# Patient Record
Sex: FEMALE | Race: WHITE | Employment: FULL TIME | URBAN - METROPOLITAN AREA
[De-identification: names, ages, dates, MRNs, and addresses within clinical notes are randomized per-mention and may not be internally consistent; named-entity substitution may affect disease eponyms.]

---

## 2017-01-12 ENCOUNTER — TELEPHONE (OUTPATIENT)
Dept: FAMILY MEDICINE CLINIC | Age: 47
End: 2017-01-12

## 2017-01-13 ENCOUNTER — TELEPHONE (OUTPATIENT)
Dept: FAMILY MEDICINE CLINIC | Age: 47
End: 2017-01-13

## 2017-01-13 DIAGNOSIS — G89.29 OTHER CHRONIC BACK PAIN: Primary | ICD-10-CM

## 2017-01-13 DIAGNOSIS — M54.9 OTHER CHRONIC BACK PAIN: Primary | ICD-10-CM

## 2017-01-19 PROBLEM — M48.061 SPINAL STENOSIS, LUMBAR REGION, WITHOUT NEUROGENIC CLAUDICATION: Chronic | Status: ACTIVE | Noted: 2017-01-19

## 2017-01-19 PROBLEM — M47.817 LUMBOSACRAL SPONDYLOSIS WITHOUT MYELOPATHY: Status: ACTIVE | Noted: 2017-01-19

## 2017-01-19 PROBLEM — M51.379 DEGENERATION OF LUMBAR OR LUMBOSACRAL INTERVERTEBRAL DISC: Chronic | Status: ACTIVE | Noted: 2017-01-19

## 2017-01-19 PROBLEM — M51.37 DEGENERATION OF LUMBAR OR LUMBOSACRAL INTERVERTEBRAL DISC: Chronic | Status: ACTIVE | Noted: 2017-01-19

## 2017-01-19 PROBLEM — M51.26 DISPLACEMENT OF LUMBAR INTERVERTEBRAL DISC WITHOUT MYELOPATHY: Chronic | Status: ACTIVE | Noted: 2017-01-19

## 2017-01-19 PROBLEM — M51.37 DEGENERATION OF LUMBAR OR LUMBOSACRAL INTERVERTEBRAL DISC: Status: ACTIVE | Noted: 2017-01-19

## 2017-01-19 PROBLEM — M51.379 DEGENERATION OF LUMBAR OR LUMBOSACRAL INTERVERTEBRAL DISC: Status: ACTIVE | Noted: 2017-01-19

## 2017-01-19 PROBLEM — M51.26 DISPLACEMENT OF LUMBAR INTERVERTEBRAL DISC WITHOUT MYELOPATHY: Status: ACTIVE | Noted: 2017-01-19

## 2017-01-19 PROBLEM — M47.817 LUMBOSACRAL SPONDYLOSIS WITHOUT MYELOPATHY: Chronic | Status: ACTIVE | Noted: 2017-01-19

## 2017-01-19 PROBLEM — M48.061 SPINAL STENOSIS, LUMBAR REGION, WITHOUT NEUROGENIC CLAUDICATION: Status: ACTIVE | Noted: 2017-01-19

## 2017-05-31 ENCOUNTER — OFFICE VISIT (OUTPATIENT)
Dept: FAMILY MEDICINE CLINIC | Age: 47
End: 2017-05-31

## 2017-05-31 VITALS
DIASTOLIC BLOOD PRESSURE: 62 MMHG | BODY MASS INDEX: 23.58 KG/M2 | WEIGHT: 137.4 LBS | TEMPERATURE: 99.1 F | SYSTOLIC BLOOD PRESSURE: 92 MMHG

## 2017-05-31 DIAGNOSIS — J01.01 ACUTE RECURRENT MAXILLARY SINUSITIS: Primary | ICD-10-CM

## 2017-05-31 PROCEDURE — 99213 OFFICE O/P EST LOW 20 MIN: CPT | Performed by: FAMILY MEDICINE

## 2017-05-31 RX ORDER — AMOXICILLIN AND CLAVULANATE POTASSIUM 875; 125 MG/1; MG/1
1 TABLET, FILM COATED ORAL EVERY 12 HOURS
Qty: 20 TABLET | Refills: 0 | Status: SHIPPED | OUTPATIENT
Start: 2017-05-31 | End: 2017-06-10

## 2017-05-31 ASSESSMENT — ENCOUNTER SYMPTOMS
FACIAL SWELLING: 1
SINUS PRESSURE: 1
SORE THROAT: 1
BACK PAIN: 1

## 2017-08-14 PROBLEM — M51.26 DISC DISPLACEMENT, LUMBAR: Status: ACTIVE | Noted: 2017-08-14

## 2017-08-14 PROBLEM — M48.061 LUMBAR STENOSIS: Status: ACTIVE | Noted: 2017-08-14

## 2017-10-16 ENCOUNTER — TELEPHONE (OUTPATIENT)
Dept: FAMILY MEDICINE CLINIC | Age: 47
End: 2017-10-16

## 2017-10-16 ENCOUNTER — OFFICE VISIT (OUTPATIENT)
Dept: FAMILY MEDICINE CLINIC | Age: 47
End: 2017-10-16

## 2017-10-16 VITALS
WEIGHT: 137 LBS | TEMPERATURE: 98.5 F | SYSTOLIC BLOOD PRESSURE: 108 MMHG | DIASTOLIC BLOOD PRESSURE: 68 MMHG | BODY MASS INDEX: 23.52 KG/M2

## 2017-10-16 DIAGNOSIS — J01.00 ACUTE MAXILLARY SINUSITIS, RECURRENCE NOT SPECIFIED: Primary | ICD-10-CM

## 2017-10-16 DIAGNOSIS — M51.26 DISC DISPLACEMENT, LUMBAR: ICD-10-CM

## 2017-10-16 PROCEDURE — 99213 OFFICE O/P EST LOW 20 MIN: CPT | Performed by: FAMILY MEDICINE

## 2017-10-16 RX ORDER — AMOXICILLIN AND CLAVULANATE POTASSIUM 875; 125 MG/1; MG/1
1 TABLET, FILM COATED ORAL EVERY 12 HOURS
Qty: 20 TABLET | Refills: 0 | Status: SHIPPED | OUTPATIENT
Start: 2017-10-16 | End: 2017-10-26

## 2017-10-16 RX ORDER — AMOXICILLIN AND CLAVULANATE POTASSIUM 875; 125 MG/1; MG/1
1 TABLET, FILM COATED ORAL EVERY 12 HOURS
Qty: 20 TABLET | Refills: 0 | Status: SHIPPED | OUTPATIENT
Start: 2017-10-16 | End: 2017-10-16 | Stop reason: SDUPTHER

## 2017-10-16 ASSESSMENT — ENCOUNTER SYMPTOMS
BACK PAIN: 1
DIARRHEA: 0
NAUSEA: 0
VOMITING: 0
SINUS PRESSURE: 1
COUGH: 1
SINUS PAIN: 1
RHINORRHEA: 1

## 2017-10-16 ASSESSMENT — PATIENT HEALTH QUESTIONNAIRE - PHQ9
1. LITTLE INTEREST OR PLEASURE IN DOING THINGS: 0
SUM OF ALL RESPONSES TO PHQ QUESTIONS 1-9: 0
2. FEELING DOWN, DEPRESSED OR HOPELESS: 0
SUM OF ALL RESPONSES TO PHQ9 QUESTIONS 1 & 2: 0

## 2017-10-16 NOTE — PROGRESS NOTES
Subjective:      Patient ID: Sandra Colin is a 40 y.o. female. HPI  Head ,chest ,stuffy ,runny nose  Mucous with cough + yellow  Fever last a week ago   OTC Tylenol sinus + Sudafed     Review of Systems   Constitutional:        Loss of appetite   HENT: Positive for congestion, postnasal drip, rhinorrhea, sinus pain and sinus pressure. Respiratory: Positive for cough. Gastrointestinal: Negative for diarrhea, nausea and vomiting. Genitourinary:        BCP continuous to skip cycle   Musculoskeletal: Positive for back pain. Back pain resolved   Chiropractor care   She didn't want Epidural    Psychiatric/Behavioral:        Sleep is poor due cough       Objective:   Physical Exam   Constitutional: She is oriented to person, place, and time. She appears well-developed and well-nourished. No distress. HENT:   Head: Normocephalic. Right Ear: External ear normal.   Left Ear: External ear normal.   Nose: Nose normal.   Mouth/Throat: Oropharynx is clear and moist. No oropharyngeal exudate. Eyes: Conjunctivae and EOM are normal. Pupils are equal, round, and reactive to light. Neck: Normal range of motion. Neck supple. Cardiovascular: Normal rate, regular rhythm and normal heart sounds. No murmur heard. Pulmonary/Chest: Effort normal and breath sounds normal. No respiratory distress. She has no wheezes. Musculoskeletal: Normal range of motion. She exhibits no edema. Neurological: She is alert and oriented to person, place, and time. Skin: Skin is warm. No rash noted. She is not diaphoretic. Psychiatric: Her behavior is normal. Judgment and thought content normal.   Vitals reviewed. /68   Temp 98.5 °F (36.9 °C) (Oral)   Wt 137 lb (62.1 kg)   BMI 23.52 kg/m²       Assessment:      1. Acute maxillary sinusitis, recurrence not specified  amoxicillin-clavulanate (AUGMENTIN) 875-125 MG per tablet   2.  Disc displacement, lumbar             Plan:      Back pain stable she couldn't continue with pain specialist due insurance issues & she declined Epidural  Rx

## 2017-10-16 NOTE — TELEPHONE ENCOUNTER
Pt states that Augmentin needs sent to [place]. It is cheaper for her if it is sent there.     Last office visit 10/16/17

## 2017-11-01 ENCOUNTER — TELEPHONE (OUTPATIENT)
Dept: FAMILY MEDICINE CLINIC | Age: 47
End: 2017-11-01

## 2017-11-01 DIAGNOSIS — M51.26 DISPLACEMENT OF LUMBAR INTERVERTEBRAL DISC WITHOUT MYELOPATHY: Chronic | ICD-10-CM

## 2017-11-01 DIAGNOSIS — M48.061 SPINAL STENOSIS, LUMBAR REGION, WITHOUT NEUROGENIC CLAUDICATION: Chronic | ICD-10-CM

## 2017-11-01 DIAGNOSIS — M51.37 DEGENERATION OF LUMBAR OR LUMBOSACRAL INTERVERTEBRAL DISC: Chronic | ICD-10-CM

## 2017-11-01 DIAGNOSIS — M47.817 LUMBOSACRAL SPONDYLOSIS WITHOUT MYELOPATHY: Chronic | ICD-10-CM

## 2017-11-07 ENCOUNTER — TELEPHONE (OUTPATIENT)
Dept: PAIN MANAGEMENT | Age: 47
End: 2017-11-07

## 2017-12-28 ENCOUNTER — OFFICE VISIT (OUTPATIENT)
Dept: PAIN MANAGEMENT | Age: 47
End: 2017-12-28

## 2017-12-28 VITALS
DIASTOLIC BLOOD PRESSURE: 69 MMHG | OXYGEN SATURATION: 99 % | SYSTOLIC BLOOD PRESSURE: 117 MMHG | HEIGHT: 64 IN | BODY MASS INDEX: 23.9 KG/M2 | WEIGHT: 140 LBS | HEART RATE: 71 BPM

## 2017-12-28 DIAGNOSIS — M51.37 DEGENERATION OF LUMBAR OR LUMBOSACRAL INTERVERTEBRAL DISC: Chronic | ICD-10-CM

## 2017-12-28 DIAGNOSIS — M47.817 LUMBOSACRAL SPONDYLOSIS WITHOUT MYELOPATHY: Chronic | ICD-10-CM

## 2017-12-28 DIAGNOSIS — F51.01 PRIMARY INSOMNIA: ICD-10-CM

## 2017-12-28 DIAGNOSIS — G89.29 CHRONIC BILATERAL LOW BACK PAIN WITH LEFT-SIDED SCIATICA: ICD-10-CM

## 2017-12-28 DIAGNOSIS — G89.4 CHRONIC PAIN SYNDROME: Primary | ICD-10-CM

## 2017-12-28 DIAGNOSIS — R53.83 FATIGUE, UNSPECIFIED TYPE: ICD-10-CM

## 2017-12-28 DIAGNOSIS — M54.42 CHRONIC BILATERAL LOW BACK PAIN WITH LEFT-SIDED SCIATICA: ICD-10-CM

## 2017-12-28 DIAGNOSIS — M54.16 LUMBAR NERVE ROOT IMPINGEMENT: ICD-10-CM

## 2017-12-28 DIAGNOSIS — F32.A DEPRESSION, UNSPECIFIED DEPRESSION TYPE: ICD-10-CM

## 2017-12-28 DIAGNOSIS — M51.26 DISC DISPLACEMENT, LUMBAR: ICD-10-CM

## 2017-12-28 PROCEDURE — 99244 OFF/OP CNSLTJ NEW/EST MOD 40: CPT | Performed by: NURSE PRACTITIONER

## 2017-12-28 RX ORDER — OXYCODONE HYDROCHLORIDE AND ACETAMINOPHEN 5; 325 MG/1; MG/1
1 TABLET ORAL 2 TIMES DAILY PRN
Qty: 60 TABLET | Refills: 0 | Status: SHIPPED | OUTPATIENT
Start: 2017-12-28 | End: 2018-01-25 | Stop reason: SDUPTHER

## 2017-12-28 RX ORDER — SPIRONOLACTONE 100 MG/1
100 TABLET, FILM COATED ORAL DAILY
COMMUNITY
End: 2020-07-01 | Stop reason: CLARIF

## 2017-12-28 RX ORDER — DULOXETIN HYDROCHLORIDE 30 MG/1
30 CAPSULE, DELAYED RELEASE ORAL DAILY
Qty: 30 CAPSULE | Refills: 0 | Status: SHIPPED | OUTPATIENT
Start: 2017-12-28 | End: 2018-01-25 | Stop reason: SDUPTHER

## 2017-12-28 RX ORDER — DROSPIRENONE AND ETHINYL ESTRADIOL 0.03MG-3MG
1 KIT ORAL DAILY
COMMUNITY
End: 2020-07-01 | Stop reason: CLARIF

## 2017-12-28 RX ORDER — AMITRIPTYLINE HYDROCHLORIDE 25 MG/1
25 TABLET, FILM COATED ORAL NIGHTLY
Qty: 28 TABLET | Refills: 0 | Status: SHIPPED | OUTPATIENT
Start: 2017-12-28 | End: 2018-01-25 | Stop reason: SDUPTHER

## 2017-12-29 NOTE — PROGRESS NOTES
HISTORY OF PRESENT ILLNESS:  Ms. Katie Ford is a 56 y.o. female presents for consultation at the kind request of Dr. Fritz Jolley her primary care physician for chronic pain management. Her presenting problems are pain in the lower back that radiated more to the left buttock and leg, than the right lower extremity. She has also been evaluated by OB/GYN. Onset of pain in the lower back began about a year ago while making her bed. She believes she may have bended wrongly causing a strain on her lower back. Reports having failed to work the next day due to pain, and was treated at [facility] later the same day with Morphine, and muscle relaxer's. She was under the care of pain management with Dr. Mirta Vasquez, who was already her 's pain provider for about 1 year. Insurance coverage changed due to her recent divorce, forcing her to find another pain provider. Under Dr. Delma Butcher care she was mainly treated with hydrocodone and oxycodone. Denies having had PRASHANTH's or surgeries to the lumbar spine, and is not interested in these procedures at this point. Denies having had physical therapy. She was last prescribed oxycodone 2 months ago, and was able to stretch it until today's visit. She reports taking it only as needed to avoid getting dependent on pain pills. She rates the pain in the lower back and legs at 3/10, and mainly takes pain medications to allow her to work. She describes it as aching, burning, numbness, tingling, pins and needles to the left leg. Pain is greater in her lower back than legs. Pain is made worse by: sitting, bending, vacuuming, straining, yardwork. Activities that have been limited by pain that she otherwise tolerated well are exercising. Alternative therapies she has previously attempted are pain medicine, manipulation by chiropractor, ice/heat packs. Current treatment regimen has helped relieve about 50% of the pain.  Relieving factors of pain include pain medicine, heat pad, Pulmonary/Chest: Effort normal and breath sounds normal. No respiratory distress. Abdominal: Soft. Bowel sounds are normal. She exhibits no distension and no mass. There is no tenderness. There is no guarding. Musculoskeletal: She exhibits tenderness. She exhibits no edema. Right shoulder: Normal.        Right hip: She exhibits decreased range of motion (Tenderness with 70° flexion to the lumbar region) and tenderness. Left hip: She exhibits tenderness (Guarded lumber pain). She exhibits normal range of motion. Lumbar back: She exhibits tenderness and bony tenderness (Tenderness noted mainly with 60° flexion, 5° extension). She exhibits normal range of motion and no edema. Left upper leg: She exhibits tenderness. Left lower leg: She exhibits tenderness. Left foot: There is tenderness. Lymphadenopathy:     She has no cervical adenopathy. She has no axillary adenopathy. Neurological: She is alert and oriented to person, place, and time. She has normal strength and normal reflexes. No cranial nerve deficit or sensory deficit. She displays a negative Romberg sign. Coordination and gait normal.   Reflex Scores:       Patellar reflexes are 2+ on the right side and 2+ on the left side. Achilles reflexes are 2+ on the right side and 2+ on the left side. Skin: Skin is warm, dry and intact. No rash noted. No cyanosis. Nails show no clubbing. Psychiatric: Her speech is normal and behavior is normal. Her affect is blunt. She exhibits a depressed mood. MRI of the Lumbar spine 1/13/17:  1.  Small right lateral disc protrusion at the L2-3 level, extending into the neural foramen, resulting in right neural foraminal narrowing, impinging upon the exiting right L2 nerve root. 2.  No significant canal stenosis throughout the lumbar spine. /69   Pulse 71   Ht 5' 4\" (1.626 m)   Wt 140 lb (63.5 kg)   SpO2 99%   Breastfeeding?  No   BMI 24.03 kg/m²

## 2018-01-08 ENCOUNTER — TELEPHONE (OUTPATIENT)
Dept: PAIN MANAGEMENT | Age: 48
End: 2018-01-08

## 2018-01-08 NOTE — TELEPHONE ENCOUNTER
Following up on overdue results for the CBC, CMP and TSH ordered during her last office visit. Alexus Newton will review the need for testing with the patient at her next monthly office visit.

## 2018-01-15 ENCOUNTER — HOSPITAL ENCOUNTER (OUTPATIENT)
Dept: MAMMOGRAPHY | Age: 48
Discharge: HOME | End: 2018-01-15
Attending: FAMILY MEDICINE | Admitting: FAMILY MEDICINE

## 2018-01-15 DIAGNOSIS — Z12.31 VISIT FOR SCREENING MAMMOGRAM: ICD-10-CM

## 2018-01-25 ENCOUNTER — OFFICE VISIT (OUTPATIENT)
Dept: PAIN MANAGEMENT | Age: 48
End: 2018-01-25

## 2018-01-25 VITALS
DIASTOLIC BLOOD PRESSURE: 73 MMHG | SYSTOLIC BLOOD PRESSURE: 112 MMHG | OXYGEN SATURATION: 99 % | HEART RATE: 78 BPM | WEIGHT: 135 LBS | BODY MASS INDEX: 23.17 KG/M2

## 2018-01-25 DIAGNOSIS — F51.01 PRIMARY INSOMNIA: ICD-10-CM

## 2018-01-25 DIAGNOSIS — M51.37 DEGENERATION OF LUMBAR OR LUMBOSACRAL INTERVERTEBRAL DISC: Chronic | ICD-10-CM

## 2018-01-25 DIAGNOSIS — G89.4 CHRONIC PAIN SYNDROME: Primary | ICD-10-CM

## 2018-01-25 DIAGNOSIS — G89.29 CHRONIC BILATERAL LOW BACK PAIN WITH LEFT-SIDED SCIATICA: ICD-10-CM

## 2018-01-25 DIAGNOSIS — M48.061 SPINAL STENOSIS, LUMBAR REGION, WITHOUT NEUROGENIC CLAUDICATION: Chronic | ICD-10-CM

## 2018-01-25 DIAGNOSIS — F32.A DEPRESSION, UNSPECIFIED DEPRESSION TYPE: ICD-10-CM

## 2018-01-25 DIAGNOSIS — M54.16 LUMBAR NERVE ROOT IMPINGEMENT: ICD-10-CM

## 2018-01-25 DIAGNOSIS — M54.42 CHRONIC BILATERAL LOW BACK PAIN WITH LEFT-SIDED SCIATICA: ICD-10-CM

## 2018-01-25 DIAGNOSIS — M51.26 DISPLACEMENT OF LUMBAR INTERVERTEBRAL DISC WITHOUT MYELOPATHY: Chronic | ICD-10-CM

## 2018-01-25 PROCEDURE — 99213 OFFICE O/P EST LOW 20 MIN: CPT | Performed by: NURSE PRACTITIONER

## 2018-01-25 RX ORDER — AMITRIPTYLINE HYDROCHLORIDE 25 MG/1
25 TABLET, FILM COATED ORAL NIGHTLY
Qty: 28 TABLET | Refills: 1 | Status: SHIPPED | OUTPATIENT
Start: 2018-01-25 | End: 2018-03-08 | Stop reason: SDUPTHER

## 2018-01-25 RX ORDER — OXYCODONE HYDROCHLORIDE AND ACETAMINOPHEN 5; 325 MG/1; MG/1
1 TABLET ORAL 2 TIMES DAILY PRN
Qty: 60 TABLET | Refills: 0 | Status: SHIPPED | OUTPATIENT
Start: 2018-01-25 | End: 2018-03-08 | Stop reason: SDUPTHER

## 2018-01-25 RX ORDER — DULOXETIN HYDROCHLORIDE 30 MG/1
30 CAPSULE, DELAYED RELEASE ORAL DAILY
Qty: 30 CAPSULE | Refills: 1 | Status: SHIPPED | OUTPATIENT
Start: 2018-01-25 | End: 2018-03-08

## 2018-01-25 NOTE — PROGRESS NOTES
Elavil  -Opted for home exercises as physical therapy would be expensive under her insurance  -CBT techniques- relaxation therapies such as biofeedback, mindfulness based stress reduction, imagery, cognitive restructuring, problem solving discussed with patient  -Last UDS Consistent  -Return in about 4 weeks (around 2/22/2018). Current Outpatient Prescriptions   Medication Sig Dispense Refill    oxyCODONE-acetaminophen (PERCOCET) 5-325 MG per tablet Take 1 tablet by mouth 2 times daily as needed for Pain for up to 28 days. 60 tablet 0    DULoxetine (CYMBALTA) 30 MG extended release capsule Take 1 capsule by mouth daily 30 capsule 1    amitriptyline (ELAVIL) 25 MG tablet Take 1 tablet by mouth nightly 28 tablet 1    drospirenone-ethinyl estradiol (OCELLA) 3-0.03 MG TABS Take 1 tablet by mouth daily      spironolactone (ALDACTONE) 100 MG tablet Take 100 mg by mouth daily       No current facility-administered medications for this visit. I will continue her current medication regimen  which is part of the above treatment schedule. It has been helping with Ms. Lara's chronic  medical problems which for this visit include: The primary encounter diagnosis was Chronic pain syndrome. Diagnoses of Degeneration of lumbar or lumbosacral intervertebral disc, Displacement of lumbar intervertebral disc without myelopathy, Lumbar nerve root impingement, L2, Spinal stenosis, lumbar region, without neurogenic claudication, Chronic bilateral low back pain with left-sided sciatica, Depression, unspecified depression type, minimal, and Primary insomnia were also pertinent to this visit. Risks and benefits of the medications and other alternative treatments  including no treatment were discussed with the patient. The common side effects of these medications were also explained to the patient. Informed verbal consent was obtained.    Goals of current treatment regimen include improvement in pain, restoration of

## 2018-03-08 ENCOUNTER — OFFICE VISIT (OUTPATIENT)
Dept: PAIN MANAGEMENT | Age: 48
End: 2018-03-08

## 2018-03-08 VITALS — DIASTOLIC BLOOD PRESSURE: 82 MMHG | OXYGEN SATURATION: 100 % | HEART RATE: 77 BPM | SYSTOLIC BLOOD PRESSURE: 123 MMHG

## 2018-03-08 DIAGNOSIS — M48.061 SPINAL STENOSIS, LUMBAR REGION, WITHOUT NEUROGENIC CLAUDICATION: Chronic | ICD-10-CM

## 2018-03-08 DIAGNOSIS — G89.4 CHRONIC PAIN SYNDROME: Primary | ICD-10-CM

## 2018-03-08 DIAGNOSIS — M51.26 DISPLACEMENT OF LUMBAR INTERVERTEBRAL DISC WITHOUT MYELOPATHY: Chronic | ICD-10-CM

## 2018-03-08 DIAGNOSIS — M47.817 LUMBOSACRAL SPONDYLOSIS WITHOUT MYELOPATHY: Chronic | ICD-10-CM

## 2018-03-08 DIAGNOSIS — F51.01 PRIMARY INSOMNIA: ICD-10-CM

## 2018-03-08 DIAGNOSIS — K59.03 DRUG-INDUCED CONSTIPATION: ICD-10-CM

## 2018-03-08 DIAGNOSIS — M51.37 DEGENERATION OF LUMBAR OR LUMBOSACRAL INTERVERTEBRAL DISC: Chronic | ICD-10-CM

## 2018-03-08 DIAGNOSIS — M54.16 LUMBAR NERVE ROOT IMPINGEMENT: ICD-10-CM

## 2018-03-08 DIAGNOSIS — F32.A DEPRESSION, UNSPECIFIED DEPRESSION TYPE: ICD-10-CM

## 2018-03-08 PROCEDURE — 99213 OFFICE O/P EST LOW 20 MIN: CPT | Performed by: NURSE PRACTITIONER

## 2018-03-08 RX ORDER — POLYETHYLENE GLYCOL 3350 17 G/17G
17 POWDER, FOR SOLUTION ORAL DAILY
Qty: 510 G | Refills: 0 | Status: SHIPPED | OUTPATIENT
Start: 2018-03-08 | End: 2018-04-07

## 2018-03-08 RX ORDER — AMITRIPTYLINE HYDROCHLORIDE 25 MG/1
25 TABLET, FILM COATED ORAL NIGHTLY
Qty: 28 TABLET | Refills: 1 | Status: SHIPPED | OUTPATIENT
Start: 2018-03-08 | End: 2018-04-19 | Stop reason: SDUPTHER

## 2018-03-08 RX ORDER — OXYCODONE HYDROCHLORIDE AND ACETAMINOPHEN 5; 325 MG/1; MG/1
1 TABLET ORAL 2 TIMES DAILY PRN
Qty: 60 TABLET | Refills: 0 | Status: SHIPPED | OUTPATIENT
Start: 2018-03-08 | End: 2018-04-19 | Stop reason: SDUPTHER

## 2018-03-08 NOTE — PROGRESS NOTES
Cymbalta  -Denies the need for other adjuvant medications  -Opted for home exercises, Corrine exercises recommended  -CBT techniques- relaxation therapies such as biofeedback, mindfulness based stress reduction, imagery, cognitive restructuring, problem solving discussed with patient  -Last UDS consistent  -Return in about 4 weeks (around 4/5/2018). -[place] was obtained and reviewed  for the last one year and no indicators of drug misuse  were found. Any other controlled substance prescriptions  seen on the record have been accounted for, I am aware of the patient receiving these medications. Flavia Oliva [place] record will be rechecked as part of office protocol. Current Outpatient Prescriptions   Medication Sig Dispense Refill    amitriptyline (ELAVIL) 25 MG tablet Take 1 tablet by mouth nightly 28 tablet 1    oxyCODONE-acetaminophen (PERCOCET) 5-325 MG per tablet Take 1 tablet by mouth 2 times daily as needed for Pain for up to 28 days. 60 tablet 0    polyethylene glycol (MIRALAX) powder Take 17 g by mouth daily 510 g 0    drospirenone-ethinyl estradiol (OCELLA) 3-0.03 MG TABS Take 1 tablet by mouth daily      spironolactone (ALDACTONE) 100 MG tablet Take 100 mg by mouth daily       No current facility-administered medications for this visit. I will continue her current medication regimen  which is part of the above treatment schedule. It has been helping with Ms. Lara's chronic  medical problems which for this visit include: The primary encounter diagnosis was Chronic pain syndrome. Diagnoses of Degeneration of lumbar or lumbosacral intervertebral disc, Displacement of lumbar intervertebral disc without myelopathy, Lumbosacral spondylosis without myelopathy, Spinal stenosis, lumbar region, without neurogenic claudication, Lumbar nerve root impingement, L2, Depression, unspecified depression type, Primary insomnia, and Drug-induced constipation were also pertinent to this visit.    Risks and benefits

## 2018-04-19 ENCOUNTER — OFFICE VISIT (OUTPATIENT)
Dept: PAIN MANAGEMENT | Age: 48
End: 2018-04-19

## 2018-04-19 VITALS
HEART RATE: 69 BPM | WEIGHT: 137 LBS | BODY MASS INDEX: 23.52 KG/M2 | OXYGEN SATURATION: 100 % | DIASTOLIC BLOOD PRESSURE: 69 MMHG | SYSTOLIC BLOOD PRESSURE: 106 MMHG

## 2018-04-19 DIAGNOSIS — M54.16 LUMBAR NERVE ROOT IMPINGEMENT: ICD-10-CM

## 2018-04-19 DIAGNOSIS — M48.061 SPINAL STENOSIS, LUMBAR REGION, WITHOUT NEUROGENIC CLAUDICATION: Chronic | ICD-10-CM

## 2018-04-19 DIAGNOSIS — M47.817 LUMBOSACRAL SPONDYLOSIS WITHOUT MYELOPATHY: Chronic | ICD-10-CM

## 2018-04-19 DIAGNOSIS — M51.37 DEGENERATION OF LUMBAR OR LUMBOSACRAL INTERVERTEBRAL DISC: Chronic | ICD-10-CM

## 2018-04-19 DIAGNOSIS — F32.A DEPRESSION, UNSPECIFIED DEPRESSION TYPE: ICD-10-CM

## 2018-04-19 DIAGNOSIS — G89.4 CHRONIC PAIN SYNDROME: Primary | ICD-10-CM

## 2018-04-19 DIAGNOSIS — F51.01 PRIMARY INSOMNIA: ICD-10-CM

## 2018-04-19 DIAGNOSIS — K59.03 DRUG-INDUCED CONSTIPATION: ICD-10-CM

## 2018-04-19 PROCEDURE — 99213 OFFICE O/P EST LOW 20 MIN: CPT | Performed by: NURSE PRACTITIONER

## 2018-04-19 RX ORDER — OXYCODONE HYDROCHLORIDE AND ACETAMINOPHEN 5; 325 MG/1; MG/1
1 TABLET ORAL 2 TIMES DAILY PRN
Qty: 60 TABLET | Refills: 0 | Status: SHIPPED | OUTPATIENT
Start: 2018-04-19 | End: 2018-06-04 | Stop reason: SDUPTHER

## 2018-04-19 RX ORDER — AMITRIPTYLINE HYDROCHLORIDE 25 MG/1
25 TABLET, FILM COATED ORAL NIGHTLY
Qty: 28 TABLET | Refills: 1 | Status: SHIPPED | OUTPATIENT
Start: 2018-04-19 | End: 2018-06-04 | Stop reason: SDUPTHER

## 2018-06-04 ENCOUNTER — OFFICE VISIT (OUTPATIENT)
Dept: PAIN MANAGEMENT | Age: 48
End: 2018-06-04

## 2018-06-04 VITALS
BODY MASS INDEX: 23.52 KG/M2 | HEART RATE: 67 BPM | OXYGEN SATURATION: 99 % | WEIGHT: 137 LBS | SYSTOLIC BLOOD PRESSURE: 121 MMHG | DIASTOLIC BLOOD PRESSURE: 75 MMHG

## 2018-06-04 DIAGNOSIS — M47.817 LUMBOSACRAL SPONDYLOSIS WITHOUT MYELOPATHY: Chronic | ICD-10-CM

## 2018-06-04 DIAGNOSIS — G89.4 CHRONIC PAIN SYNDROME: Primary | ICD-10-CM

## 2018-06-04 DIAGNOSIS — F51.01 PRIMARY INSOMNIA: ICD-10-CM

## 2018-06-04 DIAGNOSIS — K59.03 DRUG-INDUCED CONSTIPATION: ICD-10-CM

## 2018-06-04 DIAGNOSIS — F32.A DEPRESSION, UNSPECIFIED DEPRESSION TYPE: ICD-10-CM

## 2018-06-04 DIAGNOSIS — M51.37 DEGENERATION OF LUMBAR OR LUMBOSACRAL INTERVERTEBRAL DISC: Chronic | ICD-10-CM

## 2018-06-04 DIAGNOSIS — M54.16 LUMBAR NERVE ROOT IMPINGEMENT: ICD-10-CM

## 2018-06-04 DIAGNOSIS — M48.061 SPINAL STENOSIS, LUMBAR REGION, WITHOUT NEUROGENIC CLAUDICATION: Chronic | ICD-10-CM

## 2018-06-04 PROCEDURE — 99213 OFFICE O/P EST LOW 20 MIN: CPT | Performed by: NURSE PRACTITIONER

## 2018-06-04 RX ORDER — AMITRIPTYLINE HYDROCHLORIDE 25 MG/1
25 TABLET, FILM COATED ORAL NIGHTLY
Qty: 28 TABLET | Refills: 1 | Status: SHIPPED | OUTPATIENT
Start: 2018-06-04 | End: 2018-07-16 | Stop reason: SDUPTHER

## 2018-06-04 RX ORDER — OXYCODONE HYDROCHLORIDE AND ACETAMINOPHEN 5; 325 MG/1; MG/1
1 TABLET ORAL 2 TIMES DAILY PRN
Qty: 60 TABLET | Refills: 0 | Status: SHIPPED | OUTPATIENT
Start: 2018-06-04 | End: 2018-07-16 | Stop reason: SDUPTHER

## 2018-07-16 ENCOUNTER — OFFICE VISIT (OUTPATIENT)
Dept: PAIN MANAGEMENT | Age: 48
End: 2018-07-16

## 2018-07-16 VITALS
HEART RATE: 67 BPM | BODY MASS INDEX: 23.86 KG/M2 | SYSTOLIC BLOOD PRESSURE: 106 MMHG | OXYGEN SATURATION: 100 % | DIASTOLIC BLOOD PRESSURE: 71 MMHG | WEIGHT: 139 LBS

## 2018-07-16 DIAGNOSIS — F32.A DEPRESSION, UNSPECIFIED DEPRESSION TYPE: ICD-10-CM

## 2018-07-16 DIAGNOSIS — M47.817 LUMBOSACRAL SPONDYLOSIS WITHOUT MYELOPATHY: Chronic | ICD-10-CM

## 2018-07-16 DIAGNOSIS — M54.16 LUMBAR NERVE ROOT IMPINGEMENT: ICD-10-CM

## 2018-07-16 DIAGNOSIS — F51.01 PRIMARY INSOMNIA: ICD-10-CM

## 2018-07-16 DIAGNOSIS — G89.4 CHRONIC PAIN SYNDROME: Primary | ICD-10-CM

## 2018-07-16 DIAGNOSIS — M48.061 SPINAL STENOSIS, LUMBAR REGION, WITHOUT NEUROGENIC CLAUDICATION: Chronic | ICD-10-CM

## 2018-07-16 DIAGNOSIS — K59.03 DRUG INDUCED CONSTIPATION: ICD-10-CM

## 2018-07-16 DIAGNOSIS — M51.37 DEGENERATION OF LUMBAR OR LUMBOSACRAL INTERVERTEBRAL DISC: Chronic | ICD-10-CM

## 2018-07-16 PROCEDURE — 99213 OFFICE O/P EST LOW 20 MIN: CPT | Performed by: NURSE PRACTITIONER

## 2018-07-16 RX ORDER — AMITRIPTYLINE HYDROCHLORIDE 25 MG/1
25 TABLET, FILM COATED ORAL NIGHTLY
Qty: 28 TABLET | Refills: 1 | Status: SHIPPED | OUTPATIENT
Start: 2018-07-16 | End: 2018-08-27 | Stop reason: SDUPTHER

## 2018-07-16 RX ORDER — OXYCODONE HYDROCHLORIDE AND ACETAMINOPHEN 5; 325 MG/1; MG/1
1 TABLET ORAL 2 TIMES DAILY PRN
Qty: 60 TABLET | Refills: 0 | Status: SHIPPED | OUTPATIENT
Start: 2018-07-16 | End: 2018-08-27 | Stop reason: SDUPTHER

## 2018-08-27 ENCOUNTER — OFFICE VISIT (OUTPATIENT)
Dept: PAIN MANAGEMENT | Age: 48
End: 2018-08-27

## 2018-08-27 VITALS
HEIGHT: 64 IN | HEART RATE: 63 BPM | BODY MASS INDEX: 23.22 KG/M2 | SYSTOLIC BLOOD PRESSURE: 116 MMHG | DIASTOLIC BLOOD PRESSURE: 72 MMHG | OXYGEN SATURATION: 100 % | WEIGHT: 136 LBS

## 2018-08-27 DIAGNOSIS — M51.37 DEGENERATION OF LUMBAR OR LUMBOSACRAL INTERVERTEBRAL DISC: Chronic | ICD-10-CM

## 2018-08-27 DIAGNOSIS — S16.1XXA STRAIN OF NECK MUSCLE, INITIAL ENCOUNTER: ICD-10-CM

## 2018-08-27 DIAGNOSIS — M54.16 LUMBAR NERVE ROOT IMPINGEMENT: ICD-10-CM

## 2018-08-27 DIAGNOSIS — M47.817 LUMBOSACRAL SPONDYLOSIS WITHOUT MYELOPATHY: Chronic | ICD-10-CM

## 2018-08-27 DIAGNOSIS — F51.01 PRIMARY INSOMNIA: ICD-10-CM

## 2018-08-27 DIAGNOSIS — M79.7 FIBROMYALGIA: Primary | ICD-10-CM

## 2018-08-27 DIAGNOSIS — M48.061 SPINAL STENOSIS, LUMBAR REGION, WITHOUT NEUROGENIC CLAUDICATION: Chronic | ICD-10-CM

## 2018-08-27 DIAGNOSIS — M25.522 LEFT ELBOW PAIN: ICD-10-CM

## 2018-08-27 DIAGNOSIS — K59.03 DRUG INDUCED CONSTIPATION: ICD-10-CM

## 2018-08-27 DIAGNOSIS — G89.4 CHRONIC PAIN SYNDROME: ICD-10-CM

## 2018-08-27 DIAGNOSIS — F32.A DEPRESSION, UNSPECIFIED DEPRESSION TYPE: ICD-10-CM

## 2018-08-27 PROCEDURE — 99214 OFFICE O/P EST MOD 30 MIN: CPT | Performed by: NURSE PRACTITIONER

## 2018-08-27 PROCEDURE — 20553 NJX 1/MLT TRIGGER POINTS 3/>: CPT | Performed by: NURSE PRACTITIONER

## 2018-08-27 RX ORDER — OXYCODONE HYDROCHLORIDE AND ACETAMINOPHEN 5; 325 MG/1; MG/1
1 TABLET ORAL 2 TIMES DAILY PRN
Qty: 60 TABLET | Refills: 0 | Status: SHIPPED | OUTPATIENT
Start: 2018-08-27 | End: 2018-09-24 | Stop reason: SDUPTHER

## 2018-08-27 RX ORDER — AMITRIPTYLINE HYDROCHLORIDE 25 MG/1
25 TABLET, FILM COATED ORAL NIGHTLY
Qty: 28 TABLET | Refills: 1 | Status: SHIPPED | OUTPATIENT
Start: 2018-08-27 | End: 2018-09-24 | Stop reason: SDUPTHER

## 2018-08-27 NOTE — PROGRESS NOTES
MEDICATIONS: Ms. Rere La list of medications were reviewed. Her current medications are   Outpatient Medications Prior to Visit   Medication Sig Dispense Refill    drospirenone-ethinyl estradiol (OCELLA) 3-0.03 MG TABS Take 1 tablet by mouth daily      spironolactone (ALDACTONE) 100 MG tablet Take 100 mg by mouth daily      amitriptyline (ELAVIL) 25 MG tablet Take 1 tablet by mouth nightly 28 tablet 1     No facility-administered medications prior to visit. SOCIAL/FAMILY/PAST MEDICAL HISTORY: Ms. Rere La social, family and past medical history was reviewed. REVIEW OF SYSTEMS:    Respiratory: Negative for apnea, chest tightness and shortness of breath or change in baseline breathing. Gastrointestinal: Negative for nausea, vomiting, abdominal pain, diarrhea, constipation, blood in stool and abdominal distention. PHYSICAL EXAM:   Nursing note and vitals reviewed. /72 (Site: Left Arm, Position: Sitting, Cuff Size: Medium Adult)   Pulse 63   Ht 5' 4\" (1.626 m)   Wt 136 lb (61.7 kg)   SpO2 100%   BMI 23.34 kg/m²   Constitutional: She appears well-developed and well-nourished. No acute distress. Skin: Skin is warm and dry, good turgor. No rash noted. She is not diaphoretic. Cardiovascular: Normal rate, regular rhythm, normal heart sounds, and does not have murmur. Pulmonary/Chest: Effort normal. No respiratory distress. She does not have wheezes in the lung fields. She has no rales. Neurological/Psychiatric:She is alert and oriented to person, place, and time. Coordination is  normal. Her mood isAppropriate and affect is Neutral/Euthymic(normal) . IMPRESSION:   1. Chronic pain syndrome    2. Strain of neck muscle, initial encounter    3. Left elbow pain    4. Degeneration of lumbar or lumbosacral intervertebral disc    5. Lumbosacral spondylosis without myelopathy    6. Spinal stenosis, lumbar region, without neurogenic claudication    7. Lumbar nerve root impingement, L2    8. work  Sit through a workday without lower extremity symptoms. Stand 30-60 minutes without lower extremity symptoms. Ability to lift up to 10-20 lbs. Ability to go up and down stairs. Sit 30-60 minutes  Without having to stand up frequently. - she is maintaining/progressing towards her work related goals with the current regimen. She was advised against drinking alcohol with the narcotic pain medicines, advised against driving or handling machinery while adjusting the dose of medicines or if having cognitive  issues related to the current medications. Risk of overdose and death, if medicines not taken as prescribed, were also discussed. If the patient develops new symptoms or if the symptoms worsen, the patient should call the office. While transcribing every attempt was made to maintain the accuracy of the note in terms of it's contents,there may have been some errors made inadvertently. Thank you for allowing me to participate in the care of this patient. Teodoro Craig CNP.     Cc: Sheila Andrews MD

## 2018-08-27 NOTE — PATIENT INSTRUCTIONS
steady. Hold for about 6 seconds as you continue to breathe normally. 7. Slowly lower your hips back down to the floor. 8. Repeat 8 to 12 times. Ball curls with bridge    1. Start flat on your back with your ankles resting on the ball. 2. Look up at the ceiling, and keep your chin relaxed. You can place a small pillow under your head or neck for comfort. 3. With your arms by your side, press your hands onto the floor for stability. 4. Tighten your belly muscles by pulling in your belly button toward your spine. 5. Push your heels down toward the floor, squeeze your buttocks, and lift your hips off the floor until your shoulders, hips, and knees are all in a straight line. 6. While holding the bridge position, roll the ball toward you with your heels. Keep your hips as level as you can. 7. Pause briefly, and then roll the ball back out. Try to keep the ball rolling straight. You will feel the muscles in your lower belly working as you straighten your legs. 8. Lower your hips, and return to your starting position. 9. Repeat 8 to 12 times. 10. When you can keep your body and the ball steady throughout this exercise, you're ready for more challenge. Try keeping your hips raised while rolling the ball out, holding the bridge, and rolling back, a few times in a row. Praying criselda    1. Kneel upright with the ball in front of you. 2. To start, clasp your hands together. Rest them on the ball in front of you. 3. As you do this exercise, keep your back and hips straight and tighten your belly and buttocks muscles. Keep your knees in place. 4. Press on the ball with your arms. Lean forward from the knees. This rolls the ball forward. You will bear most of your weight on your arms. 5. If your back starts to ache, you've gone too far. Pull back a bit. 6. Roll back to the start position. 7. Repeat 8 to 12 times. Walk-out plank on ball    1. Kneel over the ball.  Place your hands on the floor in front of times.  Child's pose with ball    1. Kneeling upright with your back straight, rest your hands on the ball in front of you. 2. Breathe out as you bend at the hips, and roll the ball forward. Lower your chest toward the ground, and drop your hips back toward your heels. 3. To stretch your upper back and shoulders, hold this position for 15 to 30 seconds. 4. Repeat 2 to 4 times. Follow-up care is a key part of your treatment and safety. Be sure to make and go to all appointments, and call your doctor if you are having problems. It's also a good idea to know your test results and keep a list of the medicines you take. Where can you learn more? Go to https://Laser Light Engines.CPUsage. org and sign in to your [employer] account. Enter C013 in the [employer] box to learn more about \"Therapeutic Ball: Back Exercises. \"     If you do not have an account, please click on the \"Sign Up Now\" link. Current as of: November 29, 2017  Content Version: 11.7  © 5214-3841 [email or web address], [employer]. Care instructions adapted under license by [facility] ([facility]). If you have questions about a medical condition or this instruction, always ask your healthcare professional. [address] any warranty or liability for your use of this information. Patient Education        Neck Arthritis: Exercises  Your Care Instructions  Here are some examples of typical rehabilitation exercises for your condition. Start each exercise slowly. Ease off the exercise if you start to have pain. Your doctor or physical therapist will tell you when you can start these exercises and which ones will work best for you. How to do the exercises  Neck stretches to the side    8. This stretch works best if you keep your shoulder down as you lean away from it. To help you remember to do this, start by relaxing your shoulders and lightly holding on to your thighs or your chair.   9. Tilt your head toward your shoulder and hold for 15 to 30 seconds. Let the weight of your head stretch your muscles. 10. Repeat 2 to 4 times toward each shoulder. Chin tuck    5. Lie on the floor with a rolled-up towel under your neck. Your head should be touching the floor. 6. Slowly bring your chin toward your chest.  7. Hold for a count of 6, and then relax for up to 10 seconds. 8. Repeat 8 to 12 times. Active cervical rotation    6. Sit in a firm chair, or stand up straight. 7. Keeping your chin level, turn your head to the right, and hold for 15 to 30 seconds. 8. Turn your head to the left and hold for 15 to 30 seconds. 9. Repeat 2 to 4 times to each side. Shoulder blade squeeze    5. While standing, squeeze your shoulder blades together. 6. Do not raise your shoulders up as you are squeezing. 7. Hold for 6 seconds. 8. Repeat 8 to 12 times. Shoulder rolls    6. Sit comfortably with your feet shoulder-width apart. You can also do this exercise standing up. 7. Roll your shoulders up, then back, and then down in a smooth, circular motion. 8. Repeat 2 to 4 times. Follow-up care is a key part of your treatment and safety. Be sure to make and go to all appointments, and call your doctor if you are having problems. It's also a good idea to know your test results and keep a list of the medicines you take. Where can you learn more? Go to https://AorTx.World Blender. org and sign in to your [email or web address] account. Enter S603 in the [facility] box to learn more about \"Neck Arthritis: Exercises. \"     If you do not have an account, please click on the \"Sign Up Now\" link. Current as of: November 29, 2017  Content Version: 11.7  © 7073-2926 [employer]. Care instructions adapted under license by [facility] [employer] [facility] ([facility]). If you have questions about a medical condition or this instruction, always ask your healthcare professional. [place] [address] any warranty or liability for your use of this information.

## 2018-08-28 ENCOUNTER — HOSPITAL ENCOUNTER (OUTPATIENT)
Dept: OTHER | Age: 48
Discharge: HOME | End: 2018-08-28
Attending: NURSE PRACTITIONER | Admitting: NURSE PRACTITIONER

## 2018-08-28 DIAGNOSIS — G89.4 CHRONIC PAIN SYNDROME: ICD-10-CM

## 2018-08-28 DIAGNOSIS — S16.1XXA STRAIN OF NECK MUSCLE, INITIAL ENCOUNTER: ICD-10-CM

## 2018-09-05 RX ORDER — TRIAMCINOLONE ACETONIDE 40 MG/ML
40 INJECTION, SUSPENSION INTRA-ARTICULAR; INTRAMUSCULAR ONCE
Status: COMPLETED | OUTPATIENT
Start: 2018-09-05 | End: 2018-09-05

## 2018-09-05 RX ADMIN — TRIAMCINOLONE ACETONIDE 40 MG: 40 INJECTION, SUSPENSION INTRA-ARTICULAR; INTRAMUSCULAR at 14:49

## 2018-09-24 ENCOUNTER — OFFICE VISIT (OUTPATIENT)
Dept: PAIN MANAGEMENT | Age: 48
End: 2018-09-24
Payer: COMMERCIAL

## 2018-09-24 VITALS
HEART RATE: 69 BPM | SYSTOLIC BLOOD PRESSURE: 115 MMHG | WEIGHT: 138 LBS | BODY MASS INDEX: 23.69 KG/M2 | OXYGEN SATURATION: 98 % | DIASTOLIC BLOOD PRESSURE: 72 MMHG

## 2018-09-24 DIAGNOSIS — F51.01 PRIMARY INSOMNIA: ICD-10-CM

## 2018-09-24 DIAGNOSIS — M47.817 LUMBOSACRAL SPONDYLOSIS WITHOUT MYELOPATHY: Chronic | ICD-10-CM

## 2018-09-24 DIAGNOSIS — R05.9 COUGH: ICD-10-CM

## 2018-09-24 DIAGNOSIS — G89.4 CHRONIC PAIN SYNDROME: Primary | ICD-10-CM

## 2018-09-24 DIAGNOSIS — M54.16 LUMBAR NERVE ROOT IMPINGEMENT: ICD-10-CM

## 2018-09-24 DIAGNOSIS — K59.03 DRUG INDUCED CONSTIPATION: ICD-10-CM

## 2018-09-24 DIAGNOSIS — M51.37 DEGENERATION OF LUMBAR OR LUMBOSACRAL INTERVERTEBRAL DISC: Chronic | ICD-10-CM

## 2018-09-24 DIAGNOSIS — F32.A DEPRESSION, UNSPECIFIED DEPRESSION TYPE: ICD-10-CM

## 2018-09-24 DIAGNOSIS — M48.061 SPINAL STENOSIS, LUMBAR REGION, WITHOUT NEUROGENIC CLAUDICATION: Chronic | ICD-10-CM

## 2018-09-24 DIAGNOSIS — M25.522 LEFT ELBOW PAIN: ICD-10-CM

## 2018-09-24 PROBLEM — S16.1XXA STRAIN OF NECK MUSCLE: Status: ACTIVE | Noted: 2018-09-24

## 2018-09-24 PROBLEM — S16.1XXA: Status: ACTIVE | Noted: 2018-09-24

## 2018-09-24 PROCEDURE — 99213 OFFICE O/P EST LOW 20 MIN: CPT | Performed by: NURSE PRACTITIONER

## 2018-09-24 RX ORDER — OXYCODONE HYDROCHLORIDE AND ACETAMINOPHEN 5; 325 MG/1; MG/1
1 TABLET ORAL 2 TIMES DAILY PRN
Qty: 60 TABLET | Refills: 0 | Status: SHIPPED | OUTPATIENT
Start: 2018-09-24 | End: 2018-10-22 | Stop reason: SDUPTHER

## 2018-09-24 RX ORDER — AMITRIPTYLINE HYDROCHLORIDE 25 MG/1
25 TABLET, FILM COATED ORAL NIGHTLY
Qty: 28 TABLET | Refills: 1 | Status: SHIPPED | OUTPATIENT
Start: 2018-09-24 | End: 2018-11-19 | Stop reason: SDUPTHER

## 2018-09-24 RX ORDER — METHYLPREDNISOLONE 4 MG/1
TABLET ORAL
Qty: 1 KIT | Refills: 0 | Status: SHIPPED | OUTPATIENT
Start: 2018-09-24 | End: 2018-09-30

## 2018-09-24 NOTE — PROGRESS NOTES
of allergies were reviewed     MEDICATIONS: Ms. Terese Robertson list of medications were reviewed. Her current medications are   Outpatient Medications Prior to Visit   Medication Sig Dispense Refill    drospirenone-ethinyl estradiol (OCELLA) 3-0.03 MG TABS Take 1 tablet by mouth daily      spironolactone (ALDACTONE) 100 MG tablet Take 100 mg by mouth daily      oxyCODONE-acetaminophen (PERCOCET) 5-325 MG per tablet Take 1 tablet by mouth 2 times daily as needed for Pain for up to 30 days. . 60 tablet 0    amitriptyline (ELAVIL) 25 MG tablet Take 1 tablet by mouth nightly 28 tablet 1     No facility-administered medications prior to visit. SOCIAL/FAMILY/PAST MEDICAL HISTORY: Ms. Terese Robertson social, family and past medical history was reviewed. REVIEW OF SYSTEMS:    Respiratory: Negative for apnea, chest tightness and shortness of breath or change in baseline breathing. Gastrointestinal: Negative for nausea, vomiting, abdominal pain, diarrhea, constipation, blood in stool and abdominal distention. PHYSICAL EXAM:   Nursing note and vitals reviewed. /72   Pulse 69   Wt 138 lb (62.6 kg)   SpO2 98%   BMI 23.69 kg/m²   Constitutional: She appears well-developed and well-nourished. No acute distress. Skin: Skin is warm and dry, good turgor. No rash noted. She is not diaphoretic. Cardiovascular: Normal rate, regular rhythm, normal heart sounds, and does not have murmur. Pulmonary/Chest: Effort normal. No respiratory distress. She does not have wheezes in the lung fields. She has no rales. Neurological/Psychiatric:She is alert and oriented to person, place, and time. Coordination is  Normal. Tenderness noted with palpation of the left elbow, negative for deformity. ROM. Her mood isAppropriate and affect is Neutral/Euthymic(normal) . IMPRESSION:   1. Chronic pain syndrome    2. Left elbow pain    3. Degeneration of lumbar or lumbosacral intervertebral disc    4.  Lumbosacral spondylosis without myelopathy    5. Spinal stenosis, lumbar region, without neurogenic claudication    6. Lumbar nerve root impingement, L2    7. Depression, unspecified depression type    8. Primary insomnia    9. Drug induced constipation    10. Cough        PLAN:  Informed verbal consent was obtained  -Continue with Percocet  -Medrol dose pack  -Declines Xray noting it could mainly be inflamation. Call the office if pain persist/worsen  -F/u with PCP is symptoms of cough/cold persist  -CBT techniques- relaxation therapies such as biofeedback, mindfulness based stress reduction, imagery, cognitive restructuring, problem solving discussed with patient  -Last UDS consistemt  -Return in about 4 weeks (around 10/22/2018). Current Outpatient Prescriptions   Medication Sig Dispense Refill    oxyCODONE-acetaminophen (PERCOCET) 5-325 MG per tablet Take 1 tablet by mouth 2 times daily as needed for Pain for up to 30 days. . 60 tablet 0    amitriptyline (ELAVIL) 25 MG tablet Take 1 tablet by mouth nightly 28 tablet 1    methylPREDNISolone (MEDROL DOSEPACK) 4 MG tablet Take by mouth. 1 kit 0    drospirenone-ethinyl estradiol (OCELLA) 3-0.03 MG TABS Take 1 tablet by mouth daily      spironolactone (ALDACTONE) 100 MG tablet Take 100 mg by mouth daily       No current facility-administered medications for this visit. I will continue her current medication regimen  which is part of the above treatment schedule. It has been helping with Ms. Lara's chronic  medical problems which for this visit include: The primary encounter diagnosis was Chronic pain syndrome. Diagnoses of Left elbow pain, Degeneration of lumbar or lumbosacral intervertebral disc, Lumbosacral spondylosis without myelopathy, Spinal stenosis, lumbar region, without neurogenic claudication, Lumbar nerve root impingement, L2, Depression, unspecified depression type, Primary insomnia, Drug induced constipation, and Cough were also pertinent to this visit.    Risks and allowing me to participate in the care of this patient. Justino Millan. Faisal RENDON.     Cc: Reji Bundy MD

## 2018-10-22 ENCOUNTER — OFFICE VISIT (OUTPATIENT)
Dept: PAIN MANAGEMENT | Age: 48
End: 2018-10-22
Payer: COMMERCIAL

## 2018-10-22 VITALS
HEART RATE: 69 BPM | WEIGHT: 133.6 LBS | OXYGEN SATURATION: 100 % | BODY MASS INDEX: 22.93 KG/M2 | SYSTOLIC BLOOD PRESSURE: 119 MMHG | DIASTOLIC BLOOD PRESSURE: 78 MMHG

## 2018-10-22 DIAGNOSIS — M47.817 LUMBOSACRAL SPONDYLOSIS WITHOUT MYELOPATHY: Chronic | ICD-10-CM

## 2018-10-22 DIAGNOSIS — M54.16 LUMBAR NERVE ROOT IMPINGEMENT: ICD-10-CM

## 2018-10-22 DIAGNOSIS — M48.061 SPINAL STENOSIS, LUMBAR REGION, WITHOUT NEUROGENIC CLAUDICATION: Chronic | ICD-10-CM

## 2018-10-22 DIAGNOSIS — G89.4 CHRONIC PAIN SYNDROME: Primary | ICD-10-CM

## 2018-10-22 DIAGNOSIS — F51.01 PRIMARY INSOMNIA: ICD-10-CM

## 2018-10-22 DIAGNOSIS — M25.522 LEFT ELBOW PAIN: ICD-10-CM

## 2018-10-22 DIAGNOSIS — K59.03 DRUG INDUCED CONSTIPATION: ICD-10-CM

## 2018-10-22 DIAGNOSIS — M51.37 DEGENERATION OF LUMBAR OR LUMBOSACRAL INTERVERTEBRAL DISC: Chronic | ICD-10-CM

## 2018-10-22 DIAGNOSIS — M54.2 CERVICALGIA: ICD-10-CM

## 2018-10-22 DIAGNOSIS — F32.A DEPRESSION, UNSPECIFIED DEPRESSION TYPE: ICD-10-CM

## 2018-10-22 PROBLEM — R05.9 COUGH: Status: ACTIVE | Noted: 2018-10-22

## 2018-10-22 PROCEDURE — 99213 OFFICE O/P EST LOW 20 MIN: CPT | Performed by: NURSE PRACTITIONER

## 2018-10-22 RX ORDER — OXYCODONE HYDROCHLORIDE AND ACETAMINOPHEN 5; 325 MG/1; MG/1
1 TABLET ORAL 2 TIMES DAILY PRN
Qty: 60 TABLET | Refills: 0 | Status: SHIPPED | OUTPATIENT
Start: 2018-10-22 | End: 2018-11-19 | Stop reason: SDUPTHER

## 2018-10-22 NOTE — PROGRESS NOTES
2. Left elbow pain    3. Degeneration of lumbar or lumbosacral intervertebral disc    4. Lumbosacral spondylosis without myelopathy    5. Spinal stenosis, lumbar region, without neurogenic claudication    6. Lumbar nerve root impingement, L2    7. Depression, unspecified depression type    8. Primary insomnia    9. Drug induced constipation        PLAN:  Informed verbal consent was obtained  -Continue with Percocet, Elavil  -Corrine exercises  -Obtain Xray of the Left elbow  -CBT techniques- relaxation therapies such as biofeedback, mindfulness based stress reduction, imagery, cognitive restructuring, problem solving discussed with patient  -Last UDS consistent  -Return in about 4 weeks (around 11/19/2018). Current Outpatient Prescriptions   Medication Sig Dispense Refill    oxyCODONE-acetaminophen (PERCOCET) 5-325 MG per tablet Take 1 tablet by mouth 2 times daily as needed for Pain for up to 30 days. . 60 tablet 0    amitriptyline (ELAVIL) 25 MG tablet Take 1 tablet by mouth nightly 28 tablet 1    drospirenone-ethinyl estradiol (OCELLA) 3-0.03 MG TABS Take 1 tablet by mouth daily      spironolactone (ALDACTONE) 100 MG tablet Take 100 mg by mouth daily       No current facility-administered medications for this visit. I will continue her current medication regimen  which is part of the above treatment schedule. It has been helping with Ms. Lara's chronic  medical problems which for this visit include:   Diagnoses of Chronic pain syndrome, Left elbow pain, Degeneration of lumbar or lumbosacral intervertebral disc, Lumbosacral spondylosis without myelopathy, Spinal stenosis, lumbar region, without neurogenic claudication, Lumbar nerve root impingement, L2, Depression, unspecified depression type, Primary insomnia, and Drug induced constipation were pertinent to this visit.    Risks and benefits of the medications and other alternative treatments  including no treatment were discussed with the

## 2018-10-22 NOTE — PATIENT INSTRUCTIONS
[email or web address] and sign in to your [email or web address] account. Enter Z178 in the [employer] box to learn more about \"Back Stretches: Exercises. \"     If you do not have an account, please click on the \"Sign Up Now\" link. Current as of: November 29, 2017  Content Version: 11.7  © 3962-8461 "[employer]. Care instructions adapted under license by [facility] ([facility]). If you have questions about a medical condition or this instruction, always ask your healthcare professional. [address] any warranty or liability for your use of this information.

## 2018-10-29 ENCOUNTER — HOSPITAL ENCOUNTER (OUTPATIENT)
Dept: GENERAL RADIOLOGY | Age: 48
Discharge: HOME | End: 2018-10-29
Payer: COMMERCIAL

## 2018-10-29 ENCOUNTER — HOSPITAL ENCOUNTER (OUTPATIENT)
Age: 48
Discharge: HOME | End: 2018-10-29
Payer: COMMERCIAL

## 2018-10-29 DIAGNOSIS — M25.522 LEFT ELBOW PAIN: ICD-10-CM

## 2018-10-29 DIAGNOSIS — G89.4 CHRONIC PAIN SYNDROME: ICD-10-CM

## 2018-10-29 PROCEDURE — 73070 X-RAY EXAM OF ELBOW: CPT

## 2018-11-19 ENCOUNTER — OFFICE VISIT (OUTPATIENT)
Dept: PAIN MANAGEMENT | Age: 48
End: 2018-11-19
Payer: COMMERCIAL

## 2018-11-19 VITALS
HEART RATE: 76 BPM | SYSTOLIC BLOOD PRESSURE: 116 MMHG | BODY MASS INDEX: 22.69 KG/M2 | WEIGHT: 132.2 LBS | OXYGEN SATURATION: 99 % | DIASTOLIC BLOOD PRESSURE: 72 MMHG

## 2018-11-19 DIAGNOSIS — M54.16 LUMBAR NERVE ROOT IMPINGEMENT: ICD-10-CM

## 2018-11-19 DIAGNOSIS — G89.4 CHRONIC PAIN SYNDROME: ICD-10-CM

## 2018-11-19 DIAGNOSIS — F51.01 PRIMARY INSOMNIA: ICD-10-CM

## 2018-11-19 DIAGNOSIS — K59.03 DRUG INDUCED CONSTIPATION: ICD-10-CM

## 2018-11-19 DIAGNOSIS — M47.817 LUMBOSACRAL SPONDYLOSIS WITHOUT MYELOPATHY: Chronic | ICD-10-CM

## 2018-11-19 DIAGNOSIS — M51.37 DEGENERATION OF LUMBAR OR LUMBOSACRAL INTERVERTEBRAL DISC: Chronic | ICD-10-CM

## 2018-11-19 DIAGNOSIS — M25.522 LEFT ELBOW PAIN: ICD-10-CM

## 2018-11-19 DIAGNOSIS — F32.A DEPRESSION, UNSPECIFIED DEPRESSION TYPE: ICD-10-CM

## 2018-11-19 DIAGNOSIS — M48.061 SPINAL STENOSIS, LUMBAR REGION, WITHOUT NEUROGENIC CLAUDICATION: Chronic | ICD-10-CM

## 2018-11-19 PROCEDURE — 99213 OFFICE O/P EST LOW 20 MIN: CPT | Performed by: NURSE PRACTITIONER

## 2018-11-19 RX ORDER — AMITRIPTYLINE HYDROCHLORIDE 25 MG/1
25 TABLET, FILM COATED ORAL NIGHTLY
Qty: 28 TABLET | Refills: 1 | Status: SHIPPED | OUTPATIENT
Start: 2018-11-19 | End: 2018-12-17 | Stop reason: SDUPTHER

## 2018-11-19 RX ORDER — OXYCODONE HYDROCHLORIDE AND ACETAMINOPHEN 5; 325 MG/1; MG/1
1 TABLET ORAL 2 TIMES DAILY PRN
Qty: 60 TABLET | Refills: 0 | Status: SHIPPED | OUTPATIENT
Start: 2018-11-19 | End: 2018-12-17 | Stop reason: SDUPTHER

## 2018-11-19 NOTE — PROGRESS NOTES
claudication, Lumbar nerve root impingement, L2, Depression, unspecified depression type, Primary insomnia, and Drug induced constipation were pertinent to this visit. Risks and benefits of the medications and other alternative treatments  including no treatment were discussed with the patient. The common side effects of these medications were also explained to the patient. Informed verbal consent was obtained. Goals of current treatment regimen include improvement in pain, restoration of functioning- with focus on improvement in physical performance, general activity, work or disability,emotional distress, health care utilization and  decreased medication consumption. Will continue to monitor progress towards achieving/maintaining therapeutic goals with special emphasis on  1. Improvement in perceived interfernce  of pain with ADL's. Ability to do home exercises independently. Ability to do household chores indoor and/or outdoor work and social and leisure activities. Improve psychosocial and physical functioning. - she is showing progression towards this treatment goal with the current regimen. 2. Ability to focus/concentrate at work and perform the duties required of her at work  Sit through a workday without lower extremity symptoms. Stand 30-60 minutes without lower extremity symptoms. Ability to lift up to 10-20 lbs. Ability to go up and down stairs. Sit 30-60 minutes  Without having to stand up frequently. - she is maintaining/progressing towards her work related goals with the current regimen. She was advised against drinking alcohol with the narcotic pain medicines, advised against driving or handling machinery while adjusting the dose of medicines or if having cognitive  issues related to the current medications. Risk of overdose and death, if medicines not taken as prescribed, were also discussed. If the patient develops new symptoms or if the symptoms worsen, the patient should call the office.

## 2018-11-21 PROBLEM — R05.9 COUGH: Status: RESOLVED | Noted: 2018-10-22 | Resolved: 2018-11-21

## 2018-11-21 NOTE — PATIENT INSTRUCTIONS
your chin. 5. Keep your shoulders back and relaxed. Knee extension    1. Sit tall on the ball with your feet planted in front of you, hip-width apart. As you do this exercise, avoid slumping your shoulders and arching your back. 2. Rest your hands on the ball near your hip or a steady object next to you. (If you feel very stable on the ball, rest your hands in your lap or at your side.)  3. Slowly straighten one leg at the knee. Slowly lower it back down. Repeat with the other leg. 4. Repeat this exercise 8 to 12 times. Roll-ups    1. Lie on your back with your knees bent, feet resting on the floor. 2. Lay the ball on your thighs. Rest your hands up high on the ball. 3. Raising your head and shoulder blades, roll the ball up your thighs. Exhale as you roll up. 4. If this is hard on your neck, gently support your lower head and upper neck with one hand. Don't use that hand to pull your head up. 5. Repeat 8 to 12 times. Ball curls    1. Lie on your back with your ankles resting on the ball, knees straight. 2. Use your legs to roll the exercise ball toward you. Allow your knees to bend and move closer to your chest.  3. Pause briefly, and then roll the ball to the starting position. Try to keep the ball rolling straight. You will feel the muscles in your lower belly working. 4. Repeat 8 to 12 times. Bridge with ball under legs    1. Lie on your back with your legs up, calves resting on the ball. For more challenge, rest your heels on the ball. 2. Look up at the ceiling, and keep your chin relaxed. You can place a small pillow under your head or neck for comfort. 3. With your arms by your side, press your hands onto the floor for stability. 4. Tighten your belly muscles by pulling in your belly button toward your spine. 5. Push your heels down toward the floor, squeeze your buttocks, and lift your hips off the floor until your shoulders, hips, and knees are all in a straight line.   6. Try to

## 2018-12-12 ENCOUNTER — OFFICE VISIT (OUTPATIENT)
Dept: FAMILY MEDICINE CLINIC | Age: 48
End: 2018-12-12
Payer: COMMERCIAL

## 2018-12-12 VITALS
TEMPERATURE: 98.8 F | WEIGHT: 134.4 LBS | OXYGEN SATURATION: 99 % | BODY MASS INDEX: 23.07 KG/M2 | DIASTOLIC BLOOD PRESSURE: 70 MMHG | SYSTOLIC BLOOD PRESSURE: 110 MMHG | HEART RATE: 77 BPM

## 2018-12-12 DIAGNOSIS — J06.9 URI, ACUTE: Primary | ICD-10-CM

## 2018-12-12 DIAGNOSIS — G89.4 CHRONIC PAIN SYNDROME: ICD-10-CM

## 2018-12-12 PROCEDURE — 99213 OFFICE O/P EST LOW 20 MIN: CPT | Performed by: FAMILY MEDICINE

## 2018-12-12 RX ORDER — AMOXICILLIN 875 MG/1
875 TABLET, COATED ORAL 2 TIMES DAILY
Qty: 20 TABLET | Refills: 0 | Status: SHIPPED | OUTPATIENT
Start: 2018-12-12 | End: 2018-12-22

## 2018-12-12 ASSESSMENT — ENCOUNTER SYMPTOMS
ABDOMINAL PAIN: 0
CHEST TIGHTNESS: 1
COUGH: 1
WHEEZING: 0

## 2018-12-17 ENCOUNTER — OFFICE VISIT (OUTPATIENT)
Dept: PAIN MANAGEMENT | Age: 48
End: 2018-12-17
Payer: COMMERCIAL

## 2018-12-17 VITALS — SYSTOLIC BLOOD PRESSURE: 108 MMHG | DIASTOLIC BLOOD PRESSURE: 67 MMHG | HEART RATE: 70 BPM | OXYGEN SATURATION: 98 %

## 2018-12-17 DIAGNOSIS — G89.4 CHRONIC PAIN SYNDROME: ICD-10-CM

## 2018-12-17 DIAGNOSIS — M25.522 LEFT ELBOW PAIN: ICD-10-CM

## 2018-12-17 DIAGNOSIS — M51.37 DEGENERATION OF LUMBAR OR LUMBOSACRAL INTERVERTEBRAL DISC: Chronic | ICD-10-CM

## 2018-12-17 DIAGNOSIS — F51.01 PRIMARY INSOMNIA: ICD-10-CM

## 2018-12-17 DIAGNOSIS — K59.03 DRUG INDUCED CONSTIPATION: ICD-10-CM

## 2018-12-17 DIAGNOSIS — M48.061 SPINAL STENOSIS, LUMBAR REGION, WITHOUT NEUROGENIC CLAUDICATION: Chronic | ICD-10-CM

## 2018-12-17 DIAGNOSIS — F32.A DEPRESSION, UNSPECIFIED DEPRESSION TYPE: ICD-10-CM

## 2018-12-17 DIAGNOSIS — M54.16 LUMBAR NERVE ROOT IMPINGEMENT: ICD-10-CM

## 2018-12-17 DIAGNOSIS — M47.817 LUMBOSACRAL SPONDYLOSIS WITHOUT MYELOPATHY: Chronic | ICD-10-CM

## 2018-12-17 PROCEDURE — 99213 OFFICE O/P EST LOW 20 MIN: CPT | Performed by: NURSE PRACTITIONER

## 2018-12-17 RX ORDER — AMITRIPTYLINE HYDROCHLORIDE 25 MG/1
25 TABLET, FILM COATED ORAL NIGHTLY
Qty: 28 TABLET | Refills: 1 | Status: SHIPPED | OUTPATIENT
Start: 2018-12-17 | End: 2019-01-14 | Stop reason: SDUPTHER

## 2018-12-17 RX ORDER — OXYCODONE HYDROCHLORIDE AND ACETAMINOPHEN 5; 325 MG/1; MG/1
1 TABLET ORAL 2 TIMES DAILY PRN
Qty: 60 TABLET | Refills: 0 | Status: SHIPPED | OUTPATIENT
Start: 2018-12-17 | End: 2019-01-14 | Stop reason: SDUPTHER

## 2018-12-26 ENCOUNTER — OFFICE VISIT (OUTPATIENT)
Dept: FAMILY MEDICINE CLINIC | Age: 48
End: 2018-12-26
Payer: COMMERCIAL

## 2018-12-26 VITALS
SYSTOLIC BLOOD PRESSURE: 116 MMHG | BODY MASS INDEX: 22.49 KG/M2 | HEART RATE: 71 BPM | WEIGHT: 131 LBS | OXYGEN SATURATION: 100 % | RESPIRATION RATE: 16 BRPM | DIASTOLIC BLOOD PRESSURE: 70 MMHG | TEMPERATURE: 98 F

## 2018-12-26 DIAGNOSIS — R68.89 FLU-LIKE SYMPTOMS: ICD-10-CM

## 2018-12-26 DIAGNOSIS — J40 BRONCHITIS: Primary | ICD-10-CM

## 2018-12-26 LAB
INFLUENZA A ANTIBODY: NEGATIVE
INFLUENZA B ANTIBODY: NEGATIVE

## 2018-12-26 PROCEDURE — 99213 OFFICE O/P EST LOW 20 MIN: CPT | Performed by: FAMILY MEDICINE

## 2018-12-26 PROCEDURE — 87804 INFLUENZA ASSAY W/OPTIC: CPT | Performed by: FAMILY MEDICINE

## 2018-12-26 RX ORDER — AZITHROMYCIN 250 MG/1
250 TABLET, FILM COATED ORAL SEE ADMIN INSTRUCTIONS
Qty: 6 TABLET | Refills: 0 | Status: SHIPPED | OUTPATIENT
Start: 2018-12-26 | End: 2018-12-31

## 2018-12-26 ASSESSMENT — ENCOUNTER SYMPTOMS: COUGH: 1

## 2019-01-14 ENCOUNTER — OFFICE VISIT (OUTPATIENT)
Dept: PAIN MANAGEMENT | Age: 49
End: 2019-01-14
Payer: COMMERCIAL

## 2019-01-14 VITALS
HEART RATE: 69 BPM | OXYGEN SATURATION: 96 % | SYSTOLIC BLOOD PRESSURE: 109 MMHG | BODY MASS INDEX: 23.48 KG/M2 | DIASTOLIC BLOOD PRESSURE: 71 MMHG | WEIGHT: 136.8 LBS

## 2019-01-14 DIAGNOSIS — K59.03 DRUG INDUCED CONSTIPATION: ICD-10-CM

## 2019-01-14 DIAGNOSIS — G89.4 CHRONIC PAIN SYNDROME: ICD-10-CM

## 2019-01-14 DIAGNOSIS — M25.522 LEFT ELBOW PAIN: ICD-10-CM

## 2019-01-14 DIAGNOSIS — M54.16 LUMBAR NERVE ROOT IMPINGEMENT: ICD-10-CM

## 2019-01-14 DIAGNOSIS — M51.37 DEGENERATION OF LUMBAR OR LUMBOSACRAL INTERVERTEBRAL DISC: Chronic | ICD-10-CM

## 2019-01-14 DIAGNOSIS — M48.061 SPINAL STENOSIS, LUMBAR REGION, WITHOUT NEUROGENIC CLAUDICATION: Chronic | ICD-10-CM

## 2019-01-14 DIAGNOSIS — F51.01 PRIMARY INSOMNIA: ICD-10-CM

## 2019-01-14 DIAGNOSIS — M47.817 LUMBOSACRAL SPONDYLOSIS WITHOUT MYELOPATHY: Chronic | ICD-10-CM

## 2019-01-14 DIAGNOSIS — F32.A DEPRESSION, UNSPECIFIED DEPRESSION TYPE: ICD-10-CM

## 2019-01-14 PROCEDURE — 99213 OFFICE O/P EST LOW 20 MIN: CPT | Performed by: NURSE PRACTITIONER

## 2019-01-14 RX ORDER — OXYCODONE HYDROCHLORIDE AND ACETAMINOPHEN 5; 325 MG/1; MG/1
1 TABLET ORAL EVERY 8 HOURS PRN
Qty: 84 TABLET | Refills: 0 | Status: SHIPPED | OUTPATIENT
Start: 2019-01-14 | End: 2019-02-25 | Stop reason: SDUPTHER

## 2019-01-14 RX ORDER — AMITRIPTYLINE HYDROCHLORIDE 25 MG/1
25 TABLET, FILM COATED ORAL NIGHTLY
Qty: 28 TABLET | Refills: 1 | Status: SHIPPED | OUTPATIENT
Start: 2019-01-14 | End: 2019-02-25 | Stop reason: SDUPTHER

## 2019-01-25 ENCOUNTER — HOSPITAL ENCOUNTER (OUTPATIENT)
Dept: MAMMOGRAPHY | Age: 49
Discharge: HOME | End: 2019-01-29
Payer: COMMERCIAL

## 2019-01-25 DIAGNOSIS — Z12.31 VISIT FOR SCREENING MAMMOGRAM: ICD-10-CM

## 2019-01-25 PROCEDURE — 77063 BREAST TOMOSYNTHESIS BI: CPT

## 2019-02-25 ENCOUNTER — OFFICE VISIT (OUTPATIENT)
Dept: PAIN MANAGEMENT | Age: 49
End: 2019-02-25
Payer: COMMERCIAL

## 2019-02-25 VITALS — DIASTOLIC BLOOD PRESSURE: 64 MMHG | SYSTOLIC BLOOD PRESSURE: 111 MMHG | HEART RATE: 66 BPM | OXYGEN SATURATION: 99 %

## 2019-02-25 DIAGNOSIS — M25.522 LEFT ELBOW PAIN: ICD-10-CM

## 2019-02-25 DIAGNOSIS — M54.16 LUMBAR NERVE ROOT IMPINGEMENT: ICD-10-CM

## 2019-02-25 DIAGNOSIS — F32.A DEPRESSION, UNSPECIFIED DEPRESSION TYPE: ICD-10-CM

## 2019-02-25 DIAGNOSIS — K59.03 DRUG INDUCED CONSTIPATION: ICD-10-CM

## 2019-02-25 DIAGNOSIS — M47.817 LUMBOSACRAL SPONDYLOSIS WITHOUT MYELOPATHY: Chronic | ICD-10-CM

## 2019-02-25 DIAGNOSIS — F51.01 PRIMARY INSOMNIA: ICD-10-CM

## 2019-02-25 DIAGNOSIS — M51.37 DEGENERATION OF LUMBAR OR LUMBOSACRAL INTERVERTEBRAL DISC: Chronic | ICD-10-CM

## 2019-02-25 DIAGNOSIS — M48.061 SPINAL STENOSIS, LUMBAR REGION, WITHOUT NEUROGENIC CLAUDICATION: Chronic | ICD-10-CM

## 2019-02-25 DIAGNOSIS — G89.4 CHRONIC PAIN SYNDROME: ICD-10-CM

## 2019-02-25 PROCEDURE — 99213 OFFICE O/P EST LOW 20 MIN: CPT | Performed by: NURSE PRACTITIONER

## 2019-02-25 RX ORDER — OXYCODONE HYDROCHLORIDE AND ACETAMINOPHEN 5; 325 MG/1; MG/1
1 TABLET ORAL EVERY 8 HOURS PRN
Qty: 84 TABLET | Refills: 0 | Status: SHIPPED | OUTPATIENT
Start: 2019-02-25 | End: 2019-04-08 | Stop reason: SDUPTHER

## 2019-02-25 RX ORDER — AMITRIPTYLINE HYDROCHLORIDE 25 MG/1
25 TABLET, FILM COATED ORAL NIGHTLY
Qty: 28 TABLET | Refills: 1 | Status: SHIPPED | OUTPATIENT
Start: 2019-02-25 | End: 2019-04-08 | Stop reason: SDUPTHER

## 2019-04-08 ENCOUNTER — OFFICE VISIT (OUTPATIENT)
Dept: PAIN MANAGEMENT | Age: 49
End: 2019-04-08
Payer: COMMERCIAL

## 2019-04-08 VITALS — HEART RATE: 64 BPM | OXYGEN SATURATION: 99 % | DIASTOLIC BLOOD PRESSURE: 74 MMHG | SYSTOLIC BLOOD PRESSURE: 119 MMHG

## 2019-04-08 DIAGNOSIS — M51.37 DEGENERATION OF LUMBAR OR LUMBOSACRAL INTERVERTEBRAL DISC: ICD-10-CM

## 2019-04-08 DIAGNOSIS — M47.817 LUMBOSACRAL SPONDYLOSIS WITHOUT MYELOPATHY: ICD-10-CM

## 2019-04-08 DIAGNOSIS — M25.522 LEFT ELBOW PAIN: ICD-10-CM

## 2019-04-08 DIAGNOSIS — F32.A DEPRESSION, UNSPECIFIED DEPRESSION TYPE: ICD-10-CM

## 2019-04-08 DIAGNOSIS — K59.03 DRUG INDUCED CONSTIPATION: ICD-10-CM

## 2019-04-08 DIAGNOSIS — M48.061 SPINAL STENOSIS, LUMBAR REGION, WITHOUT NEUROGENIC CLAUDICATION: ICD-10-CM

## 2019-04-08 DIAGNOSIS — F51.01 PRIMARY INSOMNIA: ICD-10-CM

## 2019-04-08 DIAGNOSIS — M54.16 LUMBAR NERVE ROOT IMPINGEMENT: ICD-10-CM

## 2019-04-08 DIAGNOSIS — G89.4 CHRONIC PAIN SYNDROME: ICD-10-CM

## 2019-04-08 PROCEDURE — 99213 OFFICE O/P EST LOW 20 MIN: CPT | Performed by: NURSE PRACTITIONER

## 2019-04-08 RX ORDER — OXYCODONE HYDROCHLORIDE AND ACETAMINOPHEN 5; 325 MG/1; MG/1
1 TABLET ORAL EVERY 8 HOURS PRN
Qty: 84 TABLET | Refills: 0 | Status: SHIPPED | OUTPATIENT
Start: 2019-04-08 | End: 2019-05-20 | Stop reason: SDUPTHER

## 2019-04-08 RX ORDER — AMITRIPTYLINE HYDROCHLORIDE 25 MG/1
25 TABLET, FILM COATED ORAL NIGHTLY
Qty: 28 TABLET | Refills: 1 | Status: SHIPPED | OUTPATIENT
Start: 2019-04-08 | End: 2019-05-20 | Stop reason: SDUPTHER

## 2019-04-08 NOTE — PROGRESS NOTES
Elina Lara  1970  [id number]      HISTORY OF PRESENT ILLNESS:  Ms. Matthew Juan is a 53 y.o. female returns for a follow up visit for pain management  She has a diagnosis of   1. Chronic pain syndrome    2. Degeneration of lumbar or lumbosacral intervertebral disc    3. Lumbosacral spondylosis without myelopathy    4. Spinal stenosis, lumbar region, without neurogenic claudication    5. Lumbar nerve root impingement, L2    6. Left elbow pain    7. Depression, unspecified depression type    8. Primary insomnia    9. Drug induced constipation      On the Patients Pain Assessment form:  She complains of pain in the both buttocks, both knee(s), both leg(s): entire limb and bilateral lower back She rates the pain 8/10 and describes it as sharp, aching, numbness. Current treatment regimen has helped relieve about 60% of the pain. She denies any side effects from the current pain regimen. Patient reports that since the last follow up visit the physical functioning is unchanged, family/social relationships are unchanged, mood is unchanged sleep patterns are worse, and that the overall functioning is unchanged. Patient denies misusing/abusing her narcotic pain medications or using any illegal drugs. There are No indicators for possible drug abuse, addiction or diversion problems. Upon obtaining medical history from Ms. Lara states that pain is not manageable on current pain therapy. Reports having been moving around more with increased work activities causing an increase in lower back pain. She took 2 pills at a time when pain was worse. Mood is stable without anxiety. Sleep is fair with an average of 5-6 hours. Denies to having issues of constipation. Tolerating activities/house chores with moderate tenderness to the lower back. ALLERGIES: Patients list of allergies were reviewed     MEDICATIONS: Ms. Matthew Juan list of medications were reviewed. Her current medications are   Outpatient Medications Prior to Visit Medication Sig Dispense Refill    drospirenone-ethinyl estradiol (OCELLA) 3-0.03 MG TABS Take 1 tablet by mouth daily      spironolactone (ALDACTONE) 100 MG tablet Take 100 mg by mouth daily      amitriptyline (ELAVIL) 25 MG tablet Take 1 tablet by mouth nightly 28 tablet 1     No facility-administered medications prior to visit. SOCIAL/FAMILY/PAST MEDICAL HISTORY: Ms. Kim Gallagher social, family and past medical history was reviewed. REVIEW OF SYSTEMS:    Respiratory: Negative for apnea, chest tightness and shortness of breath or change in baseline breathing. Gastrointestinal: Negative for nausea, vomiting, abdominal pain, diarrhea, constipation, blood in stool and abdominal distention. PHYSICAL EXAM:   Nursing note and vitals reviewed. /74   Pulse 64   SpO2 99%   Constitutional: She appears well-developed and well-nourished. No acute distress. Skin: Skin is warm and dry, good turgor. No rash noted. She is not diaphoretic. Cardiovascular: Normal rate, regular rhythm, normal heart sounds, and does not have murmur. Pulmonary/Chest: Effort normal. No respiratory distress. She does not have wheezes in the lung fields. She has no rales. Neurological/Psychiatric:She is alert and oriented to person, place, and time. Coordination is  normal.  Her mood isAppropriate and affect is Neutral/Euthymic(normal) . MRI of the Lumbar spine 1/13/17:  1.  Small right lateral disc protrusion at the L2-3 level, extending into the neural foramen,    resulting in right neural foraminal narrowing, impinging upon the exiting right L2 nerve root.       2.  No significant canal stenosis throughout the lumbar spine. IMPRESSION:   1. Chronic pain syndrome    2. Degeneration of lumbar or lumbosacral intervertebral disc    3. Lumbosacral spondylosis without myelopathy    4. Spinal stenosis, lumbar region, without neurogenic claudication    5. Lumbar nerve root impingement, L2    6. Left elbow pain    7. Depression, unspecified depression type    8. Primary insomnia    9. Drug induced constipation        PLAN:  Informed verbal consent was obtained  -Continue with Percocet, Elavil  -ZTLido 1.8% topical daily  -Corrine exercises  -Coached patient on taking medication other than how they are prescribed. No changes will be made to the pain medications today  -Educations provided on PRASHANTH of the Lumbar spine, side effects reviewed including a temporary elevation in blood sugars, advised to monitor closely. She verbalized understanding, and voiced interest, wishes to consult with her insurance company about the charges/coverage  -She will be referred to Dr. Cayetano Barrera for eval/tx for lumbar degeneration  -CBT techniques- relaxation therapies such as biofeedback, mindfulness based stress reduction, imagery, cognitive restructuring, problem solving discussed with patient  -Last UDS consistent/UDS today  -Return in about 1 month (around 5/6/2019). Current Outpatient Medications   Medication Sig Dispense Refill    amitriptyline (ELAVIL) 25 MG tablet Take 1 tablet by mouth nightly 28 tablet 1    oxyCODONE-acetaminophen (PERCOCET) 5-325 MG per tablet Take 1 tablet by mouth every 8 hours as needed for Pain for up to 30 days. Take no more than 2-3 tabs orally prn 84 tablet 0    Lidocaine (ZTLIDO) 1.8 % PTCH Apply 1 patch topically daily 30 patch 0    drospirenone-ethinyl estradiol (OCELLA) 3-0.03 MG TABS Take 1 tablet by mouth daily      spironolactone (ALDACTONE) 100 MG tablet Take 100 mg by mouth daily       No current facility-administered medications for this visit. I will continue her current medication regimen  which is part of the above treatment schedule. It has been helping with Ms. Lara's chronic  medical problems which for this visit include:   Diagnoses of Chronic pain syndrome, Degeneration of lumbar or lumbosacral intervertebral disc, Lumbosacral spondylosis without myelopathy, Spinal stenosis, lumbar region, without neurogenic claudication, Lumbar nerve root impingement, L2, Left elbow pain, Depression, unspecified depression type, Primary insomnia, and Drug induced constipation were pertinent to this visit. Risks and benefits of the medications and other alternative treatments  including no treatment were discussed with the patient. The common side effects of these medications were also explained to the patient. Informed verbal consent was obtained. Goals of current treatment regimen include improvement in pain, restoration of functioning- with focus on improvement in physical performance, general activity, work or disability,emotional distress, health care utilization and  decreased medication consumption. Will continue to monitor progress towards achieving/maintaining therapeutic goals with special emphasis on  1. Improvement in perceived interfernce  of pain with ADL's. Ability to do home exercises independently. Ability to do household chores indoor and/or outdoor work and social and leisure activities. Improve psychosocial and physical functioning. .rsm  2. Ability to focus/concentrate at work and perform the duties required of her at work  Sit through a workday without lower extremity symptoms. Stand 30-60 minutes without lower extremity symptoms. Ability to lift up to 10-20 lbs. Ability to go up and down stairs. Sit 30-60 minutes  Without having to stand up frequently. - she is maintaining/progressing towards her work related goals with the current regimen. She was advised against drinking alcohol with the narcotic pain medicines, advised against driving or handling machinery while adjusting the dose of medicines or if having cognitive  issues related to the current medications. Risk of overdose and death, if medicines not taken as prescribed, were also discussed. If the patient develops new symptoms or if the symptoms worsen, the patient should call the office.     While transcribing every attempt was made to maintain the accuracy of the note in terms of it's contents,there may have been some errors made inadvertently. Thank you for allowing me to participate in the care of this patient.     Boris Almanza, JUSTICE    Cc: Pia Neville MD

## 2019-04-08 NOTE — PATIENT INSTRUCTIONS
Patient Education        Back Stretches: Exercises  Your Care Instructions  Here are some examples of exercises for stretching your back. Start each exercise slowly. Ease off the exercise if you start to have pain. Your doctor or physical therapist will tell you when you can start these exercises and which ones will work best for you. How to do the exercises  Overhead stretch    1. Stand comfortably with your feet shoulder-width apart. 2. Looking straight ahead, raise both arms over your head and reach toward the ceiling. Do not allow your head to tilt back. 3. Hold for 15 to 30 seconds, then lower your arms to your sides. 4. Repeat 2 to 4 times. Side stretch    1. Stand comfortably with your feet shoulder-width apart. 2. Raise one arm over your head, and then lean to the other side. 3. Slide your hand down your leg as you let the weight of your arm gently stretch your side muscles. Hold for 15 to 30 seconds. 4. Repeat 2 to 4 times on each side. Press-up    1. Lie on your stomach, supporting your body with your forearms. 2. Press your elbows down into the floor to raise your upper back. As you do this, relax your stomach muscles and allow your back to arch without using your back muscles. As your press up, do not let your hips or pelvis come off the floor. 3. Hold for 15 to 30 seconds, then relax. 4. Repeat 2 to 4 times. Relax and rest    1. Lie on your back with a rolled towel under your neck and a pillow under your knees. Extend your arms comfortably to your sides. 2. Relax and breathe normally. 3. Remain in this position for about 10 minutes. 4. If you can, do this 2 or 3 times each day. Follow-up care is a key part of your treatment and safety. Be sure to make and go to all appointments, and call your doctor if you are having problems. It's also a good idea to know your test results and keep a list of the medicines you take. Where can you learn more?   Go to [email or web address] and sign in to your AboutMyStart account. Enter W224 in the [employer] box to learn more about \"Back Stretches: Exercises. \"     If you do not have an account, please click on the \"Sign Up Now\" link. Current as of: September 20, 2018  Content Version: 11.9  © 5792-6883 [employer], [employer]. Care instructions adapted under license by [facility] ([facility]). If you have questions about a medical condition or this instruction, always ask your healthcare professional. [address] any warranty or liability for your use of this information.

## 2019-05-20 ENCOUNTER — OFFICE VISIT (OUTPATIENT)
Dept: PAIN MANAGEMENT | Age: 49
End: 2019-05-20
Payer: COMMERCIAL

## 2019-05-20 VITALS — DIASTOLIC BLOOD PRESSURE: 75 MMHG | SYSTOLIC BLOOD PRESSURE: 117 MMHG | OXYGEN SATURATION: 99 % | HEART RATE: 69 BPM

## 2019-05-20 DIAGNOSIS — F51.01 PRIMARY INSOMNIA: ICD-10-CM

## 2019-05-20 DIAGNOSIS — M25.522 LEFT ELBOW PAIN: ICD-10-CM

## 2019-05-20 DIAGNOSIS — G89.4 CHRONIC PAIN SYNDROME: ICD-10-CM

## 2019-05-20 DIAGNOSIS — K59.03 DRUG INDUCED CONSTIPATION: ICD-10-CM

## 2019-05-20 DIAGNOSIS — M51.37 DEGENERATION OF LUMBAR OR LUMBOSACRAL INTERVERTEBRAL DISC: ICD-10-CM

## 2019-05-20 DIAGNOSIS — M47.817 LUMBOSACRAL SPONDYLOSIS WITHOUT MYELOPATHY: ICD-10-CM

## 2019-05-20 DIAGNOSIS — M48.061 SPINAL STENOSIS, LUMBAR REGION, WITHOUT NEUROGENIC CLAUDICATION: ICD-10-CM

## 2019-05-20 DIAGNOSIS — F32.A DEPRESSION, UNSPECIFIED DEPRESSION TYPE: ICD-10-CM

## 2019-05-20 DIAGNOSIS — M54.16 LUMBAR NERVE ROOT IMPINGEMENT: ICD-10-CM

## 2019-05-20 PROCEDURE — 99213 OFFICE O/P EST LOW 20 MIN: CPT | Performed by: NURSE PRACTITIONER

## 2019-05-20 RX ORDER — OXYCODONE HYDROCHLORIDE AND ACETAMINOPHEN 5; 325 MG/1; MG/1
1 TABLET ORAL EVERY 8 HOURS PRN
Qty: 84 TABLET | Refills: 0 | Status: SHIPPED | OUTPATIENT
Start: 2019-05-20 | End: 2019-07-01 | Stop reason: SDUPTHER

## 2019-05-20 RX ORDER — AMITRIPTYLINE HYDROCHLORIDE 25 MG/1
25 TABLET, FILM COATED ORAL NIGHTLY
Qty: 28 TABLET | Refills: 1 | Status: SHIPPED | OUTPATIENT
Start: 2019-05-20 | End: 2019-07-01 | Stop reason: SDUPTHER

## 2019-05-20 NOTE — PATIENT INSTRUCTIONS
Patient Education        Back Stretches: Exercises  Your Care Instructions  Here are some examples of exercises for stretching your back. Start each exercise slowly. Ease off the exercise if you start to have pain. Your doctor or physical therapist will tell you when you can start these exercises and which ones will work best for you. How to do the exercises  Overhead stretch    1. Stand comfortably with your feet shoulder-width apart. 2. Looking straight ahead, raise both arms over your head and reach toward the ceiling. Do not allow your head to tilt back. 3. Hold for 15 to 30 seconds, then lower your arms to your sides. 4. Repeat 2 to 4 times. Side stretch    1. Stand comfortably with your feet shoulder-width apart. 2. Raise one arm over your head, and then lean to the other side. 3. Slide your hand down your leg as you let the weight of your arm gently stretch your side muscles. Hold for 15 to 30 seconds. 4. Repeat 2 to 4 times on each side. Press-up    1. Lie on your stomach, supporting your body with your forearms. 2. Press your elbows down into the floor to raise your upper back. As you do this, relax your stomach muscles and allow your back to arch without using your back muscles. As your press up, do not let your hips or pelvis come off the floor. 3. Hold for 15 to 30 seconds, then relax. 4. Repeat 2 to 4 times. Relax and rest    1. Lie on your back with a rolled towel under your neck and a pillow under your knees. Extend your arms comfortably to your sides. 2. Relax and breathe normally. 3. Remain in this position for about 10 minutes. 4. If you can, do this 2 or 3 times each day. Follow-up care is a key part of your treatment and safety. Be sure to make and go to all appointments, and call your doctor if you are having problems. It's also a good idea to know your test results and keep a list of the medicines you take. Where can you learn more?   Go to [email or web address] and sign in to your [email or web address] account. Enter Y724 in the makeenahire box to learn more about \"Back Stretches: Exercises. \"     If you do not have an account, please click on the \"Sign Up Now\" link. Current as of: September 20, 2018  Content Version: 12.0  © 0990-2691 [employer]. Care instructions adapted under license by [facility] ([facility]). If you have questions about a medical condition or this instruction, always ask your healthcare professional. [address] any warranty or liability for your use of this information.

## 2019-06-06 ENCOUNTER — TELEPHONE (OUTPATIENT)
Dept: PAIN MANAGEMENT | Age: 49
End: 2019-06-06

## 2019-06-06 DIAGNOSIS — G89.4 CHRONIC PAIN SYNDROME: Primary | ICD-10-CM

## 2019-06-06 DIAGNOSIS — M47.817 LUMBOSACRAL SPONDYLOSIS WITHOUT MYELOPATHY: ICD-10-CM

## 2019-06-06 DIAGNOSIS — M51.37 DEGENERATION OF LUMBAR OR LUMBOSACRAL INTERVERTEBRAL DISC: ICD-10-CM

## 2019-06-06 DIAGNOSIS — M48.061 SPINAL STENOSIS, LUMBAR REGION, WITHOUT NEUROGENIC CLAUDICATION: ICD-10-CM

## 2019-06-06 RX ORDER — IBUPROFEN AND FAMOTIDINE 26.6; 8 MG/1; MG/1
1 TABLET, FILM COATED ORAL DAILY
Qty: 30 TABLET | Refills: 0 | Status: SHIPPED | OUTPATIENT
Start: 2019-06-06 | End: 2019-07-01

## 2019-06-06 NOTE — TELEPHONE ENCOUNTER
Pt calling, would like a script for the Duexis sent to [facility] on [address].

## 2019-06-07 ENCOUNTER — TELEPHONE (OUTPATIENT)
Dept: PAIN MANAGEMENT | Age: 49
End: 2019-06-07

## 2019-07-01 ENCOUNTER — OFFICE VISIT (OUTPATIENT)
Dept: PAIN MANAGEMENT | Age: 49
End: 2019-07-01
Payer: COMMERCIAL

## 2019-07-01 VITALS — OXYGEN SATURATION: 98 % | DIASTOLIC BLOOD PRESSURE: 67 MMHG | SYSTOLIC BLOOD PRESSURE: 106 MMHG | HEART RATE: 65 BPM

## 2019-07-01 DIAGNOSIS — F32.A DEPRESSION, UNSPECIFIED DEPRESSION TYPE: ICD-10-CM

## 2019-07-01 DIAGNOSIS — M54.16 LUMBAR NERVE ROOT IMPINGEMENT: ICD-10-CM

## 2019-07-01 DIAGNOSIS — M51.37 DEGENERATION OF LUMBAR OR LUMBOSACRAL INTERVERTEBRAL DISC: ICD-10-CM

## 2019-07-01 DIAGNOSIS — M25.522 LEFT ELBOW PAIN: ICD-10-CM

## 2019-07-01 DIAGNOSIS — M47.817 LUMBOSACRAL SPONDYLOSIS WITHOUT MYELOPATHY: ICD-10-CM

## 2019-07-01 DIAGNOSIS — F51.01 PRIMARY INSOMNIA: ICD-10-CM

## 2019-07-01 DIAGNOSIS — K59.03 DRUG INDUCED CONSTIPATION: ICD-10-CM

## 2019-07-01 DIAGNOSIS — M48.061 SPINAL STENOSIS, LUMBAR REGION, WITHOUT NEUROGENIC CLAUDICATION: ICD-10-CM

## 2019-07-01 DIAGNOSIS — G89.4 CHRONIC PAIN SYNDROME: ICD-10-CM

## 2019-07-01 PROCEDURE — 99213 OFFICE O/P EST LOW 20 MIN: CPT | Performed by: NURSE PRACTITIONER

## 2019-07-01 RX ORDER — AMITRIPTYLINE HYDROCHLORIDE 25 MG/1
25 TABLET, FILM COATED ORAL NIGHTLY
Qty: 28 TABLET | Refills: 1 | Status: SHIPPED | OUTPATIENT
Start: 2019-07-01 | End: 2019-08-15 | Stop reason: SDUPTHER

## 2019-07-01 RX ORDER — OXYCODONE HYDROCHLORIDE AND ACETAMINOPHEN 5; 325 MG/1; MG/1
1 TABLET ORAL EVERY 8 HOURS PRN
Qty: 84 TABLET | Refills: 0 | Status: SHIPPED | OUTPATIENT
Start: 2019-07-01 | End: 2019-08-15 | Stop reason: SDUPTHER

## 2019-08-15 ENCOUNTER — OFFICE VISIT (OUTPATIENT)
Dept: PAIN MANAGEMENT | Age: 49
End: 2019-08-15
Payer: COMMERCIAL

## 2019-08-15 VITALS — DIASTOLIC BLOOD PRESSURE: 73 MMHG | HEART RATE: 65 BPM | OXYGEN SATURATION: 100 % | SYSTOLIC BLOOD PRESSURE: 108 MMHG

## 2019-08-15 DIAGNOSIS — M51.37 DEGENERATION OF LUMBAR OR LUMBOSACRAL INTERVERTEBRAL DISC: ICD-10-CM

## 2019-08-15 DIAGNOSIS — F32.A DEPRESSION, UNSPECIFIED DEPRESSION TYPE: ICD-10-CM

## 2019-08-15 DIAGNOSIS — M25.522 LEFT ELBOW PAIN: ICD-10-CM

## 2019-08-15 DIAGNOSIS — M48.061 SPINAL STENOSIS, LUMBAR REGION, WITHOUT NEUROGENIC CLAUDICATION: ICD-10-CM

## 2019-08-15 DIAGNOSIS — M47.817 LUMBOSACRAL SPONDYLOSIS WITHOUT MYELOPATHY: ICD-10-CM

## 2019-08-15 DIAGNOSIS — F51.01 PRIMARY INSOMNIA: ICD-10-CM

## 2019-08-15 DIAGNOSIS — K59.03 DRUG INDUCED CONSTIPATION: ICD-10-CM

## 2019-08-15 DIAGNOSIS — G89.4 CHRONIC PAIN SYNDROME: ICD-10-CM

## 2019-08-15 DIAGNOSIS — M54.16 LUMBAR NERVE ROOT IMPINGEMENT: ICD-10-CM

## 2019-08-15 PROCEDURE — 99213 OFFICE O/P EST LOW 20 MIN: CPT | Performed by: NURSE PRACTITIONER

## 2019-08-15 RX ORDER — OXYCODONE HYDROCHLORIDE AND ACETAMINOPHEN 5; 325 MG/1; MG/1
1 TABLET ORAL EVERY 8 HOURS PRN
Qty: 84 TABLET | Refills: 0 | Status: SHIPPED | OUTPATIENT
Start: 2019-08-15 | End: 2019-09-26 | Stop reason: SDUPTHER

## 2019-08-15 RX ORDER — AMITRIPTYLINE HYDROCHLORIDE 25 MG/1
25 TABLET, FILM COATED ORAL NIGHTLY
Qty: 28 TABLET | Refills: 1 | Status: SHIPPED | OUTPATIENT
Start: 2019-08-15 | End: 2019-09-26 | Stop reason: SDUPTHER

## 2019-08-15 NOTE — PROGRESS NOTES
(around 9/26/2019). -[place] was obtained and reviewed  for the last one year and no indicators of drug misuse  were found. Any other controlled substance prescriptions  seen on the record have been accounted for, I am aware of the patient receiving these medications. Alejo Choi [place] record will be rechecked as part of office protocol. Current Outpatient Medications   Medication Sig Dispense Refill    amitriptyline (ELAVIL) 25 MG tablet Take 1 tablet by mouth nightly 28 tablet 1    oxyCODONE-acetaminophen (PERCOCET) 5-325 MG per tablet Take 1 tablet by mouth every 8 hours as needed for Pain for up to 30 days. Take no more than 2-3 tabs orally prn 84 tablet 0    drospirenone-ethinyl estradiol (OCELLA) 3-0.03 MG TABS Take 1 tablet by mouth daily      spironolactone (ALDACTONE) 100 MG tablet Take 100 mg by mouth daily       No current facility-administered medications for this visit. I will continue her current medication regimen  which is part of the above treatment schedule. It has been helping with Ms. Lara's chronic  medical problems which for this visit include:   Diagnoses of Chronic pain syndrome, Degeneration of lumbar or lumbosacral intervertebral disc, Spinal stenosis, lumbar region, without neurogenic claudication, Lumbosacral spondylosis without myelopathy, Lumbar nerve root impingement, L2, Left elbow pain, Depression, unspecified depression type, Primary insomnia, and Drug induced constipation were pertinent to this visit. Risks and benefits of the medications and other alternative treatments  including no treatment were discussed with the patient. The common side effects of these medications were also explained to the patient. Informed verbal consent was obtained.    Goals of current treatment regimen include improvement in pain, restoration of functioning- with focus on improvement in physical performance, general activity, work or disability,emotional distress, health care utilization and

## 2019-09-26 ENCOUNTER — OFFICE VISIT (OUTPATIENT)
Dept: PAIN MANAGEMENT | Age: 49
End: 2019-09-26
Payer: COMMERCIAL

## 2019-09-26 VITALS — OXYGEN SATURATION: 100 % | DIASTOLIC BLOOD PRESSURE: 79 MMHG | SYSTOLIC BLOOD PRESSURE: 120 MMHG | HEART RATE: 76 BPM

## 2019-09-26 DIAGNOSIS — M47.817 LUMBOSACRAL SPONDYLOSIS WITHOUT MYELOPATHY: ICD-10-CM

## 2019-09-26 DIAGNOSIS — F32.A DEPRESSION, UNSPECIFIED DEPRESSION TYPE: ICD-10-CM

## 2019-09-26 DIAGNOSIS — M54.16 LUMBAR NERVE ROOT IMPINGEMENT: ICD-10-CM

## 2019-09-26 DIAGNOSIS — F51.01 PRIMARY INSOMNIA: ICD-10-CM

## 2019-09-26 DIAGNOSIS — M48.061 SPINAL STENOSIS, LUMBAR REGION, WITHOUT NEUROGENIC CLAUDICATION: ICD-10-CM

## 2019-09-26 DIAGNOSIS — M25.522 LEFT ELBOW PAIN: ICD-10-CM

## 2019-09-26 DIAGNOSIS — G89.4 CHRONIC PAIN SYNDROME: ICD-10-CM

## 2019-09-26 DIAGNOSIS — K59.03 DRUG INDUCED CONSTIPATION: ICD-10-CM

## 2019-09-26 DIAGNOSIS — M51.37 DEGENERATION OF LUMBAR OR LUMBOSACRAL INTERVERTEBRAL DISC: ICD-10-CM

## 2019-09-26 PROCEDURE — 99213 OFFICE O/P EST LOW 20 MIN: CPT | Performed by: NURSE PRACTITIONER

## 2019-09-26 RX ORDER — OXYCODONE HYDROCHLORIDE AND ACETAMINOPHEN 5; 325 MG/1; MG/1
1 TABLET ORAL EVERY 8 HOURS PRN
Qty: 84 TABLET | Refills: 0 | Status: SHIPPED | OUTPATIENT
Start: 2019-09-26 | End: 2019-11-11 | Stop reason: SDUPTHER

## 2019-09-26 RX ORDER — AMITRIPTYLINE HYDROCHLORIDE 25 MG/1
25 TABLET, FILM COATED ORAL NIGHTLY
Qty: 28 TABLET | Refills: 1 | Status: SHIPPED | OUTPATIENT
Start: 2019-09-26 | End: 2019-11-11 | Stop reason: SDUPTHER

## 2019-11-07 RX ORDER — AMITRIPTYLINE HYDROCHLORIDE 25 MG/1
25 TABLET, FILM COATED ORAL NIGHTLY
Qty: 30 TABLET | Refills: 1 | Status: CANCELLED | OUTPATIENT
Start: 2019-11-07

## 2019-11-07 RX ORDER — AMITRIPTYLINE HYDROCHLORIDE 25 MG/1
25 TABLET, FILM COATED ORAL NIGHTLY
Qty: 28 TABLET | Refills: 1 | Status: CANCELLED | OUTPATIENT
Start: 2019-11-07

## 2019-11-11 ENCOUNTER — OFFICE VISIT (OUTPATIENT)
Dept: PAIN MANAGEMENT | Age: 49
End: 2019-11-11
Payer: COMMERCIAL

## 2019-11-11 VITALS — OXYGEN SATURATION: 98 % | DIASTOLIC BLOOD PRESSURE: 67 MMHG | HEART RATE: 73 BPM | SYSTOLIC BLOOD PRESSURE: 104 MMHG

## 2019-11-11 DIAGNOSIS — M51.37 DEGENERATION OF LUMBAR OR LUMBOSACRAL INTERVERTEBRAL DISC: ICD-10-CM

## 2019-11-11 DIAGNOSIS — F51.01 PRIMARY INSOMNIA: ICD-10-CM

## 2019-11-11 DIAGNOSIS — M25.522 LEFT ELBOW PAIN: ICD-10-CM

## 2019-11-11 DIAGNOSIS — M54.16 LUMBAR NERVE ROOT IMPINGEMENT: ICD-10-CM

## 2019-11-11 DIAGNOSIS — M48.061 SPINAL STENOSIS, LUMBAR REGION, WITHOUT NEUROGENIC CLAUDICATION: ICD-10-CM

## 2019-11-11 DIAGNOSIS — M47.817 LUMBOSACRAL SPONDYLOSIS WITHOUT MYELOPATHY: ICD-10-CM

## 2019-11-11 DIAGNOSIS — G89.4 CHRONIC PAIN SYNDROME: ICD-10-CM

## 2019-11-11 PROCEDURE — 99213 OFFICE O/P EST LOW 20 MIN: CPT | Performed by: NURSE PRACTITIONER

## 2019-11-11 RX ORDER — OXYCODONE HYDROCHLORIDE AND ACETAMINOPHEN 5; 325 MG/1; MG/1
1 TABLET ORAL EVERY 8 HOURS PRN
Qty: 84 TABLET | Refills: 0 | Status: SHIPPED | OUTPATIENT
Start: 2019-11-11 | End: 2019-12-09 | Stop reason: SDUPTHER

## 2019-11-11 RX ORDER — AMITRIPTYLINE HYDROCHLORIDE 25 MG/1
25 TABLET, FILM COATED ORAL NIGHTLY
Qty: 28 TABLET | Refills: 1 | Status: SHIPPED | OUTPATIENT
Start: 2019-11-11 | End: 2019-12-09 | Stop reason: SDUPTHER

## 2019-12-09 ENCOUNTER — OFFICE VISIT (OUTPATIENT)
Dept: PAIN MANAGEMENT | Age: 49
End: 2019-12-09
Payer: COMMERCIAL

## 2019-12-09 VITALS — SYSTOLIC BLOOD PRESSURE: 121 MMHG | HEART RATE: 75 BPM | DIASTOLIC BLOOD PRESSURE: 75 MMHG | OXYGEN SATURATION: 97 %

## 2019-12-09 DIAGNOSIS — F51.01 PRIMARY INSOMNIA: ICD-10-CM

## 2019-12-09 DIAGNOSIS — M47.817 LUMBOSACRAL SPONDYLOSIS WITHOUT MYELOPATHY: ICD-10-CM

## 2019-12-09 DIAGNOSIS — M51.37 DEGENERATION OF LUMBAR OR LUMBOSACRAL INTERVERTEBRAL DISC: ICD-10-CM

## 2019-12-09 DIAGNOSIS — M48.061 SPINAL STENOSIS, LUMBAR REGION, WITHOUT NEUROGENIC CLAUDICATION: ICD-10-CM

## 2019-12-09 DIAGNOSIS — G89.4 CHRONIC PAIN SYNDROME: ICD-10-CM

## 2019-12-09 DIAGNOSIS — M25.522 LEFT ELBOW PAIN: ICD-10-CM

## 2019-12-09 DIAGNOSIS — M54.16 LUMBAR NERVE ROOT IMPINGEMENT: ICD-10-CM

## 2019-12-09 PROCEDURE — 99213 OFFICE O/P EST LOW 20 MIN: CPT | Performed by: NURSE PRACTITIONER

## 2019-12-09 RX ORDER — OXYCODONE HYDROCHLORIDE AND ACETAMINOPHEN 5; 325 MG/1; MG/1
1 TABLET ORAL EVERY 8 HOURS PRN
Qty: 90 TABLET | Refills: 0 | Status: SHIPPED | OUTPATIENT
Start: 2019-12-09 | End: 2020-01-20 | Stop reason: SDUPTHER

## 2019-12-09 RX ORDER — AMITRIPTYLINE HYDROCHLORIDE 25 MG/1
25 TABLET, FILM COATED ORAL NIGHTLY
Qty: 30 TABLET | Refills: 1 | Status: SHIPPED | OUTPATIENT
Start: 2019-12-09 | End: 2020-01-20 | Stop reason: SDUPTHER

## 2020-01-20 ENCOUNTER — OFFICE VISIT (OUTPATIENT)
Dept: PAIN MANAGEMENT | Age: 50
End: 2020-01-20
Payer: COMMERCIAL

## 2020-01-20 VITALS — OXYGEN SATURATION: 98 % | DIASTOLIC BLOOD PRESSURE: 75 MMHG | SYSTOLIC BLOOD PRESSURE: 122 MMHG | HEART RATE: 76 BPM

## 2020-01-20 PROCEDURE — 99213 OFFICE O/P EST LOW 20 MIN: CPT | Performed by: NURSE PRACTITIONER

## 2020-01-20 RX ORDER — OXYCODONE HYDROCHLORIDE AND ACETAMINOPHEN 5; 325 MG/1; MG/1
1 TABLET ORAL EVERY 8 HOURS PRN
Qty: 90 TABLET | Refills: 0 | Status: SHIPPED | OUTPATIENT
Start: 2020-01-20 | End: 2020-03-02 | Stop reason: SDUPTHER

## 2020-01-20 RX ORDER — AMITRIPTYLINE HYDROCHLORIDE 25 MG/1
25 TABLET, FILM COATED ORAL NIGHTLY
Qty: 30 TABLET | Refills: 1 | Status: SHIPPED | OUTPATIENT
Start: 2020-01-20 | End: 2020-03-02 | Stop reason: SDUPTHER

## 2020-01-20 NOTE — PATIENT INSTRUCTIONS
Patient Education        Back Stretches: Exercises  Introduction  Here are some examples of exercises for stretching your back. Start each exercise slowly. Ease off the exercise if you start to have pain. Your doctor or physical therapist will tell you when you can start these exercises and which ones will work best for you. How to do the exercises  Overhead stretch   1. Stand comfortably with your feet shoulder-width apart. 2. Looking straight ahead, raise both arms over your head and reach toward the ceiling. Do not allow your head to tilt back. 3. Hold for 15 to 30 seconds, then lower your arms to your sides. 4. Repeat 2 to 4 times. Side stretch   1. Stand comfortably with your feet shoulder-width apart. 2. Raise one arm over your head, and then lean to the other side. 3. Slide your hand down your leg as you let the weight of your arm gently stretch your side muscles. Hold for 15 to 30 seconds. 4. Repeat 2 to 4 times on each side. Press-up   1. Lie on your stomach, supporting your body with your forearms. 2. Press your elbows down into the floor to raise your upper back. As you do this, relax your stomach muscles and allow your back to arch without using your back muscles. As your press up, do not let your hips or pelvis come off the floor. 3. Hold for 15 to 30 seconds, then relax. 4. Repeat 2 to 4 times. Relax and rest   1. Lie on your back with a rolled towel under your neck and a pillow under your knees. Extend your arms comfortably to your sides. 2. Relax and breathe normally. 3. Remain in this position for about 10 minutes. 4. If you can, do this 2 or 3 times each day. Follow-up care is a key part of your treatment and safety. Be sure to make and go to all appointments, and call your doctor if you are having problems. It's also a good idea to know your test results and keep a list of the medicines you take. Where can you learn more? Go to https://gagan.healthPrevistar. org tuck your chin as you glide your head backward over your body  3. Hold for a count of 6, and then relax for up to 10 seconds. 4. Repeat 8 to 12 times. Chest and shoulder stretch   1. Sit or stand tall and glide your head backward as in the dorsal glide stretch. 2. Raise both arms so that your hands are next to your ears. 3. Take a deep breath, and as you breathe out, lower your elbows down and behind your back. You will feel your shoulder blades slide down and together, and at the same time you will feel a stretch across your chest and the front of your shoulders. 4. Hold for about 6 seconds, and then relax for up to 10 seconds. 5. Repeat 8 to 12 times. Strengthening: Hands on head   1. Move your head backward, forward, and side to side against gentle pressure from your hands, holding each position for about 6 seconds. 2. Repeat 8 to 12 times. Follow-up care is a key part of your treatment and safety. Be sure to make and go to all appointments, and call your doctor if you are having problems. It's also a good idea to know your test results and keep a list of the medicines you take. Where can you learn more? Go to https://Smart Sparrow.[employer]. org and sign in to your [employer] account. Enter P975 in the [employer] box to learn more about \"Neck: Exercises. \"     If you do not have an account, please click on the \"Sign Up Now\" link. Current as of: June 26, 2019  Content Version: 12.3  © 6530-4236 Healthwise, Incorporated. Care instructions adapted under license by [facility] ([facility]). If you have questions about a medical condition or this instruction, always ask your healthcare professional. [place] [address] any warranty or liability for your use of this information. Patient Education        Elbow: Exercises  Introduction  Here are some examples of exercises for you to try. The exercises may be suggested for a condition or for rehabilitation.  Start each exercise slowly. Ease off the exercises if you start to have pain. You will be told when to start these exercises and which ones will work best for you. How to do the exercises  Wrist flexor stretch   1. Extend your arm in front of you with your palm up. 2. Bend your wrist, pointing your hand toward the floor. 3. With your other hand, gently bend your wrist farther until you feel a mild to moderate stretch in your forearm. 4. Hold for at least 15 to 30 seconds. Repeat 2 to 4 times. Wrist extensor stretch   1. Repeat steps 1 to 4 of the stretch above but begin with your extended hand palm down. Ball or sock squeeze   1. Hold a tennis ball (or a rolled-up sock) in your hand. 2. Make a fist around the ball (or sock) and squeeze. 3. Hold for about 6 seconds, and then relax for up to 10 seconds. 4. Repeat 8 to 12 times. 5. Switch the ball (or sock) to your other hand and do 8 to 12 times. Wrist deviation   1. Sit so that your arm is supported but your hand hangs off the edge of a flat surface, such as a table. 2. Hold your hand out like you are shaking hands with someone. 3. Move your hand up and down. 4. Repeat this motion 8 to 12 times. 5. Switch arms. 6. Try to do this exercise twice with each hand. Wrist curls   1. Place your forearm on a table with your hand hanging over the edge of the table, palm up. 2. Place a 1- to 2-pound weight in your hand. This may be a dumbbell, a can of food, or a filled water bottle. 3. Slowly raise and lower the weight while keeping your forearm on the table and your palm facing up. 4. Repeat this motion 8 to 12 times. 5. Switch arms, and do steps 1 through 4.  6. Repeat with your hand facing down toward the floor. Switch arms. Biceps curls   1. Sit leaning forward with your legs slightly spread and your left hand on your left thigh.   2. Place your right elbow on your right thigh, and hold the weight with your forearm horizontal.  3. Slowly curl the weight up and toward your chest.  4. Repeat this motion 8 to 12 times. 5. Switch arms, and do steps 1 through 4. Follow-up care is a key part of your treatment and safety. Be sure to make and go to all appointments, and call your doctor if you are having problems. It's also a good idea to know your test results and keep a list of the medicines you take. Where can you learn more? Go to https://TristarpePerformLine.[employer]. org and sign in to your [employer] account. Enter M279 in the [employer] box to learn more about \"Elbow: Exercises. \"     If you do not have an account, please click on the \"Sign Up Now\" link. Current as of: June 26, 2019  Content Version: 12.3  © 1881-9182 Healthwise, Incorporated. Care instructions adapted under license by [facility] ([facility]). If you have questions about a medical condition or this instruction, always ask your healthcare professional. [place] [address] any warranty or liability for your use of this information.

## 2020-01-20 NOTE — PROGRESS NOTES
medications are   Outpatient Medications Prior to Visit   Medication Sig Dispense Refill    drospirenone-ethinyl estradiol (OCELLA) 3-0.03 MG TABS Take 1 tablet by mouth daily      spironolactone (ALDACTONE) 100 MG tablet Take 100 mg by mouth daily      amitriptyline (ELAVIL) 25 MG tablet Take 1 tablet by mouth nightly 30 tablet 1     No facility-administered medications prior to visit. SOCIAL/FAMILY/PAST MEDICAL HISTORY: Ms. Brent Joy social, family and past medical history was reviewed. REVIEW OF SYSTEMS:    Respiratory: Negative for apnea, chest tightness and shortness of breath or change in baseline breathing. Gastrointestinal: Negative for nausea, vomiting, abdominal pain, diarrhea, constipation, blood in stool and abdominal distention. PHYSICAL EXAM:   Nursing note and vitals reviewed. /75   Pulse 76   SpO2 98%   Constitutional: She appears well-developed and well-nourished. No acute distress. Skin: Skin is warm and dry, good turgor. No rash noted. She is not diaphoretic. Cardiovascular: Normal rate, regular rhythm, normal heart sounds, and does not have murmur. Pulmonary/Chest: Effort normal. No respiratory distress. She does not have wheezes in the lung fields. She has no rales. Neurological/Psychiatric:She is alert and oriented to person, place, and time. Coordination is  Normal. Elbow pain with palpation, 45 degree flexion. Negative for obvious deformity/swellin  Her mood isAppropriate and affect is Neutral/Euthymic(normal) . IMPRESSION:   1. Chronic pain syndrome    2. Cervicalgia    3. Bilateral elbow joint pain    4. Degeneration of lumbar or lumbosacral intervertebral disc    5. Lumbar nerve root impingement, L2    6. Spinal stenosis, lumbar region, without neurogenic claudication    7. Lumbosacral spondylosis without myelopathy    8.  Primary insomnia        PLAN:  Informed verbal consent was obtained  -Continue with Percocet Elavil, ZTlido  -Corrine exercises  -Cervical xray, Xray bilateral elbows  -CBT techniques- relaxation therapies such as biofeedback, mindfulness based stress reduction, imagery, cognitive restructuring, problem solving discussed with patient  -Last UDS 5/20/19 Consistent  -Return in about 4 weeks (around 2/17/2020). Current Outpatient Medications   Medication Sig Dispense Refill    amitriptyline (ELAVIL) 25 MG tablet Take 1 tablet by mouth nightly 30 tablet 1    oxyCODONE-acetaminophen (PERCOCET) 5-325 MG per tablet Take 1 tablet by mouth every 8 hours as needed for Pain for up to 30 days. Take no more than 2-3 tabs orally prn 90 tablet 0    Lidocaine (ZTLIDO) 1.8 % PTCH Apply 1 patch topically daily 30 patch 0    drospirenone-ethinyl estradiol (OCELLA) 3-0.03 MG TABS Take 1 tablet by mouth daily      spironolactone (ALDACTONE) 100 MG tablet Take 100 mg by mouth daily       No current facility-administered medications for this visit. I will continue her current medication regimen  which is part of the above treatment schedule. It has been helping with Ms. Lara's chronic  medical problems which for this visit include:   Diagnoses of Chronic pain syndrome, Cervicalgia, Bilateral elbow joint pain, Degeneration of lumbar or lumbosacral intervertebral disc, Lumbar nerve root impingement, L2, Spinal stenosis, lumbar region, without neurogenic claudication, Lumbosacral spondylosis without myelopathy, and Primary insomnia were pertinent to this visit. Risks and benefits of the medications and other alternative treatments  including no treatment were discussed with the patient. The common side effects of these medications were also explained to the patient. Informed verbal consent was obtained.    Goals of current treatment regimen include improvement in pain, restoration of functioning- with focus on improvement in physical performance, general activity, work or disability,emotional distress, health care utilization and  decreased medication consumption. Will continue to monitor progress towards achieving/maintaining therapeutic goals with special emphasis on  1. Improvement in perceived interfernce  of pain with ADL's. Ability to do home exercises independently. Ability to do household chores indoor and/or outdoor work and social and leisure activities. Improve psychosocial and physical functioning. - she is not showing any significant progress/or showing regression  towards this goal and reassessment and adjustment of goals/treatment have been made. 2. Ability to focus/concentrate at work and perform the duties required of her at work  Sit through a workday without lower extremity symptoms. Stand 30-60 minutes without lower extremity symptoms. Ability to lift up to 10-20 lbs. Ability to go up and down stairs. Sit 30-60 minutes  Without having to stand up frequently. - she is maintaining/progressing towards her work related goals with the current regimen. She was advised against drinking alcohol with the narcotic pain medicines, advised against driving or handling machinery while adjusting the dose of medicines or if having cognitive  issues related to the current medications. Risk of overdose and death, if medicines not taken as prescribed, were also discussed. If the patient develops new symptoms or if the symptoms worsen, the patient should call the office. While transcribing every attempt was made to maintain the accuracy of the note in terms of it's contents,there may have been some errors made inadvertently. Thank you for allowing me to participate in the care of this patient.     Dann Diaz CNP    Cc: Richar Moser MD

## 2020-01-23 ENCOUNTER — HOSPITAL ENCOUNTER (OUTPATIENT)
Dept: GENERAL RADIOLOGY | Age: 50
Discharge: HOME | End: 2020-01-23
Payer: COMMERCIAL

## 2020-01-23 ENCOUNTER — HOSPITAL ENCOUNTER (OUTPATIENT)
Age: 50
Discharge: HOME | End: 2020-01-23
Payer: COMMERCIAL

## 2020-01-23 PROCEDURE — 73080 X-RAY EXAM OF ELBOW: CPT

## 2020-01-23 PROCEDURE — 72040 X-RAY EXAM NECK SPINE 2-3 VW: CPT

## 2020-03-02 ENCOUNTER — OFFICE VISIT (OUTPATIENT)
Dept: PAIN MANAGEMENT | Age: 50
End: 2020-03-02
Payer: COMMERCIAL

## 2020-03-02 VITALS
BODY MASS INDEX: 24.37 KG/M2 | SYSTOLIC BLOOD PRESSURE: 112 MMHG | OXYGEN SATURATION: 97 % | DIASTOLIC BLOOD PRESSURE: 66 MMHG | HEART RATE: 70 BPM | WEIGHT: 142 LBS

## 2020-03-02 PROCEDURE — 99214 OFFICE O/P EST MOD 30 MIN: CPT | Performed by: NURSE PRACTITIONER

## 2020-03-02 RX ORDER — AMITRIPTYLINE HYDROCHLORIDE 25 MG/1
25 TABLET, FILM COATED ORAL NIGHTLY
Qty: 30 TABLET | Refills: 1 | Status: SHIPPED | OUTPATIENT
Start: 2020-03-02 | End: 2020-04-13 | Stop reason: SDUPTHER

## 2020-03-02 RX ORDER — OXYCODONE HYDROCHLORIDE AND ACETAMINOPHEN 5; 325 MG/1; MG/1
1 TABLET ORAL EVERY 8 HOURS PRN
Qty: 90 TABLET | Refills: 0 | Status: SHIPPED | OUTPATIENT
Start: 2020-03-02 | End: 2020-04-13 | Stop reason: SDUPTHER

## 2020-03-23 ENCOUNTER — OFFICE VISIT (OUTPATIENT)
Dept: PRIMARY CARE CLINIC | Age: 50
End: 2020-03-23
Payer: COMMERCIAL

## 2020-03-23 VITALS
DIASTOLIC BLOOD PRESSURE: 72 MMHG | TEMPERATURE: 98.8 F | BODY MASS INDEX: 25.1 KG/M2 | RESPIRATION RATE: 14 BRPM | SYSTOLIC BLOOD PRESSURE: 138 MMHG | HEART RATE: 75 BPM | WEIGHT: 146.2 LBS | OXYGEN SATURATION: 99 %

## 2020-03-23 PROCEDURE — 99214 OFFICE O/P EST MOD 30 MIN: CPT | Performed by: FAMILY MEDICINE

## 2020-03-23 RX ORDER — TORSEMIDE 10 MG/1
10 TABLET ORAL DAILY
Qty: 30 TABLET | Refills: 0 | Status: SHIPPED | OUTPATIENT
Start: 2020-03-23 | End: 2020-04-17 | Stop reason: SDUPTHER

## 2020-03-23 SDOH — ECONOMIC STABILITY: TRANSPORTATION INSECURITY
IN THE PAST 12 MONTHS, HAS THE LACK OF TRANSPORTATION KEPT YOU FROM MEDICAL APPOINTMENTS OR FROM GETTING MEDICATIONS?: NO

## 2020-03-23 SDOH — ECONOMIC STABILITY: TRANSPORTATION INSECURITY
IN THE PAST 12 MONTHS, HAS LACK OF TRANSPORTATION KEPT YOU FROM MEETINGS, WORK, OR FROM GETTING THINGS NEEDED FOR DAILY LIVING?: NO

## 2020-03-23 SDOH — ECONOMIC STABILITY: FOOD INSECURITY: WITHIN THE PAST 12 MONTHS, YOU WORRIED THAT YOUR FOOD WOULD RUN OUT BEFORE YOU GOT MONEY TO BUY MORE.: NEVER TRUE

## 2020-03-23 SDOH — ECONOMIC STABILITY: INCOME INSECURITY: HOW HARD IS IT FOR YOU TO PAY FOR THE VERY BASICS LIKE FOOD, HOUSING, MEDICAL CARE, AND HEATING?: NOT HARD AT ALL

## 2020-03-23 SDOH — ECONOMIC STABILITY: FOOD INSECURITY: WITHIN THE PAST 12 MONTHS, THE FOOD YOU BOUGHT JUST DIDN'T LAST AND YOU DIDN'T HAVE MONEY TO GET MORE.: NEVER TRUE

## 2020-03-23 ASSESSMENT — ENCOUNTER SYMPTOMS
BACK PAIN: 1
CHOKING: 0
WHEEZING: 0
SHORTNESS OF BREATH: 0
CHEST TIGHTNESS: 0
COUGH: 0
EYES NEGATIVE: 1
STRIDOR: 0
SINUS PAIN: 0
SORE THROAT: 0
TROUBLE SWALLOWING: 0
ABDOMINAL PAIN: 0

## 2020-03-23 NOTE — PROGRESS NOTES
SUBJECTIVE:  Patient ID: Vaishnavi Aguirre is a 51 y.o. female.   Chief Complaint:  Chief Complaint   Patient presents with    Edema     lower left leg x3 months on and off       HPI   51year old female  Chronic back pain  under care Specialist see CNP Q 6 weeks monitor & Rx Percocet  Chronic disc L 2 pressure nerve root pressure  Leg pain bilateral but Lt usually >>RT  Chiropractor [place] body [place] [facility] last seen one year ago  Dx Ulcerative Colitis   Leg swelling Lt off/on since 2019  Now she works from Home   Less activity  Rx Aldactone from dermatology for acne    Past Medical History:   Diagnosis Date    Acne     Dr Wendy Gilbert     Past Surgical History:   Procedure Laterality Date     SECTION  2001     x1     No Known Allergies      Past Medical History:   Diagnosis Date    Acne     Dr Wendy Gilbert     Past Surgical History:   Procedure Laterality Date     SECTION  2001     x1     No Known Allergies  Family History   Problem Relation Age of Onset    Other Brother         do not talk     Social History     Social History Narrative    Not on file         Patient Active Problem List   Diagnosis    Allergic state    Diverticulosis of intestine without bleeding    Degeneration of lumbar or lumbosacral intervertebral disc    Displacement of lumbar intervertebral disc without myelopathy    Lumbosacral spondylosis without myelopathy    Spinal stenosis, lumbar region, without neurogenic claudication    Disc displacement, lumbar    Lumbar stenosis    Chronic pain syndrome    Lumbar nerve root impingement, L2    Depression    Primary insomnia    Drug induced constipation    Strain of neck, initial encounter    Strain of neck muscle    Left elbow pain     Current Outpatient Medications   Medication Sig Dispense Refill    amitriptyline (ELAVIL) 25 MG tablet Take 1 tablet by mouth nightly 30 tablet 1    oxyCODONE-acetaminophen (PERCOCET) 5-325 MG per tablet Take 1 tablet by mouth every 8 hours as needed for Pain for up to 30 days. Take no more than 2-3 tabs orally prn 90 tablet 0    drospirenone-ethinyl estradiol (OCELLA) 3-0.03 MG TABS Take 1 tablet by mouth daily      spironolactone (ALDACTONE) 100 MG tablet Take 100 mg by mouth daily      Lidocaine (ZTLIDO) 1.8 % PTCH Apply 1 patch topically daily (Patient not taking: Reported on 3/23/2020) 30 patch 0     No current facility-administered medications for this visit. Lab Results   Component Value Date    WBC 5.4 12/14/2016    HGB 12.5 12/14/2016    HCT 37.0 12/14/2016    MCV 97.3 12/14/2016     12/14/2016     Lab Results   Component Value Date    CHOL 204 (H) 10/11/2012    CHOL 198 11/02/2010     Lab Results   Component Value Date    TRIG 93 10/11/2012    TRIG 120 11/02/2010     Lab Results   Component Value Date    HDL 85 (H) 10/11/2012    HDL 48 11/02/2010     Lab Results   Component Value Date    LDLCALC 101 (H) 10/11/2012    LDLCALC 126 11/02/2010     Lab Results   Component Value Date    LABVLDL 19 10/11/2012     Lab Results   Component Value Date    CHOLHDLRATIO 4.1 11/02/2010       Chemistry        Component Value Date/Time     12/14/2016 1440    K 4.5 12/14/2016 1440     12/14/2016 1440    CO2 24 12/14/2016 1440    BUN 16 12/14/2016 1440    CREATININE 0.9 12/14/2016 1440        Component Value Date/Time    CALCIUM 9.7 12/14/2016 1440    ALKPHOS 55 12/14/2016 1440    AST 31 12/14/2016 1440    ALT 26 12/14/2016 1440    BILITOT 0.3 12/14/2016 1440            Review of Systems   Constitutional: Negative for chills and fever. HENT: Negative for congestion, sinus pain, sore throat and trouble swallowing. Eyes: Negative. Respiratory: Negative for cough, choking, chest tightness, shortness of breath, wheezing and stridor. Cardiovascular: Positive for leg swelling. Negative for chest pain and palpitations. Gastrointestinal: Negative for abdominal pain.         Dx UC  Dx Diverticulitis 2018  Last CT abdomen

## 2020-04-13 ENCOUNTER — VIRTUAL VISIT (OUTPATIENT)
Dept: PAIN MANAGEMENT | Age: 50
End: 2020-04-13
Payer: COMMERCIAL

## 2020-04-13 PROCEDURE — 99213 OFFICE O/P EST LOW 20 MIN: CPT | Performed by: NURSE PRACTITIONER

## 2020-04-13 RX ORDER — OXYCODONE HYDROCHLORIDE AND ACETAMINOPHEN 5; 325 MG/1; MG/1
1 TABLET ORAL EVERY 8 HOURS PRN
Qty: 90 TABLET | Refills: 0 | Status: SHIPPED | OUTPATIENT
Start: 2020-04-13 | End: 2020-05-11 | Stop reason: SDUPTHER

## 2020-04-13 RX ORDER — AMITRIPTYLINE HYDROCHLORIDE 25 MG/1
25 TABLET, FILM COATED ORAL NIGHTLY
Qty: 30 TABLET | Refills: 1 | Status: SHIPPED | OUTPATIENT
Start: 2020-04-13 | End: 2020-06-08 | Stop reason: SDUPTHER

## 2020-04-13 RX ORDER — LIDOCAINE 36 MG/1
1 PATCH TOPICAL DAILY
Qty: 30 PATCH | Refills: 0 | Status: SHIPPED | OUTPATIENT
Start: 2020-04-13 | End: 2020-05-11 | Stop reason: SDUPTHER

## 2020-04-13 NOTE — PROGRESS NOTES
TELE HEALTH VISIT (AUDIO-VISUAL)    Pursuant to the emergency declaration under the [facility], [facility] waiver authority and the [employer] Act, this Virtual  Visit was conducted, with patient's consent, to reduce the patient's risk of exposure to COVID-19 and provide continuity of care for an established patient. Service is  provided through a video synchronous discussion virtually to substitute for in-person clinic visit. Due to this being a TeleHealth encounter (During QNNBR-86 public health emergency), evaluation of the following organ systems was limited: Vitals/Constitutional/EENT/Resp/CV/GI//MS/Neuro/Skin/Heme-Lymph-Imm. Tasia Lara  1970  [id number]    Ms. Lara is being seen virtually for a follow up visit using Doxy. me/Liquipel Video visit/[employer] or face time  Informed verbal consent to the virtual visit was obtained from Ms. Lara. Risks associated with HIPPA compliance with the virtual visit was explained to the patient. Ms. Meme De Paz is at her home and CHRISTA BRODY CNP is in his office. HISTORY OF PRESENT ILLNESS:  Ms. Meme De Paz is a 51 y.o. female  being assessed for a follow up visit for pain management for evaluation of ongoing care regarding her symptoms and monitoring of compliance with long term use high risk medications. She has a diagnosis of   1. Chronic pain syndrome    2. DDD (degenerative disc disease), cervical    3. Bilateral elbow joint pain    4. Degeneration of lumbar or lumbosacral intervertebral disc    5. Lumbar nerve root impingement, L2    6. Spinal stenosis, lumbar region, without neurogenic claudication    7. Lumbosacral spondylosis without myelopathy    8.  Primary insomnia      On the Patients Pain Assessment reviewed with the Medical Assistant:  She complains of pain in the bilateral lower back  with radiation to the upper leg Bilateral and lower leg Bilateral . She

## 2020-04-17 ENCOUNTER — VIRTUAL VISIT (OUTPATIENT)
Dept: PRIMARY CARE CLINIC | Age: 50
End: 2020-04-17
Payer: COMMERCIAL

## 2020-04-17 VITALS — BODY MASS INDEX: 25.06 KG/M2 | WEIGHT: 146 LBS

## 2020-04-17 PROBLEM — M51.26 DISPLACEMENT OF LUMBAR INTERVERTEBRAL DISC WITHOUT MYELOPATHY: Chronic | Status: RESOLVED | Noted: 2017-01-19 | Resolved: 2020-04-17

## 2020-04-17 PROBLEM — I82.4Z9 ACUTE DEEP VEIN THROMBOSIS (DVT) OF DISTAL VEIN OF LOWER EXTREMITY (HCC): Status: ACTIVE | Noted: 2020-04-17

## 2020-04-17 PROBLEM — M25.522 LEFT ELBOW PAIN: Status: RESOLVED | Noted: 2018-09-24 | Resolved: 2020-04-17

## 2020-04-17 PROBLEM — S16.1XXA STRAIN OF NECK MUSCLE: Status: RESOLVED | Noted: 2018-09-24 | Resolved: 2020-04-17

## 2020-04-17 PROCEDURE — 99213 OFFICE O/P EST LOW 20 MIN: CPT | Performed by: FAMILY MEDICINE

## 2020-04-17 RX ORDER — TORSEMIDE 10 MG/1
10 TABLET ORAL DAILY
Qty: 30 TABLET | Refills: 2 | Status: SHIPPED | OUTPATIENT
Start: 2020-04-17 | End: 2020-05-15 | Stop reason: SDUPTHER

## 2020-04-17 ASSESSMENT — ENCOUNTER SYMPTOMS
EYES NEGATIVE: 1
CHEST TIGHTNESS: 0
BACK PAIN: 1
ABDOMINAL PAIN: 0
SHORTNESS OF BREATH: 0
WHEEZING: 0

## 2020-04-17 NOTE — PROGRESS NOTES
Historical Provider, MD       Social History     Tobacco Use    Smoking status: Never Smoker    Smokeless tobacco: Never Used   Substance Use Topics    Alcohol use: No    Drug use: No            PHYSICAL EXAMINATION:  [ INSTRUCTIONS:  \"[x]\" Indicates a positive item  \"[]\" Indicates a negative item  -- DELETE ALL ITEMS NOT EXAMINED]  Vital Signs: (As obtained by patient/caregiver or practitioner observation)    Blood pressure-  Heart rate-    Respiratory rate-    Temperature-  Pulse oximetry-     Constitutional: [x] Appears well-developed and well-nourished [] No apparent distress      [] Abnormal-   Mental status  [x] Alert and awake  [] Oriented to person/place/time []Able to follow commands      Eyes:  EOM    [x]  Normal  [] Abnormal-  Sclera  []  Normal  [] Abnormal -         Discharge []  None visible  [] Abnormal -    HENT:   [x] Normocephalic, atraumatic. [] Abnormal   [] Mouth/Throat: Mucous membranes are moist.     External Ears [x] Normal  [] Abnormal-     Neck: [x] No visualized mass     Pulmonary/Chest: [x] Respiratory effort normal.  [x] No visualized signs of difficulty breathing or respiratory distress        [] Abnormal-      Musculoskeletal:   [] Normal gait with no signs of ataxia         [] Normal range of motion of neck        [] Abnormal-       Neurological:        [x] No Facial Asymmetry (Cranial nerve 7 motor function) (limited exam to video visit)          [] No gaze palsy        [] Abnormal-         Skin:        [x] No significant exanthematous lesions or discoloration noted on facial skin         [] Abnormal-            Psychiatric:       [x] Normal Affect [] No Hallucinations        [] Abnormal-     Other pertinent observable physical exam findings-     ASSESSMENT/PLAN:     Diagnosis Orders   1. DVT of deep femoral vein, left (HCC)  US DUP LOWER EXTREMITY LEFT DONAVON    torsemide (DEMADEX) 10 MG tablet   2.  Edema of both lower extremities due to peripheral venous insufficiency  torsemide

## 2020-04-20 ENCOUNTER — TELEPHONE (OUTPATIENT)
Dept: PRIMARY CARE CLINIC | Age: 50
End: 2020-04-20

## 2020-04-20 NOTE — TELEPHONE ENCOUNTER
Patient wants to know if her ultra sound is routine? If not its not until the end of may, if its emergent then it would be sooner. Please advise patient on the matter. a good call back number is [phone]

## 2020-05-08 NOTE — PROGRESS NOTES
TELE HEALTH VISIT (AUDIO-VISUAL)    Pursuant to the emergency declaration under the [facility] [place], [facility] waiver authority and the [employer] and [employer] Act, this Virtual  Visit was conducted, with patient's consent, to reduce the patient's risk of exposure to COVID-19 and provide continuity of care for an established patient. Service is  provided through a video synchronous discussion virtually to substitute for in-person clinic visit. Due to this being a TeleHealth encounter (During [facility] public health emergency), evaluation of the following organ systems was limited: Vitals/Constitutional/EENT/Resp/CV/GI//MS/Neuro/Skin/Wbfu-Dlvk-Dlc. Merced Lara  1970  [id number]    Ms. Lara is being seen virtually for a follow up visit using D[employer]. me/[employer] Video visit/[employer] or face time  Informed verbal consent to the virtual visit was obtained from Ms. Lara. Risks associated with HIPPA compliance with the virtual visit was explained to the patient. Ms. Ginette Sheikh is at her home and Raymond BALDWIN is in her office. HISTORY OF PRESENT ILLNESS:  Ms. Ginette Sheikh is a 51 y.o. female  being assessed for a follow up visit for pain management for evaluation of ongoing care regarding her symptoms and monitoring of compliance with long term use high risk medications. She has a diagnosis of   1. Chronic pain syndrome    2. Degeneration of lumbar or lumbosacral intervertebral disc    3. Lumbar nerve root impingement, L2    4. Spinal stenosis, lumbar region, without neurogenic claudication    5. Lumbosacral spondylosis without myelopathy    6. DDD (degenerative disc disease), cervical    7. Bilateral elbow joint pain    8.  Primary insomnia      On the Patients Pain Assessment form verified with the Medical Assistant:  She complains of pain in the bilateral lower back  with radiation to the upper leg Bilateral and lower leg Bilateral . She rates the pain 7/10 and describes it as aching, numbness. Current treatment regimen has helped relieve about 50% of the pain. She has constipation any side effects from the current pain regimen. Patient reports that since the last follow up visit the physical functioning is worse, family/social relationships are worse, mood is unchanged sleep patterns are worse, and that the overall functioning is worse. Patient denies misusing/abusing her narcotic pain medications or using any illegal drugs. Upon obtaining medical history from Ms. Lara states that pain is manageable on current pain therapy. Reports an increase in pain in the lower back the past couple of weeks, unsure whether extended amounts of sitting conribute to pain. Pain medication provide moderate relief of pain, takes medications as prescribed. Mood is stable without anxiety. Sleep is fair with an average of 5-6 hours. Denies to having issues of constipation. Tolerating activities/house chores with moderate tenderness to the lower back. ALLERGIES: Patients list of allergies were reviewed     MEDICATIONS: Ms. Isis Gutiérrez list of medications were reviewed. Her current medications are   Outpatient Medications Prior to Visit   Medication Sig Dispense Refill    torsemide (DEMADEX) 10 MG tablet Take 1 tablet by mouth daily 30 tablet 2    amitriptyline (ELAVIL) 25 MG tablet Take 1 tablet by mouth nightly 30 tablet 1    apixaban (ELIQUIS DVT/PE STARTER PACK) 5 MG TABS tablet Take 10 mg (2 tablets) orally twice daily for 7 days, then take 5 mg (1 tablet) orally twice daily thereafter. 74 tablet 0    drospirenone-ethinyl estradiol (OCELLA) 3-0.03 MG TABS Take 1 tablet by mouth daily      spironolactone (ALDACTONE) 100 MG tablet Take 100 mg by mouth daily      oxyCODONE-acetaminophen (PERCOCET) 5-325 MG per tablet Take 1 tablet by mouth every 8 hours as needed for Pain for up to 30 days.  Take no more than 2-3 tabs orally prn 90 tablet 0    Lidocaine (ZTLIDO) 1.8 % PTCH Apply 1 patch topically daily 30 patch 0     No facility-administered medications prior to visit. SOCIAL/FAMILY/PAST MEDICAL HISTORY: Ms. Eliza Landa social, family and past medical history was reviewed. REVIEW OF SYSTEMS:    Respiratory: Negative for apnea, chest tightness and shortness of breath or change in baseline breathing. Gastrointestinal: Negative for nausea, vomiting, abdominal pain, diarrhea, constipation, blood in stool and abdominal distention. PHYSICAL EXAM:   Nursing note and vitals reviewed. There were no vitals taken for this visit. as per patient  Constitutional: She appears well-developed and well-nourished. No acute distress. Skin: Skin appears to be warm and dry. No rashes or any other marks noted. She is not diaphoretic. Neurological/Psychiatric:She is alert and oriented to person, place, and time. Coordination is  normal.  Her mood isAppropriate and affect is Neutral/Euthymic(normal). Her behavior is normal.   thought content normal.   Musculoskeletal / Extremities:Gait is normal, assistive devices use: none. IMPRESSION:   1. Chronic pain syndrome    2. Degeneration of lumbar or lumbosacral intervertebral disc    3. Lumbar nerve root impingement, L2    4. Spinal stenosis, lumbar region, without neurogenic claudication    5. Lumbosacral spondylosis without myelopathy    6. DDD (degenerative disc disease), cervical    7. Bilateral elbow joint pain    8.  Primary insomnia        PLAN:  Informed verbal consent regarding treatment was obtained  -Continue with Percocet, Elavil, ZTlido  -Medrol dose pack, will re-evaluate back pain of f/u visit, possible consider injections should pain persist  -Corrine exercises recommended   -CBT techniques- relaxation therapies such as biofeedback, mindfulness based stress reduction, imagery, cognitive restructuring, problem solving discussed with patient  -Reviewed Covid-19 safety regulations which were discussed in detail at the last visit, patient maintains compliance with the safety guidelines regarding Covid-19  -Last UDS 5/20/19 Consistent  -Return in about 4 weeks (around 6/8/2020). Current Outpatient Medications   Medication Sig Dispense Refill    oxyCODONE-acetaminophen (PERCOCET) 5-325 MG per tablet Take 1 tablet by mouth every 8 hours as needed for Pain for up to 30 days. Take no more than 2-3 tabs orally prn 90 tablet 0    Lidocaine (ZTLIDO) 1.8 % PTCH Apply 1 patch topically daily 30 patch 0    methylPREDNISolone (MEDROL DOSEPACK) 4 MG tablet Take by mouth. 1 kit 0    torsemide (DEMADEX) 10 MG tablet Take 1 tablet by mouth daily 30 tablet 2    amitriptyline (ELAVIL) 25 MG tablet Take 1 tablet by mouth nightly 30 tablet 1    apixaban (ELIQUIS DVT/PE STARTER PACK) 5 MG TABS tablet Take 10 mg (2 tablets) orally twice daily for 7 days, then take 5 mg (1 tablet) orally twice daily thereafter. 74 tablet 0    drospirenone-ethinyl estradiol (OCELLA) 3-0.03 MG TABS Take 1 tablet by mouth daily      spironolactone (ALDACTONE) 100 MG tablet Take 100 mg by mouth daily       No current facility-administered medications for this visit. I will continue her current medication regimen  which is part of the above treatment schedule. It has been helping with Ms. Lara's chronic  medical problems which for this visit include:   Diagnoses of Chronic pain syndrome, Degeneration of lumbar or lumbosacral intervertebral disc, Lumbar nerve root impingement, L2, Spinal stenosis, lumbar region, without neurogenic claudication, Lumbosacral spondylosis without myelopathy, DDD (degenerative disc disease), cervical, Bilateral elbow joint pain, and Primary insomnia were pertinent to this visit. Risks and benefits of the medications and other alternative treatments  including no treatment were discussed with the patient. The common side effects of these medications were also explained to the patient. Informed verbal consent was obtained.

## 2020-05-11 ENCOUNTER — VIRTUAL VISIT (OUTPATIENT)
Dept: PAIN MANAGEMENT | Age: 50
End: 2020-05-11
Payer: COMMERCIAL

## 2020-05-11 PROCEDURE — 99213 OFFICE O/P EST LOW 20 MIN: CPT | Performed by: NURSE PRACTITIONER

## 2020-05-11 RX ORDER — METHYLPREDNISOLONE 4 MG/1
TABLET ORAL
Qty: 1 KIT | Refills: 0 | Status: SHIPPED | OUTPATIENT
Start: 2020-05-11 | End: 2020-05-17

## 2020-05-11 RX ORDER — OXYCODONE HYDROCHLORIDE AND ACETAMINOPHEN 5; 325 MG/1; MG/1
1 TABLET ORAL EVERY 8 HOURS PRN
Qty: 90 TABLET | Refills: 0 | Status: SHIPPED | OUTPATIENT
Start: 2020-05-11 | End: 2020-06-08 | Stop reason: SDUPTHER

## 2020-05-11 RX ORDER — LIDOCAINE 36 MG/1
1 PATCH TOPICAL DAILY
Qty: 30 PATCH | Refills: 0 | Status: SHIPPED | OUTPATIENT
Start: 2020-05-11 | End: 2020-06-08 | Stop reason: SDUPTHER

## 2020-05-15 ENCOUNTER — VIRTUAL VISIT (OUTPATIENT)
Dept: PRIMARY CARE CLINIC | Age: 50
End: 2020-05-15
Payer: COMMERCIAL

## 2020-05-15 VITALS — BODY MASS INDEX: 23.69 KG/M2 | WEIGHT: 138 LBS

## 2020-05-15 PROCEDURE — 99214 OFFICE O/P EST MOD 30 MIN: CPT | Performed by: FAMILY MEDICINE

## 2020-05-15 RX ORDER — TORSEMIDE 10 MG/1
10 TABLET ORAL DAILY
Qty: 30 TABLET | Refills: 2 | Status: SHIPPED | OUTPATIENT
Start: 2020-05-15 | End: 2020-08-07

## 2020-05-15 RX ORDER — METRONIDAZOLE 500 MG/1
500 TABLET ORAL 3 TIMES DAILY
COMMUNITY
Start: 2020-05-13 | End: 2020-05-23

## 2020-05-15 RX ORDER — CIPROFLOXACIN 500 MG/1
500 TABLET, FILM COATED ORAL 2 TIMES DAILY
COMMUNITY
Start: 2020-05-13 | End: 2020-05-20

## 2020-05-15 ASSESSMENT — ENCOUNTER SYMPTOMS
SORE THROAT: 0
ALLERGIC/IMMUNOLOGIC NEGATIVE: 1
EYES NEGATIVE: 1
ABDOMINAL PAIN: 1
SINUS PAIN: 0
BACK PAIN: 1
SHORTNESS OF BREATH: 0
TROUBLE SWALLOWING: 0
CHEST TIGHTNESS: 0
RESPIRATORY NEGATIVE: 1
WHEEZING: 0

## 2020-06-08 ENCOUNTER — VIRTUAL VISIT (OUTPATIENT)
Dept: PAIN MANAGEMENT | Age: 50
End: 2020-06-08
Payer: COMMERCIAL

## 2020-06-08 PROCEDURE — 99213 OFFICE O/P EST LOW 20 MIN: CPT | Performed by: NURSE PRACTITIONER

## 2020-06-08 RX ORDER — LIDOCAINE 36 MG/1
1 PATCH TOPICAL DAILY
Qty: 30 PATCH | Refills: 0 | Status: SHIPPED | OUTPATIENT
Start: 2020-06-08 | End: 2020-07-06 | Stop reason: SDUPTHER

## 2020-06-08 RX ORDER — AMITRIPTYLINE HYDROCHLORIDE 25 MG/1
25 TABLET, FILM COATED ORAL NIGHTLY
Qty: 30 TABLET | Refills: 1 | Status: SHIPPED | OUTPATIENT
Start: 2020-06-08 | End: 2020-08-03 | Stop reason: SDUPTHER

## 2020-06-08 RX ORDER — OXYCODONE HYDROCHLORIDE AND ACETAMINOPHEN 5; 325 MG/1; MG/1
1 TABLET ORAL EVERY 8 HOURS PRN
Qty: 90 TABLET | Refills: 0 | Status: SHIPPED | OUTPATIENT
Start: 2020-06-08 | End: 2020-07-06 | Stop reason: SDUPTHER

## 2020-06-08 NOTE — PATIENT INSTRUCTIONS
and sign in to your [employer] account. Enter F843 in the [employer] box to learn more about \"Back Stretches: Exercises. \"     If you do not have an account, please click on the \"Sign Up Now\" link. Current as of: March 2, 2020               Content Version: 12.5  © 1522-2176 [employer], Incorporated. Care instructions adapted under license by [address] If you have questions about a medical condition or this instruction, always ask your healthcare professional. [address] any warranty or liability for your use of this information.

## 2020-06-08 NOTE — PROGRESS NOTES
TELE HEALTH VISIT (AUDIO-VISUAL)    Pursuant to the emergency declaration under the [facility], [facility] waiver authority and the [employer] and [employer] Act, this Virtual  Visit was conducted, with patient's consent, to reduce the patient's risk of exposure to COVID-19 and provide continuity of care for an established patient. Service is  provided through a video synchronous discussion virtually to substitute for in-person clinic visit. Due to this being a TeleHealth encounter (During [place]-93 public health emergency), evaluation of the following organ systems was limited: Vitals/Constitutional/EENT/Resp/CV/GI//MS/Neuro/Skin/Zkkp-Rydl-Cze. Claudell Senior Money  1970  [id number]    Ms. Lara is being seen virtually for a follow up visit using [employer] Video visit/[employer] or face time  Informed verbal consent to the virtual visit was obtained from Ms. Lara. Risks associated with HIPPA compliance with the virtual visit was explained to the patient. Ms. Dwayne Dan is at her home and Uri Night. Macie BALDWIN is in her office. HISTORY OF PRESENT ILLNESS:  Ms. Dwayne Dan is a 51 y.o. female  being assessed for a follow up visit for pain management for evaluation of ongoing care regarding her symptoms and monitoring of compliance with long term use high risk medications. She has a diagnosis of   1. Chronic pain syndrome    2. Degeneration of lumbar or lumbosacral intervertebral disc    3. Lumbar nerve root impingement, L2    4. Spinal stenosis, lumbar region, without neurogenic claudication    5. Lumbosacral spondylosis without myelopathy    6. DDD (degenerative disc disease), cervical    7. Bilateral elbow joint pain    8.  Primary insomnia      On the Patients Pain Assessment form reviewed with the Medical Assistant:  She complains of pain in the left and bilateral lower back and both upper leg(s): lower  with radiation to the lower leg prior to visit. SOCIAL/FAMILY/PAST MEDICAL HISTORY: Ms. Will Hernandez social, family and past medical history was reviewed. REVIEW OF SYSTEMS:    Respiratory: Negative for apnea, chest tightness and shortness of breath or change in baseline breathing. Gastrointestinal: Negative for nausea, vomiting, abdominal pain, diarrhea, constipation, blood in stool and abdominal distention. PHYSICAL EXAM:   Nursing note and vitals reviewed. There were no vitals taken for this visit. as per patient  Constitutional: She appears well-developed and well-nourished. No acute distress. Skin: Skin appears to be warm and dry. No rashes or any other marks noted. She is not diaphoretic. Neurological/Psychiatric:She is alert and oriented to person, place, and time. Coordination is  normal.  Her mood isAppropriate and affect is Neutral/Euthymic(normal). Her behavior is normal.   thought content normal.   Musculoskeletal / Extremities:Gait is normal, assistive devices use: none. IMPRESSION:   1. Chronic pain syndrome    2. Degeneration of lumbar or lumbosacral intervertebral disc    3. Lumbar nerve root impingement, L2    4. Spinal stenosis, lumbar region, without neurogenic claudication    5. Lumbosacral spondylosis without myelopathy    6. DDD (degenerative disc disease), cervical    7. Bilateral elbow joint pain    8. Primary insomnia        PLAN:   Informed verbal consent regarding treatment was obtained  -Continue with Elavil, ZTlido, Percocet  -Corrine exercises  -Maintain f/u with her PCP for Diverticulitis  -CBT techniques- relaxation therapies such as biofeedback, mindfulness based stress reduction, imagery, cognitive restructuring, problem solving discussed with patient  -Reviewed Covid-19 safety regulations which were discussed in detail at previous visits, patient maintains compliance with the safety guidelines regarding Covid-19  -Last UDS 5/20/19 consistent  -Return in about 4 weeks (around 7/6/2020).

## 2020-06-17 LAB — SARS-COV-2, IGG: NEGATIVE

## 2020-06-30 ENCOUNTER — TELEPHONE (OUTPATIENT)
Dept: PRIMARY CARE CLINIC | Age: 50
End: 2020-06-30

## 2020-06-30 ENCOUNTER — NURSE TRIAGE (OUTPATIENT)
Dept: OTHER | Facility: CLINIC | Age: 50
End: 2020-06-30

## 2020-06-30 NOTE — TELEPHONE ENCOUNTER
[facility] received a call from:    Name of Caller: Arely     Relationship to patient:self    Organization name: n/a    Best contact number: [phone]    Reason for call:   Patient called in and is requesting an Mri for her back as her chiropractor thinks she has a bulging disc. The patient stated her insurance company will approve it as she's already contacted them. Please advise.

## 2020-07-01 ENCOUNTER — HOSPITAL ENCOUNTER (OUTPATIENT)
Dept: MRI IMAGING | Age: 50
Discharge: HOME | End: 2020-07-01
Payer: COMMERCIAL

## 2020-07-01 ENCOUNTER — VIRTUAL VISIT (OUTPATIENT)
Dept: PRIMARY CARE CLINIC | Age: 50
End: 2020-07-01
Payer: COMMERCIAL

## 2020-07-01 PROCEDURE — 72141 MRI NECK SPINE W/O DYE: CPT

## 2020-07-01 PROCEDURE — 99214 OFFICE O/P EST MOD 30 MIN: CPT | Performed by: FAMILY MEDICINE

## 2020-07-01 RX ORDER — METHYLPREDNISOLONE 4 MG/1
TABLET ORAL
Qty: 1 KIT | Refills: 0 | Status: SHIPPED | OUTPATIENT
Start: 2020-07-01 | End: 2020-07-07

## 2020-07-01 ASSESSMENT — ENCOUNTER SYMPTOMS
BACK PAIN: 1
ABDOMINAL PAIN: 0
SINUS PAIN: 0
SORE THROAT: 0
RESPIRATORY NEGATIVE: 1
TROUBLE SWALLOWING: 0
EYES NEGATIVE: 1
CHEST TIGHTNESS: 0
ALLERGIC/IMMUNOLOGIC NEGATIVE: 1
NAUSEA: 0
SHORTNESS OF BREATH: 0
WHEEZING: 0

## 2020-07-01 NOTE — PROGRESS NOTES
Numbness shoulder ,arms ,hands Bilateral   Hematological: Negative. +DVT       Prior to Visit Medications    Medication Sig Taking? Authorizing Provider   oxyCODONE-acetaminophen (PERCOCET) 5-325 MG per tablet Take 1 tablet by mouth every 8 hours as needed for Pain for up to 30 days. Take no more than 2-3 tabs orally prn Yes CHRISTA Craven CNP   amitriptyline (ELAVIL) 25 MG tablet Take 1 tablet by mouth nightly Yes CHRISTA Craven CNP   Lidocaine (ZTLIDO) 1.8 % PTCH Apply 1 patch topically daily Yes CHRISTA Craven CNP   torsemide (DEMADEX) 10 MG tablet Take 1 tablet by mouth daily Yes Roshan Veloz MD   apixaban (ELIQUIS) 5 MG TABS tablet Take 1 tablet by mouth 2 times daily Yes Roshan Veloz MD       Social History     Tobacco Use    Smoking status: Never Smoker    Smokeless tobacco: Never Used   Substance Use Topics    Alcohol use: No    Drug use: No            PHYSICAL EXAMINATION:  [ INSTRUCTIONS:  \"[x]\" Indicates a positive item  \"[]\" Indicates a negative item  -- DELETE ALL ITEMS NOT EXAMINED]  Vital Signs: (As obtained by patient/caregiver or practitioner observation)    Blood pressure-  Heart rate-    Respiratory rate-    Temperature-  Pulse oximetry-     Constitutional: [] Appears well-developed and well-nourished [] No apparent distress      [x] Abnormal-in pain   Mental status  [x] Alert and awake  [x] Oriented to person/place/time []Able to follow commands      Eyes:  EOM    [x]  Normal  [] Abnormal-  Sclera  []  Normal  [] Abnormal -         Discharge []  None visible  [] Abnormal -    HENT:   [x] Normocephalic, atraumatic.   [] Abnormal   [] Mouth/Throat: Mucous membranes are moist.     External Ears [x] Normal  [] Abnormal-     Neck: [] No visualized mass + difficult move her neck in all direction  Pulmonary/Chest: [x] Respiratory effort normal.  [] No visualized signs of difficulty breathing or respiratory distress        [] Abnormal-      Musculoskeletal: [] Normal gait with no signs of ataxia         [] Normal range of motion of neck        [x] Abnormal-       Neurological:        [x] No Facial Asymmetry (Cranial nerve 7 motor function) (limited exam to video visit)          [x] No gaze palsy        [] Abnormal-         Skin:        [x] No significant exanthematous lesions or discoloration noted on facial skin         [] Abnormal-            Psychiatric:       [x] Normal Affect [] No Hallucinations        [] Abnormal-     Other pertinent observable physical exam findings-     ASSESSMENT/PLAN:     Diagnosis Orders   1. Neck pain  MRI CERVICAL SPINE WO CONTRAST    methylPREDNISolone (MEDROL DOSEPACK) 4 MG tablet   2. Numbness in both hands  MRI CERVICAL SPINE WO CONTRAST    methylPREDNISolone (MEDROL DOSEPACK) 4 MG tablet   3. Cervical disc disorder at C5-C6 level with radiculopathy  MRI CERVICAL SPINE WO CONTRAST    methylPREDNISolone (MEDROL DOSEPACK) 4 MG tablet     Stat MRI      Arely Lara is a 51 y.o. female being evaluated by a Virtual Visit (video visit) encounter to address concerns as mentioned above. A caregiver was present when appropriate. Due to this being a TeleHealth encounter (During [place]-53 public health emergency), evaluation of the following organ systems was limited: Vitals/Constitutional/EENT/Resp/CV/GI//MS/Neuro/Skin/Heme-Lymph-Imm. Pursuant to the emergency declaration under the [address] [place] [address] and the [employer] and [employer] Act, this Virtual Visit was conducted with patient's (and/or legal guardian's) consent, to reduce the patient's risk of exposure to COVID-19 and provide necessary medical care. The patient (and/or legal guardian) has also been advised to contact this office for worsening conditions or problems, and seek emergency medical treatment and/or call 911 if deemed necessary.      Patient identification was verified at the start of the visit: Yes    Total time spent on this encounter: Not Billed by time    Services were provided through a video synchronous discussion virtually to substitute for in-person clinic visit. Patient and provider were located at their individual homes. --Fabi Shine MD on 7/1/2020 at 11:18 AM    An electronic signature was used to authenticate this note.

## 2020-07-06 ENCOUNTER — VIRTUAL VISIT (OUTPATIENT)
Dept: PAIN MANAGEMENT | Age: 50
End: 2020-07-06
Payer: COMMERCIAL

## 2020-07-06 PROCEDURE — 99213 OFFICE O/P EST LOW 20 MIN: CPT | Performed by: NURSE PRACTITIONER

## 2020-07-06 RX ORDER — LIDOCAINE 36 MG/1
1 PATCH TOPICAL DAILY
Qty: 30 PATCH | Refills: 0 | Status: SHIPPED | OUTPATIENT
Start: 2020-07-06 | End: 2020-08-03 | Stop reason: SDUPTHER

## 2020-07-06 RX ORDER — APIXABAN 5 MG/1
TABLET, FILM COATED ORAL
Qty: 60 TABLET | Refills: 0 | Status: SHIPPED | OUTPATIENT
Start: 2020-07-06 | End: 2020-08-07

## 2020-07-06 RX ORDER — OXYCODONE HYDROCHLORIDE AND ACETAMINOPHEN 5; 325 MG/1; MG/1
1 TABLET ORAL EVERY 8 HOURS PRN
Qty: 90 TABLET | Refills: 0 | Status: SHIPPED | OUTPATIENT
Start: 2020-07-06 | End: 2020-08-03 | Stop reason: SDUPTHER

## 2020-07-06 NOTE — PROGRESS NOTES
TELE HEALTH VISIT (AUDIO-VISUAL)    Pursuant to the emergency declaration under the [address] [facility], [facility] waiver authority and the [employer] Act, this Virtual  Visit was conducted, with patient's consent, to reduce the patient's risk of exposure to COVID-19 and provide continuity of care for an established patient. Service is  provided through a video synchronous discussion virtually to substitute for in-person clinic visit. Due to this being a TeleHealth encounter (During EMCDT-72 public health emergency), evaluation of the following organ systems was limited: Vitals/Constitutional/EENT/Resp/CV/GI//MS/Neuro/Skin/Xldi-Ialn-Mjx. Harley Lara  1970  [id number]    Ms. Lara is being seen virtually for a follow up visit using [employer] Video visit/[facility] or face time  Informed verbal consent to the virtual visit was obtained from Ms. Lara. Risks associated with HIPPA compliance with the virtual visit was explained to the patient. Ms. Morris Barry is at her home and Augustin BALDWIN is in her office. HISTORY OF PRESENT ILLNESS:  Ms. Morris Barry is a 51 y.o. female  being assessed for a follow up visit for pain management for evaluation of ongoing care regarding her symptoms and monitoring of compliance with long term use high risk medications. She has a diagnosis of   1. Chronic pain syndrome    2. Degeneration of lumbar or lumbosacral intervertebral disc    3. Lumbar nerve root impingement, L2    4. Spinal stenosis, lumbar region, without neurogenic claudication    5. Lumbosacral spondylosis without myelopathy    6. DDD (degenerative disc disease), cervical    7. Cervical stenosis of spine, mild    8. Numbness and tingling in both hands    9. Bilateral elbow joint pain    10.  Primary insomnia      On the Patients Pain Assessment form reviewed with the Medical Assistant:  She complains of pain in the both arm(s): mid, both hand(s): entire limb and both shoulder(s): entire area  with radiation to the n/a . She rates the pain 5/10 and describes it as numbness, tingling, pins and needles. Current treatment regimen has helped relieve about 0% of the pain. She denies any side effects from the current pain regimen. Patient reports that since the last follow up visit the physical functioning is worse, family/social relationships are unchanged, mood is worse sleep patterns are worse, and that the overall functioning is unchanged. Patient denies misusing/abusing her narcotic pain medications or using any illegal drugs. Upon obtaining medical history from Ms. Lara states that pain is manageable on current pain therapy. Reports having experienced neck pain with numbness/tingling to the hands. Percocet provides moderate relief of pain in the neck. Denies direct trauma to the neck. She was treated in the hospital but left due to extended waiting period of 4 hours. Her PCP ordered MRI and steroids which seem to help manage her pain. Mood is stable, sleep is fair with an average of 5-6 hours. Denies to having issues of constipation. Tolerating activities/house chores with moderate tenderness to the lower back. ALLERGIES: Patients list of allergies were reviewed     MEDICATIONS: Ms. Nirmal Cuevas list of medications were reviewed. Her current medications are   Outpatient Medications Prior to Visit   Medication Sig Dispense Refill    gabapentin (NEURONTIN) 100 MG capsule Take 1 capsule by mouth nightly for 30 days.  30 capsule 3    methylPREDNISolone (MEDROL DOSEPACK) 4 MG tablet Use as directed per package 1 kit 0    amitriptyline (ELAVIL) 25 MG tablet Take 1 tablet by mouth nightly 30 tablet 1    torsemide (DEMADEX) 10 MG tablet Take 1 tablet by mouth daily 30 tablet 2    apixaban (ELIQUIS) 5 MG TABS tablet Take 1 tablet by mouth 2 times daily 60 tablet 1    oxyCODONE-acetaminophen (PERCOCET) 5-325 MG per tablet Take 1 tablet by mouth every 8 hours as needed for Pain for up to 30 days. Take no more than 2-3 tabs orally prn 90 tablet 0    Lidocaine (ZTLIDO) 1.8 % PTCH Apply 1 patch topically daily 30 patch 0     No facility-administered medications prior to visit. SOCIAL/FAMILY/PAST MEDICAL HISTORY: Ms. Rell Cabrera social, family and past medical history was reviewed. REVIEW OF SYSTEMS:    Respiratory: Negative for apnea, chest tightness and shortness of breath or change in baseline breathing. Gastrointestinal: Negative for nausea, vomiting, abdominal pain, diarrhea, constipation, blood in stool and abdominal distention. PHYSICAL EXAM:   Nursing note and vitals reviewed. There were no vitals taken for this visit. as per patient  Constitutional: She appears well-developed and well-nourished. No acute distress. Skin: Skin appears to be warm and dry. No rashes or any other marks noted. She is not diaphoretic. Neurological/Psychiatric:She is alert and oriented to person, place, and time. Coordination is  normal.  Her mood isAppropriate and affect is Neutral/Euthymic(normal). Her behavior is normal.   thought content normal.   Musculoskeletal / Extremities: Gait is normal, assistive devices use: none. IMPRESSION:   1. Chronic pain syndrome    2. Degeneration of lumbar or lumbosacral intervertebral disc    3. Lumbar nerve root impingement, L2    4. Spinal stenosis, lumbar region, without neurogenic claudication    5. Lumbosacral spondylosis without myelopathy    6. DDD (degenerative disc disease), cervical    7. Cervical stenosis of spine, mild    8. Numbness and tingling in both hands    9. Bilateral elbow joint pain    10.  Primary insomnia        PLAN:  Informed verbal consent regarding treatment was obtained  -Continue with ElavilPHYLLISlido, Percocet  -Corrine exercises  -EMG upper extremity  -Recommended patient consider orthotic pillow, posture brace, currently working from home, mainly via the computer.  -Reports currently in the care of chiropractic care, unsure whether to maintain care is she believes the steroids provide the best relief  -CBT techniques- relaxation therapies such as biofeedback, mindfulness based stress reduction, imagery, cognitive restructuring, problem solving discussed with patient  -Reviewed Covid-19 safety regulations which were discussed in detail at prior o.v, patient maintains compliance with the safety guidelines regarding Covid-19  -Last UDS 5/26/20 Consistent  -Return in about 4 weeks (around 8/3/2020). Current Outpatient Medications   Medication Sig Dispense Refill    oxyCODONE-acetaminophen (PERCOCET) 5-325 MG per tablet Take 1 tablet by mouth every 8 hours as needed for Pain for up to 30 days. Take no more than 2-3 tabs orally prn 90 tablet 0    Lidocaine (ZTLIDO) 1.8 % PTCH Apply 1 patch topically daily 30 patch 0    gabapentin (NEURONTIN) 100 MG capsule Take 1 capsule by mouth nightly for 30 days. 30 capsule 3    amitriptyline (ELAVIL) 25 MG tablet Take 1 tablet by mouth nightly 30 tablet 1    torsemide (DEMADEX) 10 MG tablet Take 1 tablet by mouth daily 30 tablet 2    ELIQUIS 5 MG TABS tablet TAKE 1 TABLET BY MOUTH TWO TIMES A DAY  60 tablet 0     No current facility-administered medications for this visit. I will continue her current medication regimen  which is part of the above treatment schedule. It has been helping with Ms. Lara's chronic  medical problems which for this visit include:   Diagnoses of Chronic pain syndrome, Degeneration of lumbar or lumbosacral intervertebral disc, Lumbar nerve root impingement, L2, Spinal stenosis, lumbar region, without neurogenic claudication, Lumbosacral spondylosis without myelopathy, DDD (degenerative disc disease), cervical, Cervical stenosis of spine, mild, Numbness and tingling in both hands, Bilateral elbow joint pain, and Primary insomnia were pertinent to this visit.    Risks and benefits of the been some errors made inadvertently. Thank you for allowing me to participate in the care of this patient. Lam Coppola.  Faisal APRN-CNP     Cc: Radha Bansal MD

## 2020-07-06 NOTE — PATIENT INSTRUCTIONS
Patient Education        Back Stretches: Exercises  Introduction  Here are some examples of exercises for stretching your back. Start each exercise slowly. Ease off the exercise if you start to have pain. Your doctor or physical therapist will tell you when you can start these exercises and which ones will work best for you. How to do the exercises  Overhead stretch   1. Stand comfortably with your feet shoulder-width apart. 2. Looking straight ahead, raise both arms over your head and reach toward the ceiling. Do not allow your head to tilt back. 3. Hold for 15 to 30 seconds, then lower your arms to your sides. 4. Repeat 2 to 4 times. Side stretch   1. Stand comfortably with your feet shoulder-width apart. 2. Raise one arm over your head, and then lean to the other side. 3. Slide your hand down your leg as you let the weight of your arm gently stretch your side muscles. Hold for 15 to 30 seconds. 4. Repeat 2 to 4 times on each side. Press-up   1. Lie on your stomach, supporting your body with your forearms. 2. Press your elbows down into the floor to raise your upper back. As you do this, relax your stomach muscles and allow your back to arch without using your back muscles. As your press up, do not let your hips or pelvis come off the floor. 3. Hold for 15 to 30 seconds, then relax. 4. Repeat 2 to 4 times. Relax and rest   1. Lie on your back with a rolled towel under your neck and a pillow under your knees. Extend your arms comfortably to your sides. 2. Relax and breathe normally. 3. Remain in this position for about 10 minutes. 4. If you can, do this 2 or 3 times each day. Follow-up care is a key part of your treatment and safety. Be sure to make and go to all appointments, and call your doctor if you are having problems. It's also a good idea to know your test results and keep a list of the medicines you take. Where can you learn more? Go to https://gagan.healthMopapp. org and sign in to your [employer] account. Enter B501 in the [employer] box to learn more about \"Back Stretches: Exercises. \"     If you do not have an account, please click on the \"Sign Up Now\" link. Current as of: March 2, 2020               Content Version: 12.5  © 7001-0467 Healthwise, Incorporated. Care instructions adapted under license by [facility] [place] ([facility]). If you have questions about a medical condition or this instruction, always ask your healthcare professional. [address] any warranty or liability for your use of this information.

## 2020-07-06 NOTE — TELEPHONE ENCOUNTER
Medication:   Requested Prescriptions     Pending Prescriptions Disp Refills    ELIQUIS 5 MG TABS tablet [Pharmacy Med Name: Eliquis Oral Tablet 5 MG] 60 tablet 0     Sig: TAKE 1 TABLET BY MOUTH TWO TIMES A DAY     Last Filled:  5.15.20  Last appt: 7. 1.20   Next appt: Visit date not found    Last OARRS:   RX Monitoring 7/1/2020   Attestation -   Periodic Controlled Substance Monitoring No signs of potential drug abuse or diversion identified.

## 2020-07-08 ENCOUNTER — PROCEDURE VISIT (OUTPATIENT)
Dept: NEUROLOGY | Age: 50
End: 2020-07-08
Payer: COMMERCIAL

## 2020-07-08 PROCEDURE — 95911 NRV CNDJ TEST 9-10 STUDIES: CPT | Performed by: PSYCHIATRY & NEUROLOGY

## 2020-07-08 PROCEDURE — 95886 MUSC TEST DONE W/N TEST COMP: CPT | Performed by: PSYCHIATRY & NEUROLOGY

## 2020-07-08 NOTE — PROGRESS NOTES
Spencer Mcgowan M.D. [facility]) Physicians/[facility]  Board Certified in [address] [facility], [address] [facility], [address] [facility]    EMG / [address] STUDY    PATIENT:     Arely Lara      DATE OF EM2020    YOB: 1970       REASON FOR EMG:   Bilateral arm pain and numbness    REFERRING PHYSICIAN:  Tonya Suarez [facility], [address] [facility] [facility],[address]    SUMMARY:   Bilateral median motor and sensory nerve studies with prolonged distal latencies. Bilateral ulnar motor and sensory studies were normal.  The left radial sensory nerve study was normal.  Needle EMG of several muscles in both upper extremities showed evidence of decreased motor units in bilateral abductor pollicis brevis muscles. Needle EMG of bilateral cervical paraspinal muscles was normal.     CLINICAL DIAGNOSIS:    Unspecified neuropathy versus cervical radiculopathy. EMG RESULTS:   This patient has moderately severe bilateral median nerve lesions at the wrist.  (Carpal tunnel syndrome). The left side is slightly more involved when compared to the right side. There is no electrophysiological evidence for ulnar nerve entrapment or cervical radiculopathy in this study. _____________________________  Spencer Mcgowan M.D.   Electromyographer/Neurologist

## 2020-07-13 ENCOUNTER — PATIENT MESSAGE (OUTPATIENT)
Dept: PAIN MANAGEMENT | Age: 50
End: 2020-07-13

## 2020-07-13 NOTE — PROGRESS NOTES
[facility] called and gave patient results from EMG, patient has carpal tunnel of both hands. [facility] ordered a referral for patient to see Dr. Shreyas Bingham with hand orthopaedics.

## 2020-07-13 NOTE — TELEPHONE ENCOUNTER
From: Michelle Gagnon Money  To: CHRISTA Luna - CNP  Sent: 7/13/2020 10:51 AM EDT  Subject: Test Results Question    To Whom It May Concern:    I had an EMG performed last Wednesday. I was told the results would be made available the next day and have yet to receive them. Do you have the results that they can be shared with me? Thank you in advance.     Matti Lara

## 2020-07-15 ENCOUNTER — OFFICE VISIT (OUTPATIENT)
Dept: ORTHOPEDIC SURGERY | Age: 50
End: 2020-07-15
Payer: COMMERCIAL

## 2020-07-15 VITALS — HEIGHT: 64 IN | TEMPERATURE: 97.8 F | WEIGHT: 138 LBS | RESPIRATION RATE: 16 BRPM | BODY MASS INDEX: 23.56 KG/M2

## 2020-07-15 PROBLEM — G56.03 CARPAL TUNNEL SYNDROME, BILATERAL: Status: ACTIVE | Noted: 2020-07-15

## 2020-07-15 PROCEDURE — 99203 OFFICE O/P NEW LOW 30 MIN: CPT | Performed by: ORTHOPAEDIC SURGERY

## 2020-07-15 PROCEDURE — L3908 WHO COCK-UP NONMOLDE PRE OTS: HCPCS | Performed by: ORTHOPAEDIC SURGERY

## 2020-07-15 PROCEDURE — 20526 THER INJECTION CARP TUNNEL: CPT | Performed by: ORTHOPAEDIC SURGERY

## 2020-07-15 NOTE — Clinical Note
Dear  Margaret Gutiérrez MD,    Thank you very much for your referral or Ms. Arely JARRETT Money to me for evaluation and treatment of her Hand & Wrist condition. I appreciate your confidence in me and thank you for allowing me the opportunity to care for your patients. If I can be of any further assistance to you on this or any other patient, please do not hesitate to contact me. Sincerely,    Jocelin Hess.  Regla Huitron MD

## 2020-07-15 NOTE — PROGRESS NOTES
Ms. Jimmie Jules is a 51 y.o. right handed individual  who is seen today in Hand Surgical Consultation at the request of Dixie Castellanos MD.    She presents today regarding Bilateral symptoms which have been present for approximately 4 weeks. A history of antecedent trauma or injury is Absent. She reports symptoms to include moderate numbness & tingling in the Median Innervated Digits. Hand symptoms do Daily awaken her from sleep. She reports mild pain located in the diffuse both wrists. Symptoms are worsening over time. Previous treatment has included electrodiagnostic studies were ordered. She does not claim relation of her symptoms to her required work activities. She has undergone electrodiagnostic testing. The patient's , past medical history, medications, allergies,  family history, social history, and review of systems have been updated, reviewed and have been scanned into the chart today. Physical Exam:  Ms. Jess Lara's most recent vitals:  Vitals  Temp: 97.8 °F (36.6 °C)  Temp Source: Temporal  Resp: 16  Height: 5' 4\" (162.6 cm)  Weight: 138 lb (62.6 kg)    She is well nourished, oriented to person, place & time. She demonstrates appropriate mood and affect as well as normal gait and station. Skin: Normal in appearance, Normal Color and Free of Lesions Bilaterally   Digital range of motion is Full and equal bilaterally bilaterally  Wrist range of motion is Full and equal bilaterally bilaterally  There is no evidence of gross joint instability bilaterally. Sensation is subjectively tingling in the Median Innervated Digits bilaterally and objectively present in the same distribution bilaterally. Vascular examination reveals normal, good capillary refill and good color bilaterally  Swelling is absent in the distal volar forearm bilaterally  Muscular strength is clinically appropriate bilaterally.   Examination for Carpal Tunnel Syndrome shows Carpal Tunnel Compression Test to be Mildly aggressive treatment may be required for her current presenting condition. Based upon our current discussion and a reasonable understating of the options available to her, Ms. Carmel Gray has selected to proceed with  an injection to both carpal tunnel(s). I have outlined for her the nature of the injection, and the pre, brett and post injection considerations and the appropriate expectations for this injection. I have clearly explained to her that the above outlined treatment plan should not be expected to 'cure' her carpal tunnel syndrome, but we are rather treating the symptoms with which she presents. She has understood that in order to achieve long lasting relief of her symptoms and to prevent future worsening or further damage, that definitive surgical treatment would be required. Ms. Carmel Gray  voiced an appropriate understanding of our discussion, the options available to her, and of the expectations of her selected  treatment. She did wish to proceed with Bilateral carpal tunnel injection. Procedure: right Carpal Tunnel Injection  [first Injection]: After full discussion of the nature of this process and outlining a treatment plan with Ms. Carmel Gray, we discussed the complications, limitations, expectations, alternatives, and risks of injection to the carpal tunnel. She understood this information well and verbally consented to this treatment. The skin of the symptomatic extremity was prepped with Isopropyl Alcohol and under aseptic conditions the carpal tunnel was injected with a combination of 1 ml of 0.25% Bupivacaine without Epinephrine and 40 mg of Triamcinolone (40 mg/ml). There was good filling of the carpal tunnel. A  dry, sterile bandage was applied and she tolerated the injection without difficulty. I advised her of the expected response, possible reactions and the instructions for care of the hand. Procedure: left Carpal Tunnel Injection  [first Injection]:   After

## 2020-07-15 NOTE — PATIENT INSTRUCTIONS
Information & Instructions   After Finger, Hand, Wrist, or Elbow Injection    Nuria Hartman MD    You have received an injection of local anesthetic (Bupivicaine without Epinephrine) for comfort & a steroid (Kenalog) for its strong anti-inflammatory effects. In order to give the medication a chance to reduce your inflammation and discomfort, it is recommended that you take it easy for a day or so. You may use your hand and arm as you feel comfortable, but you should avoid highly strenuous activity and heavy use for several days. Relief from the injection will often not begin for several days, and you may not feel full relief for up to one month. It is not uncommon to experience some local discomfort or pain at or around the injection site for a few days. To relieve these symptoms you may do the following if you feel necessary:       Apply ice to the affected area 20 minutes on and 20 minutes off. Do not apply ice directly to the skin. Use a thin layer (T-shirt, pillowcase, towel, etc.) to protect the skin. - If allowed by your other medical physicians, you may take -     2 Tylenol extra strength tablets every 4-6 hours       1-2 Aleve tablets twice a day     2-3 Advil tablets two to three times a day    If you are diabetic, the steroid medication may increase your blood sugar, so you are advised to monitor your sugar more closely so you can adjust it accordingly for a few days following your injection. If you need assistance with the control of you blood sugar, please contact you primary care physician for further advice. I will request that you please call the office one month after your injection at [phone] if you have not experienced relief of your symptoms (unless I have instructed you otherwise). If your injection has given you good relief of you symptoms as expected, then you only need to call the office if your symptoms return.

## 2020-07-22 LAB
ALBUMIN SERPL-MCNC: 4.3 G/DL (ref 3.5–5)
ALP BLD-CCNC: 89 IU/L (ref 35–135)
ALT SERPL-CCNC: 30 IU/L (ref 10–60)
ANION GAP SERPL CALCULATED.3IONS-SCNC: 6 MMOL/L (ref 6–18)
AST SERPL-CCNC: 18 IU/L (ref 10–40)
BASOPHILS ABSOLUTE: 0 %
BASOPHILS ABSOLUTE: 0 THOU/MCL (ref 0–0.2)
BILIRUB SERPL-MCNC: 0.4 MG/DL (ref 0–1.2)
BUN BLDV-MCNC: 17 MG/DL (ref 8–26)
CALCIUM SERPL-MCNC: 9.3 MG/DL (ref 8.5–10.5)
CHLORIDE BLD-SCNC: 102 MEQ/L (ref 101–111)
CO2: 30 MMOL/L (ref 24–36)
CREAT SERPL-MCNC: 0.83 MG/DL (ref 0.44–1.03)
EOSINOPHILS ABSOLUTE: 0 THOU/MCL (ref 0.03–0.45)
EOSINOPHILS RELATIVE PERCENT: 0 %
GFR AFRICAN AMERICAN: 95 ML/MIN/1.73 M2
GFR NON-AFRICAN AMERICAN: 82 ML/MIN/1.73 M2
GLUCOSE BLD-MCNC: 103 MG/DL (ref 70–99)
HCT VFR BLD CALC: 34.4 % (ref 36–46)
HEMOGLOBIN: 11.9 G/DL (ref 12–15.2)
LACTATE DEHYDROGENASE: 136 IU/L (ref 91–180)
LYMPHOCYTES ABSOLUTE: 1.5 THOU/MCL (ref 1–4)
LYMPHOCYTES RELATIVE PERCENT: 19 %
MCH RBC QN AUTO: 32 PG (ref 27–33)
MCHC RBC AUTO-ENTMCNC: 34.5 G/DL (ref 32–36)
MCV RBC AUTO: 92.7 FL (ref 82–97)
MONOCYTES # BLD: 8 %
MONOCYTES ABSOLUTE: 0.6 THOU/MCL (ref 0.2–0.9)
NEUTROPHILS ABSOLUTE: 5.6 THOU/MCL (ref 1.8–7.7)
PDW BLD-RTO: 13.2 % (ref 12.3–17)
PLATELET # BLD: 330 THOU/MCL (ref 140–375)
PMV BLD AUTO: 7.4 FL (ref 7.4–11.5)
POTASSIUM SERPL-SCNC: 4.3 MEQ/L (ref 3.6–5.1)
RBC # BLD: 3.71 MIL/MCL (ref 3.8–5.2)
SEG NEUTROPHILS: 73 %
SODIUM BLD-SCNC: 138 MEQ/L (ref 135–145)
TOTAL PROTEIN: 7.1 G/DL (ref 6–8)
WBC # BLD: 7.7 THOU/MCL (ref 3.6–10.5)

## 2020-07-30 LAB
ACTIVATED PROTEIN C RESISTANCE: 1.8 RATIO
ACTIVATED PROTEIN C RESISTANCE: 137 % OF NL (ref 72–142)
ANTICARDIOLIPIN IGG ANTIBODY: 5.1 CU
BETA-2 GLYCOPROTEIN 1 IGG ANTIBODY: 20.9 CU
BETA-2 GLYCOPROTEIN 1 IGM ANTIBODY: <1.1 CU
CARDIOLIPIN AB IGM: 2.1 CU
COAGULATION SURFACE INDUCED: 35.5 SECONDS
DILUTE RUSSELL VIPER VENOM TIME: 36.3 SECONDS
DRVVT,DIL: ABNORMAL SECONDS
HEXAGONAL PHOSPHOLIPID NEUTRALIZAT TEST: ABNORMAL
LUPUS ANTICOAG RATIO: ABNORMAL
Lab: 119 % OF NL (ref 80–125)
Lab: ABNORMAL SECONDS
PATHOLOGIST REVIEW: ABNORMAL
PROTEIN S ACTIVITY: 72 % OF NL (ref 60–125)
PROTHROMBIN G20210A MUTATION: NORMAL

## 2020-07-31 LAB — FACTOR V LEIDEN MUTATION: ABNORMAL

## 2020-08-03 ENCOUNTER — VIRTUAL VISIT (OUTPATIENT)
Dept: PAIN MANAGEMENT | Age: 50
End: 2020-08-03
Payer: COMMERCIAL

## 2020-08-03 PROCEDURE — 99213 OFFICE O/P EST LOW 20 MIN: CPT | Performed by: NURSE PRACTITIONER

## 2020-08-03 RX ORDER — OXYCODONE HYDROCHLORIDE AND ACETAMINOPHEN 5; 325 MG/1; MG/1
1 TABLET ORAL EVERY 8 HOURS PRN
Qty: 90 TABLET | Refills: 0 | Status: SHIPPED | OUTPATIENT
Start: 2020-08-03 | End: 2020-08-31 | Stop reason: SDUPTHER

## 2020-08-03 RX ORDER — AMITRIPTYLINE HYDROCHLORIDE 25 MG/1
25 TABLET, FILM COATED ORAL NIGHTLY
Qty: 30 TABLET | Refills: 1 | Status: SHIPPED | OUTPATIENT
Start: 2020-08-03 | End: 2020-09-28 | Stop reason: SDUPTHER

## 2020-08-03 RX ORDER — LIDOCAINE 36 MG/1
1 PATCH TOPICAL DAILY
Qty: 30 PATCH | Refills: 0 | Status: SHIPPED | OUTPATIENT
Start: 2020-08-03 | End: 2020-08-31 | Stop reason: SDUPTHER

## 2020-08-03 RX ORDER — DOCUSATE SODIUM 100 MG/1
100 CAPSULE, LIQUID FILLED ORAL 2 TIMES DAILY
COMMUNITY
Start: 2020-07-28 | End: 2020-08-27

## 2020-08-03 RX ORDER — AMOXICILLIN AND CLAVULANATE POTASSIUM 875; 125 MG/1; MG/1
TABLET, FILM COATED ORAL
COMMUNITY
Start: 2020-07-27 | End: 2020-09-24

## 2020-08-03 NOTE — PATIENT INSTRUCTIONS
Patient Education        Back Stretches: Exercises  Introduction  Here are some examples of exercises for stretching your back. Start each exercise slowly. Ease off the exercise if you start to have pain. Your doctor or physical therapist will tell you when you can start these exercises and which ones will work best for you. How to do the exercises  Overhead stretch   1. Stand comfortably with your feet shoulder-width apart. 2. Looking straight ahead, raise both arms over your head and reach toward the ceiling. Do not allow your head to tilt back. 3. Hold for 15 to 30 seconds, then lower your arms to your sides. 4. Repeat 2 to 4 times. Side stretch   1. Stand comfortably with your feet shoulder-width apart. 2. Raise one arm over your head, and then lean to the other side. 3. Slide your hand down your leg as you let the weight of your arm gently stretch your side muscles. Hold for 15 to 30 seconds. 4. Repeat 2 to 4 times on each side. Press-up   1. Lie on your stomach, supporting your body with your forearms. 2. Press your elbows down into the floor to raise your upper back. As you do this, relax your stomach muscles and allow your back to arch without using your back muscles. As your press up, do not let your hips or pelvis come off the floor. 3. Hold for 15 to 30 seconds, then relax. 4. Repeat 2 to 4 times. Relax and rest   1. Lie on your back with a rolled towel under your neck and a pillow under your knees. Extend your arms comfortably to your sides. 2. Relax and breathe normally. 3. Remain in this position for about 10 minutes. 4. If you can, do this 2 or 3 times each day. Follow-up care is a key part of your treatment and safety. Be sure to make and go to all appointments, and call your doctor if you are having problems. It's also a good idea to know your test results and keep a list of the medicines you take. Where can you learn more? Go to https://gagan.[email or web address] org

## 2020-08-03 NOTE — PROGRESS NOTES
TELE HEALTH VISIT (AUDIO-VISUAL)    Pursuant to the emergency declaration under the [facility], [facility] waiver authority and the [employer] Act, this Virtual  Visit was conducted, with patient's consent, to reduce the patient's risk of exposure to COVID-19 and provide continuity of care for an established patient. Service is  provided through a video synchronous discussion virtually to substitute for in-person clinic visit. Due to this being a TeleHealth encounter (During MUNEO-48 public health emergency), evaluation of the following organ systems was limited: Vitals/Constitutional/EENT/Resp/CV/GI//MS/Neuro/Skin/Hkbr-Ypvf-Bqn. Magdaleno Lara  1970  [id number]    Ms. Lara is being seen virtually for a follow up visit using Doxy. me/[email or web address] Video visit/[employer] or face time  Informed verbal consent to the virtual visit was obtained from Ms. Lara. Risks associated with HIPPA compliance with the virtual visit was explained to the patient. Ms. Rell Cabrera is at her home and Pandora Hodgkin. Gregor Mam APRN-CNP is in her office. HISTORY OF PRESENT ILLNESS:  Ms. Rell Cabrera is a 51 y.o. female  being assessed for a follow up visit for pain management for evaluation of ongoing care regarding her symptoms and monitoring of compliance with long term use high risk medications. She has a diagnosis of   1. Chronic pain syndrome    2. Degeneration of lumbar or lumbosacral intervertebral disc    3. Lumbar nerve root impingement, L2    4. Spinal stenosis, lumbar region, without neurogenic claudication    5. Lumbosacral spondylosis without myelopathy    6. DDD (degenerative disc disease), cervical    7. Cervical stenosis of spine, mild    8. Numbness and tingling in both hands    9. Bilateral elbow joint pain    10.  Primary insomnia      On the Patients Pain Assessment form reviewed with the Medical Assistant:  She complains of pain in the abdomen and left and bilateral lower back  with radiation to the n/a . She rates the pain 5/10 and describes it as aching, shooting. Current treatment regimen has helped relieve about 50% of the pain. She has constipation any side effects from the current pain regimen. Patient reports that since the last follow up visit the physical functioning is worse, family/social relationships are unchanged, mood is worse sleep patterns are worse, and that the overall functioning is worse. Patient denies misusing/abusing her narcotic pain medications or using any illegal drugs. Upon obtaining medical history from Ms. Lara states that pain is manageable on current pain therapy. She was treated in the ER for diverticulitis with abscess, currently has an appointment tomorrow with GI. Taking Augmenting with better relief of abdominal discomfort. Pain medications adequately manages her pain, takes them as prescribed. Received cortisone injections to bilateral wrists for CTS. Mood is stable without anxiety. Sleep is fair with an average of 5-6 hours. Denies to having issues of constipation. Currently on stool softeners. Tolerating activities/house chores with moderate tenderness to the lower back. ALLERGIES: Patients list of allergies were reviewed     MEDICATIONS: Ms. Trudi Schmidt list of medications were reviewed. Her current medications are   Outpatient Medications Prior to Visit   Medication Sig Dispense Refill    amoxicillin-clavulanate (AUGMENTIN) 875-125 MG per tablet TAKE 1 TABLET BY MOUTH EVERY TWELVE HOURS FOR 7 DAYS      docusate sodium (COLACE) 100 MG capsule Take 100 mg by mouth 2 times daily      ELIQUIS 5 MG TABS tablet TAKE 1 TABLET BY MOUTH TWO TIMES A DAY  60 tablet 0    torsemide (DEMADEX) 10 MG tablet Take 1 tablet by mouth daily 30 tablet 2    oxyCODONE-acetaminophen (PERCOCET) 5-325 MG per tablet Take 1 tablet by mouth every 8 hours as needed for Pain for up to 30 days.  Take no more than 2-3 tabs orally prn 90 tablet 0    Lidocaine (ZTLIDO) 1.8 % PTCH Apply 1 patch topically daily 30 patch 0    gabapentin (NEURONTIN) 100 MG capsule Take 1 capsule by mouth nightly for 30 days. 30 capsule 3    amitriptyline (ELAVIL) 25 MG tablet Take 1 tablet by mouth nightly 30 tablet 1     No facility-administered medications prior to visit. SOCIAL/FAMILY/PAST MEDICAL HISTORY: Ms. Evan Sharma social, family and past medical history was reviewed. REVIEW OF SYSTEMS:    Respiratory: Negative for apnea, chest tightness and shortness of breath or change in baseline breathing. Gastrointestinal: Negative for nausea, vomiting, abdominal pain, diarrhea, constipation, blood in stool and abdominal distention. PHYSICAL EXAM:   Nursing note and vitals reviewed. There were no vitals taken for this visit. as per patient  Constitutional: She appears well-developed and well-nourished. No acute distress. Skin: Skin appears to be warm and dry. No rashes or any other marks noted. She is not diaphoretic. Neurological/Psychiatric:She is alert and oriented to person, place, and time. Coordination is  normal.  Her mood isAppropriate and affect is Neutral/Euthymic(normal). Her behavior is normal.   thought content normal.   Musculoskeletal / Extremities: Gait is normal, assistive devices use: none. EMG of the upper extremtities    IMPRESSION:   1. Chronic pain syndrome    2. Degeneration of lumbar or lumbosacral intervertebral disc    3. Lumbar nerve root impingement, L2    4. Spinal stenosis, lumbar region, without neurogenic claudication    5. Lumbosacral spondylosis without myelopathy    6. DDD (degenerative disc disease), cervical    7. Cervical stenosis of spine, mild    8. Numbness and tingling in both hands    9. Bilateral elbow joint pain    10.  Primary insomnia        PLAN:  Informed verbal consent regarding treatment was obtained  -Continue with Elavil, ZTlido, Percocet  -Currently under the care of Dr. Johnathan West for [place] elbow joint pain, and Primary insomnia were pertinent to this visit. Risks and benefits of the medications and other alternative treatments  including no treatment were discussed with the patient. The common side effects of these medications were also explained to the patient. Informed verbal consent was obtained. Goals of current treatment regimen include improvement in pain, restoration of functioning- with focus on improvement in physical performance, general activity, work or disability,emotional distress, health care utilization and  decreased medication consumption. Will continue to monitor progress towards achieving/maintaining therapeutic goals with special emphasis on  1. Improvement in perceived interfernce  of pain with ADL's. Ability to do home exercises independently. Ability to do household chores indoor and/or outdoor work and social and leisure activities. Improve psychosocial and physical functioning. - she is showing progression towards this treatment goal with the current regimen. She was advised against drinking alcohol with the narcotic pain medicines, advised against driving or handling machinery while adjusting the dose of medicines or if having cognitive  issues related to the current medications. Risk of overdose and death, if medicines not taken as prescribed, were also discussed. If the patient develops new symptoms or if the symptoms worsen, the patient should call the office. While transcribing every attempt was made to maintain the accuracy of the note in terms of it's contents,there may have been some errors made inadvertently. Thank you for allowing me to participate in the care of this patient. Flor Garcia.  Faisal GOODWIN-JUSTICE     Cc: Shantell Ortega MD

## 2020-08-06 RX ORDER — AMITRIPTYLINE HYDROCHLORIDE 25 MG/1
25 TABLET, FILM COATED ORAL NIGHTLY
Qty: 30 TABLET | Refills: 0 | OUTPATIENT
Start: 2020-08-06 | End: 2020-09-05

## 2020-08-12 RX ORDER — AMITRIPTYLINE HYDROCHLORIDE 25 MG/1
25 TABLET, FILM COATED ORAL NIGHTLY
Qty: 30 TABLET | Refills: 0 | OUTPATIENT
Start: 2020-08-12 | End: 2020-09-11

## 2020-08-14 ENCOUNTER — HOSPITAL ENCOUNTER (OUTPATIENT)
Dept: MAMMOGRAPHY | Age: 50
Discharge: HOME | End: 2020-08-19
Payer: COMMERCIAL

## 2020-08-14 PROCEDURE — 77063 BREAST TOMOSYNTHESIS BI: CPT

## 2020-08-31 ENCOUNTER — VIRTUAL VISIT (OUTPATIENT)
Dept: PAIN MANAGEMENT | Age: 50
End: 2020-08-31
Payer: COMMERCIAL

## 2020-08-31 PROCEDURE — 99213 OFFICE O/P EST LOW 20 MIN: CPT | Performed by: NURSE PRACTITIONER

## 2020-08-31 RX ORDER — LIDOCAINE 36 MG/1
1 PATCH TOPICAL DAILY
Qty: 30 PATCH | Refills: 0 | Status: SHIPPED | OUTPATIENT
Start: 2020-08-31 | End: 2020-09-28 | Stop reason: SDUPTHER

## 2020-08-31 RX ORDER — TORSEMIDE 10 MG/1
10 TABLET ORAL DAILY
Qty: 90 TABLET | Refills: 0 | Status: SHIPPED | OUTPATIENT
Start: 2020-08-31 | End: 2021-01-04

## 2020-08-31 RX ORDER — OXYCODONE HYDROCHLORIDE AND ACETAMINOPHEN 5; 325 MG/1; MG/1
1 TABLET ORAL EVERY 8 HOURS PRN
Qty: 90 TABLET | Refills: 0 | Status: SHIPPED | OUTPATIENT
Start: 2020-08-31 | End: 2020-09-28 | Stop reason: SDUPTHER

## 2020-08-31 NOTE — PATIENT INSTRUCTIONS
Patient Education        Back Stretches: Exercises  Introduction  Here are some examples of exercises for stretching your back. Start each exercise slowly. Ease off the exercise if you start to have pain. Your doctor or physical therapist will tell you when you can start these exercises and which ones will work best for you. How to do the exercises  Overhead stretch   1. Stand comfortably with your feet shoulder-width apart. 2. Looking straight ahead, raise both arms over your head and reach toward the ceiling. Do not allow your head to tilt back. 3. Hold for 15 to 30 seconds, then lower your arms to your sides. 4. Repeat 2 to 4 times. Side stretch   1. Stand comfortably with your feet shoulder-width apart. 2. Raise one arm over your head, and then lean to the other side. 3. Slide your hand down your leg as you let the weight of your arm gently stretch your side muscles. Hold for 15 to 30 seconds. 4. Repeat 2 to 4 times on each side. Press-up   1. Lie on your stomach, supporting your body with your forearms. 2. Press your elbows down into the floor to raise your upper back. As you do this, relax your stomach muscles and allow your back to arch without using your back muscles. As your press up, do not let your hips or pelvis come off the floor. 3. Hold for 15 to 30 seconds, then relax. 4. Repeat 2 to 4 times. Relax and rest   1. Lie on your back with a rolled towel under your neck and a pillow under your knees. Extend your arms comfortably to your sides. 2. Relax and breathe normally. 3. Remain in this position for about 10 minutes. 4. If you can, do this 2 or 3 times each day. Follow-up care is a key part of your treatment and safety. Be sure to make and go to all appointments, and call your doctor if you are having problems. It's also a good idea to know your test results and keep a list of the medicines you take. Where can you learn more? Go to https://gagan.healthMicrosaic. org and sign in to your [employer] account. Enter L783 in the Hookflash box to learn more about \"Back Stretches: Exercises. \"     If you do not have an account, please click on the \"Sign Up Now\" link. Current as of: March 2, 2020               Content Version: 12.5  © 9440-0933 Healthwise, Incorporated. Care instructions adapted under license by [facility] ([facility]). If you have questions about a medical condition or this instruction, always ask your healthcare professional. [address] any warranty or liability for your use of this information.

## 2020-08-31 NOTE — TELEPHONE ENCOUNTER
Medication:   Requested Prescriptions     Pending Prescriptions Disp Refills    torsemide (DEMADEX) 10 MG tablet 90 tablet 0     Last Filled: 8.7.20  Last appt: 7/1/2020   Next appt: Visit date not found    Last OARRS:   RX Monitoring 7/1/2020   Attestation -   Periodic Controlled Substance Monitoring No signs of potential drug abuse or diversion identified.

## 2020-08-31 NOTE — PROGRESS NOTES
TELE HEALTH VISIT (AUDIO-VISUAL)    Pursuant to the emergency declaration under the [facility], [facility] waiver authority and the [employer] Act, this Virtual  Visit was conducted, with patient's consent, to reduce the patient's risk of exposure to COVID-19 and provide continuity of care for an established patient. Service is  provided through a video synchronous discussion virtually to substitute for in-person clinic visit. Due to this being a TeleHealth encounter (During OEOHD-27 public health emergency), evaluation of the following organ systems was limited: Vitals/Constitutional/EENT/Resp/CV/GI//MS/Neuro/Skin/Ppdp-Ncab-Dms. Niki Lara  1970  [id number]    Ms. Lara is being seen virtually for a follow up visit using Doxy. me/[employer] Video visit/[employer] or face time  Informed verbal consent to the virtual visit was obtained from Ms. Lara. Risks associated with HIPPA compliance with the virtual visit was explained to the patient. Ms. Bob Cuevas is at [facility] Karthikeyan BALDWIN is in her office. HISTORY OF PRESENT ILLNESS:  Ms. Bob Cuevas is a 51 y.o. female  being assessed for a follow up visit for pain management for evaluation of ongoing care regarding her symptoms and monitoring of compliance with long term use high risk medications. She has a diagnosis of   1. Chronic pain syndrome    2. Degeneration of lumbar or lumbosacral intervertebral disc    3. Lumbar nerve root impingement, L2    4. Spinal stenosis, lumbar region, without neurogenic claudication    5. Lumbosacral spondylosis without myelopathy    6. DDD (degenerative disc disease), cervical    7. Cervical stenosis of spine, mild    8. Numbness and tingling in both hands    9. Bilateral elbow joint pain    10.  Primary insomnia      On the Patients Pain Assessment form reviewed with the Medical Assistant:  She complains of pain in the both leg(s): entire limb  with radiation to the feet Left . She rates the pain 7/10 and describes it as dull, aching. Current treatment regimen has helped relieve about 10% of the pain. She denies any side effects from the current pain regimen. Patient reports that since the last follow up visit the physical functioning is worse, family/social relationships are unchanged, mood is unchanged sleep patterns are worse, and that the overall functioning is worse. Patient denies misusing/abusing her narcotic pain medications or using any illegal drugs. Upon obtaining medical history from Ms. Lara states that pain is manageable on current pain therapy. Reports having tenderness in the lower back radiating to the left lower extremity. She has an appointment with the hand specialists for possible CTS. Pain medications provide moderate relief of pain, takes them as prescribed. Mood is stable without anxiety. Sleep is fair with an average of 5-6 hours. Denies to having issues of constipation. Tolerating activities/house chores with moderate tenderness to the lower back. ALLERGIES: Patients list of allergies were reviewed     MEDICATIONS: Ms. Danny Avelar list of medications were reviewed. Her current medications are   Outpatient Medications Prior to Visit   Medication Sig Dispense Refill    ELIQUIS 5 MG TABS tablet TAKE 1 TABLET BY MOUTH TWO TIMES A DAY  60 tablet 0    torsemide (DEMADEX) 10 MG tablet TAKE 1 TABLET BY MOUTH ONE TIME A DAY  90 tablet 0    amoxicillin-clavulanate (AUGMENTIN) 875-125 MG per tablet TAKE 1 TABLET BY MOUTH EVERY TWELVE HOURS FOR 7 DAYS      amitriptyline (ELAVIL) 25 MG tablet Take 1 tablet by mouth nightly 30 tablet 1    oxyCODONE-acetaminophen (PERCOCET) 5-325 MG per tablet Take 1 tablet by mouth every 8 hours as needed for Pain for up to 30 days.  Take no more than 2-3 tabs orally prn 90 tablet 0    Lidocaine (ZTLIDO) 1.8 % PTCH Apply 1 patch topically daily 30 patch 0    gabapentin (NEURONTIN) 100 MG capsule Take 1 capsule by mouth nightly for 30 days. 30 capsule 3     No facility-administered medications prior to visit. SOCIAL/FAMILY/PAST MEDICAL HISTORY: Ms. Mary Contreras social, family and past medical history was reviewed. REVIEW OF SYSTEMS:    Respiratory: Negative for apnea, chest tightness and shortness of breath or change in baseline breathing. Gastrointestinal: Negative for nausea, vomiting, abdominal pain, diarrhea, constipation, blood in stool and abdominal distention. PHYSICAL EXAM:   Nursing note and vitals reviewed. There were no vitals taken for this visit. as per patient  Constitutional: She appears well-developed and well-nourished. No acute distress. Skin: Skin appears to be warm and dry. No rashes or any other marks noted. She is not diaphoretic. Neurological/Psychiatric:She is alert and oriented to person, place, and time. Coordination is  normal.  Her mood isAppropriate and affect is Neutral/Euthymic(normal). Her behavior is normal.   thought content normal.   Musculoskeletal / Extremities:Gait is normal, assistive devices use: none. MRI of the Lumbar spine 1/13/17:  Alignment is maintained. Marrow signal of the lumbar vertebral bodies is within normal limits.     There is no lumbar compression deformity.  There is desiccation within the L2-3 through    L5-S1 discs.  Disc signal is otherwise maintained. The cord    terminates normally at the L1-2 level.         T12-L1: There is no significant abnormality.         L1-2: There is no focal disc protrusion, significant canal stenosis or foraminal narrowing.         L2-3: There is a small right lateral disc protrusion, which extends into the right neural    foramen, resulting in right foraminal narrowing.  There is no significant canal stenosis or    left foraminal narrowing.         L3-4: There is no focal disc protrusion, significant canal stenosis or foraminal narrowing.         L4-5: There is a minimal disc bulge.  Lidocaine (ZTLIDO) 1.8 % PTCH Apply 1 patch topically daily 30 patch 0    ELIQUIS 5 MG TABS tablet TAKE 1 TABLET BY MOUTH TWO TIMES A DAY  60 tablet 0    amoxicillin-clavulanate (AUGMENTIN) 875-125 MG per tablet TAKE 1 TABLET BY MOUTH EVERY TWELVE HOURS FOR 7 DAYS      amitriptyline (ELAVIL) 25 MG tablet Take 1 tablet by mouth nightly 30 tablet 1    torsemide (DEMADEX) 10 MG tablet Take 1 tablet by mouth daily 90 tablet 0    gabapentin (NEURONTIN) 100 MG capsule Take 1 capsule by mouth nightly for 30 days. 30 capsule 3     No current facility-administered medications for this visit. I will continue her current medication regimen  which is part of the above treatment schedule. It has been helping with Ms. Lara's chronic  medical problems which for this visit include:   Diagnoses of Chronic pain syndrome, Degeneration of lumbar or lumbosacral intervertebral disc, Lumbar nerve root impingement, L2, Spinal stenosis, lumbar region, without neurogenic claudication, Lumbosacral spondylosis without myelopathy, DDD (degenerative disc disease), cervical, Cervical stenosis of spine, mild, Numbness and tingling in both hands, Bilateral elbow joint pain, and Primary insomnia were pertinent to this visit. Risks and benefits of the medications and other alternative treatments  including no treatment were discussed with the patient. The common side effects of these medications were also explained to the patient. Informed verbal consent was obtained. Goals of current treatment regimen include improvement in pain, restoration of functioning- with focus on improvement in physical performance, general activity, work or disability,emotional distress, health care utilization and  decreased medication consumption. Will continue to monitor progress towards achieving/maintaining therapeutic goals with special emphasis on  1. Improvement in perceived interfernce  of pain with ADL's.  Ability to do home exercises independently. Ability to do household chores indoor and/or outdoor work and social and leisure activities. Improve psychosocial and physical functioning. - she is showing progression towards this treatment goal with the current regimen. She was advised against drinking alcohol with the narcotic pain medicines, advised against driving or handling machinery while adjusting the dose of medicines or if having cognitive  issues related to the current medications. Risk of overdose and death, if medicines not taken as prescribed, were also discussed. If the patient develops new symptoms or if the symptoms worsen, the patient should call the office. While transcribing every attempt was made to maintain the accuracy of the note in terms of it's contents,there may have been some errors made inadvertently. Thank you for allowing me to participate in the care of this patient. Jose Alfredo Lynn.  Faisal GOODWIN-CNP     Cc: Jeannine Willoughby MD

## 2020-09-24 ENCOUNTER — OFFICE VISIT (OUTPATIENT)
Dept: ORTHOPEDIC SURGERY | Age: 50
End: 2020-09-24
Payer: COMMERCIAL

## 2020-09-24 VITALS — HEIGHT: 64 IN | RESPIRATION RATE: 14 BRPM | TEMPERATURE: 98.9 F | WEIGHT: 138.01 LBS | BODY MASS INDEX: 23.56 KG/M2

## 2020-09-24 PROCEDURE — 99203 OFFICE O/P NEW LOW 30 MIN: CPT | Performed by: PHYSICAL MEDICINE & REHABILITATION

## 2020-09-24 NOTE — PROGRESS NOTES
New Patient: SPINE    Referring Provider:  CHRISTA Sanchez*    Chief Complaint   Patient presents with    Lower Back Pain     NP LSP - 3 1/2 yrs. (-) [facility]. c/o constant pain. wakes her up at night. no ambulatory aids.  Leg Pain     B/L LEGS - L>R. HISTORY OF PRESENT ILLNESS:      · The patient is being sent at the request of CHRISTA Sanchez* in consultation as a new spine patient for low back pain and left leg pain The patient is a 51 y.o. female whom reports low back pain for 3 years that has worsened over the last year. Denies injury or traumatic event that may have triggered her painThe patient was hospitalized for an abscess in July. She reports her pain worsened after the hospitalization. The patient works as an  where she sits for hours. The patient rate her pain 5/10 and describes it as aching. She states her back pain is the most bothersome. The pain radiates around the entire trunk. She reports tightness in the left calf and swelling in the left foot. She does have a history of left DVT in March. The patient has a history of factor 5 Leiden for which she takes Elliquis. She is not able to take NSAID's. Sitting and walking make her pain worse. Alleviating factors include exercise. Denies weakness or difficulty walking. Denies neurogenic bowel or bladder. Recent treatment includes chiropractic care. Denies physical therapy. The patient does not present with any assistive devices in the office today.      Pain Assessment  Location of Pain: Back(LSP)  Location Modifiers: Left, Right(LEGS - L>R - LOWER LEGS)  Severity of Pain: 7  Quality of Pain: Aching, Dull, Throbbing(NUMBNESS)  Duration of Pain: Persistent  Frequency of Pain: Constant  Date Pain First Started: (1/2016)  Aggravating Factors: Walking(SITTING, SLEEPING)  Limiting Behavior: Yes  Result of Injury: No  Work-Related Injury: No  Are there other pain locations you wish to document?: No      Associated signs and symptoms:   Neurogenic bowel or bladder symptoms:  no   Perceived weakness:  no   Difficulty walking:  no    Recent Imaging (within past one year)   Xrays: no   MRI or CT of spine: no    Current/Past Treatment:   · Physical Therapy:  none  · Chiropractic:  yes  · Injection:  none  · Medications:   NSAIDS:  none   Muscle relaxer:  none   Steriods:  none   Neuropathic medications:  none   Opioids:  Percocet  · Previous surgery:  no  · Previous surgical consult:  no  · Other:  · Infection control  · Tested positive for MRSA in past 12 months:  no  · Tested positive for MSSA \"staph infection\" in past 12 months: no  · Tested positive for VRE (Vancomycin Resistant Enterococci) in past 12 months:   no  · Currently on any antibiotics for an infection: no  · Anticoagulants:  · On a blood thinner:  Yes, Elliquis   · Any history of bleeding disorder: yes, Factor 5 Leiden    · MRI Contraindication: no   · Previous Pain Management: yes   · Goal for treatment Pain reduction   · How long can you stand?   0 minutes   Sit?    0 minutes     Walk? - 0 minutes      Past medical, surgical, social and family history reviewed with the patient. No pertinent relevant history            Past Medical History:   Past Medical History:   Diagnosis Date    Acne     Dr [place] history reviewed with no changes       Past Surgical History:     Past Surgical History:   Procedure Laterality Date     SECTION  2001     x1     Current Medications:     Current Outpatient Medications:     oxyCODONE-acetaminophen (PERCOCET) 5-325 MG per tablet, Take 1 tablet by mouth every 8 hours as needed for Pain for up to 30 days.  Take no more than 2-3 tabs orally prn, Disp: 90 tablet, Rfl: 0    Lidocaine (ZTLIDO) 1.8 % PTCH, Apply 1 patch topically daily, Disp: 30 patch, Rfl: 0    torsemide (DEMADEX) 10 MG tablet, Take 1 tablet by mouth daily, Disp: 90 tablet, Rfl: 0    ELIQUIS 5 MG TABS tablet, TAKE 1 TABLET BY MOUTH TWO TIMES A DAY , Disp: 60 tablet, Rfl: 0    amitriptyline (ELAVIL) 25 MG tablet, Take 1 tablet by mouth nightly, Disp: 30 tablet, Rfl: 1  Allergies:  Patient has no known allergies. Social History:    reports that she has never smoked. She has never used smokeless tobacco. She reports that she does not drink alcohol or use drugs. Family History:   Family History   Problem Relation Age of Onset    No Known Problems Mother     No Known Problems Father     Other Brother         do not talk       REVIEW OF SYSTEMS: ROS - 14 point    Constitutional: No fevers, chills, night sweats, unexplained weight loss  Eye: No vision changes or diplopia  ENT: No nasal congestion, postnasal drip or sore throat. No tinnitus  Respiratory: No cough or SOB  CV: No chest pain or palpitations  GI: No nausea, abdominal pain, stool changes  : No dysuria or hematuria  Skin: No new or changing skin lesions, no rashes  MSK: No joint swelling, morning stiffness, unusual joint pain  Neurological: No headache, confusion, syncope  Psychiatric: No excessive anxiety or depression  Endocrine: No polyuria or polydipsia  Hematologic: No lymph node enlargement or excessive bleeding  Immunologic:No history of immune deficiency or immunomodulating drugs           PHYSICAL EXAM:    Vitals: Temperature 98.9 °F (37.2 °C), resp. rate 14, height 5' 4.02\" (1.626 m), weight 138 lb 0.1 oz (62.6 kg), not currently breastfeeding. GENERAL EXAM:  · General Apparence: Patient is adequately groomed with no evidence of malnutrition. · Psychiatric: Orientation: The patient is oriented to time, place and person. The patient's mood and affect are appropriate   · Vascular: Examination reveals no swelling and palpation reveals no tenderness in upper or lower extremities. Good capillary refill.    · The lymphatic examination of the neck, axillae and groin reveals all areas to be without enlargement or induration   Sensation is intact without deficit in the upper and lower extremities to light touch and pinprick  · Coordination of the upper and lower extremities are normal.    CERVICAL EXAMINATION:  · Inspection: Local inspection shows no step-off or bruising. Cervical alignment is normal. No instability is noted. · Palpation and Percussion: No evidence of tenderness at the midline. Paraspinal tenderness is not present. There is no paraspinal spasm. · Range of Motion:  pain-free ROM   · Strength: 5/5 bilateral upper extremities  · Special Tests:   Spurling's and Gresham's are negative bilaterally. Marx and Impingement tests are negative bilaterally. · Skin:There are no rashes, ulcerations or lesions. · Reflexes: Bilaterally triceps, biceps and brachioradialis are 2+. Clonus absent bilaterally at the feet. No pathological reflexes are noted. · Gait & station:  normal, patient ambulates without assistance and no ataxia  · Additional Examinations:  · RIGHT UPPER EXTREMITY:  Inspection/examination of the right upper extremity does not show any tenderness, deformity or injury. Range of motion is normal and pain-free. There is no gross instability. There are no rashes, ulcerations or lesions. Strength and tone are normal. No atrophy or abnormal movements are noted. · LEFT UPPER EXTREMITY: Inspection/examination of the left upper extremity does not show any tenderness, deformity or injury. Range of motion is normal and pain-free. There is no gross instability. There are no rashes, ulcerations or lesions. Strength and tone are normal. No atrophy or abnormal movements are noted. LUMBAR/SACRAL EXAMINATION:  · Inspection: Local inspection shows no step-off or bruising. Lumbar alignment is normal. No instability is noted. · Palpation:   No evidence of tenderness at the midline. Lumbar paraspinal tenderness Mild L4/5 and L5/S1 tenderness  Bursal tenderness No tenderness bilaterally  There is no paraspinal spasm.   · Range of Motion: painful with facet loading with extension and rotation bilaterally   · Strength:   Strength testing is 5/5 in all muscle groups tested. · Special Tests:   Straight leg raise and crossed SLR negative. Fredis's testing is negative bilaterally. FADIR's testing is negative bilaterally. Slump test negative. Bowstring test negative  · Skin: There are no rashes, ulcerations or lesions. · Reflexes: Reflexes are symmetrically 1+ at the patellar and ankle tendons. Clonus absent bilaterally at the feet. · Gait & station: normal, patient ambulates without assistance and no ataxia  · Additional Examinations:  · RIGHT LOWER EXTREMITY: Inspection/examination of the right lower extremity does not show any tenderness, deformity or injury. Range of motion is unremarkable. There is no gross instability. There are no rashes, ulcerations or lesions. Strength and tone are normal. No atrophy or abnormal movements are noted. · LEFT LOWER EXTREMITY:  Inspection/examination of the left lower extremity does not show any tenderness, deformity or injury. Range of motion is unremarkable. There is no gross instability. There are no rashes, ulcerations or lesions. Strength and tone are normal. No atrophy or abnormal movements are noted.       Diagnostic Testing:    Xrays:   AP and lateral of the lumbar spine taken today in the office show 5 lumbar type vertebrae, mild DDD L5-S1  MRI or CT:  None  EMG:  None  Results for orders placed or performed during the hospital encounter of 07/01/20   CBC Auto Differential   Result Value Ref Range    WBC 7.7 3.6 - 10.5 THOU/mcL    RBC 3.71 (L) 3.80 - 5.20 MIL/mcL    Hemoglobin 11.9 (L) 12.0 - 15.2 g/dL    Hematocrit 34.4 (L) 36 - 46 %    MCV 92.7 82 - 97 fL    MCH 32.0 27 - 33 pg    MCHC 34.5 32 - 36 g/dL    RDW 13.2 12.3 - 17.0 %    Platelets 230 905 - 842 THOU/mcL    MPV 7.4 7.4 - 11.5 fL    Neutrophils Absolute 5.60 1.80 - 7.70 THOU/mcL    Lymphocytes Absolute 1.50 1.00 - 4.00 THOU/mcL    Monocytes Absolute 0.60 0.20 - 0.90 THOU/mcL    Eosinophils Absolute 0.00 (L) 0.03 - 0.45 THOU/mcL    Basophils Absolute 0.00 0.00 - 0.20 THOU/mcL    Seg Neutrophils 73 %    Lymphocytes % 19 %    Monocytes 8 %    Eosinophils % 0 %    Basophils Absolute 0 %   Comprehensive Metabolic Panel   Result Value Ref Range    Sodium 138 135 - 145 mEq/L    Potassium 4.3 3.6 - 5.1 mEq/L    Chloride 102 101 - 111 mEq/L    CO2 30 24 - 36 mmol/L    Glucose 103 (H) 70 - 99 mg/dL    BUN 17 8 - 26 mg/dL    CREATININE 0.83 0.44 - 1.03 mg/dL    Calcium 9.3 8.5 - 10.5 mg/dL    Total Protein 7.1 6.0 - 8.0 g/dL    Alb 4.3 3.5 - 5.0 g/dL    Total Bilirubin 0.4 0.0 - 1.2 mg/dL    Alkaline Phosphatase 89 35 - 135 IU/L    AST 18 10 - 40 IU/L    ALT 30 10 - 60 IU/L    GFR Non- 82 >59 mL/min/1.73 m2    GFR African American 95 >59 mL/min/1.73 m2    Anion Gap 6 6 - 18 mmol/L   Lactate Dehydrogenase   Result Value Ref Range     91 - 180 IU/L   HYPERCOAG PROFILE   Result Value Ref Range    Activated Protein C Resistance 137 72 - 142 % OF NL    Protein S Activity 72 60 - 125 % OF NL    Activated Protein C Resistance 1.8 (L) >2.0 Ratio    AntiThromb III Func 119 80 - 125 % OF NL    Coagulation Surface Induced 35.5 <43.0 Seconds    Hex Phosph Neut Test Test not indicated NEGATIVE    Dilute Viper Venom Time 36.3 <44.0 Seconds    Confirmation Test not indicated. Seconds    Lupus Anticoag Ratio Test not indicated. <1.2    DRVVT,DIL Test not indicated. Seconds    Anticardiolipin IgG 5.1 <20.1 CU    Cardiolipin Ab IgM 2.1 <20.1 CU    Beta-2 Glyco 1 IgG 20.9 (H) <20.1 CU    Beta-2 Glyco 1 IgM <1.1 <20.1 CU    Prothrombin Mutation NORMAL NORMAL    Pathologist Review       This patient shows an increased activated protein C (APC) resistance (low APC ratio) and this is a major risk factor for venous thromboembolism. DNA based testing for Factor V Leiden will be performed.     Factor V Leiden   Result Value Ref Range    Factor V Leiden Mutation HETEROZYGOUS (A) NORMAL       Impression (Medical Decision Making):       1. Spondylosis without myelopathy or radiculopathy, lumbar region    2. Degenerative disc disease, lumbar    3. Spinal stenosis of lumbar region without neurogenic claudication    4. Pain of lumbar spine        Plan (Medical Decision Making):    I discussed the diagnosis and the treatment options with Mendez Lara today. In Summary:  The various treatment options were outlined and discussed with Arely Lara including:  Conservative care options: physical therapy, ice, medications, bracing, and activity modification. The indications for therapeutic injections. The indications for additional imaging/laboratory studies. The indications for (possible future) interventions. After considering the various options discussed, Mendez Lara elected to pursue a course of treatment that includes the followin. Medications: No further recommendations for new medications. 2. PT:  I will start the patient on a trial of PT to work on a lumbar stabilization program to focus on core strengthening, core stabilizing, lumbar stretches, hamstring flexibility, modalities as indicated for 6-8 visits over the next 4-6 weeks. 3. Further studies: MRI of the lumbar spine due to chronicity of pain, radiating radicular pain, and lack of improvement despite pain management. 4. Interventional:  After further imaging is obtained, interventional options will be reviewed and recommended. Medial branch blocks are a potential treatment option if no improvement with physical therapy.     5. Healthy Lifestyle Measures:  Patient education material reviewing the following was distributed to [employer]  Anatomic drawings  Healthy lifestyle education  Osteoporosis prevention,   Back and neck pain educational information   Advanced imaging preparedness    Posture education   Proper lifting and carrying techniques,   Weight management  Quitting smoking and   Minor ways to treat back pain  For further information regarding the spine conditions and to review interventional treatments the patient was directed to [employer].    6.  Follow up:  4-6 weeks    Arely Lara was instructed to call the office if her symptoms worsen or if new symptoms appear prior to the next scheduled visit. She is specifically instructed to contact the office between now & her scheduled appointment if she has concerns related to her condition or if she needs assistance in scheduling the above tests. She is welcome to call for an appointment sooner if she has any additional concerns or questions. Cj Chung. Cora Dakins, MD, CH[facility], [facility]  Board Certified in [address] [place] [address]  Certified and Fellowship Trained in [place] [address]     This dictation was performed with a verbal recognition program [facility]) and it was checked for errors. It is possible that there are still dictated errors within this office note. If so, please bring any errors to my attention for an addendum. All efforts were made to ensure that this office note is accurate.

## 2020-09-28 ENCOUNTER — VIRTUAL VISIT (OUTPATIENT)
Dept: PAIN MANAGEMENT | Age: 50
End: 2020-09-28
Payer: COMMERCIAL

## 2020-09-28 PROCEDURE — 99213 OFFICE O/P EST LOW 20 MIN: CPT | Performed by: NURSE PRACTITIONER

## 2020-09-28 RX ORDER — OXYCODONE HYDROCHLORIDE AND ACETAMINOPHEN 5; 325 MG/1; MG/1
1 TABLET ORAL 2 TIMES DAILY PRN
Qty: 60 TABLET | Refills: 0 | Status: SHIPPED | OUTPATIENT
Start: 2020-09-28 | End: 2020-10-26 | Stop reason: SDUPTHER

## 2020-09-28 RX ORDER — LIDOCAINE 36 MG/1
1 PATCH TOPICAL DAILY
Qty: 90 PATCH | Refills: 0 | Status: SHIPPED | OUTPATIENT
Start: 2020-09-28 | End: 2020-11-23 | Stop reason: SDUPTHER

## 2020-09-28 RX ORDER — AMITRIPTYLINE HYDROCHLORIDE 25 MG/1
25 TABLET, FILM COATED ORAL NIGHTLY
Qty: 90 TABLET | Refills: 1 | Status: SHIPPED | OUTPATIENT
Start: 2020-09-28 | End: 2020-11-23 | Stop reason: SDUPTHER

## 2020-09-28 NOTE — PATIENT INSTRUCTIONS
Patient Education        Back Stretches: Exercises  Introduction  Here are some examples of exercises for stretching your back. Start each exercise slowly. Ease off the exercise if you start to have pain. Your doctor or physical therapist will tell you when you can start these exercises and which ones will work best for you. How to do the exercises  Overhead stretch   1. Stand comfortably with your feet shoulder-width apart. 2. Looking straight ahead, raise both arms over your head and reach toward the ceiling. Do not allow your head to tilt back. 3. Hold for 15 to 30 seconds, then lower your arms to your sides. 4. Repeat 2 to 4 times. Side stretch   1. Stand comfortably with your feet shoulder-width apart. 2. Raise one arm over your head, and then lean to the other side. 3. Slide your hand down your leg as you let the weight of your arm gently stretch your side muscles. Hold for 15 to 30 seconds. 4. Repeat 2 to 4 times on each side. Press-up   1. Lie on your stomach, supporting your body with your forearms. 2. Press your elbows down into the floor to raise your upper back. As you do this, relax your stomach muscles and allow your back to arch without using your back muscles. As your press up, do not let your hips or pelvis come off the floor. 3. Hold for 15 to 30 seconds, then relax. 4. Repeat 2 to 4 times. Relax and rest   1. Lie on your back with a rolled towel under your neck and a pillow under your knees. Extend your arms comfortably to your sides. 2. Relax and breathe normally. 3. Remain in this position for about 10 minutes. 4. If you can, do this 2 or 3 times each day. Follow-up care is a key part of your treatment and safety. Be sure to make and go to all appointments, and call your doctor if you are having problems. It's also a good idea to know your test results and keep a list of the medicines you take. Where can you learn more? Go to [email or web address] org and sign in to your [employer] account. Enter G194 in the [employer] box to learn more about \"Back Stretches: Exercises. \"     If you do not have an account, please click on the \"Sign Up Now\" link. Current as of: March 2, 2020               Content Version: 12.5  © 5624-5207 [employer], [employer]. Care instructions adapted under license by [facility] ([facility]). If you have questions about a medical condition or this instruction, always ask your healthcare professional. [address] any warranty or liability for your use of this information.

## 2020-09-28 NOTE — PROGRESS NOTES
TELE HEALTH VISIT (AUDIO-VISUAL)    Pursuant to the emergency declaration under the [facility], [facility] waiver authority and the [employer] Act, this Virtual  Visit was conducted, with patient's consent, to reduce the patient's risk of exposure to COVID-19 and provide continuity of care for an established patient. Service is  provided through a video synchronous discussion virtually to substitute for in-person clinic visit. Due to this being a TeleHealth encounter (During WXEIY-34 public health emergency), evaluation of the following organ systems was limited: Vitals/Constitutional/EENT/Resp/CV/GI//MS/Neuro/Skin/Hkzq-Fxir-Kgf. Lora Lara  1970  [id number]    Ms. Lara is being seen virtually for a follow up visit using [employer] Video visit/Context[employer] or face time  Informed verbal consent to the virtual visit was obtained from Ms. Lara. Risks associated with HIPPA compliance with the virtual visit was explained to the patient. Ms. Eddie Wilcox is at her home and Melita BALDWIN is in her office. HISTORY OF PRESENT ILLNESS:  Ms. Eddie Wilcox is a 51 y.o. female  being assessed for a follow up visit for pain management for evaluation of ongoing care regarding her symptoms and monitoring of compliance with long term use high risk medications. She has a diagnosis of   1. Chronic pain syndrome    2. Degeneration of lumbar or lumbosacral intervertebral disc    3. Lumbar nerve root impingement, L2    4. Spinal stenosis, lumbar region, without neurogenic claudication    5. Lumbosacral spondylosis without myelopathy    6. DDD (degenerative disc disease), cervical    7. Cervical stenosis of spine, mild    8. Numbness and tingling in both hands    9. Bilateral elbow joint pain    10.  Primary insomnia      On the Patients Pain Assessment form reviewed with the Medical Assistant:  She complains of pain in the bilateral lower back  with radiation to the upper leg Left and lower leg Left . She rates the pain 9/10 and describes it as aching, chills. Current treatment regimen has helped relieve about 20% of the pain. She denies any side effects from the current pain regimen. Patient reports that since the last follow up visit the physical functioning is worse, family/social relationships are unchanged, mood is worse sleep patterns are worse, and that the overall functioning is worse. Patient denies misusing/abusing her narcotic pain medications or using any illegal drugs. Upon obtaining medical history from Ms. Lara states that pain is manageable on current pain therapy. Reports experiencing pain in the lower back after driving her mother to [place]. She was evaluated by Dr. Delicia Harris who recommended PT, MRI of the lumbar spine. Pain medications provide moderate, takes it mostly at night. Tries not to take any medications during the daytime due to working. Mood is stable without anxiety. Sleep is fair with an average of 5-6 hours. Denies to having issues of constipation. Tolerating activities/house chores with moderate tenderness to the lower back. ALLERGIES: Patients list of allergies were reviewed     MEDICATIONS: Ms. Jayson Silverio list of medications were reviewed. Her current medications are   Outpatient Medications Prior to Visit   Medication Sig Dispense Refill    torsemide (DEMADEX) 10 MG tablet Take 1 tablet by mouth daily 90 tablet 0    ELIQUIS 5 MG TABS tablet TAKE 1 TABLET BY MOUTH TWO TIMES A DAY  60 tablet 0    oxyCODONE-acetaminophen (PERCOCET) 5-325 MG per tablet Take 1 tablet by mouth every 8 hours as needed for Pain for up to 30 days. Take no more than 2-3 tabs orally prn 90 tablet 0    Lidocaine (ZTLIDO) 1.8 % PTCH Apply 1 patch topically daily 30 patch 0    amitriptyline (ELAVIL) 25 MG tablet Take 1 tablet by mouth nightly 30 tablet 1     No facility-administered medications prior to visit. SOCIAL/FAMILY/PAST MEDICAL HISTORY: Ms. True Rothman social, family and past medical history was reviewed. REVIEW OF SYSTEMS:    Respiratory: Negative for apnea, chest tightness and shortness of breath or change in baseline breathing. Gastrointestinal: Negative for nausea, vomiting, abdominal pain, diarrhea, constipation, blood in stool and abdominal distention. PHYSICAL EXAM:   Nursing note and vitals reviewed. There were no vitals taken for this visit. as per patient  Constitutional: She appears well-developed and well-nourished. No acute distress. Skin: Skin appears to be warm and dry. No rashes or any other marks noted. She is not diaphoretic. Neurological/Psychiatric:She is alert and oriented to person, place, and time. Coordination is  normal.  Her mood isAppropriate and affect is Neutral/Euthymic(normal). Her behavior is normal.   thought content normal.   Musculoskeletal / Extremities: Gait is normal, assistive devices use: none. IMPRESSION:   1. Chronic pain syndrome    2. Degeneration of lumbar or lumbosacral intervertebral disc    3. Lumbar nerve root impingement, L2    4. Spinal stenosis, lumbar region, without neurogenic claudication    5. Lumbosacral spondylosis without myelopathy    6. DDD (degenerative disc disease), cervical    7. Cervical stenosis of spine, mild    8. Numbness and tingling in both hands    9. Bilateral elbow joint pain    10. Primary insomnia        PLAN:  Informed verbal consent regarding treatment was obtained  -Take Percocet 5-325 mg bid prn  -Continue with ZTlido, Elavil  -PT pending through orthopedics  -Maintain f/u with Dr. Mary Tineo for Lumbar degeneration  -Advised patient to take the pain medications atelast twice a day for daytime and evening relief of pain. Opioids can be adjusted after injections based on pain relief.  Patient verbalized understanding  -CBT techniques- relaxation therapies such as biofeedback, mindfulness based stress reduction, imagery, cognitive restructuring, problem solving discussed with patient  -Last UDS 5/26/20 Consistent  -Return in about 4 weeks (around 10/26/2020). Current Outpatient Medications   Medication Sig Dispense Refill    oxyCODONE-acetaminophen (PERCOCET) 5-325 MG per tablet Take 1 tablet by mouth 2 times daily as needed for Pain for up to 30 days. Take no more than 2-3 tabs orally prn 60 tablet 0    Lidocaine (ZTLIDO) 1.8 % PTCH Apply 1 patch topically daily 90 patch 0    amitriptyline (ELAVIL) 25 MG tablet Take 1 tablet by mouth nightly 90 tablet 1    torsemide (DEMADEX) 10 MG tablet Take 1 tablet by mouth daily 90 tablet 0    ELIQUIS 5 MG TABS tablet TAKE 1 TABLET BY MOUTH TWO TIMES A DAY  60 tablet 0     No current facility-administered medications for this visit. I will continue her current medication regimen  which is part of the above treatment schedule. It has been helping with Ms. Lara's chronic  medical problems which for this visit include:   Diagnoses of Chronic pain syndrome, Degeneration of lumbar or lumbosacral intervertebral disc, Lumbar nerve root impingement, L2, Spinal stenosis, lumbar region, without neurogenic claudication, Lumbosacral spondylosis without myelopathy, DDD (degenerative disc disease), cervical, Cervical stenosis of spine, mild, Numbness and tingling in both hands, Bilateral elbow joint pain, and Primary insomnia were pertinent to this visit. Risks and benefits of the medications and other alternative treatments  including no treatment were discussed with the patient. The common side effects of these medications were also explained to the patient. Informed verbal consent was obtained. Goals of current treatment regimen include improvement in pain, restoration of functioning- with focus on improvement in physical performance, general activity, work or disability,emotional distress, health care utilization and  decreased medication consumption.  Will continue to monitor progress towards achieving/maintaining therapeutic goals with special emphasis on  1. Improvement in perceived interfernce  of pain with ADL's. Ability to do home exercises independently. Ability to do household chores indoor and/or outdoor work and social and leisure activities. Improve psychosocial and physical functioning. - she is not showing any significant progress/or showing regression  towards this goal and reassessment and adjustment of goals/treatment have been made. 2. Ability to focus/concentrate at work and perform the duties required of her at work  Sit through a workday without lower extremity symptoms. Stand 30-60 minutes without lower extremity symptoms. Ability to lift up to 10-20 lbs. Ability to go up and down stairs. Sit 30-60 minutes  Without having to stand up frequently. - she is maintaining/progressing towards her work related goals with the current regimen. She was advised against drinking alcohol with the narcotic pain medicines, advised against driving or handling machinery while adjusting the dose of medicines or if having cognitive  issues related to the current medications. Risk of overdose and death, if medicines not taken as prescribed, were also discussed. If the patient develops new symptoms or if the symptoms worsen, the patient should call the office. While transcribing every attempt was made to maintain the accuracy of the note in terms of it's contents,there may have been some errors made inadvertently. Thank you for allowing me to participate in the care of this patient. Finesse GOODWIN-JUSTIEC     Cc: Last Moran MD

## 2020-10-01 ENCOUNTER — TELEPHONE (OUTPATIENT)
Dept: ORTHOPEDIC SURGERY | Age: 50
End: 2020-10-01

## 2020-10-01 NOTE — TELEPHONE ENCOUNTER
 Case Number: [id number]   Appointment Date: 10/02/2020   Appointment Time: 08:15 AM /[facility] Name of Provider Requesting P2P: Yoselin PANG    Name: Pennie Gramajo Contact Instructions: Yoselin pang on behalf of Dr Roderick mcleod    Level of Review: Informal P2P

## 2020-10-01 NOTE — TELEPHONE ENCOUNTER
Message received regarding MRI LSP being denied:     MRI LUMBAR SPINE has been DENIED due to lack of current 6 weeks conservative treatment. Evelin Olvera [phone] ref# [id number] for a peer to peer - JLB     Please advise if P2P will be completed.

## 2020-10-05 ENCOUNTER — TELEPHONE (OUTPATIENT)
Dept: ORTHOPEDIC SURGERY | Age: 50
End: 2020-10-05

## 2020-10-05 NOTE — TELEPHONE ENCOUNTER
S/W PATIENT regarding MRI LSP approval and authorization being valid until 9/24/2021. Patient was instructed to call Rayne Severance to schedule MRI LSP, then contact our office for follow up appointment. MRI LSP results will not be given over the phone or via [employer]. Patient was also asked to allow a minimum 48 hours for follow up appointment from MRI scan in order for staff to obtain MRI report. Patient currently has a follow up appointment schedule for 10/8. Advised to contact the office if needing to reschedule this to accommodate MRI scan. Patient voiced understanding of MRI results not being given over the phone.

## 2020-10-08 ENCOUNTER — OFFICE VISIT (OUTPATIENT)
Dept: ORTHOPEDIC SURGERY | Age: 50
End: 2020-10-08
Payer: COMMERCIAL

## 2020-10-08 ENCOUNTER — TELEPHONE (OUTPATIENT)
Dept: ORTHOPEDIC SURGERY | Age: 50
End: 2020-10-08

## 2020-10-08 ENCOUNTER — TELEPHONE (OUTPATIENT)
Dept: PRIMARY CARE CLINIC | Age: 50
End: 2020-10-08

## 2020-10-08 VITALS — HEIGHT: 64 IN | TEMPERATURE: 96.8 F | WEIGHT: 138.01 LBS | BODY MASS INDEX: 23.56 KG/M2 | RESPIRATION RATE: 12 BRPM

## 2020-10-08 PROCEDURE — 99213 OFFICE O/P EST LOW 20 MIN: CPT | Performed by: PHYSICAL MEDICINE & REHABILITATION

## 2020-10-08 PROCEDURE — 20611 DRAIN/INJ JOINT/BURSA W/US: CPT | Performed by: PHYSICAL MEDICINE & REHABILITATION

## 2020-10-08 RX ORDER — TRIAMCINOLONE ACETONIDE 40 MG/ML
40 INJECTION, SUSPENSION INTRA-ARTICULAR; INTRAMUSCULAR ONCE
Status: COMPLETED | OUTPATIENT
Start: 2020-10-08 | End: 2020-10-08

## 2020-10-08 RX ADMIN — TRIAMCINOLONE ACETONIDE 40 MG: 40 INJECTION, SUSPENSION INTRA-ARTICULAR; INTRAMUSCULAR at 13:50

## 2020-10-08 NOTE — TELEPHONE ENCOUNTER
Ask approval to hold her Eliquis for 3 days prior to her lumbar epidural steroid injection. Scheduled on 10-26-20. Fax: [phone]    Can be noted in [address] [place] [address] Rd.

## 2020-10-08 NOTE — PROGRESS NOTES
10/8/20 1:49 PM           NDC: 1947-3302-09   -   LIDOCAINE 1%    Lot Number: [id number]. [id number]       Comments: LEFT GT BURSA INJ

## 2020-10-08 NOTE — PROGRESS NOTES
Follow up: [address]  1970  [id number]         Chief Complaint   Patient presents with    Lower Back Pain     TR MRI LSP 10/7/2020 - sitting/sleeping increase pain. stretching/exercise are alleviating measures. HISTORY OF PRESENT ILLNESS:  Ms. Cristiana Rashid is a 51 y.o. female returns for a follow up visit for multiple medical problems. Her current presenting problems are   1. Lumbar radiculopathy    2. HNP (herniated nucleus pulposus), lumbar    3. Spondylosis without myelopathy or radiculopathy, lumbar region    4. Greater trochanteric bursitis of left hip    . As per information/history obtained from the PADT(patient assessment and documentation tool) - She complains of pain in the lower back with radiation to the buttocks and lower leg Left She rates the pain 5/10 and describes it as dull, aching. Pain is made worse by: sitting. She denies side effects from the current pain regimen. Patient reports that since the last follow up visit the physical functioning is worse, family/social relationships are worse, mood is worse and sleep patterns are worse, and that the overall functioning is worse. Patient denies neurological bowel or bladder. Ms. Cristiana Rashid presents for follow-up of her lumbar MRI that was taken on 9/24/2020. The patient states her symptoms are largely unchanged since her last visit. She continues to complain of low back pain with radiation to the left buttock and left calf. The patient states sitting for long periods and sleeping increase her pain. Stretching and exercise alleviate her pain. The patient is also complaining of left hip pain worse with lying on her side. She states this pain radiates down the side of her leg to her knee as well. She does have her first physical therapy appointment tomorrow however she has been completing home exercise program from her chiropractor at least 3 times a week for the last 3 months.   She has also had cupping treatment from her chiropractor. The patient states she enjoys working out however she has modified her exercise due to her left leg and low back pain. The patient can sit for 2 hours, stand for 2 hours and walk for 2 hours without a considerable amount of pain. She does state that when sitting on her couch she feels a sharp pain in her left buttock. Associated signs and symptoms:   Neurogenic bowel or bladder symptoms:  no   Perceived weakness:  no   Difficulty walking:  no            Past medical, surgical, social and family history reviewed with the patient. No pertinent relevant history  Past Medical History:   Past Medical History:   Diagnosis Date    Acne     Dr Meehan Orlando history reviewed with no changes       Past Surgical History:     Past Surgical History:   Procedure Laterality Date     SECTION  2001     x1     Current Medications:     Current Outpatient Medications:     oxyCODONE-acetaminophen (PERCOCET) 5-325 MG per tablet, Take 1 tablet by mouth 2 times daily as needed for Pain for up to 30 days. Take no more than 2-3 tabs orally prn, Disp: 60 tablet, Rfl: 0    Lidocaine (ZTLIDO) 1.8 % PTCH, Apply 1 patch topically daily, Disp: 90 patch, Rfl: 0    amitriptyline (ELAVIL) 25 MG tablet, Take 1 tablet by mouth nightly, Disp: 90 tablet, Rfl: 1    torsemide (DEMADEX) 10 MG tablet, Take 1 tablet by mouth daily, Disp: 90 tablet, Rfl: 0    ELIQUIS 5 MG TABS tablet, TAKE 1 TABLET BY MOUTH TWO TIMES A DAY , Disp: 60 tablet, Rfl: 0  Allergies:  Patient has no known allergies. Social History:    reports that she has never smoked. She has never used smokeless tobacco. She reports that she does not drink alcohol or use drugs.   Family History:   Family History   Problem Relation Age of Onset    No Known Problems Mother     No Known Problems Father     Other Brother         do not talk       REVIEW OF SYSTEMS:   CONSTITUTIONAL: Denies unexplained weight loss, fevers, chills or fatigue  NEUROLOGICAL: Denies unsteady gait or progressive weakness  MUSCULOSKELETAL: Denies joint swelling or redness  GI: Denies nausea, vomiting, diarrhea   : Denies bowel or bladder issues       PHYSICAL EXAM:    Vitals: Temperature 96.8 °F (36 °C), resp. rate 12, height 5' 4.02\" (1.626 m), weight 138 lb 0.1 oz (62.6 kg), not currently breastfeeding. GENERAL EXAM:  · General Apparence: Patient is adequately groomed with no evidence of malnutrition. · Psychiatric: Orientation: The patient is oriented to time, place and person. The patient's mood and affect are appropriate   · Vascular: Examination reveals no swelling and palpation reveals no tenderness in upper or lower extremities. Good capillary refill. · The lymphatic examination of the neck, axillae and groin reveals all areas to be without enlargement or induration  · Sensation is intact without deficit in the upper and lower extremities to light touch and pinprick  · Coordination of the upper and lower extremities are normal.  · RIGHT UPPER EXTREMITY:  Inspection/examination of the right upper extremity does not show any tenderness, deformity or injury. Range of motion is unremarkable and pain-free. There is no gross instability. There are no rashes, ulcerations or lesions. Strength and tone are normal. No atrophy or abnormal movements are noted. · LEFT UPPER EXTREMITY: Inspection/examination of the left upper extremity does not show any tenderness, deformity or injury. Range of motion is unremarkable and pain-free. There is no gross instability. There are no rashes, ulcerations or lesions. Strength and tone are normal. No atrophy or abnormal movements are noted. LUMBAR/SACRAL EXAMINATION:  · Inspection: Local inspection shows no step-off or bruising. Lumbar alignment is normal. No instability is noted. · Palpation:   No evidence of tenderness at the midline.   Lumbar paraspinal tenderness: Mild L4/5 and L5/S1 tenderness  Bursal tenderness Left greater trochanteric Eosinophils Absolute 0.00 (L) 0.03 - 0.45 THOU/mcL    Basophils Absolute 0.00 0.00 - 0.20 THOU/mcL    Seg Neutrophils 73 %    Lymphocytes % 19 %    Monocytes 8 %    Eosinophils % 0 %    Basophils Absolute 0 %   Comprehensive Metabolic Panel   Result Value Ref Range    Sodium 138 135 - 145 mEq/L    Potassium 4.3 3.6 - 5.1 mEq/L    Chloride 102 101 - 111 mEq/L    CO2 30 24 - 36 mmol/L    Glucose 103 (H) 70 - 99 mg/dL    BUN 17 8 - 26 mg/dL    CREATININE 0.83 0.44 - 1.03 mg/dL    Calcium 9.3 8.5 - 10.5 mg/dL    Total Protein 7.1 6.0 - 8.0 g/dL    Alb 4.3 3.5 - 5.0 g/dL    Total Bilirubin 0.4 0.0 - 1.2 mg/dL    Alkaline Phosphatase 89 35 - 135 IU/L    AST 18 10 - 40 IU/L    ALT 30 10 - 60 IU/L    GFR Non- 82 >59 mL/min/1.73 m2    GFR African American 95 >59 mL/min/1.73 m2    Anion Gap 6 6 - 18 mmol/L   Lactate Dehydrogenase   Result Value Ref Range     91 - 180 IU/L   HYPERCOAG PROFILE   Result Value Ref Range    Activated Protein C Resistance 137 72 - 142 % OF NL    Protein S Activity 72 60 - 125 % OF NL    Activated Protein C Resistance 1.8 (L) >2.0 Ratio    AntiThromb III Func 119 80 - 125 % OF NL    Coagulation Surface Induced 35.5 <43.0 Seconds    Hex Phosph Neut Test Test not indicated NEGATIVE    Dilute Viper Venom Time 36.3 <44.0 Seconds    Confirmation Test not indicated. Seconds    Lupus Anticoag Ratio Test not indicated. <1.2    DRVVT,DIL Test not indicated. Seconds    Anticardiolipin IgG 5.1 <20.1 CU    Cardiolipin Ab IgM 2.1 <20.1 CU    Beta-2 Glyco 1 IgG 20.9 (H) <20.1 CU    Beta-2 Glyco 1 IgM <1.1 <20.1 CU    Prothrombin Mutation NORMAL NORMAL    Pathologist Review       This patient shows an increased activated protein C (APC) resistance (low APC ratio) and this is a major risk factor for venous thromboembolism. DNA based testing for Factor V Leiden will be performed.     Factor V Leiden   Result Value Ref Range    Factor V Leiden Mutation HETEROZYGOUS (A) NORMAL     Impression: 1. Lumbar radiculopathy    2. HNP (herniated nucleus pulposus), lumbar    3. Spondylosis without myelopathy or radiculopathy, lumbar region    4. Greater trochanteric bursitis of left hip        Plan:  Clinical Course: Above diagnoses are worsening    I discussed the diagnosis and the treatment options with Eugene Lara today. In Summary:  The various treatment options were outlined and discussed with Arely Lara including:  Conservative care options: physical therapy, ice, medications, bracing, and activity modification. The indications for therapeutic injections. The indications for additional imaging/laboratory studies. The indications for (possible future) interventions. After considering the various options discussed, Eugene Lara elected to pursue a course of treatment that includes the followin. Medications:  Continue anti-inflammatories with appropriate GI Precautions including to stop if develop dark tarry stools or GI upset and to take with food. 2. PT:  Patient has first physical therapy appointment tomorrow. She has been compliant with a home exercise program and is encouraged she continues doing so. 3. Further studies: COVID-19 PCR testing prior to procedure      4. Interventional:  We discussed pursuing a Left L4 and L5 transforaminal epidural steroid injection to address the pain. Radiologic imaging and symptoms confirm the pain etiology. Risks, benefits and alternatives of interventional options were discussed. These include and are not limited to bleeding, infection, spinal headache, nerve injury and lack of pain relief. The patient verbalized understanding and would like to proceed. The patient will be scheduled accordingly.      First Epidural steroid injection for therapeutic purposes    As such, I have confirmed the patient has met the general requirements including failure of conservative management including prescription strength analgesics, adjunctive medications were utilized , therapeutic exercise program, and no underlying addiction or behavioral disorders were identified to the ability of the provider. Symptoms are impacting their ADLs or iADLs such as walking, sitting and transferring and significant pain was noted in the HPI. Imaging studies as noted in the diagnostic imaging section of the consultation were reviewed and correlated with clinical findings. Fluoroscopy is utilized for interventional procedures. The patient presents with radicular pain or claudication type symptoms associated with functional impairment. Review of diagnostic imaging in the diagnostic studies section of the consultation shows nerve root compression and correlates with clinical findings. The patient has failed at least 4 weeks of appropriate conservative management. After risks benefits alternatives discussed a left greater trochanter bursa injection was undertaken the bedside. The skin overlying the left hip was prepped with ChloraPrep ×3. Ultrasound guidance was utilized with a curvilinear array transducer and in-plane technique. A mixture of 40 mg of Kenalog with 3 ml of 1% lidocaine was drawn up and instilled over the most tender point of the left greater trochanter with a 22-gauge needle. The needle was removed after the mixture was instilled and a Band-Aid was applied        5. Follow up:  2-3 weeks      Arely Lara was instructed to call the office if her symptoms worsen or if new symptoms appear prior to the next scheduled visit. She is specifically instructed to contact the office between now & her scheduled appointment if she has concerns related to her condition or if she needs assistance in scheduling the above tests. She is welcome to call for an appointment sooner if she has any additional concerns or questions. Humberto Cortes.  Chi Greenberg MD, CHUNG, [facility]  Board Certified in [address] [place] [address]  Certified and Fellowship Trained [facility]  Partner of [facility] ([facility])             This dictation was performed with a verbal recognition program [facility]) and it was checked for errors. It is possible that there are still dictated errors within this office note. If so, please bring any errors to my attention for an addendum. All efforts were made to ensure that this office note is accurate.

## 2020-10-08 NOTE — LETTER
Please schedule the following with:     Date:   10/26/20 @ 7:30    Account: [de-identified]  Patient: Naeem Lara    : 1970  Address:  [address] [place] [address]    Phone (H):  [phone] (home) [phone] (work)     ----------------------------------------------------------------------------------------------  Diagnosis:     ICD-10-CM    1. Lumbar radiculopathy  M54.16    2. HNP (herniated nucleus pulposus), lumbar  M51.26    3. Spondylosis without myelopathy or radiculopathy, lumbar region  M47.816    4. Greater trochanteric bursitis of left hip  M70.62                  Levels:L4 and L5 Left  Procedure type Transforaminal epidural steroid injection   Side Left  CPT Codes 68977, 01277    ----------------------------------------------------------------------------------------------  Injection # 1   [facility]    Attending Physician       Ulysses Trammell.  Louis Be MD.  ----------------------------------------------------------------------------------------------  Injection Scheduled For:    At:    [facility]    Pre-Cert#    2nd Insurance     Pre-Cert#    Comments or Special instructions:    COVID TEST: yes    · Infection control  · Tested positive for MRSA in past 12 months:  no  · Tested positive for MSSA \"staph infection\" in past 12 months: no  · Tested positive for VRE (Vancomycin Resistant Enterococci) in past 12 months:   no  · Currently on any antibiotics for an infection: no  · Anticoagulants:  · On a blood thinner:  no   · Any history of bleeding disorder: no   · Advanced Liver disease: no   · Advanced Renal disease: no   · Glaucoma: no   · Diabetes: no    Sedation:  No  -----------------------------------------------------------------------------------------------  No Known Allergies

## 2020-10-09 LAB
ALBUMIN SERPL-MCNC: 4.6 G/DL (ref 3.5–5.7)
ALP BLD-CCNC: 99 IU/L (ref 35–135)
ALT SERPL-CCNC: 23 IU/L (ref 10–60)
ANION GAP SERPL CALCULATED.3IONS-SCNC: 7 MMOL/L (ref 6–18)
AST SERPL-CCNC: 22 IU/L (ref 10–40)
BASOPHILS ABSOLUTE: 0 %
BASOPHILS ABSOLUTE: 0 THOU/MCL (ref 0–0.2)
BILIRUB SERPL-MCNC: 0.4 MG/DL (ref 0–1.2)
BUN BLDV-MCNC: 26 MG/DL (ref 8–26)
CALCIUM SERPL-MCNC: 10 MG/DL (ref 8.5–10.4)
CHLORIDE BLD-SCNC: 99 MEQ/L (ref 98–111)
CO2: 32 MMOL/L (ref 21–31)
CREAT SERPL-MCNC: 1.02 MG/DL (ref 0.6–1.2)
EOSINOPHILS ABSOLUTE: 0.1 THOU/MCL (ref 0.03–0.45)
EOSINOPHILS RELATIVE PERCENT: 2 %
FERRITIN: 45 NG/ML (ref 13–150)
GFR AFRICAN AMERICAN: 74 ML/MIN/1.73 M2
GFR NON-AFRICAN AMERICAN: 64 ML/MIN/1.73 M2
GLUCOSE BLD-MCNC: 96 MG/DL (ref 70–99)
HCT VFR BLD CALC: 35.7 % (ref 36–46)
HEMOGLOBIN: 12 G/DL (ref 12–15.2)
IRON SATURATION: 22 % (ref 20–50)
IRON: 87 MCG/DL (ref 30–160)
LACTATE DEHYDROGENASE: 141 IU/L (ref 100–271)
LYMPHOCYTES ABSOLUTE: 1.6 THOU/MCL (ref 1–4)
LYMPHOCYTES RELATIVE PERCENT: 32 %
MCH RBC QN AUTO: 31.1 PG (ref 27–33)
MCHC RBC AUTO-ENTMCNC: 33.7 G/DL (ref 32–36)
MCV RBC AUTO: 92.2 FL (ref 82–97)
MONOCYTES # BLD: 7 %
MONOCYTES ABSOLUTE: 0.4 THOU/MCL (ref 0.2–0.9)
NEUTROPHILS ABSOLUTE: 2.9 THOU/MCL (ref 1.8–7.7)
PDW BLD-RTO: 13.6 % (ref 12.3–17)
PLATELET # BLD: 295 THOU/MCL (ref 140–375)
PMV BLD AUTO: 7.5 FL (ref 7.4–11.5)
POTASSIUM SERPL-SCNC: 4.1 MEQ/L (ref 3.6–5.1)
RBC # BLD: 3.87 MIL/MCL (ref 3.8–5.2)
SEG NEUTROPHILS: 59 %
SODIUM BLD-SCNC: 138 MEQ/L (ref 135–145)
TOTAL IRON BINDING CAPACITY: 388 MCG/DL (ref 250–400)
TOTAL PROTEIN: 7.1 G/DL (ref 6–8)
WBC # BLD: 5 THOU/MCL (ref 3.6–10.5)

## 2020-10-09 NOTE — TELEPHONE ENCOUNTER
Injection is schedule 10-  Hematologist Dr Joel Rivas will handle Rx Vitaly Jordan office/Rebekah   Pt is contact Hematology for any instruction in reference Rx Eliquis

## 2020-10-20 ENCOUNTER — OFFICE VISIT (OUTPATIENT)
Dept: PRIMARY CARE CLINIC | Age: 50
End: 2020-10-20
Payer: COMMERCIAL

## 2020-10-20 PROCEDURE — 99211 OFF/OP EST MAY X REQ PHY/QHP: CPT | Performed by: NURSE PRACTITIONER

## 2020-10-20 NOTE — PROGRESS NOTES
PATIENT REACHED   YES_x___NO____    PREOP INSTUCTIONS LEFT ON VM NUMBER_______________  Patient instructed to get their COVID-19 test done as directed by their doctor (5-7 days prior to procedure)  or patient states will get on __10/20________. Patient was notified that they need to have an appointment,number to call provided. The day the COVID test is done is considered day one. Instructed to self quarantine after test until DOS. There is a one visitor policy at [facility] for all surgeries and endoscopies. Whether the visitor can stay or will be asked to wait in the car will depend on the current policy and if social distancing can be maintained. The policy is subject to change at any time. Please make sure the visitor has a cell phone that is on,charged and able to accept calls, as this may be the way that the staff communicates with them. Pain management is NO VISITOR policyThe patients ride is expected to remain in the car with a cell phone for communication. If the ride is leaving the hospital grounds please make sure they are back in time for pickup. Have the patient inform the staff on arrival what their rides plans are while the patient is in the facility. At the MAIN there is one visitor allowed. Please note that the visitor policy is subject to change. DATE__10/26/20_______ TIME__0730_______ARRIVAL__0630______PLACE__masc__________  NOTHING TO EAT OR DRINK  AFTER MIDNIGHT THE EVENING PRIOR OR AS INSTRUCTED BY YOUR DR. Moya Nest NEED A RESPONSIBLE ADULT AGE 18 OR OLDER TO DRIVE YOU HOME  PLEASE BRING INSURANCE CARD. PICTURE ID AND COMPLETE LIST OF MEDS  WEAR LOOSE COMFORTABLE CLOTHING  FOLLOW ANY INSTRUCTIONS YOUR DRS OFFICE HAS GIVEN YOU,INCLUDING WHAT MEDICATIONS TO TAKE THE AM OF PROCEDURE AND WHEN AND IF YOU NEED TO STOP ANY BLOOD THINNERS.  IF YOU HAVE QUESTIONS REGARDING THIS CALL THE OFFICE  THE GOAL BLOOD SUGAR THE AM OF PROCEDURE  OR LESS ABOVE THAT THE PROCEDURE MAY BE CANCELLED  ANY QUESTIONS CALL YOUR

## 2020-10-22 LAB — SARS-COV-2, NAA: NOT DETECTED

## 2020-10-23 ENCOUNTER — TELEPHONE (OUTPATIENT)
Dept: ORTHOPEDIC SURGERY | Age: 50
End: 2020-10-23

## 2020-10-23 NOTE — TELEPHONE ENCOUNTER
Auth: # [id number]    Date: 10/22/2020 - 04/20/2021  Type of SX:  OP  Location: [facility]  CPT: 73852   83658   DX Code: M54.16   M47.816   M70.62  SX area: Left  L4  L5  Transforaminal PRASHANTH  Insurance: Adebayo Landa    CPT: 89365.26   41523   DVT

## 2020-10-23 NOTE — RESULT ENCOUNTER NOTE

## 2020-10-26 ENCOUNTER — APPOINTMENT (OUTPATIENT)
Dept: GENERAL RADIOLOGY | Age: 50
End: 2020-10-26
Attending: PHYSICAL MEDICINE & REHABILITATION
Payer: COMMERCIAL

## 2020-10-26 ENCOUNTER — VIRTUAL VISIT (OUTPATIENT)
Dept: PAIN MANAGEMENT | Age: 50
End: 2020-10-26
Payer: COMMERCIAL

## 2020-10-26 ENCOUNTER — HOSPITAL ENCOUNTER (OUTPATIENT)
Age: 50
Setting detail: OUTPATIENT SURGERY
Discharge: HOME | End: 2020-10-26
Attending: PHYSICAL MEDICINE & REHABILITATION | Admitting: PHYSICAL MEDICINE & REHABILITATION
Payer: COMMERCIAL

## 2020-10-26 VITALS
TEMPERATURE: 98 F | BODY MASS INDEX: 23.9 KG/M2 | RESPIRATION RATE: 16 BRPM | HEIGHT: 64 IN | OXYGEN SATURATION: 100 % | HEART RATE: 69 BPM | DIASTOLIC BLOOD PRESSURE: 77 MMHG | WEIGHT: 140 LBS | SYSTOLIC BLOOD PRESSURE: 112 MMHG

## 2020-10-26 PROCEDURE — 99213 OFFICE O/P EST LOW 20 MIN: CPT | Performed by: NURSE PRACTITIONER

## 2020-10-26 PROCEDURE — 3610000056 HC PAIN LEVEL 4 BASE (NON-OR): Performed by: PHYSICAL MEDICINE & REHABILITATION

## 2020-10-26 PROCEDURE — 2709999900 HC NON-CHARGEABLE SUPPLY: Performed by: PHYSICAL MEDICINE & REHABILITATION

## 2020-10-26 PROCEDURE — 99152 MOD SED SAME PHYS/QHP 5/>YRS: CPT | Performed by: PHYSICAL MEDICINE & REHABILITATION

## 2020-10-26 PROCEDURE — 2500000003 HC RX 250 WO HCPCS: Performed by: PHYSICAL MEDICINE & REHABILITATION

## 2020-10-26 PROCEDURE — 3209999900 FLUORO FOR SURGICAL PROCEDURES

## 2020-10-26 PROCEDURE — 6360000002 HC RX W HCPCS: Performed by: PHYSICAL MEDICINE & REHABILITATION

## 2020-10-26 RX ORDER — MIDAZOLAM HYDROCHLORIDE 1 MG/ML
INJECTION INTRAMUSCULAR; INTRAVENOUS
Status: COMPLETED | OUTPATIENT
Start: 2020-10-26 | End: 2020-10-26

## 2020-10-26 RX ORDER — OXYCODONE HYDROCHLORIDE AND ACETAMINOPHEN 5; 325 MG/1; MG/1
1 TABLET ORAL 2 TIMES DAILY PRN
Qty: 60 TABLET | Refills: 0 | Status: SHIPPED | OUTPATIENT
Start: 2020-10-26 | End: 2020-11-23 | Stop reason: SDUPTHER

## 2020-10-26 RX ORDER — LIDOCAINE HYDROCHLORIDE 10 MG/ML
INJECTION, SOLUTION INFILTRATION; PERINEURAL
Status: COMPLETED | OUTPATIENT
Start: 2020-10-26 | End: 2020-10-26

## 2020-10-26 RX ORDER — FENTANYL CITRATE 50 UG/ML
INJECTION, SOLUTION INTRAMUSCULAR; INTRAVENOUS
Status: COMPLETED | OUTPATIENT
Start: 2020-10-26 | End: 2020-10-26

## 2020-10-26 RX ORDER — BUPIVACAINE HYDROCHLORIDE 5 MG/ML
INJECTION, SOLUTION EPIDURAL; INTRACAUDAL
Status: COMPLETED | OUTPATIENT
Start: 2020-10-26 | End: 2020-10-26

## 2020-10-26 RX ORDER — BETAMETHASONE SODIUM PHOSPHATE AND BETAMETHASONE ACETATE 3; 3 MG/ML; MG/ML
INJECTION, SUSPENSION INTRA-ARTICULAR; INTRALESIONAL; INTRAMUSCULAR; SOFT TISSUE
Status: COMPLETED | OUTPATIENT
Start: 2020-10-26 | End: 2020-10-26

## 2020-10-26 ASSESSMENT — PAIN SCALES - GENERAL
PAINLEVEL_OUTOF10: 0
PAINLEVEL_OUTOF10: 0

## 2020-10-26 ASSESSMENT — PAIN DESCRIPTION - DESCRIPTORS: DESCRIPTORS: ACHING

## 2020-10-26 ASSESSMENT — PAIN - FUNCTIONAL ASSESSMENT
PAIN_FUNCTIONAL_ASSESSMENT: 0-10
PAIN_FUNCTIONAL_ASSESSMENT: PREVENTS OR INTERFERES SOME ACTIVE ACTIVITIES AND ADLS

## 2020-10-26 NOTE — PROGRESS NOTES
TELE HEALTH VISIT (AUDIO-VISUAL)    Pursuant to the emergency declaration under the [facility] Critical access hospital waiver authority and the [employer] Act, this Virtual  Visit was conducted, with patient's consent, to reduce the patient's risk of exposure to COVID-19 and provide continuity of care for an established patient. Service is  provided through a video synchronous discussion virtually to substitute for in-person clinic visit. Due to this being a TeleHealth encounter (During [facility]-05 public health emergency), evaluation of the following organ systems was limited: Vitals/Constitutional/EENT/Resp/CV/GI//MS/Neuro/Skin/Beuo-Tucx-Obw. Emilie Lara  1970  [id number]    Ms. Lara is being seen virtually for a follow up visit using Doxy. me/Seevibes Video visit/Crowdmarko or face time  Informed verbal consent to the virtual visit was obtained from Ms. Lara. Risks associated with HIPPA compliance with the virtual visit was explained to the patient. Ms. Aiden Galeas is at her home and Alyssa BALDWIN is in her office. HISTORY OF PRESENT ILLNESS:  Ms. Aiden Galeas is a 51 y.o. female  being assessed for a follow up visit for pain management for evaluation of ongoing care regarding her symptoms and monitoring of compliance with long term use high risk medications. She has a diagnosis of   1. Chronic pain syndrome    2. Degeneration of lumbar or lumbosacral intervertebral disc    3. Lumbar nerve root impingement, L2    4. Spinal stenosis, lumbar region, without neurogenic claudication    5. Lumbosacral spondylosis without myelopathy    6. DDD (degenerative disc disease), cervical    7. Cervical stenosis of spine, mild    8. Numbness and tingling in both hands    9. Bilateral elbow joint pain    10.  Primary insomnia      On the Patients Pain Assessment form reviewed with the Medical Asistant:  She complains of pain in the bilateral lower back  with radiation to the NA . She rates the pain 5/10 and describes it as aching. Current treatment regimen has helped relieve about 40% of the pain. She denies any side effects from the current pain regimen. Patient reports that since the last follow up visit the physical functioning is unchanged, family/social relationships are unchanged, mood is unchanged sleep patterns are unchanged, and that the overall functioning is unchanged. Patient denies misusing/abusing her narcotic pain medications or using any illegal drugs. Upon obtaining medical history from Ms. Lara states that pain is manageable on current pain therapy. She had Lumbar PRASHANTH with Dr. Rodriguez Holes this morning, tolerated the procedure well. Pain medications adequately manages her pain, takes them as prescribed. Mood is stable without anxiety. Sleep is fair with an average of 5-6 hours. Denies to having issues of constipation. Tolerating activities/house chores with moderate tenderness to the lower back. ALLERGIES: Patients list of allergies were reviewed     MEDICATIONS: Ms. Nidhi Pritchett list of medications were reviewed. Her current medications are   Outpatient Medications Prior to Visit   Medication Sig Dispense Refill    TURMERIC PO Take by mouth      APPLE CIDER VINEGAR PO Take by mouth      Linoleic Acid-Sunflower Oil (CLA PO) Take by mouth      Lidocaine (ZTLIDO) 1.8 % PTCH Apply 1 patch topically daily 90 patch 0    amitriptyline (ELAVIL) 25 MG tablet Take 1 tablet by mouth nightly 90 tablet 1    torsemide (DEMADEX) 10 MG tablet Take 1 tablet by mouth daily 90 tablet 0    ELIQUIS 5 MG TABS tablet TAKE 1 TABLET BY MOUTH TWO TIMES A DAY  60 tablet 0    oxyCODONE-acetaminophen (PERCOCET) 5-325 MG per tablet Take 1 tablet by mouth 2 times daily as needed for Pain for up to 30 days. Take no more than 2-3 tabs orally prn 60 tablet 0     No facility-administered medications prior to visit.         SOCIAL/FAMILY/PAST MEDICAL HISTORY: Ms. Money social, family and past medical history was reviewed. REVIEW OF SYSTEMS:    Respiratory: Negative for apnea, chest tightness and shortness of breath or change in baseline breathing. Gastrointestinal: Negative for nausea, vomiting, abdominal pain, diarrhea, constipation, blood in stool and abdominal distention. PHYSICAL EXAM:   Nursing note and vitals reviewed. There were no vitals taken for this visit. as per patient  Constitutional: She appears well-developed and well-nourished. No acute distress. Skin: Skin appears to be warm and dry. No rashes or any other marks noted. She is not diaphoretic. Neurological/Psychiatric:She is alert and oriented to person, place, and time. Coordination is  normal.  Her mood isAppropriate and affect is Neutral/Euthymic(normal). Her behavior is normal.   thought content normal.   Musculoskeletal / Extremities: Gait is normal, assistive devices use: none. IMPRESSION:   1. Chronic pain syndrome    2. Degeneration of lumbar or lumbosacral intervertebral disc    3. Lumbar nerve root impingement, L2    4. Spinal stenosis, lumbar region, without neurogenic claudication    5. Lumbosacral spondylosis without myelopathy    6. DDD (degenerative disc disease), cervical    7. Cervical stenosis of spine, mild    8. Numbness and tingling in both hands    9. Bilateral elbow joint pain    10.  Primary insomnia        PLAN:  Informed verbal consent regarding treatment was obtained  -Continue with Anya Centeno Elvil  -PT  -Maintain f/u with Dr. Monique Hayes for lumbar spine degeneration  -CBT techniques- relaxation therapies such as biofeedback, mindfulness based stress reduction, imagery, cognitive restructuring, problem solving discussed with patient  -Reviewed Covid-19 safety regulations which were discussed including Flu vaccine, patient maintains compliance with the safety guidelines regarding Covid-19  -Last UDS 5/11/20 Consistent  -Return in about 4 weeks (around 11/23/2020). -[place] record was obtained and reviewed  for the last one year and no indicators of drug misuse  were found. Any other controlled substance prescriptions  seen on the record have been accounted for, I am aware of the patient receiving these medications. Ad[facility] P[place] OAR[place] record will be rechecked as part of office protocol. Current Outpatient Medications   Medication Sig Dispense Refill    oxyCODONE-acetaminophen (PERCOCET) 5-325 MG per tablet Take 1 tablet by mouth 2 times daily as needed for Pain for up to 30 days. Take no more than 2-3 tabs orally prn 60 tablet 0    TURMERIC PO Take by mouth      APPLE CIDER VINEGAR PO Take by mouth      Linoleic Acid-Sunflower Oil (CLA PO) Take by mouth      Lidocaine (ZTLIDO) 1.8 % PTCH Apply 1 patch topically daily 90 patch 0    amitriptyline (ELAVIL) 25 MG tablet Take 1 tablet by mouth nightly 90 tablet 1    torsemide (DEMADEX) 10 MG tablet Take 1 tablet by mouth daily 90 tablet 0    ELIQUIS 5 MG TABS tablet TAKE 1 TABLET BY MOUTH TWO TIMES A DAY  60 tablet 0     No current facility-administered medications for this visit. I will continue her current medication regimen  which is part of the above treatment schedule. It has been helping with Ms. Lara's chronic  medical problems which for this visit include:   Diagnoses of Chronic pain syndrome, Degeneration of lumbar or lumbosacral intervertebral disc, Lumbar nerve root impingement, L2, Spinal stenosis, lumbar region, without neurogenic claudication, Lumbosacral spondylosis without myelopathy, DDD (degenerative disc disease), cervical, Cervical stenosis of spine, mild, Numbness and tingling in both hands, Bilateral elbow joint pain, and Primary insomnia were pertinent to this visit. Risks and benefits of the medications and other alternative treatments  including no treatment were discussed with the patient. The common side effects of these medications were also explained to the patient.   Informed verbal consent was obtained. Goals of current treatment regimen include improvement in pain, restoration of functioning- with focus on improvement in physical performance, general activity, work or disability,emotional distress, health care utilization and  decreased medication consumption. Will continue to monitor progress towards achieving/maintaining therapeutic goals with special emphasis on  1. Improvement in perceived interfernce  of pain with ADL's. Ability to do home exercises independently. Ability to do household chores indoor and/or outdoor work and social and leisure activities. Improve psychosocial and physical functioning. - she is showing progression towards this treatment goal with the current regimen. 2. Ability to focus/concentrate at work and perform the duties required of her at work  Sit through a workday without lower extremity symptoms. Stand 30-60 minutes without lower extremity symptoms. Ability to lift up to 10-20 lbs. Ability to go up and down stairs. Sit 30-60 minutes  Without having to stand up frequently. - she is maintaining/progressing towards her work related goals with the current regimen. She was advised against drinking alcohol with the narcotic pain medicines, advised against driving or handling machinery while adjusting the dose of medicines or if having cognitive  issues related to the current medications. Risk of overdose and death, if medicines not taken as prescribed, were also discussed. If the patient develops new symptoms or if the symptoms worsen, the patient should call the office. While transcribing every attempt was made to maintain the accuracy of the note in terms of it's contents,there may have been some errors made inadvertently. Thank you for allowing me to participate in the care of this patient. Nini Malone APRN-CNP     Cc: Teddy Walter MD

## 2020-10-26 NOTE — PATIENT INSTRUCTIONS
Patient Education        Back Stretches: Exercises  Introduction  Here are some examples of exercises for stretching your back. Start each exercise slowly. Ease off the exercise if you start to have pain. Your doctor or physical therapist will tell you when you can start these exercises and which ones will work best for you. How to do the exercises  Overhead stretch   1. Stand comfortably with your feet shoulder-width apart. 2. Looking straight ahead, raise both arms over your head and reach toward the ceiling. Do not allow your head to tilt back. 3. Hold for 15 to 30 seconds, then lower your arms to your sides. 4. Repeat 2 to 4 times. Side stretch   1. Stand comfortably with your feet shoulder-width apart. 2. Raise one arm over your head, and then lean to the other side. 3. Slide your hand down your leg as you let the weight of your arm gently stretch your side muscles. Hold for 15 to 30 seconds. 4. Repeat 2 to 4 times on each side. Press-up   1. Lie on your stomach, supporting your body with your forearms. 2. Press your elbows down into the floor to raise your upper back. As you do this, relax your stomach muscles and allow your back to arch without using your back muscles. As your press up, do not let your hips or pelvis come off the floor. 3. Hold for 15 to 30 seconds, then relax. 4. Repeat 2 to 4 times. Relax and rest   1. Lie on your back with a rolled towel under your neck and a pillow under your knees. Extend your arms comfortably to your sides. 2. Relax and breathe normally. 3. Remain in this position for about 10 minutes. 4. If you can, do this 2 or 3 times each day. Follow-up care is a key part of your treatment and safety. Be sure to make and go to all appointments, and call your doctor if you are having problems. It's also a good idea to know your test results and keep a list of the medicines you take. Where can you learn more? Go to [email or web address] org and sign in to your [employer] account. Enter Q920 in the [employer] box to learn more about \"Back Stretches: Exercises. \"     If you do not have an account, please click on the \"Sign Up Now\" link. Current as of: March 2, 2020               Content Version: 12.6  © 3393-0845 [employer]. Care instructions adapted under license by [facility] ([facility]). If you have questions about a medical condition or this instruction, always ask your healthcare professional. [address] any warranty or liability for your use of this information.

## 2020-10-26 NOTE — OP NOTE
Patient:  aDnny Engel  YOB: 1970  Medical Record #:  [id number]   Place: [address] [place] [address]  Date:  10/26/2020   Physician:  Jarocho Matamoros MD, CHUNG    Procedure: 1. Transforaminal Lumbar Epidural Steroid Injection -  left L4  CPT 77569          2. Transforaminal Lumbar Epidural Steroid Injection -  left L5  CPT 69922    Pre-Procedure Diagnosis: Lumbar radiculopathy      Post-Procedure Diagnosis: Same    Sedation: Local with 1% Lidocaine 2.5 ml and 1 mg of IV Versed and 25 mcg of IV Fentanyl    EBL: None    Complications: None    Procedure Summary:        The patient was brought to the procedure suite and placed in the prone position. The skin overlying the lumbar spine was prepped and draped in the usual sterile fashion. Using fluoroscopic guidance, the left L4 foramen was identified. Through anesthetized skin, a 22 gauge 3.5 inch curved tip spinal needle was advanced into the foramen. Isovue M 300 was instilled showing an epidurogram/nerve root outline pattern without evidence of vascular or intrathecal spread. Following which, 7.5 mg of Celestone mixed with 1 ml of 0.5% Marcaine was instilled. The needle was removed. Using fluoroscopic guidance, the left L5 foramen was identified. Through anesthetized skin, a 22 gauge 3.5 inch curved tip spinal needle was advanced into the foramen. Isovue M 300 was instilled showing an epidurogram/nerve root outline pattern without evidence of vascular or intrathecal spread. Following which, 7.5 mg of Celestone mixed with 1 ml of 0.5% Marcaine was instilled. The needle was removed and band-aids were applied. The patient was transferred to the post-operative area in stable condition.

## 2020-11-06 PROBLEM — D68.9 COAGULATION DISORDER (HCC): Status: ACTIVE | Noted: 2020-11-06

## 2020-11-12 ENCOUNTER — TELEPHONE (OUTPATIENT)
Dept: ORTHOPEDIC SURGERY | Age: 50
End: 2020-11-12

## 2020-11-12 ENCOUNTER — OFFICE VISIT (OUTPATIENT)
Dept: ORTHOPEDIC SURGERY | Age: 50
End: 2020-11-12
Payer: COMMERCIAL

## 2020-11-12 ENCOUNTER — TELEPHONE (OUTPATIENT)
Dept: PRIMARY CARE CLINIC | Age: 50
End: 2020-11-12

## 2020-11-12 VITALS — BODY MASS INDEX: 23.9 KG/M2 | RESPIRATION RATE: 12 BRPM | WEIGHT: 140 LBS | HEIGHT: 64 IN | TEMPERATURE: 98.2 F

## 2020-11-12 PROCEDURE — 99214 OFFICE O/P EST MOD 30 MIN: CPT | Performed by: STUDENT IN AN ORGANIZED HEALTH CARE EDUCATION/TRAINING PROGRAM

## 2020-11-12 NOTE — TELEPHONE ENCOUNTER
L/m for approval to hold ELIQUIS  for 3 days prior to [facility] on 11/17/20. Patient aware of hold date.

## 2020-11-12 NOTE — TELEPHONE ENCOUNTER
Need an order to hold  the ELIQUIS 5 MG TABS tablet  For 3 days she is having her injection on 11/17/20 hold starting on 11/14/20

## 2020-11-12 NOTE — LETTER
Please schedule the following with:     Date:   20 @ 10:45    Account: [de-identified]  Patient: Suyapa Lara    : 1970  Address:  [address] [place] [address] [address]    Phone (H):  [phone] (home) [phone] (work)     ----------------------------------------------------------------------------------------------  Diagnosis:     ICD-10-CM    1. Lumbar radiculopathy  M54.16 Amb External Referral To Physical Therapy   2. HNP (herniated nucleus pulposus), lumbar  M51.26 Amb External Referral To Physical Therapy   3. Spondylosis without myelopathy or radiculopathy, lumbar region  M47.816 Amb External Referral To Physical Therapy         Levels:L4 and L5 Left  Procedure type Transforaminal epidural steroid injection   Side Left  CPT Codes 74484, 12598    ----------------------------------------------------------------------------------------------  Injection # 2  [facility]    Attending Physician       Janell Mae MD.  ----------------------------------------------------------------------------------------------  Injection Scheduled For:    At:    [facility]    Pre-Cert#  2nd Insurance     Pre-Cert#    Comments:    COVID TEST Needed: no    · Infection control  ? Tested positive for MRSA in past 12 months:  no  ? Tested positive for MSSA \"staph infection\" in past 12 months: no  ? Tested positive for VRE (Vancomycin Resistant Enterococci) in past 12 months:   no  ? Currently on any antibiotics for an infection: no  · Anticoagulants:  ? On a blood thinner:  Sigrid Lam  ?  Any history of bleeding disorder: no   · Advanced Liver disease: no   · Advanced Renal disease: no   · Glaucoma: no   · Diabetes: no     Sedation:         No  -----------------------------------------------------------------------------------------------  No Known Allergies

## 2020-11-12 NOTE — PROGRESS NOTES
Follow up: Kyung Severe A Money  1970  [id number]      CHIEF COMPLAINT:    Chief Complaint   Patient presents with    Lower Back Pain     F/u Left L4 & Left L5 TF 10/26  (#1)         HISTORY OF PRESENT ILLNESS:  Ms. Kelton Daniel is a 51 y.o. female returns for a follow up visit for multiple medical problems. Her current presenting problems are No diagnosis found. .    As per information/history obtained from the PADT(patient assessment and documentation tool) - She complains of pain in the lower back with radiation to the lower leg Left She rates the pain 6/10 and describes it as aching. Pain is made worse by: sitting, bending. She denies side effects from the current pain regimen. Patient reports that since the last follow up visit the physical functioning is better, family/social relationships are unchanged, mood is unchanged and sleep patterns are unchanged, and that the overall functioning is better. Patient denies neurological bowel or bladder. Ms. Kelton Daniel presents for follow up of low back and left leg pain. The patient recently underwent left L4 and L5 transforaminal epidural steroid injection. She reports 90% relief of her pain for 4-5 days following the procedure. She states the pain did return however it is better than prior to the procedure. She reports about 50% relief of her pain at this time. The patient continues to complain of low back pain with radiation down the left leg to the calf. Aggravating factors include bending, sitting and standing for long periods of time. Alleviating measures include physical therapy and pain medication. The patient has been attending physical therapy where they are doing dry needling on the lumbar spine and glutes. She states this is helping however she continues to feel this constant, aching radiating pain down the left leg. The patient can sit for 60 minutes, stand for 60 minutes and walk for 60 minutes without a considerable amount of pain. Associated signs and symptoms:   Neurogenic bowel or bladder symptoms:  no   Perceived weakness:  no   Difficulty walking:  no              Past Medical History:   Past Medical History:   Diagnosis Date    Acne     Dr Praveena Hubbard history reviewed with no changes       Past Surgical History:     Past Surgical History:   Procedure Laterality Date    CARPAL TUNNEL RELEASE Bilateral      SECTION  2001     x1    PAIN MANAGEMENT PROCEDURE Left 10/26/2020    LEFT L4 AND L5 TRANSFORAMINAL EPDIURAL STEROID INJECTION WITH FLUOROSCOPY (82107, [id number]) performed by Mariusz Webb [facility] at [facility]     Current Medications:     Current Outpatient Medications:     oxyCODONE-acetaminophen (PERCOCET) 5-325 MG per tablet, Take 1 tablet by mouth 2 times daily as needed for Pain for up to 30 days. Take no more than 2-3 tabs orally prn, Disp: 60 tablet, Rfl: 0    TURMERIC PO, Take by mouth, Disp: , Rfl:     APPLE CIDER VINEGAR PO, Take by mouth, Disp: , Rfl:     Linoleic Acid-Sunflower Oil (CLA PO), Take by mouth, Disp: , Rfl:     Lidocaine (ZTLIDO) 1.8 % PTCH, Apply 1 patch topically daily, Disp: 90 patch, Rfl: 0    amitriptyline (ELAVIL) 25 MG tablet, Take 1 tablet by mouth nightly, Disp: 90 tablet, Rfl: 1    torsemide (DEMADEX) 10 MG tablet, Take 1 tablet by mouth daily, Disp: 90 tablet, Rfl: 0    ELIQUIS 5 MG TABS tablet, TAKE 1 TABLET BY MOUTH TWO TIMES A DAY , Disp: 60 tablet, Rfl: 0  Allergies:  Patient has no known allergies. Social History:    reports that she has never smoked. She has never used smokeless tobacco. She reports that she does not drink alcohol or use drugs.   Family History:   Family History   Problem Relation Age of Onset    No Known Problems Mother     No Known Problems Father     Other Brother         do not talk       REVIEW OF SYSTEMS:   CONSTITUTIONAL: Denies unexplained weight loss, fevers, chills or fatigue  NEUROLOGICAL: Denies unsteady gait or progressive weakness  MUSCULOSKELETAL: Denies joint swelling or redness  GI: Denies nausea, vomiting, diarrhea   : Denies bowel or bladder issues       PHYSICAL EXAM:    Vitals: Temperature 98.2 °F (36.8 °C), resp. rate 12, height 5' 4\" (1.626 m), weight 140 lb (63.5 kg), not currently breastfeeding. GENERAL EXAM:  · General Apparence: Patient is adequately groomed with no evidence of malnutrition. · Psychiatric: Orientation: The patient is oriented to time, place and person. The patient's mood and affect are appropriate   · Vascular: Examination reveals no swelling and palpation reveals no tenderness in upper or lower extremities. Good capillary refill. · The lymphatic examination of the neck, axillae and groin reveals all areas to be without enlargement or induration  · Sensation is intact without deficit in the upper and lower extremities to light touch and pinprick  · Coordination of the upper and lower extremities are normal.  · RIGHT UPPER EXTREMITY:  Inspection/examination of the right upper extremity does not show any tenderness, deformity or injury. Range of motion is unremarkable and pain-free. There is no gross instability. There are no rashes, ulcerations or lesions. Strength and tone are normal. No atrophy or abnormal movements are noted. · LEFT UPPER EXTREMITY: Inspection/examination of the left upper extremity does not show any tenderness, deformity or injury. Range of motion is unremarkable and pain-free. There is no gross instability. There are no rashes, ulcerations or lesions. Strength and tone are normal. No atrophy or abnormal movements are noted. LUMBAR/SACRAL EXAMINATION:  · Inspection: Local inspection shows no step-off or bruising. Lumbar alignment is normal. No instability is noted. · Palpation:   No evidence of tenderness at the midline. Lumbar paraspinal tenderness: Mild L4/5 and L5/S1 tenderness  Bursal tenderness No tenderness bilaterally  There is no paraspinal spasm.   · Range of Motion: limited by 25% in all planes due to pain  · Strength:   Strength testing is 5/5 in all muscle groups tested. · Special Tests:   Straight leg raise positive left and crossed SLR negative. Fredis's testing is negative bilaterally. FADIR's testing is negative bilaterally. · Skin: There are no rashes, ulcerations or lesions. · Reflexes: Reflexes are symmetrically 1+ at the patellar and ankle tendons. Clonus absent bilaterally at the feet. · Gait & station: normal, patient ambulates without assistance  · Additional Examinations:  · RIGHT LOWER EXTREMITY: Inspection/examination of the right lower extremity does not show any tenderness, deformity or injury. Range of motion is normal and pain-free. There is no gross instability. There are no rashes, ulcerations or lesions. Strength and tone are normal. No atrophy or abnormal movements are noted. · LEFT LOWER EXTREMITY:  Inspection/examination of the left lower extremity does not show any tenderness, deformity or injury. Range of motion is normal and pain-free. There is no gross instability. There are no rashes, ulcerations or lesions. Strength and tone are normal. No atrophy or abnormal movements are noted.       Diagnostic Testing:    MR Lumbar spine shows  10/8/2020  T11-12: Disc is dehydrated, slightly height reduced; shallow left lateral disc protrusion    gently deforms left ventral dural sac.         T12-L1: Disc shows no posterior displacement.         L1-2: Disc shows no posterior displacement.         L2-3: Disc is dehydrated; trace disc bulge; right extraforaminal annular fissure; disc effaces    ventral dural sac.  Mild facet hypertrophy, scant facet fluid.         L3-4: Disc is dehydrated; shallow disc bulge with annular fissure straightens ventral dural    sac.  Scant facet fluid.         L4-5: Disc is dehydrated, slightly height reduced; 2mm retrolisthesis; disc bulge with annular    fissure gently encroaches upon ventral dural sac, minimally greater on left; disc contacts    foraminal L4 nerve roots.  Mild spinal stenosis.  Mild bilateral lateral recess stenosis.  Mild     biforaminal stenosis.         L5-S1: Disc is dehydrated, slightly height reduced; shallow central disc protrusion effaces    ventral dural sac.  Disc contacts foraminal L5 nerve roots.  Mild biforaminal narrowing.               Results for orders placed or performed in visit on 10/20/20   COVID-19   Result Value Ref Range    SARS-CoV-2, RHEA NOT DETECTED NOT DETECTED     Impression:       No diagnosis found. Plan:  Clinical Course: Above diagnoses are improving     I discussed the diagnosis and the treatment options with [facility] today. In Summary:  The various treatment options were outlined and discussed with Arely Lara including:  Conservative care options: physical therapy, ice, medications, bracing, and activity modification. The indications for therapeutic injections. The indications for additional imaging/laboratory studies. The indications for (possible future) interventions. After considering the various options discussed, [facility] elected to pursue a course of treatment that includes the followin. Medications:  Continue anti-inflammatories with appropriate GI Precautions including to stop if develop dark tarry stools or GI upset and to take with food. 2. PT:  Encouraged to continue with HEP. 3. Further studies:  No further studies. 4. Interventional:  We discussed pursuing a left L4 and L5 transforaminal epidural steroid injection to address the pain. Radiologic imaging and symptoms confirm the pain etiology. Risks, benefits and alternatives of interventional options were discussed. These include and are not limited to bleeding, infection, spinal headache, nerve injury and lack of pain relief. The patient verbalized understanding and would like to proceed. The patient will be scheduled accordingly.          Repeat Therapeutic PRASHANTH with positive relief from first therapeutic injection    As such, I have confirmed the patient has met the general requirements including failure of conservative management including prescription strength analgesics, adjunctive medications were utilized , therapeutic exercise program, and no underlying addiction or behavioral disorders were identified to the ability of the provider. Symptoms are impacting their ADLs or iADLs such as walking and transferring and significant pain was noted in the HPI. Imaging studies as noted in the diagnostic imaging section of the consultation were reviewed and correlated with clinical findings. Fluoroscopy is utilized for interventional procedures. A repeat injection is being recommended due to at least 50% pain reduction and functional improvement of at least 3 weeks duration. The patient notes significant functional limitations and continues to adhere to conservative treatment between injections. 5. Follow up:  4-6 weeks      Arely Lara was instructed to call the office if her symptoms worsen or if new symptoms appear prior to the next scheduled visit. She is specifically instructed to contact the office between now & her scheduled appointment if she has concerns related to her condition or if she needs assistance in scheduling the above tests. She is welcome to call for an appointment sooner if she has any additional concerns or questions. Bobby Meade PA-C   Board Certified by the [employer] on Certification of Physician Assistants  Star Jacob 59  Partner of [facility] ([facility])             This dictation was performed with a verbal recognition program [facility]) and it was checked for errors. It is possible that there are still dictated errors within this office note. If so, please bring any errors to my attention for an addendum. All efforts were made to ensure that this office note is accurate.

## 2020-11-13 ENCOUNTER — TELEPHONE (OUTPATIENT)
Dept: ORTHOPEDIC SURGERY | Age: 50
End: 2020-11-13

## 2020-11-13 NOTE — TELEPHONE ENCOUNTER
Auth: # [id number]    Date: 11/17/2020 thru 05/16/2021  Type of SX:  Outpatient PRASHANTH  Location: [facility]  CPT: 90406, 50827, [id number], [id number]   DX Code: M54.16, M51.26, M47.816  SX area: Lumbar spine  [facility]urance[facility]    CPT: 03266 17, 76642  [address] [facility]

## 2020-11-13 NOTE — TELEPHONE ENCOUNTER
OK Hold Rx Eliquis 5 mg  X 3 days prior to procedure  Pt is followed by Hematology too Dr José Miguel Gray

## 2020-11-16 ENCOUNTER — OFFICE VISIT (OUTPATIENT)
Dept: PRIMARY CARE CLINIC | Age: 50
End: 2020-11-16
Payer: COMMERCIAL

## 2020-11-16 VITALS
SYSTOLIC BLOOD PRESSURE: 125 MMHG | DIASTOLIC BLOOD PRESSURE: 79 MMHG | BODY MASS INDEX: 24.31 KG/M2 | WEIGHT: 142.4 LBS | OXYGEN SATURATION: 100 % | HEART RATE: 72 BPM | HEIGHT: 64 IN | TEMPERATURE: 97.7 F

## 2020-11-16 DIAGNOSIS — E55.9 VITAMIN D DEFICIENCY: ICD-10-CM

## 2020-11-16 DIAGNOSIS — Z13.220 LIPID SCREENING: ICD-10-CM

## 2020-11-16 DIAGNOSIS — Z00.00 ROUTINE PHYSICAL EXAMINATION: ICD-10-CM

## 2020-11-16 DIAGNOSIS — Z13.1 DIABETES MELLITUS SCREENING: ICD-10-CM

## 2020-11-16 LAB
A/G RATIO: 1.8 (ref 1.1–2.2)
ALBUMIN SERPL-MCNC: 4.7 G/DL (ref 3.4–5)
ALP BLD-CCNC: 131 U/L (ref 40–129)
ALT SERPL-CCNC: 18 U/L (ref 10–40)
ANION GAP SERPL CALCULATED.3IONS-SCNC: 10 MMOL/L (ref 3–16)
AST SERPL-CCNC: 19 U/L (ref 15–37)
BASOPHILS ABSOLUTE: 0 K/UL (ref 0–0.2)
BASOPHILS RELATIVE PERCENT: 0.5 %
BILIRUB SERPL-MCNC: 0.4 MG/DL (ref 0–1)
BUN BLDV-MCNC: 20 MG/DL (ref 7–20)
CALCIUM SERPL-MCNC: 9.8 MG/DL (ref 8.3–10.6)
CHLORIDE BLD-SCNC: 99 MMOL/L (ref 99–110)
CHOLESTEROL, TOTAL: 241 MG/DL (ref 0–199)
CO2: 28 MMOL/L (ref 21–32)
CREAT SERPL-MCNC: 0.8 MG/DL (ref 0.6–1.1)
EOSINOPHILS ABSOLUTE: 0.1 K/UL (ref 0–0.6)
EOSINOPHILS RELATIVE PERCENT: 1.7 %
GFR AFRICAN AMERICAN: >60
GFR NON-AFRICAN AMERICAN: >60
GLOBULIN: 2.6 G/DL
GLUCOSE BLD-MCNC: 98 MG/DL (ref 70–99)
HCT VFR BLD CALC: 39.6 % (ref 36–48)
HDLC SERPL-MCNC: 76 MG/DL (ref 40–60)
HEMOGLOBIN: 13.3 G/DL (ref 12–16)
LDL CHOLESTEROL CALCULATED: 151 MG/DL
LYMPHOCYTES ABSOLUTE: 1.5 K/UL (ref 1–5.1)
LYMPHOCYTES RELATIVE PERCENT: 33.8 %
MCH RBC QN AUTO: 32 PG (ref 26–34)
MCHC RBC AUTO-ENTMCNC: 33.6 G/DL (ref 31–36)
MCV RBC AUTO: 95.4 FL (ref 80–100)
MONOCYTES ABSOLUTE: 0.4 K/UL (ref 0–1.3)
MONOCYTES RELATIVE PERCENT: 8 %
NEUTROPHILS ABSOLUTE: 2.5 K/UL (ref 1.7–7.7)
NEUTROPHILS RELATIVE PERCENT: 56 %
PDW BLD-RTO: 13.5 % (ref 12.4–15.4)
PLATELET # BLD: 295 K/UL (ref 135–450)
PMV BLD AUTO: 7.7 FL (ref 5–10.5)
POTASSIUM SERPL-SCNC: 4.5 MMOL/L (ref 3.5–5.1)
RBC # BLD: 4.15 M/UL (ref 4–5.2)
SODIUM BLD-SCNC: 137 MMOL/L (ref 136–145)
TOTAL PROTEIN: 7.3 G/DL (ref 6.4–8.2)
TRIGL SERPL-MCNC: 71 MG/DL (ref 0–150)
VLDLC SERPL CALC-MCNC: 14 MG/DL
WBC # BLD: 4.5 K/UL (ref 4–11)

## 2020-11-16 PROCEDURE — 99396 PREV VISIT EST AGE 40-64: CPT | Performed by: FAMILY MEDICINE

## 2020-11-16 ASSESSMENT — ENCOUNTER SYMPTOMS
SHORTNESS OF BREATH: 0
ALLERGIC/IMMUNOLOGIC NEGATIVE: 1
EYES NEGATIVE: 1
TROUBLE SWALLOWING: 0
BACK PAIN: 1
CHEST TIGHTNESS: 0
RESPIRATORY NEGATIVE: 1
ABDOMINAL PAIN: 0
SORE THROAT: 0
WHEEZING: 0

## 2020-11-16 NOTE — PROGRESS NOTES
Take by mouth      APPLE CIDER VINEGAR PO Take by mouth      Linoleic Acid-Sunflower Oil (CLA PO) Take by mouth      Lidocaine (ZTLIDO) 1.8 % PTCH Apply 1 patch topically daily 90 patch 0    amitriptyline (ELAVIL) 25 MG tablet Take 1 tablet by mouth nightly 90 tablet 1    torsemide (DEMADEX) 10 MG tablet Take 1 tablet by mouth daily 90 tablet 0    ELIQUIS 5 MG TABS tablet TAKE 1 TABLET BY MOUTH TWO TIMES A DAY  60 tablet 0     No current facility-administered medications for this visit. Lab Results   Component Value Date    WBC 5.0 10/09/2020    HGB 12.0 10/09/2020    HCT 35.7 (L) 10/09/2020    MCV 92.2 10/09/2020     10/09/2020     Lab Results   Component Value Date    CHOL 204 (H) 10/11/2012    CHOL 198 11/02/2010     Lab Results   Component Value Date    TRIG 93 10/11/2012    TRIG 120 11/02/2010     Lab Results   Component Value Date    HDL 85 (H) 10/11/2012    HDL 48 11/02/2010     Lab Results   Component Value Date    LDLCALC 101 (H) 10/11/2012    LDLCALC 126 11/02/2010     Lab Results   Component Value Date    LABVLDL 19 10/11/2012     Lab Results   Component Value Date    CHOLHDLRATIO 4.1 11/02/2010       Chemistry        Component Value Date/Time     10/09/2020 1213    K 4.1 10/09/2020 1213    CL 99 10/09/2020 1213    CO2 32 (H) 10/09/2020 1213    BUN 26 10/09/2020 1213    CREATININE 1.02 10/09/2020 1213        Component Value Date/Time    CALCIUM 10.0 10/09/2020 1213    ALKPHOS 99 10/09/2020 1213    AST 22 10/09/2020 1213    ALT 23 10/09/2020 1213    BILITOT 0.4 10/09/2020 1213            Review of Systems   Constitutional: Negative for chills, fatigue and fever. HENT: Negative. Negative for congestion, nosebleeds, sore throat and trouble swallowing. Eyes: Negative. Respiratory: Negative. Negative for chest tightness, shortness of breath and wheezing. Cardiovascular: Negative. Negative for chest pain, palpitations and leg swelling.    Gastrointestinal: Negative for abdominal pain. Colonoscopy done 9-2020  Diverticulitis  Abscess 7-2020 BN   Endocrine: Negative. Genitourinary:        GYN next year  Mammogram done   Musculoskeletal: Positive for back pain and neck pain. Dr Savanah Cordova c spine & lumbar disc disorder  Pain Management  Epi dural is due tomorrow  Lumbar DD  C spine DD   Skin: Negative for rash. Allergic/Immunologic: Negative. Negative for environmental allergies. Neurological: Negative. Negative for dizziness and headaches. Hematological: Negative. Hematology care   Dx factor V  Rx Eliquis   Psychiatric/Behavioral: Negative for behavioral problems, self-injury, sleep disturbance and suicidal ideas. The patient is not nervous/anxious and is not hyperactive. OBJECTIVE:  /79 (Site: Left Upper Arm, Position: Sitting, Cuff Size: Medium Adult)   Pulse 72   Temp 97.7 °F (36.5 °C) (Infrared)   Ht 5' 4\" (1.626 m)   Wt 142 lb 6.4 oz (64.6 kg)   SpO2 100%   BMI 24.44 kg/m²   Physical Exam  Constitutional:       General: She is not in acute distress. Appearance: She is well-developed. She is not diaphoretic. HENT:      Head: Normocephalic. Right Ear: External ear normal.      Left Ear: External ear normal.      Nose: Nose normal.   Eyes:      General: No scleral icterus. Right eye: No discharge. Left eye: No discharge. Conjunctiva/sclera: Conjunctivae normal.      Pupils: Pupils are equal, round, and reactive to light. Neck:      Musculoskeletal: Normal range of motion and neck supple. Thyroid: No thyromegaly. Cardiovascular:      Rate and Rhythm: Normal rate and regular rhythm. Heart sounds: Normal heart sounds. No murmur. Pulmonary:      Effort: Pulmonary effort is normal. No respiratory distress. Breath sounds: Normal breath sounds. No wheezing or rales. Chest:      Chest wall: No tenderness. Abdominal:      General: Bowel sounds are normal. There is no distension. Palpations: Abdomen is soft. There is no mass. Tenderness: There is no abdominal tenderness. There is no guarding or rebound. Lymphadenopathy:      Cervical: No cervical adenopathy. Skin:     General: Skin is warm. Findings: No rash. Neurological:      Mental Status: She is alert and oriented to person, place, and time. Deep Tendon Reflexes: Reflexes are normal and symmetric. Psychiatric:         Behavior: Behavior normal.         Thought Content: Thought content normal.         Judgment: Judgment normal.         ASSESSMENT/PLAN:      Diagnosis Orders   1. Routine physical examination  CBC Auto Differential    Comprehensive Metabolic Panel    Hemoglobin A1C    Lipid Panel    Vitamin D 25 Hydroxy   2. Lipid screening  Lipid Panel   3. Diabetes mellitus screening  Hemoglobin A1C   4.  Vitamin D deficiency  Vitamin D 25 Hydroxy       As above  Declined Flu shot  Pt will come back [facility] visit for Tdap  Pt has Epidural tomorrow  She did d/c Eliquis x total 3 days prior to epidural

## 2020-11-17 ENCOUNTER — HOSPITAL ENCOUNTER (OUTPATIENT)
Age: 50
Setting detail: OUTPATIENT SURGERY
Discharge: HOME | End: 2020-11-17
Attending: PHYSICAL MEDICINE & REHABILITATION | Admitting: PHYSICAL MEDICINE & REHABILITATION
Payer: COMMERCIAL

## 2020-11-17 VITALS
OXYGEN SATURATION: 100 % | DIASTOLIC BLOOD PRESSURE: 72 MMHG | WEIGHT: 142 LBS | HEART RATE: 74 BPM | TEMPERATURE: 97.2 F | RESPIRATION RATE: 18 BRPM | SYSTOLIC BLOOD PRESSURE: 115 MMHG | HEIGHT: 64 IN | BODY MASS INDEX: 24.24 KG/M2

## 2020-11-17 LAB
ESTIMATED AVERAGE GLUCOSE: 102.5 MG/DL
HBA1C MFR BLD: 5.2 %
PREGNANCY, URINE: NEGATIVE
VITAMIN D 25-HYDROXY: 33.1 NG/ML

## 2020-11-17 PROCEDURE — 6360000002 HC RX W HCPCS: Performed by: PHYSICAL MEDICINE & REHABILITATION

## 2020-11-17 PROCEDURE — 6360000004 HC RX CONTRAST MEDICATION: Performed by: PHYSICAL MEDICINE & REHABILITATION

## 2020-11-17 PROCEDURE — 7100000010 HC PHASE II RECOVERY - FIRST 15 MIN: Performed by: PHYSICAL MEDICINE & REHABILITATION

## 2020-11-17 PROCEDURE — 84703 CHORIONIC GONADOTROPIN ASSAY: CPT

## 2020-11-17 PROCEDURE — 2500000003 HC RX 250 WO HCPCS: Performed by: PHYSICAL MEDICINE & REHABILITATION

## 2020-11-17 PROCEDURE — 3600000002 HC SURGERY LEVEL 2 BASE: Performed by: PHYSICAL MEDICINE & REHABILITATION

## 2020-11-17 PROCEDURE — 2709999900 HC NON-CHARGEABLE SUPPLY: Performed by: PHYSICAL MEDICINE & REHABILITATION

## 2020-11-17 RX ORDER — BUPIVACAINE HYDROCHLORIDE 5 MG/ML
INJECTION, SOLUTION EPIDURAL; INTRACAUDAL
Status: DISCONTINUED
Start: 2020-11-17 | End: 2020-11-17 | Stop reason: HOSPADM

## 2020-11-17 RX ORDER — LIDOCAINE HYDROCHLORIDE 10 MG/ML
INJECTION, SOLUTION EPIDURAL; INFILTRATION; INTRACAUDAL; PERINEURAL PRN
Status: DISCONTINUED | OUTPATIENT
Start: 2020-11-17 | End: 2020-11-17 | Stop reason: ALTCHOICE

## 2020-11-17 RX ORDER — BETAMETHASONE SODIUM PHOSPHATE AND BETAMETHASONE ACETATE 3; 3 MG/ML; MG/ML
INJECTION, SUSPENSION INTRA-ARTICULAR; INTRALESIONAL; INTRAMUSCULAR; SOFT TISSUE
Status: DISCONTINUED
Start: 2020-11-17 | End: 2020-11-17 | Stop reason: HOSPADM

## 2020-11-17 ASSESSMENT — PAIN SCALES - GENERAL: PAINLEVEL_OUTOF10: 0

## 2020-11-17 ASSESSMENT — PAIN - FUNCTIONAL ASSESSMENT: PAIN_FUNCTIONAL_ASSESSMENT: 0-10

## 2020-11-17 ASSESSMENT — PAIN DESCRIPTION - DESCRIPTORS: DESCRIPTORS: ACHING;SHOOTING;SHARP

## 2020-11-17 NOTE — H&P
HISTORY AND PHYSICAL/PRE-SEDATION ASSESSMENT    Patient:  Alexandra Bolton   :  1970  Medical Record No.:  [id number]   Date:  2020  Physician:  Augustus Cruz M.D. Facility: [facility]    HISTORY OF PRESENT ILLNESS:                 The patient is a 51 y.o. female whom presents with lower back and left leg pain. Review of the imaging and physical exam of the patient confirmed the pre-procedure diagnosis. After a thorough discussion of risks, benefits and alternatives informed consent was obtained. Past Medical History:   Past Medical History:   Diagnosis Date    Acne     Dr Leslie Gale history reviewed with no changes       Past Surgical History:     Past Surgical History:   Procedure Laterality Date    CARPAL TUNNEL RELEASE Bilateral      SECTION  2001     x1    PAIN MANAGEMENT PROCEDURE Left 10/26/2020    LEFT L4 AND L5 TRANSFORAMINAL EPDIURAL STEROID INJECTION WITH FLUOROSCOPY (12592, 48031) performed by Augustus Cruz MD at [facility]     Current Medications:   Prior to Admission medications    Medication Sig Start Date End Date Taking? Authorizing Provider   oxyCODONE-acetaminophen (PERCOCET) 5-325 MG per tablet Take 1 tablet by mouth 2 times daily as needed for Pain for up to 30 days.  Take no more than 2-3 tabs orally prn 10/26/20 11/25/20  CHRISTA Yan CNP   TURMERIC PO Take by mouth    Historical Provider, MD   APPLE CIDER VINEGAR PO Take by mouth    Historical Provider, MD   Linoleic Acid-Sunflower Oil (CLA PO) Take by mouth    Historical Provider, MD   Lidocaine (ZTLIDO) 1.8 % PTCH Apply 1 patch topically daily 20  CHRISTA Yan CNP   amitriptyline (ELAVIL) 25 MG tablet Take 1 tablet by mouth nightly 20  CHRISTA Yan CNP   torsemide (DEMADEX) 10 MG tablet Take 1 tablet by mouth daily 20   María Han MD   ELIQUGHAZAL 5 MG TABS tablet TAKE 1 TABLET BY MOUTH TWO TIMES A DAY  20 Starr Knight MD     Allergies:  Patient has no known allergies. Social History:    reports that she has never smoked. She has never used smokeless tobacco. She reports that she does not drink alcohol or use drugs. Family History:   Family History   Problem Relation Age of Onset    No Known Problems Mother         Healthy 66year old     Heart Disease Father 78        +Stent    High Cholesterol Father     Other Brother         do not talk +drug        Vitals: Blood pressure 122/76, pulse 71, temperature 97.2 °F (36.2 °C), temperature source Temporal, resp. rate 14, height 5' 4\" (1.626 m), weight 142 lb (64.4 kg), SpO2 100 %, not currently breastfeeding. PHYSICAL EXAM:  HENT: Airway patent and reviewed  Cardiovascular: Normal rate, regular rhythm, normal heart sounds. Pulmonary/Chest: No wheezes. No rhonchi. No rales. Abdominal: Soft. Bowel sounds are normal. No distension. Extremities: Moves all extremities equally  Lumbar Spine: Painful range of motion, no midline tenderness       Diagnosis:Lumbar radiculopathy    Plan: Proceed with planned procedure    The patient was counseled at length about the risks of danie Covid-19 in the brett-operative and post-operative states including the recovery window of their procedure. The patient was made aware that danie Covid-19 after a surgical procedure may worsen their prognosis for recovering from the virus and lend to a higher morbidity and or mortality risk. The patient was given the options of postponing their procedure. All of the risks, benefits, and alternatives were discussed. The patient does wish to proceed with the procedure. ASA CLASS:         []   I. Normal, healthy adult           [x]   II.  Mild systemic disease            []   III.   Severe systemic disease      Mallampati: Mallampati Class II - (soft palate, fauces & uvula are visible)      Sedation plan:   [x]  Local              []  Minimal                  []  General

## 2020-11-17 NOTE — OP NOTE
Patient:  Betty Hilton  YOB: 1970  Medical Record #:  [id number]   Place: [address], [place]  Date:  11/17/2020   Physician:  Rasheeda Reagan MD, CHUNG    Procedure: 1. Transforaminal Lumbar Epidural Steroid Injection -  left L4  CPT 03843          2. Transforaminal Lumbar Epidural Steroid Injection -  left L5  CPT 74169    Pre-Procedure Diagnosis: Lumbar radiculopathy    PRASHANTH #2    Post-Procedure Diagnosis: Same    Sedation: Local with 1% Lidocaine 3 ml and no IV sedation     EBL: None    Complications: None    Procedure Summary:        The patient was brought to the procedure suite and placed in the prone position. The skin overlying the lumbar spine was prepped and draped in the usual sterile fashion. Using fluoroscopic guidance, the left L4 foramen was identified. Through anesthetized skin, a 22 gauge 3.5 inch curved tip spinal needle was advanced into the foramen. Isovue M 300 was instilled showing an epidurogram/nerve root outline pattern without evidence of vascular or intrathecal spread. Following which, 7.5 mg of Celestone mixed with 1 ml of 0.5% Marcaine was instilled. The needle was removed. Using fluoroscopic guidance, the left L5 foramen was identified. Through anesthetized skin, a 22 gauge 3.5 inch curved tip spinal needle was advanced into the foramen. Isovue M 300 was instilled showing an epidurogram/nerve root outline pattern without evidence of vascular or intrathecal spread. Following which, 7.5 mg of Celestone mixed with 1 ml of 0.5% Marcaine was instilled. The needle was removed and band-aids were applied. The patient was transferred to the post-operative area in stable condition.

## 2020-11-23 ENCOUNTER — VIRTUAL VISIT (OUTPATIENT)
Dept: PAIN MANAGEMENT | Age: 50
End: 2020-11-23
Payer: COMMERCIAL

## 2020-11-23 ENCOUNTER — TELEPHONE (OUTPATIENT)
Dept: PAIN MANAGEMENT | Age: 50
End: 2020-11-23

## 2020-11-23 PROCEDURE — 99213 OFFICE O/P EST LOW 20 MIN: CPT | Performed by: NURSE PRACTITIONER

## 2020-11-23 RX ORDER — OXYCODONE HYDROCHLORIDE AND ACETAMINOPHEN 5; 325 MG/1; MG/1
1 TABLET ORAL 2 TIMES DAILY PRN
Qty: 60 TABLET | Refills: 0 | Status: SHIPPED | OUTPATIENT
Start: 2020-11-23 | End: 2020-12-08 | Stop reason: SDUPTHER

## 2020-11-23 RX ORDER — LIDOCAINE 36 MG/1
1 PATCH TOPICAL DAILY
Qty: 90 PATCH | Refills: 0 | Status: SHIPPED | OUTPATIENT
Start: 2020-11-23 | End: 2020-12-21 | Stop reason: SDUPTHER

## 2020-11-23 RX ORDER — AMITRIPTYLINE HYDROCHLORIDE 25 MG/1
25 TABLET, FILM COATED ORAL NIGHTLY
Qty: 90 TABLET | Refills: 1 | Status: SHIPPED | OUTPATIENT
Start: 2020-11-23 | End: 2020-12-21

## 2020-11-23 NOTE — PATIENT INSTRUCTIONS
Patient Education        Back Stretches: Exercises  Introduction  Here are some examples of exercises for stretching your back. Start each exercise slowly. Ease off the exercise if you start to have pain. Your doctor or physical therapist will tell you when you can start these exercises and which ones will work best for you. How to do the exercises  Overhead stretch   1. Stand comfortably with your feet shoulder-width apart. 2. Looking straight ahead, raise both arms over your head and reach toward the ceiling. Do not allow your head to tilt back. 3. Hold for 15 to 30 seconds, then lower your arms to your sides. 4. Repeat 2 to 4 times. Side stretch   1. Stand comfortably with your feet shoulder-width apart. 2. Raise one arm over your head, and then lean to the other side. 3. Slide your hand down your leg as you let the weight of your arm gently stretch your side muscles. Hold for 15 to 30 seconds. 4. Repeat 2 to 4 times on each side. Press-up   1. Lie on your stomach, supporting your body with your forearms. 2. Press your elbows down into the floor to raise your upper back. As you do this, relax your stomach muscles and allow your back to arch without using your back muscles. As your press up, do not let your hips or pelvis come off the floor. 3. Hold for 15 to 30 seconds, then relax. 4. Repeat 2 to 4 times. Relax and rest   1. Lie on your back with a rolled towel under your neck and a pillow under your knees. Extend your arms comfortably to your sides. 2. Relax and breathe normally. 3. Remain in this position for about 10 minutes. 4. If you can, do this 2 or 3 times each day. Follow-up care is a key part of your treatment and safety. Be sure to make and go to all appointments, and call your doctor if you are having problems. It's also a good idea to know your test results and keep a list of the medicines you take. Where can you learn more? Go to [email or web address] org and sign in to your Dashride account. Enter P543 in the [employer] box to learn more about \"Back Stretches: Exercises. \"     If you do not have an account, please click on the \"Sign Up Now\" link. Current as of: March 2, 2020               Content Version: 12.6  © 1122-0610 [employer]. Care instructions adapted under license by [facility] ([facility]). If you have questions about a medical condition or this instruction, always ask your healthcare professional. [address] any warranty or liability for your use of this information.

## 2020-11-23 NOTE — PROGRESS NOTES
TELE HEALTH VISIT (AUDIO-VISUAL)    Pursuant to the emergency declaration under the [facility], [facility] waiver authority and the [employer] Act, this Virtual  Visit was conducted, with patient's consent, to reduce the patient's risk of exposure to COVID-19 and provide continuity of care for an established patient. Service is  provided through a video synchronous discussion virtually to substitute for in-person clinic visit. Due to this being a TeleHealth encounter (During XPPHT-11 public health emergency), evaluation of the following organ systems was limited: Vitals/Constitutional/EENT/Resp/CV/GI//MS/Neuro/Skin/Ssxp-Tluq-Ztm. Douglas Lara  1970  [id number]    Ms. Lara is being seen virtually for a follow up visit using Doxy. me/[employer] Video visit/LiveOnDemando or face time  Informed verbal consent to the virtual visit was obtained from Ms. Lara. Risks associated with HIPPA compliance with the virtual visit was explained to the patient. Ms. Aly Woods is at her home and Jamal Lie. Doris BALDWIN is in her office. HISTORY OF PRESENT ILLNESS:  Ms. Aly Woods is a 53 y.o. female  being assessed for a follow up visit for pain management for evaluation of ongoing care regarding her symptoms and monitoring of compliance with long term use high risk medications. She has a diagnosis of   1. Chronic pain syndrome    2. Degeneration of lumbar or lumbosacral intervertebral disc    3. Lumbar nerve root impingement, L2    4. Spinal stenosis, lumbar region, without neurogenic claudication    5. Lumbosacral spondylosis without myelopathy    6. DDD (degenerative disc disease), cervical    7. Cervical stenosis of spine, mild    8. Numbness and tingling in both hands    9. Bilateral elbow joint pain    10. Primary insomnia    11. Carpal tunnel syndrome, bilateral    12. Cervicalgia    13.  Left elbow pain      On the Patients Pain Assessment form reviewed with the Medical Assistant:  She complains of pain in the mid lower back . She rates the pain 3/10 and describes it as aching. Current treatment regimen has helped relieve about 80% of the pain. She denies any side effects from the current pain regimen. Patient reports that since the last follow up visit the physical functioning is unchanged, family/social relationships are unchanged, mood is unchanged sleep patterns are unchanged, and that the overall functioning is unchanged. Patient denies misusing/abusing her narcotic pain medications or using any illegal drugs. Upon obtaining medical history from Ms. Lara states that pain is manageable on current pain therapy. Takes pain medications as prescribed. Scheduled for second PRASHANTH of the Lumbar spine next week, currently in physical therapy, dry needling seems to help stabilize the pain. Mood is stable without anxiety. Sleep is fair with an average of 5-6 hours. Denies to having issues of constipation. Tolerating activities/house chores with moderate tenderness to the lower back. ALLERGIES: Patients list of allergies were reviewed     MEDICATIONS: Ms. Jaspal Stokes list of medications were reviewed. Her current medications are   Outpatient Medications Prior to Visit   Medication Sig Dispense Refill    TURMERIC PO Take by mouth      APPLE CIDER VINEGAR PO Take by mouth      Linoleic Acid-Sunflower Oil (CLA PO) Take by mouth      torsemide (DEMADEX) 10 MG tablet Take 1 tablet by mouth daily 90 tablet 0    ELIQUIS 5 MG TABS tablet TAKE 1 TABLET BY MOUTH TWO TIMES A DAY  60 tablet 0    oxyCODONE-acetaminophen (PERCOCET) 5-325 MG per tablet Take 1 tablet by mouth 2 times daily as needed for Pain for up to 30 days.  Take no more than 2-3 tabs orally prn 60 tablet 0    Lidocaine (ZTLIDO) 1.8 % PTCH Apply 1 patch topically daily 90 patch 0    amitriptyline (ELAVIL) 25 MG tablet Take 1 tablet by mouth nightly 90 tablet 1     No facility-administered medications prior to visit. SOCIAL/FAMILY/PAST MEDICAL HISTORY: Ms. Dom Dykes social, family and past medical history was reviewed. REVIEW OF SYSTEMS:    Respiratory: Negative for apnea, chest tightness and shortness of breath or change in baseline breathing. Gastrointestinal: Negative for nausea, vomiting, abdominal pain, diarrhea, constipation, blood in stool and abdominal distention. PHYSICAL EXAM:   Nursing note and vitals reviewed. There were no vitals taken for this visit. as per patient  Constitutional: She appears well-developed and well-nourished. No acute distress. Skin: Skin appears to be warm and dry. No rashes or any other marks noted. She is not diaphoretic. Neurological/Psychiatric:She is alert and oriented to person, place, and time. Coordination is  normal.  Her mood isAppropriate and affect is Neutral/Euthymic(normal). Her behavior is normal.   thought content normal.   Musculoskeletal / Extremities:Gait is normal, assistive devices use: none. IMPRESSION:   1. Chronic pain syndrome    2. Degeneration of lumbar or lumbosacral intervertebral disc    3. Lumbar nerve root impingement, L2    4. Spinal stenosis, lumbar region, without neurogenic claudication    5. Lumbosacral spondylosis without myelopathy    6. DDD (degenerative disc disease), cervical    7. Cervical stenosis of spine, mild    8. Numbness and tingling in both hands    9. Bilateral elbow joint pain    10. Primary insomnia    11. Carpal tunnel syndrome, bilateral    12. Cervicalgia    13.  Left elbow pain        PLAN:  Informed verbal consent regarding treatment was obtained  -Continue with Percocet, ZTlido, Elavil  -Maintain PT  -Continue to f/u with Dr. Sheryl Jordan for Lumbar spine degenration, Last PRASHANTH of the Lumbar spine was on 11/17/20 with releft of pain x 2 weeks  -CBT techniques- relaxation therapies such as biofeedback, mindfulness based stress reduction, imagery, cognitive restructuring, problem solving discussed with patient  -Reviewed Covid-19 safety regulations which were discussed, patient maintains compliance with the safety guidelines regarding Covid-19  -Last UDS 5/11/20 Consistent  -Return in about 4 weeks (around 12/21/2020). Current Outpatient Medications   Medication Sig Dispense Refill    oxyCODONE-acetaminophen (PERCOCET) 5-325 MG per tablet Take 1 tablet by mouth 2 times daily as needed for Pain for up to 30 days. Take no more than 2-3 tabs orally prn 60 tablet 0    Lidocaine (ZTLIDO) 1.8 % PTCH Apply 1 patch topically daily 90 patch 0    amitriptyline (ELAVIL) 25 MG tablet Take 1 tablet by mouth nightly 90 tablet 1    TURMERIC PO Take by mouth      APPLE CIDER VINEGAR PO Take by mouth      Linoleic Acid-Sunflower Oil (CLA PO) Take by mouth      torsemide (DEMADEX) 10 MG tablet Take 1 tablet by mouth daily 90 tablet 0    ELIQUIS 5 MG TABS tablet TAKE 1 TABLET BY MOUTH TWO TIMES A DAY  60 tablet 0     No current facility-administered medications for this visit. I will continue her current medication regimen  which is part of the above treatment schedule. It has been helping with Ms. Lara's chronic  medical problems which for this visit include:   Diagnoses of Chronic pain syndrome, Degeneration of lumbar or lumbosacral intervertebral disc, Lumbar nerve root impingement, L2, Spinal stenosis, lumbar region, without neurogenic claudication, Lumbosacral spondylosis without myelopathy, DDD (degenerative disc disease), cervical, Cervical stenosis of spine, mild, Numbness and tingling in both hands, Bilateral elbow joint pain, Primary insomnia, Carpal tunnel syndrome, bilateral, Cervicalgia, and Left elbow pain were pertinent to this visit. Risks and benefits of the medications and other alternative treatments  including no treatment were discussed with the patient. The common side effects of these medications were also explained to the patient. Informed verbal consent was obtained. Goals of current treatment regimen include improvement in pain, restoration of functioning- with focus on improvement in physical performance, general activity, work or disability,emotional distress, health care utilization and  decreased medication consumption. Will continue to monitor progress towards achieving/maintaining therapeutic goals with special emphasis on  1. Improvement in perceived interfernce  of pain with ADL's. Ability to do home exercises independently. Ability to do household chores indoor and/or outdoor work and social and leisure activities. Improve psychosocial and physical functioning. - she is showing progression towards this treatment goal with the current regimen. She was advised against drinking alcohol with the narcotic pain medicines, advised against driving or handling machinery while adjusting the dose of medicines or if having cognitive  issues related to the current medications. Risk of overdose and death, if medicines not taken as prescribed, were also discussed. If the patient develops new symptoms or if the symptoms worsen, the patient should call the office. While transcribing every attempt was made to maintain the accuracy of the note in terms of it's contents,there may have been some errors made inadvertently. Thank you for allowing me to participate in the care of this patient. Chasity Montana.  Faisal APRN-CNP     Cc: Chris Eaton MD

## 2020-11-23 NOTE — TELEPHONE ENCOUNTER
Garrett Ngo needs new Rx sent w/start date of  oxyCODONE-acetaminophen (PERCOCET) 5-325 MG  due to current Rx will  before fill date. Pl's advise.

## 2020-11-30 ENCOUNTER — TELEPHONE (OUTPATIENT)
Dept: ORTHOPEDIC SURGERY | Age: 50
End: 2020-11-30

## 2020-12-03 ENCOUNTER — OFFICE VISIT (OUTPATIENT)
Dept: ORTHOPEDIC SURGERY | Age: 50
End: 2020-12-03
Payer: COMMERCIAL

## 2020-12-03 VITALS — HEIGHT: 64 IN | WEIGHT: 141.98 LBS | BODY MASS INDEX: 24.24 KG/M2 | RESPIRATION RATE: 12 BRPM | TEMPERATURE: 97.8 F

## 2020-12-03 PROCEDURE — 99214 OFFICE O/P EST MOD 30 MIN: CPT | Performed by: PHYSICAL MEDICINE & REHABILITATION

## 2020-12-03 NOTE — PROGRESS NOTES
Follow up: Jeni Campbell [employer]  1970  [id number]         Chief Complaint   Patient presents with    Lower Back Pain     11/17/2020: L L4 + L5 TF #2    Neck Pain     OPNP CSP 7/2020 (-) ZAHIDA. intermittent pain. doesn't wake her. only increased symptoms w/ posterior pressure applied.  Elbow Injury     B/L ELBOWS         HISTORY OF PRESENT ILLNESS:  Ms. Jeffrey Puri is a 53 y.o. female returns for a follow up visit for multiple medical problems. Her current presenting problems are   1. Lumbar radiculopathy    2. HNP (herniated nucleus pulposus), lumbar    3. Spondylosis without myelopathy or radiculopathy, lumbar region    4. Spondylosis without myelopathy or radiculopathy, cervical region    . As per information/history obtained from the PADT(patient assessment and documentation tool) - She complains of pain in the neck and lower back with radiation to the elbows Left and lower leg Left She rates the pain 4/10 and describes it as dull, aching. Pain is made worse by: walking, sitting, lying down. She denies side effects from the current pain regimen. Patient reports that since the last follow up visit the physical functioning is worse, family/social relationships are unchanged, mood is unchanged and sleep patterns are unchanged, and that the overall functioning is worse. Patient denies neurological bowel or bladder. Ms. Jeffrey Puri presents for follow-up of ongoing low back and left leg pain. The patient recently underwent her second left L4 and L5 transforaminal dural steroid injection on 11/17/2020. The patient reports 50% relief of the pain following this procedure. She does states she is improved from her last office visit. She continues to attend physical therapy where she is receiving dry needling. She believes a dry needling is helping with the low back and leg pain. She states the leg pain is essentially gone and the pain has centered in the low back.   She would like to continue physical therapy · The lymphatic examination of the neck, axillae and groin reveals all areas to be without enlargement or induration  · Sensation is intact without deficit in the upper and lower extremities to light touch and pinprick  · Coordination of the upper and lower extremities are normal.    CERVICAL EXAMINATION:  · Inspection: Local inspection shows no step-off or bruising. Cervical alignment is normal. No instability is noted. · Palpation and Percussion: No evidence of tenderness at the midline. Paraspinal tenderness is not present. There is no paraspinal spasm. Skin:There are no rashes, ulcerations or lesions  · Range of Motion:  limited by 25% in all planes due to pain   · Strength: 5/5 bilateral upper extremities  · Special Tests:   Spurling's and Gresham's are negative bilaterally. Marx and Impingement tests are negative bilaterally. · Skin:There are no rashes, ulcerations or lesions in right & left upper extremities. · Reflexes: Bilaterally triceps, biceps and brachioradialis are 1+. Clonus absent bilaterally at the feet. No pathological reflexes are noted. · Gait & station: normal, patient ambulates without assistance and no ataxia  · Additional Examinations:  · RIGHT UPPER EXTREMITY:  Inspection/examination of the right upper extremity does not show any tenderness, deformity or injury. Range of motion is unremarkable and pain-free. There is no gross instability. There are no rashes, ulcerations or lesions. Strength and tone are normal. No atrophy or abnormal movements are noted. · LEFT UPPER EXTREMITY: Inspection/examination of the left upper extremity does not show any tenderness, deformity or injury. Range of motion is unremarkable and pain-free. There is no gross instability. There are no rashes, ulcerations or lesions. Strength and tone are normal. No atrophy or abnormal movements are noted. LUMBAR/SACRAL EXAMINATION:  · Inspection: Local inspection shows no step-off or bruising.   Lumbar alignment is normal. No instability is noted. · Palpation:   No evidence of tenderness at the midline. Lumbar paraspinal tenderness: Mild L4/5 and L5/S1 tenderness  Bursal tenderness No tenderness bilaterally  There is no paraspinal spasm. · Range of Motion: limited by 25% in all planes due to pain  · Strength:   Strength testing is 5/5 in all muscle groups tested. · Special Tests:   Straight leg raise and crossed SLR negative. Fredis's testing is negative bilaterally. FADIR's testing is negative bilaterally. Bowstring test negative. Slump test negative. · Skin: There are no rashes, ulcerations or lesions. · Reflexes: Reflexes are symmetrically 1+ at the patellar and ankle tendons. Clonus absent bilaterally at the feet. · Gait & station: normal, patient ambulates without assistance and no ataxia  · Additional Examinations:  · RIGHT LOWER EXTREMITY: Inspection/examination of the right lower extremity does not show any tenderness, deformity or injury. Range of motion is normal and pain-free. There is no gross instability. There are no rashes, ulcerations or lesions. Strength and tone are normal. No atrophy or abnormal movements are noted. · LEFT LOWER EXTREMITY:  Inspection/examination of the left lower extremity does not show any tenderness, deformity or injury. Range of motion is normal and pain-free. There is no gross instability. There are no rashes, ulcerations or lesions. Strength and tone are normal. No atrophy or abnormal movements are noted.       Diagnostic Testing:    MR cervical spine shows  7/1/2020  C2-C3: Disc height and signal maintained.  No neural foraminal narrowing or    spinal canal stenosis.         C3-C4: Disc height and signal maintained.  No neural foraminal narrowing or    spinal canal stenosis.         C4-C5: Disc height and signal maintained.  No neural foraminal narrowing or    spinal canal stenosis.         C5-C6: Mild disc height loss.  No disc signal abnormality.  No left neural    foraminal narrowing.  Mild right neural foraminal narrowing secondary to    uncovertebral hypertrophy.  Mild spinal canal stenosis secondary to disc    bulge.         C6-C7: Disc height and signal maintained.  No neural foraminal narrowing or    spinal canal stenosis.         C7-T1: Disc height and signal maintained.  No neural foraminal narrowing or    spinal canal stenosis. Results for orders placed or performed during the hospital encounter of 20   POC Pregnancy Urine Qual   Result Value Ref Range    Pregnancy, Urine Negative Detects HCG level >20 MIU/mL     Impression:       1. Lumbar radiculopathy    2. HNP (herniated nucleus pulposus), lumbar    3. Spondylosis without myelopathy or radiculopathy, lumbar region    4. Spondylosis without myelopathy or radiculopathy, cervical region        Plan:  Clinical Course: Diagnosis 1 through 3 are improving, diagnosis for is worsening    I discussed the diagnosis and the treatment options with Satnam Lara today. In Summary:  The various treatment options were outlined and discussed with Arelyaiden Lara including:  Conservative care options: physical therapy, ice, medications, bracing, and activity modification. The indications for therapeutic injections. The indications for additional imaging/laboratory studies. The indications for (possible future) interventions. After considering the various options discussed, Satnambrianna Lara elected to pursue a course of treatment that includes the followin. Medications:  No further recommendations for new medications. 2. PT:  I will start the patient on a trial of PT to work on a cervical stabilization program to focus on stretching, strengthening, traction and modalities as indicated. 3. Further studies: No further studies. 4. Interventional:  50% improvement following second lumbar epidural steroid injection.      5. Follow up:  4-6 weeks      Arelyaiden Lara was instructed to call the

## 2020-12-07 DIAGNOSIS — M51.37 DEGENERATION OF LUMBAR OR LUMBOSACRAL INTERVERTEBRAL DISC: ICD-10-CM

## 2020-12-07 DIAGNOSIS — M25.522 BILATERAL ELBOW JOINT PAIN: ICD-10-CM

## 2020-12-07 DIAGNOSIS — M54.16 LUMBAR NERVE ROOT IMPINGEMENT: ICD-10-CM

## 2020-12-07 DIAGNOSIS — M25.521 BILATERAL ELBOW JOINT PAIN: ICD-10-CM

## 2020-12-07 DIAGNOSIS — M47.817 LUMBOSACRAL SPONDYLOSIS WITHOUT MYELOPATHY: ICD-10-CM

## 2020-12-07 DIAGNOSIS — G89.4 CHRONIC PAIN SYNDROME: ICD-10-CM

## 2020-12-07 DIAGNOSIS — M48.061 SPINAL STENOSIS, LUMBAR REGION, WITHOUT NEUROGENIC CLAUDICATION: ICD-10-CM

## 2020-12-07 DIAGNOSIS — M50.30 DDD (DEGENERATIVE DISC DISEASE), CERVICAL: ICD-10-CM

## 2020-12-07 NOTE — TELEPHONE ENCOUNTER
[facility] called stating they need a new script for Percocet. His last refill was on 11/9/20, so send a new script with a fill date on 12/9/20. They can't use the 11/23/20 script because it expires in 14 days.

## 2020-12-08 RX ORDER — OXYCODONE HYDROCHLORIDE AND ACETAMINOPHEN 5; 325 MG/1; MG/1
1 TABLET ORAL 2 TIMES DAILY PRN
Qty: 60 TABLET | Refills: 0 | Status: SHIPPED | OUTPATIENT
Start: 2020-12-08 | End: 2020-12-21 | Stop reason: SDUPTHER

## 2020-12-21 ENCOUNTER — VIRTUAL VISIT (OUTPATIENT)
Dept: PAIN MANAGEMENT | Age: 50
End: 2020-12-21
Payer: COMMERCIAL

## 2020-12-21 PROCEDURE — 99213 OFFICE O/P EST LOW 20 MIN: CPT | Performed by: NURSE PRACTITIONER

## 2020-12-21 RX ORDER — TRAZODONE HYDROCHLORIDE 50 MG/1
50 TABLET ORAL NIGHTLY
Qty: 90 TABLET | Refills: 1 | Status: SHIPPED | OUTPATIENT
Start: 2020-12-21 | End: 2021-01-08 | Stop reason: SDUPTHER

## 2020-12-21 RX ORDER — OXYCODONE HYDROCHLORIDE AND ACETAMINOPHEN 5; 325 MG/1; MG/1
1 TABLET ORAL 2 TIMES DAILY PRN
Qty: 60 TABLET | Refills: 0 | Status: SHIPPED | OUTPATIENT
Start: 2020-12-21 | End: 2021-01-07 | Stop reason: SDUPTHER

## 2020-12-21 RX ORDER — LIDOCAINE 36 MG/1
1 PATCH TOPICAL DAILY
Qty: 90 PATCH | Refills: 0 | Status: SHIPPED | OUTPATIENT
Start: 2020-12-21 | End: 2021-01-25 | Stop reason: SDUPTHER

## 2020-12-21 NOTE — PROGRESS NOTES
TELE HEALTH VISIT (AUDIO-VISUAL)    Pursuant to the emergency declaration under the [facility], [facility] waiver authority and the [employer] Act, this Virtual  Visit was conducted, with patient's consent, to reduce the patient's risk of exposure to COVID-19 and provide continuity of care for an established patient. Service is  provided through a video synchronous discussion virtually to substitute for in-person clinic visit. Due to this being a TeleHealth encounter (During RZOFO-02 public health emergency), evaluation of the following organ systems was limited: Vitals/Constitutional/EENT/Resp/CV/GI//MS/Neuro/Skin/Lgjf-Yegf-Ypc. Naeem Lara  1970  [id number]    Ms. Lara is being seen virtually for a follow up visit using Doxy. me/CrossCore Video visit/Microtuneo or face time  Informed verbal consent to the virtual visit was obtained from Ms. Lara. Risks associated with HIPPA compliance with the virtual visit was explained to the patient. Ms. Verda Sacks is at her home and Nan BALDWIN is in her office. HISTORY OF PRESENT ILLNESS:  Ms. Verda Sacks is a 53 y.o. female  being assessed for a follow up visit for pain management for evaluation of ongoing care regarding her symptoms and monitoring of compliance with long term use high risk medications. She has a diagnosis of   1. Chronic pain syndrome    2. Degeneration of lumbar or lumbosacral intervertebral disc    3. Lumbar nerve root impingement, L2    4. Spinal stenosis, lumbar region, without neurogenic claudication    5. Lumbosacral spondylosis without myelopathy    6. DDD (degenerative disc disease), cervical    7. Bilateral elbow joint pain    8. Cervical stenosis of spine, mild    9. Numbness and tingling in both hands    10. Carpal tunnel syndrome, bilateral    11.  Primary insomnia  amitriptyline (ELAVIL) 25 MG tablet Take 1 tablet by mouth nightly 90 tablet 1     No facility-administered medications prior to visit. SOCIAL/FAMILY/PAST MEDICAL HISTORY: Ms. Valerie Álvarez social, family and past medical history was reviewed. REVIEW OF SYSTEMS:    Respiratory: Negative for apnea, chest tightness and shortness of breath or change in baseline breathing. Gastrointestinal: Negative for nausea, vomiting, abdominal pain, diarrhea, constipation, blood in stool and abdominal distention. PHYSICAL EXAM:   Nursing note and vitals reviewed. There were no vitals taken for this visit. as per patient  Constitutional: She appears well-developed and well-nourished. No acute distress. Skin: Skin appears to be warm and dry. No rashes or any other marks noted. She is not diaphoretic. Neurological/Psychiatric:She is alert and oriented to person, place, and time. Coordination is  normal.  Her mood isAppropriate and affect is Neutral/Euthymic(normal). Her behavior is normal.   thought content normal.   Musculoskeletal / Extremities: Gait is normal, assistive devices use: none. IMPRESSION:   1. Chronic pain syndrome    2. Degeneration of lumbar or lumbosacral intervertebral disc    3. Lumbar nerve root impingement, L2    4. Spinal stenosis, lumbar region, without neurogenic claudication    5. Lumbosacral spondylosis without myelopathy    6. DDD (degenerative disc disease), cervical    7. Bilateral elbow joint pain    8. Cervical stenosis of spine, mild    9. Numbness and tingling in both hands    10. Carpal tunnel syndrome, bilateral    11.  Primary insomnia        PLAN:  Informed verbal consent regarding treatment was obtained  -Continue with Percocet, ZTlido  -Dc/ Elavil, Trazodone 50 mg nightly  -Corrine exercises/back stretches recommended  -Maintain f/u with  Dr. Sheridan Goltz for Lumbar spine degeneration, Last had Lumbar PRASHANTH on 11/23/20 -CBT techniques- relaxation therapies such as biofeedback, mindfulness based stress reduction, imagery, cognitive restructuring, problem solving discussed with patient  -Reviewed Covid-19 safety regulations which were discussed, patient maintains compliance with the safety guidelines regarding Covid-19  -Last UDS 5/11/20 Consistent  -Return in about 4 weeks (around 1/18/2021). Current Outpatient Medications   Medication Sig Dispense Refill    oxyCODONE-acetaminophen (PERCOCET) 5-325 MG per tablet Take 1 tablet by mouth 2 times daily as needed for Pain for up to 30 days. Take no more than 2-3 tabs orally prn 60 tablet 0    Lidocaine (ZTLIDO) 1.8 % PTCH Apply 1 patch topically daily 90 patch 0    traZODone (DESYREL) 50 MG tablet Take 1 tablet by mouth nightly 90 tablet 1    TURMERIC PO Take by mouth      APPLE CIDER VINEGAR PO Take by mouth      Linoleic Acid-Sunflower Oil (CLA PO) Take by mouth      torsemide (DEMADEX) 10 MG tablet Take 1 tablet by mouth daily 90 tablet 0    ELIQUIS 5 MG TABS tablet TAKE 1 TABLET BY MOUTH TWO TIMES A DAY  60 tablet 0     No current facility-administered medications for this visit. I will continue her current medication regimen  which is part of the above treatment schedule. It has been helping with Ms. Lara's chronic  medical problems which for this visit include:   Diagnoses of Chronic pain syndrome, Degeneration of lumbar or lumbosacral intervertebral disc, Lumbar nerve root impingement, L2, Spinal stenosis, lumbar region, without neurogenic claudication, Lumbosacral spondylosis without myelopathy, DDD (degenerative disc disease), cervical, Bilateral elbow joint pain, Cervical stenosis of spine, mild, Numbness and tingling in both hands, Carpal tunnel syndrome, bilateral, and Primary insomnia were pertinent to this visit. Risks and benefits of the medications and other alternative treatments  including no treatment were discussed with the patient. The common side effects of these medications were also explained to the patient. Informed verbal consent was obtained. Goals of current treatment regimen include improvement in pain, restoration of functioning- with focus on improvement in physical performance, general activity, work or disability,emotional distress, health care utilization and  decreased medication consumption. Will continue to monitor progress towards achieving/maintaining therapeutic goals with special emphasis on  1. Improvement in perceived interfernce  of pain with ADL's. Ability to do home exercises independently. Ability to do household chores indoor and/or outdoor work and social and leisure activities. Improve psychosocial and physical functioning. - she is showing progression towards this treatment goal with the current regimen. She was advised against drinking alcohol with the narcotic pain medicines, advised against driving or handling machinery while adjusting the dose of medicines or if having cognitive  issues related to the current medications. Risk of overdose and death, if medicines not taken as prescribed, were also discussed. If the patient develops new symptoms or if the symptoms worsen, the patient should call the office. While transcribing every attempt was made to maintain the accuracy of the note in terms of it's contents,there may have been some errors made inadvertently. Thank you for allowing me to participate in the care of this patient. Chasity Montana.  Faisal GOODWIN-CNP     Cc: Chris Eaton MD

## 2020-12-21 NOTE — PATIENT INSTRUCTIONS
Patient Education        Back Stretches: Exercises  Introduction  Here are some examples of exercises for stretching your back. Start each exercise slowly. Ease off the exercise if you start to have pain. Your doctor or physical therapist will tell you when you can start these exercises and which ones will work best for you. How to do the exercises  Overhead stretch   1. Stand comfortably with your feet shoulder-width apart. 2. Looking straight ahead, raise both arms over your head and reach toward the ceiling. Do not allow your head to tilt back. 3. Hold for 15 to 30 seconds, then lower your arms to your sides. 4. Repeat 2 to 4 times. Side stretch   1. Stand comfortably with your feet shoulder-width apart. 2. Raise one arm over your head, and then lean to the other side. 3. Slide your hand down your leg as you let the weight of your arm gently stretch your side muscles. Hold for 15 to 30 seconds. 4. Repeat 2 to 4 times on each side. Press-up   1. Lie on your stomach, supporting your body with your forearms. 2. Press your elbows down into the floor to raise your upper back. As you do this, relax your stomach muscles and allow your back to arch without using your back muscles. As your press up, do not let your hips or pelvis come off the floor. 3. Hold for 15 to 30 seconds, then relax. 4. Repeat 2 to 4 times. Relax and rest   1. Lie on your back with a rolled towel under your neck and a pillow under your knees. Extend your arms comfortably to your sides. 2. Relax and breathe normally. 3. Remain in this position for about 10 minutes. 4. If you can, do this 2 or 3 times each day. Follow-up care is a key part of your treatment and safety. Be sure to make and go to all appointments, and call your doctor if you are having problems. It's also a good idea to know your test results and keep a list of the medicines you take. Where can you learn more? Go to [email or web address]://[email or web address].[email or web address] org and sign in to your ITelagenhart account. Enter T291 in the [place] box to learn more about \"Back Stretches: Exercises. \"     If you do not have an account, please click on the \"Sign Up Now\" link. Current as of: March 2, 2020               Content Version: 12.6  © 4265-5889 [employer]. Care instructions adapted under license by [facility] ([facility]). If you have questions about a medical condition or this instruction, always ask your healthcare professional. [address] any warranty or liability for your use of this information.

## 2021-01-07 ENCOUNTER — TELEPHONE (OUTPATIENT)
Dept: PAIN MANAGEMENT | Age: 51
End: 2021-01-07

## 2021-01-07 DIAGNOSIS — M25.521 BILATERAL ELBOW JOINT PAIN: ICD-10-CM

## 2021-01-07 DIAGNOSIS — M47.817 LUMBOSACRAL SPONDYLOSIS WITHOUT MYELOPATHY: ICD-10-CM

## 2021-01-07 DIAGNOSIS — M50.30 DDD (DEGENERATIVE DISC DISEASE), CERVICAL: ICD-10-CM

## 2021-01-07 DIAGNOSIS — M54.16 LUMBAR NERVE ROOT IMPINGEMENT: ICD-10-CM

## 2021-01-07 DIAGNOSIS — M25.522 BILATERAL ELBOW JOINT PAIN: ICD-10-CM

## 2021-01-07 DIAGNOSIS — M51.37 DEGENERATION OF LUMBAR OR LUMBOSACRAL INTERVERTEBRAL DISC: ICD-10-CM

## 2021-01-07 DIAGNOSIS — M48.061 SPINAL STENOSIS, LUMBAR REGION, WITHOUT NEUROGENIC CLAUDICATION: ICD-10-CM

## 2021-01-07 DIAGNOSIS — G89.4 CHRONIC PAIN SYNDROME: ICD-10-CM

## 2021-01-07 RX ORDER — OXYCODONE HYDROCHLORIDE AND ACETAMINOPHEN 5; 325 MG/1; MG/1
1 TABLET ORAL 2 TIMES DAILY PRN
Qty: 36 TABLET | Refills: 0 | Status: SHIPPED | OUTPATIENT
Start: 2021-01-07 | End: 2021-01-25 | Stop reason: SDUPTHER

## 2021-01-07 NOTE — TELEPHONE ENCOUNTER
[facility] states Rx oxyCODONE-acetaminophen (PERCOCET) 5-325 MG is past 14 days and requesting new Rx be sent to pharmacy medication is due today.  Pl's advise

## 2021-01-08 ENCOUNTER — TELEPHONE (OUTPATIENT)
Dept: PAIN MANAGEMENT | Age: 51
End: 2021-01-08

## 2021-01-08 DIAGNOSIS — F51.01 PRIMARY INSOMNIA: ICD-10-CM

## 2021-01-08 RX ORDER — TRAZODONE HYDROCHLORIDE 50 MG/1
50 TABLET ORAL NIGHTLY
Qty: 90 TABLET | Refills: 1 | Status: SHIPPED | OUTPATIENT
Start: 2021-01-08 | End: 2021-01-25 | Stop reason: SDUPTHER

## 2021-01-08 NOTE — TELEPHONE ENCOUNTER
Patient states she left [employer] message 01/05/21 and no response to her message has be given. Patient states her surgery date is 01/18/21 and needs return call. Pl's advise. Pl's advise.  Please call [phone]

## 2021-01-08 NOTE — TELEPHONE ENCOUNTER
[facility] is calling for that refill:    torsemide (DEMADEX) 10 MG tablet [[id number]    [address], [address] [facility] - [address] [place] [address] [phone]    LOV 12.21.20

## 2021-01-11 DIAGNOSIS — I87.2 EDEMA OF BOTH LOWER EXTREMITIES DUE TO PERIPHERAL VENOUS INSUFFICIENCY: ICD-10-CM

## 2021-01-11 DIAGNOSIS — I82.412 DVT OF DEEP FEMORAL VEIN, LEFT (HCC): ICD-10-CM

## 2021-01-11 RX ORDER — TORSEMIDE 10 MG/1
TABLET ORAL
Qty: 90 TABLET | Refills: 1 | Status: SHIPPED | OUTPATIENT
Start: 2021-01-11 | End: 2021-06-23

## 2021-01-11 NOTE — TELEPHONE ENCOUNTER
Medication:   Requested Prescriptions     Pending Prescriptions Disp Refills    torsemide (DEMADEX) 10 MG tablet 90 tablet 1     Last Filled: 1.4.21  Went to wrong ph. [facility]. Last appt: 11/16/2020   Next appt: Visit date not found    Last OARRS:   RX Monitoring 10/26/2020   Attestation -   Periodic Controlled Substance Monitoring No signs of potential drug abuse or diversion identified.

## 2021-01-14 ENCOUNTER — OFFICE VISIT (OUTPATIENT)
Dept: ORTHOPEDIC SURGERY | Age: 51
End: 2021-01-14
Payer: COMMERCIAL

## 2021-01-14 VITALS — HEIGHT: 64 IN | WEIGHT: 141.98 LBS | BODY MASS INDEX: 24.24 KG/M2 | RESPIRATION RATE: 14 BRPM | TEMPERATURE: 96.9 F

## 2021-01-14 DIAGNOSIS — M54.16 LUMBAR RADICULOPATHY: ICD-10-CM

## 2021-01-14 DIAGNOSIS — S76.312A PARTIAL HAMSTRING TEAR, LEFT, INITIAL ENCOUNTER: Primary | ICD-10-CM

## 2021-01-14 DIAGNOSIS — M51.26 HNP (HERNIATED NUCLEUS PULPOSUS), LUMBAR: ICD-10-CM

## 2021-01-14 PROCEDURE — 99214 OFFICE O/P EST MOD 30 MIN: CPT | Performed by: PHYSICAL MEDICINE & REHABILITATION

## 2021-01-14 NOTE — PROGRESS NOTES
Follow up: Adela Lara  1970  [id number]         Chief Complaint   Patient presents with    Neck Pain     CK [facility] PT     Hip Pain     L HIP - c/o worsening pain. especially with lying on L side and prolonged sittng. c/o instability in LLE. HISTORY OF PRESENT ILLNESS:  Ms. David Wong is a 53 y.o. female returns for a follow up visit for multiple medical problems. Her current presenting problems are   1. Partial hamstring tear, left, initial encounter    2. HNP (herniated nucleus pulposus), lumbar    3. Lumbar radiculopathy    . As per information/history obtained from the PADT(patient assessment and documentation tool) - She complains of pain in the lower back with radiation to the knees Left She rates the pain 4/10 and describes it as dull, aching. Pain is made worse by: sitting. She denies side effects from the current pain regimen. Patient reports that since the last follow up visit the physical functioning is worse, family/social relationships are unchanged, mood is unchanged and sleep patterns are unchanged, and that the overall functioning is worse. Patient denies neurological bowel or bladder. She feels worse in the morning when she wakes up. With activity her pain improves. Associated signs and symptoms:   Neurogenic bowel or bladder symptoms:  no   Perceived weakness:  no   Difficulty walking:  no            Past medical, surgical, social and family history reviewed with the patient.  No pertinent relevant history  Past Medical History:   Past Medical History:   Diagnosis Date    Acne     Dr Ebenezer Thompson 5 Leiden mutation, heterozygous [facility])     Medical history reviewed with no changes       Past Surgical History:     Past Surgical History:   Procedure Laterality Date    CARPAL TUNNEL RELEASE Bilateral      SECTION  2001     x1    PAIN MANAGEMENT PROCEDURE Left 10/26/2020    LEFT L4 AND L5 TRANSFORAMINAL EPDIURAL STEROID INJECTION WITH FLUOROSCOPY (62674, or lesions. · Reflexes: Reflexes are symmetrically 1+ at the patellar and ankle tendons. Clonus absent bilaterally at the feet. · Gait & station: normal, patient ambulates without assistance and no ataxia  · Additional Examinations:  · RIGHT LOWER EXTREMITY: Inspection/examination of the right lower extremity does not show any tenderness, deformity or injury. Range of motion is normal and pain-free. There is no gross instability. There are no rashes, ulcerations or lesions. Strength and tone are normal. No atrophy or abnormal movements are noted. · LEFT LOWER EXTREMITY:  Inspection/examination of the left lower extremity does not show any tenderness, deformity or injury. Range of motion is normal and pain-free. There is no gross instability. There are no rashes, ulcerations or lesions. Strength and tone are normal. No atrophy or abnormal movements are noted. Diagnostic Testing:    MR Lumbar spine shows    1. L4-5 mild spinal stenosis.  Trace retrolisthesis; disc bulge with annular fissure; disc    contacting foraminal L4 nerve roots.  Mild biforaminal narrowing. 2. L5-S1 shallow central disc protrusion effacing ventral dural sac; disc contacts foraminal L5    nerve roots.  Mild biforaminal narrowing. 3. T11-12 shallow left lateral disc protrusion gently encroaching upon left ventral dural sac. AP pelvis taken today in the office shows an IUD in place otherwise unremarkable. Results for orders placed or performed during the hospital encounter of 11/17/20   POC Pregnancy Urine Qual   Result Value Ref Range    Pregnancy, Urine Negative Detects HCG level >20 MIU/mL     Impression:       1. Partial hamstring tear, left, initial encounter    2. HNP (herniated nucleus pulposus), lumbar    3. Lumbar radiculopathy        Plan:  Clinical Course: Above diagnoses are worsening    I discussed the diagnosis and the treatment options with Karie Lara today.      In Summary:  The various treatment options were outlined and discussed with Arely Lara including:  Conservative care options: physical therapy, ice, medications, bracing, and activity modification. The indications for therapeutic injections. The indications for additional imaging/laboratory studies. The indications for (possible future) interventions. After considering the various options discussed, Karie Lara elected to pursue a course of treatment that includes the followin. Medications:  No further recommendations for new medications. 2. PT:  Encouraged to continue with Home exercise program.    3. Further studies: MRI pelvis to evaluate for left hamstring tear or avulsion      4. Interventional:  None at this time    5. Follow up:  4-6 weeks      Arely Lara was instructed to call the office if her symptoms worsen or if new symptoms appear prior to the next scheduled visit. She is specifically instructed to contact the office between now & her scheduled appointment if she has concerns related to her condition or if she needs assistance in scheduling the above tests. She is welcome to call for an appointment sooner if she has any additional concerns or questions. Kristine Marvin. Steve Rodriguez MD, CHUNG, [facility]  Board Certified in [address] [place] [address]  Certified and Fellowship Trained in [facility] ([facility])             This dictation was performed with a verbal recognition program [facility]) and it was checked for errors. It is possible that there are still dictated errors within this office note. If so, please bring any errors to my attention for an addendum. All efforts were made to ensure that this office note is accurate.

## 2021-01-18 NOTE — TELEPHONE ENCOUNTER
Tito from Dr Yasmeen Gutiérrez office @ [facility] called to notify Tj Doe that patient had foot surgery today. They sent Vicodin (40 tablets) over to the pharmacy.

## 2021-01-20 ENCOUNTER — TELEPHONE (OUTPATIENT)
Dept: ORTHOPEDIC SURGERY | Age: 51
End: 2021-01-20

## 2021-01-20 NOTE — TELEPHONE ENCOUNTER
S/W PATIENT regarding MRI denial. Discussed reasoning for denial, patient will come to [facility] on 1/21/2021 sometime between 8:30-11:45am or 1:00-4:00pm to obtain XR so that MRI can be resubmitted to the insurance.

## 2021-01-20 NOTE — TELEPHONE ENCOUNTER
Please see below regarding initial denial of MRI Pelvis. Denial indicates that XR is needed in order to dx a strain. Please advise if [id number] will be completed and ATC will schedule accordingly. MRI PELVIS DENIED . CLINICAL DOCUMENTS DON'T SUPPORT THE NEED FOR AN MRI. CAN REQUEST A PEER/PEER BY CALLING [phone] WITHIN 180 OF January 19TH, 2021 . CASE REF # [id number].  Gene Ambriz

## 2021-01-21 DIAGNOSIS — M53.3 SACRAL PAIN: Primary | ICD-10-CM

## 2021-01-22 ENCOUNTER — TELEPHONE (OUTPATIENT)
Dept: ORTHOPEDIC SURGERY | Age: 51
End: 2021-01-22

## 2021-01-22 DIAGNOSIS — S32.9XXA CLOSED NONDISPLACED FRACTURE OF PELVIS, UNSPECIFIED PART OF PELVIS, INITIAL ENCOUNTER (HCC): Primary | ICD-10-CM

## 2021-01-22 DIAGNOSIS — M53.3 SACROILIAC JOINT DYSFUNCTION OF BOTH SIDES: ICD-10-CM

## 2021-01-22 DIAGNOSIS — M53.3 SACRAL PAIN: ICD-10-CM

## 2021-01-25 ENCOUNTER — VIRTUAL VISIT (OUTPATIENT)
Dept: PAIN MANAGEMENT | Age: 51
End: 2021-01-25
Payer: COMMERCIAL

## 2021-01-25 DIAGNOSIS — Z98.890 S/P FOOT SURGERY, RIGHT: ICD-10-CM

## 2021-01-25 DIAGNOSIS — R20.0 NUMBNESS AND TINGLING IN BOTH HANDS: ICD-10-CM

## 2021-01-25 DIAGNOSIS — M50.30 DDD (DEGENERATIVE DISC DISEASE), CERVICAL: ICD-10-CM

## 2021-01-25 DIAGNOSIS — M48.02 CERVICAL STENOSIS OF SPINE: ICD-10-CM

## 2021-01-25 DIAGNOSIS — M47.817 LUMBOSACRAL SPONDYLOSIS WITHOUT MYELOPATHY: ICD-10-CM

## 2021-01-25 DIAGNOSIS — M25.522 BILATERAL ELBOW JOINT PAIN: ICD-10-CM

## 2021-01-25 DIAGNOSIS — M54.16 LUMBAR NERVE ROOT IMPINGEMENT: ICD-10-CM

## 2021-01-25 DIAGNOSIS — G56.03 CARPAL TUNNEL SYNDROME, BILATERAL: ICD-10-CM

## 2021-01-25 DIAGNOSIS — G89.4 CHRONIC PAIN SYNDROME: ICD-10-CM

## 2021-01-25 DIAGNOSIS — M48.061 SPINAL STENOSIS, LUMBAR REGION, WITHOUT NEUROGENIC CLAUDICATION: ICD-10-CM

## 2021-01-25 DIAGNOSIS — M51.37 DEGENERATION OF LUMBAR OR LUMBOSACRAL INTERVERTEBRAL DISC: ICD-10-CM

## 2021-01-25 DIAGNOSIS — M25.521 BILATERAL ELBOW JOINT PAIN: ICD-10-CM

## 2021-01-25 DIAGNOSIS — F51.01 PRIMARY INSOMNIA: ICD-10-CM

## 2021-01-25 DIAGNOSIS — R20.2 NUMBNESS AND TINGLING IN BOTH HANDS: ICD-10-CM

## 2021-01-25 PROCEDURE — 99213 OFFICE O/P EST LOW 20 MIN: CPT | Performed by: NURSE PRACTITIONER

## 2021-01-25 RX ORDER — LIDOCAINE 36 MG/1
1 PATCH TOPICAL DAILY
Qty: 90 PATCH | Refills: 0 | Status: SHIPPED | OUTPATIENT
Start: 2021-01-25 | End: 2021-02-22 | Stop reason: SDUPTHER

## 2021-01-25 RX ORDER — TRAZODONE HYDROCHLORIDE 50 MG/1
50 TABLET ORAL NIGHTLY
Qty: 90 TABLET | Refills: 1 | Status: SHIPPED | OUTPATIENT
Start: 2021-01-25 | End: 2021-06-17

## 2021-01-25 RX ORDER — OXYCODONE HYDROCHLORIDE AND ACETAMINOPHEN 5; 325 MG/1; MG/1
1 TABLET ORAL 2 TIMES DAILY PRN
Qty: 36 TABLET | Refills: 0 | Status: SHIPPED | OUTPATIENT
Start: 2021-01-25 | End: 2021-02-22 | Stop reason: SDUPTHER

## 2021-01-25 NOTE — PATIENT INSTRUCTIONS
Patient Education        Back Stretches: Exercises  Introduction  Here are some examples of exercises for stretching your back. Start each exercise slowly. Ease off the exercise if you start to have pain. Your doctor or physical therapist will tell you when you can start these exercises and which ones will work best for you. How to do the exercises  Overhead stretch   1. Stand comfortably with your feet shoulder-width apart. 2. Looking straight ahead, raise both arms over your head and reach toward the ceiling. Do not allow your head to tilt back. 3. Hold for 15 to 30 seconds, then lower your arms to your sides. 4. Repeat 2 to 4 times. Side stretch   1. Stand comfortably with your feet shoulder-width apart. 2. Raise one arm over your head, and then lean to the other side. 3. Slide your hand down your leg as you let the weight of your arm gently stretch your side muscles. Hold for 15 to 30 seconds. 4. Repeat 2 to 4 times on each side. Press-up   1. Lie on your stomach, supporting your body with your forearms. 2. Press your elbows down into the floor to raise your upper back. As you do this, relax your stomach muscles and allow your back to arch without using your back muscles. As your press up, do not let your hips or pelvis come off the floor. 3. Hold for 15 to 30 seconds, then relax. 4. Repeat 2 to 4 times. Relax and rest   1. Lie on your back with a rolled towel under your neck and a pillow under your knees. Extend your arms comfortably to your sides. 2. Relax and breathe normally. 3. Remain in this position for about 10 minutes. 4. If you can, do this 2 or 3 times each day. Follow-up care is a key part of your treatment and safety. Be sure to make and go to all appointments, and call your doctor if you are having problems. It's also a good idea to know your test results and keep a list of the medicines you take. Where can you learn more? Go to https://chpepiceweb.healthHelium. org and sign in to your "[employer]hart account. Enter T686 in the mechatronic systemtechnikhire box to learn more about \"Back Stretches: Exercises. \"     If you do not have an account, please click on the \"Sign Up Now\" link. Current as of: March 2, 2020               Content Version: 12.6  © 5812-2021 [employer]. Care instructions adapted under license by [address] If you have questions about a medical condition or this instruction, always ask your healthcare professional. [address] any warranty or liability for your use of this information.

## 2021-01-25 NOTE — PROGRESS NOTES
TELE HEALTH VISIT (AUDIO-VISUAL)    Pursuant to the emergency declaration under the [facility], [facility] waiver authority and the [employer] Act, this Virtual  Visit was conducted, with patient's consent, to reduce the patient's risk of exposure to COVID-19 and provide continuity of care for an established patient. Service is  provided through a video synchronous discussion virtually to substitute for in-person clinic visit. Due to this being a TeleHealth encounter (During JUS-99 public health emergency), evaluation of the following organ systems was limited: Vitals/Constitutional/EENT/Resp/CV/GI//MS/Neuro/Skin/Dffj-Lcjk-Bzq. Yasmany Lara  1970  [id number]    Ms. Lara is being seen virtually for a follow up visit using Doxy. [email or web address] Video visit/[employer] or face time  Informed verbal consent to the virtual visit was obtained from Ms. Lara. Risks associated with HIPPA compliance with the virtual visit was explained to the patient. Ms. Tito Perry is at her home and Hyacinth GOODWIN-CNP is in her office. HISTORY OF PRESENT ILLNESS:  Ms. Tito Perry is a 53 y.o. female  being assessed for a follow up visit for pain management for evaluation of ongoing care regarding her symptoms and monitoring of compliance with long term use high risk medications. She has a diagnosis of   1. Chronic pain syndrome    2. S/P foot surgery, right, 1/18/21 with Dr. Cheyenne Coffman    3. Degeneration of lumbar or lumbosacral intervertebral disc    4. Lumbar nerve root impingement, L2    5. Spinal stenosis, lumbar region, without neurogenic claudication    6. Lumbosacral spondylosis without myelopathy    7. DDD (degenerative disc disease), cervical    8. Cervical stenosis of spine, mild    9. Carpal tunnel syndrome, bilateral    10. Bilateral elbow joint pain    11. Numbness and tingling in both hands    12.  Primary insomnia On the Patients Pain Assessment form reviewed with the Medical Assistant:  She complains of pain in the bilateral lower back  with radiation to the hips Left . She rates the pain 6/10 and describes it as aching. Current treatment regimen has helped relieve about 70% of the pain. She denies any side effects from the current pain regimen. Patient reports that since the last follow up visit the physical functioning is unchanged, family/social relationships are unchanged, mood is unchanged sleep patterns are unchanged, and that the overall functioning is unchanged. Patient denies misusing/abusing her narcotic pain medications or using any illegal drugs. Upon obtaining medical history from Ms. Lara states that pain is manageable on current pain therapy. She had right foot surgery on 1/18/21 with Dr. Dali Morales, took less of the Norco due to lack of effectiveness, currently fairing well over all. Mood is stable without anxiety. Sleep is fair with an average of 5-6 hours. Denies to having issues of constipation. Tolerating activities/house chores with moderate tenderness to the lower back/right foot. ALLERGIES: Patients list of allergies were reviewed     MEDICATIONS: Ms. Henrry Thorne list of medications were reviewed. Her current medications are   Outpatient Medications Prior to Visit   Medication Sig Dispense Refill    torsemide (DEMADEX) 10 MG tablet TAKE 1 TABLET BY MOUTH ONE TIME A DAY 90 tablet 1    TURMERIC PO Take by mouth      APPLE CIDER VINEGAR PO Take by mouth      Linoleic Acid-Sunflower Oil (CLA PO) Take by mouth      ELIQUIS 5 MG TABS tablet TAKE 1 TABLET BY MOUTH TWO TIMES A DAY  60 tablet 0    traZODone (DESYREL) 50 MG tablet Take 1 tablet by mouth nightly 90 tablet 1    oxyCODONE-acetaminophen (PERCOCET) 5-325 MG per tablet Take 1 tablet by mouth 2 times daily as needed for Pain for up to 18 days.  Take no more than 2-3 tabs orally prn 36 tablet 0  Lidocaine (ZTLIDO) 1.8 % PTCH Apply 1 patch topically daily 90 patch 0     No facility-administered medications prior to visit. SOCIAL/FAMILY/PAST MEDICAL HISTORY: Ms. Modesto Downing social, family and past medical history was reviewed. REVIEW OF SYSTEMS:    Respiratory: Negative for apnea, chest tightness and shortness of breath or change in baseline breathing. Gastrointestinal: Negative for nausea, vomiting, abdominal pain, diarrhea, constipation, blood in stool and abdominal distention. PHYSICAL EXAM:   Nursing note and vitals reviewed. There were no vitals taken for this visit. as per patient  Constitutional: She appears well-developed and well-nourished. No acute distress. Skin: Skin appears to be warm and dry. No rashes or any other marks noted. She is not diaphoretic. Neurological/Psychiatric:She is alert and oriented to person, place, and time. Coordination is  normal.  Her mood isAppropriate and affect is Neutral/Euthymic(normal). Her behavior is normal.   thought content normal.   Musculoskeletal / Extremities: Not assessed    IMPRESSION:   1. Chronic pain syndrome    2. S/P foot surgery, right, 1/18/21 with Dr. Mell Matta    3. Degeneration of lumbar or lumbosacral intervertebral disc    4. Lumbar nerve root impingement, L2    5. Spinal stenosis, lumbar region, without neurogenic claudication    6. Lumbosacral spondylosis without myelopathy    7. DDD (degenerative disc disease), cervical    8. Cervical stenosis of spine, mild    9. Carpal tunnel syndrome, bilateral    10. Bilateral elbow joint pain    11. Numbness and tingling in both hands    12. Primary insomnia        PLAN:  Informed verbal consent regarding treatment was obtained  -Continue with Percocet, ZTlido, Trazodone  -She was prescribed 2 weeks worth of Percocet with respect to the opioids prescribed post-op, remainder from last prescription from the pain clinic.  Patient was agreeable to plan of care -Maintain f/u with Dr. Annalise Del Castillo post right foot surgery  -Last Lumbar PRASHANTH was on 11/21/20 with Dr. Elmer Ryan  -CBT techniques- relaxation therapies such as biofeedback, mindfulness based stress reduction, imagery, cognitive restructuring, problem solving discussed with patient  -Last UDS 5/11/20 Consistent  -Return in about 4 weeks (around 2/22/2021). Current Outpatient Medications   Medication Sig Dispense Refill    traZODone (DESYREL) 50 MG tablet Take 1 tablet by mouth nightly 90 tablet 1    oxyCODONE-acetaminophen (PERCOCET) 5-325 MG per tablet Take 1 tablet by mouth 2 times daily as needed for Pain for up to 18 days. Take no more than 2-3 tabs orally prn 36 tablet 0    Lidocaine (ZTLIDO) 1.8 % PTCH Apply 1 patch topically daily 90 patch 0    torsemide (DEMADEX) 10 MG tablet TAKE 1 TABLET BY MOUTH ONE TIME A DAY 90 tablet 1    TURMERIC PO Take by mouth      APPLE CIDER VINEGAR PO Take by mouth      Linoleic Acid-Sunflower Oil (CLA PO) Take by mouth      ELIQUIS 5 MG TABS tablet TAKE 1 TABLET BY MOUTH TWO TIMES A DAY  60 tablet 0     No current facility-administered medications for this visit. I will continue her current medication regimen  which is part of the above treatment schedule. It has been helping with Ms. Lara's chronic  medical problems which for this visit include:   Diagnoses of Chronic pain syndrome, S/P foot surgery, right, 1/18/21 with Dr. Annalise Del Castillo, Degeneration of lumbar or lumbosacral intervertebral disc, Lumbar nerve root impingement, L2, Spinal stenosis, lumbar region, without neurogenic claudication, Lumbosacral spondylosis without myelopathy, DDD (degenerative disc disease), cervical, Cervical stenosis of spine, mild, Carpal tunnel syndrome, bilateral, Bilateral elbow joint pain, Numbness and tingling in both hands, and Primary insomnia were pertinent to this visit. Risks and benefits of the medications and other alternative treatments  including no treatment were discussed with the patient. The common side effects of these medications were also explained to the patient. Informed verbal consent was obtained. Goals of current treatment regimen include improvement in pain, restoration of functioning- with focus on improvement in physical performance, general activity, work or disability,emotional distress, health care utilization and  decreased medication consumption. Will continue to monitor progress towards achieving/maintaining therapeutic goals with special emphasis on  1. Improvement in perceived interfernce  of pain with ADL's. Ability to do home exercises independently. Ability to do household chores indoor and/or outdoor work and social and leisure activities. Improve psychosocial and physical functioning. - she is showing progression towards this treatment goal with the current regimen. She was advised against drinking alcohol with the narcotic pain medicines, advised against driving or handling machinery while adjusting the dose of medicines or if having cognitive  issues related to the current medications. Risk of overdose and death, if medicines not taken as prescribed, were also discussed. If the patient develops new symptoms or if the symptoms worsen, the patient should call the office. While transcribing every attempt was made to maintain the accuracy of the note in terms of it's contents,there may have been some errors made inadvertently. Thank you for allowing me to participate in the care of this patient. Keri Muniz.  Faisal GOODWIN-JUSTICE     Cc: Jayne Palafox MD

## 2021-01-26 ENCOUNTER — TELEPHONE (OUTPATIENT)
Dept: ORTHOPEDIC SURGERY | Age: 51
End: 2021-01-26

## 2021-01-26 DIAGNOSIS — S76.312A PARTIAL HAMSTRING TEAR, LEFT, INITIAL ENCOUNTER: Primary | ICD-10-CM

## 2021-01-26 NOTE — TELEPHONE ENCOUNTER
PATIENT HAS NOT COMPLETED MRI BUT WOULD LIKE TO DISCUSS THE NEXT STEPS IN PROCESS. SHE WAS TOLD TO GET PELVIS EXAM SO THAT THE MRI WOULD BE APPROVED BY HER INSURANCE AND SHE HAS NOT HEARD ANYTHING FURTHER.  PATIENT WOULD LIKE A CALL BACK [phone]

## 2021-02-22 ENCOUNTER — VIRTUAL VISIT (OUTPATIENT)
Dept: PAIN MANAGEMENT | Age: 51
End: 2021-02-22
Payer: COMMERCIAL

## 2021-02-22 DIAGNOSIS — M25.521 BILATERAL ELBOW JOINT PAIN: ICD-10-CM

## 2021-02-22 DIAGNOSIS — F51.01 PRIMARY INSOMNIA: ICD-10-CM

## 2021-02-22 DIAGNOSIS — M51.37 DEGENERATION OF LUMBAR OR LUMBOSACRAL INTERVERTEBRAL DISC: ICD-10-CM

## 2021-02-22 DIAGNOSIS — M50.30 DDD (DEGENERATIVE DISC DISEASE), CERVICAL: ICD-10-CM

## 2021-02-22 DIAGNOSIS — G56.03 CARPAL TUNNEL SYNDROME, BILATERAL: ICD-10-CM

## 2021-02-22 DIAGNOSIS — M54.16 LUMBAR NERVE ROOT IMPINGEMENT: ICD-10-CM

## 2021-02-22 DIAGNOSIS — M48.02 CERVICAL STENOSIS OF SPINE: ICD-10-CM

## 2021-02-22 DIAGNOSIS — M25.522 BILATERAL ELBOW JOINT PAIN: ICD-10-CM

## 2021-02-22 DIAGNOSIS — Z98.890 S/P FOOT SURGERY, RIGHT: ICD-10-CM

## 2021-02-22 DIAGNOSIS — M47.817 LUMBOSACRAL SPONDYLOSIS WITHOUT MYELOPATHY: ICD-10-CM

## 2021-02-22 DIAGNOSIS — G89.4 CHRONIC PAIN SYNDROME: ICD-10-CM

## 2021-02-22 DIAGNOSIS — R20.2 NUMBNESS AND TINGLING IN BOTH HANDS: ICD-10-CM

## 2021-02-22 DIAGNOSIS — R20.0 NUMBNESS AND TINGLING IN BOTH HANDS: ICD-10-CM

## 2021-02-22 DIAGNOSIS — M48.061 SPINAL STENOSIS, LUMBAR REGION, WITHOUT NEUROGENIC CLAUDICATION: ICD-10-CM

## 2021-02-22 PROCEDURE — 99213 OFFICE O/P EST LOW 20 MIN: CPT | Performed by: NURSE PRACTITIONER

## 2021-02-22 RX ORDER — OXYCODONE HYDROCHLORIDE AND ACETAMINOPHEN 5; 325 MG/1; MG/1
1 TABLET ORAL 2 TIMES DAILY PRN
Qty: 60 TABLET | Refills: 0 | Status: SHIPPED | OUTPATIENT
Start: 2021-02-22 | End: 2021-03-22 | Stop reason: SDUPTHER

## 2021-02-22 RX ORDER — LIDOCAINE 36 MG/1
1 PATCH TOPICAL DAILY
Qty: 90 PATCH | Refills: 0 | Status: SHIPPED | OUTPATIENT
Start: 2021-02-22 | End: 2021-03-22 | Stop reason: SDUPTHER

## 2021-02-22 NOTE — PATIENT INSTRUCTIONS
Patient Education        Back Stretches: Exercises  Introduction  Here are some examples of exercises for stretching your back. Start each exercise slowly. Ease off the exercise if you start to have pain. Your doctor or physical therapist will tell you when you can start these exercises and which ones will work best for you. How to do the exercises  Overhead stretch   1. Stand comfortably with your feet shoulder-width apart. 2. Looking straight ahead, raise both arms over your head and reach toward the ceiling. Do not allow your head to tilt back. 3. Hold for 15 to 30 seconds, then lower your arms to your sides. 4. Repeat 2 to 4 times. Side stretch   1. Stand comfortably with your feet shoulder-width apart. 2. Raise one arm over your head, and then lean to the other side. 3. Slide your hand down your leg as you let the weight of your arm gently stretch your side muscles. Hold for 15 to 30 seconds. 4. Repeat 2 to 4 times on each side. Press-up   1. Lie on your stomach, supporting your body with your forearms. 2. Press your elbows down into the floor to raise your upper back. As you do this, relax your stomach muscles and allow your back to arch without using your back muscles. As your press up, do not let your hips or pelvis come off the floor. 3. Hold for 15 to 30 seconds, then relax. 4. Repeat 2 to 4 times. Relax and rest   1. Lie on your back with a rolled towel under your neck and a pillow under your knees. Extend your arms comfortably to your sides. 2. Relax and breathe normally. 3. Remain in this position for about 10 minutes. 4. If you can, do this 2 or 3 times each day. Follow-up care is a key part of your treatment and safety. Be sure to make and go to all appointments, and call your doctor if you are having problems. It's also a good idea to know your test results and keep a list of the medicines you take. Where can you learn more? Go to [email or web address] and sign in to your [employer] account. Enter D812 in the Drimmihire box to learn more about \"Back Stretches: Exercises. \"     If you do not have an account, please click on the \"Sign Up Now\" link. Current as of: March 2, 2020               Content Version: 12.6  © 1201-3036 [employer]. Care instructions adapted under license by [facility] ([facility]). If you have questions about a medical condition or this instruction, always ask your healthcare professional. [address] any warranty or liability for your use of this information.

## 2021-02-22 NOTE — PROGRESS NOTES
TELE HEALTH VISIT (AUDIO-VISUAL)    Pursuant to the emergency declaration under the [facility], [facility] waiver authority and the [employer] Act, this Virtual  Visit was conducted, with patient's consent, to reduce the patient's risk of exposure to COVID-19 and provide continuity of care for an established patient. Service is  provided through a video synchronous discussion virtually to substitute for in-person clinic visit. Due to this being a TeleHealth encounter (During [facility] public [facility]), evaluation of the following organ systems was limited: Vitals/Constitutional/EENT/Resp/CV/GI//MS/Neuro/Skin/Xfmp-Nrre-Kqd. Adina Lara  1970  [id number]    Ms. Lara is being seen virtually for a follow up visit using Doxy. me/Pop[employer] Video visit/[employer] or face time  Informed verbal consent to the virtual visit was obtained from Ms. Lara. Risks associated with HIPPA compliance with the virtual visit was explained to the patient. Ms. Vik Vega is at her home and Ethan BALDWIN is in her office. HISTORY OF PRESENT ILLNESS:  Ms. Vik Vega is a 53 y.o. female  being assessed for a follow up visit for pain management for evaluation of ongoing care regarding her symptoms and monitoring of compliance with long term use high risk medications. She has a diagnosis of   1. Chronic pain syndrome    2. S/P foot surgery, right, 1/18/21 with Dr. Fermin Whiting    3. Degeneration of lumbar or lumbosacral intervertebral disc    4. Lumbar nerve root impingement, L2    5. Spinal stenosis, lumbar region, without neurogenic claudication    6. Lumbosacral spondylosis without myelopathy    7. DDD (degenerative disc disease), cervical    8. Cervical stenosis of spine, mild    9. Carpal tunnel syndrome, bilateral    10. Bilateral elbow joint pain    11. Numbness and tingling in both hands    12.  Primary insomnia On the Patients Pain Assessment form reviewed with the Medical Assistant:  She complains of pain in the left lower back  with radiation to the buttocks . She rates the pain 8/10 and describes it as aching. Current treatment regimen has helped relieve about 50% of the pain. She denies any side effects from the current pain regimen. Patient reports that since the last follow up visit the physical functioning is unchanged, family/social relationships are unchanged, mood is unchanged sleep patterns are worse, and that the overall functioning is unchanged. Patient denies misusing/abusing her narcotic pain medications or using any illegal drugs. Upon obtaining medical history from Ms. Lara states that pain is manageable on current pain therapy. Takes pain medications as prescribed. Continues to f/u with Dr. Keri Eubanks for injections to the Lumbar spine. Currently scheduled for PT. Mood is stable without anxiety. Sleep is fair with an average of 5-6 hours. Denies to having issues of constipation. Tolerating activities/house chores with moderate tenderness to the lower back. ALLERGIES: Patients list of allergies were reviewed     MEDICATIONS: Ms. Kirstin Uribe list of medications were reviewed. Her current medications are   Outpatient Medications Prior to Visit   Medication Sig Dispense Refill    traZODone (DESYREL) 50 MG tablet Take 1 tablet by mouth nightly 90 tablet 1    torsemide (DEMADEX) 10 MG tablet TAKE 1 TABLET BY MOUTH ONE TIME A DAY 90 tablet 1    TURMERIC PO Take by mouth      APPLE CIDER VINEGAR PO Take by mouth      Linoleic Acid-Sunflower Oil (CLA PO) Take by mouth      ELIQUIS 5 MG TABS tablet TAKE 1 TABLET BY MOUTH TWO TIMES A DAY  60 tablet 0    Lidocaine (ZTLIDO) 1.8 % PTCH Apply 1 patch topically daily 90 patch 0     No facility-administered medications prior to visit. SOCIAL/FAMILY/PAST MEDICAL HISTORY: Ms. Kirstin Uribe social, family and past medical history was reviewed.      REVIEW OF SYSTEMS: Respiratory: Negative for apnea, chest tightness and shortness of breath or change in baseline breathing. Gastrointestinal: Negative for nausea, vomiting, abdominal pain, diarrhea, constipation, blood in stool and abdominal distention. PHYSICAL EXAM:   Nursing note and vitals reviewed. There were no vitals taken for this visit. as per patient  Constitutional: She appears well-developed and well-nourished. No acute distress. Skin: Skin appears to be warm and dry. No rashes or any other marks noted. She is not diaphoretic. Neurological/Psychiatric:She is alert and oriented to person, place, and time. Coordination is  normal.  Her mood isAppropriate and affect is Neutral/Euthymic(normal). Her behavior is normal.   thought content normal.   Musculoskeletal / Extremities:Gait is normal, assistive devices use: none. IMPRESSION:   1. Chronic pain syndrome    2. S/P foot surgery, right, 1/18/21 with Dr. Yuan Armenta    3. Degeneration of lumbar or lumbosacral intervertebral disc    4. Lumbar nerve root impingement, L2    5. Spinal stenosis, lumbar region, without neurogenic claudication    6. Lumbosacral spondylosis without myelopathy    7. DDD (degenerative disc disease), cervical    8. Cervical stenosis of spine, mild    9. Carpal tunnel syndrome, bilateral    10. Bilateral elbow joint pain    11. Numbness and tingling in both hands    12. Primary insomnia        PLAN:  Informed verbal consent regarding treatment was obtained  -Continue with Percocet, ZTlido, Trazodone  -PT pending  -Maintain f/u with orthopedics for Lumbar spine degeneration  -CBT techniques- relaxation therapies such as biofeedback, mindfulness based stress reduction, imagery, cognitive restructuring, problem solving discussed with patient  -Last UDS 5/11/20 Consistent  -Return in about 4 weeks (around 3/22/2021).      Current Outpatient Medications   Medication Sig Dispense Refill  Lidocaine (ZTLIDO) 1.8 % PTCH Apply 1 patch topically daily 90 patch 0    oxyCODONE-acetaminophen (PERCOCET) 5-325 MG per tablet Take 1 tablet by mouth 2 times daily as needed for Pain for up to 30 days. Take no more than 2-3 tabs orally prn 60 tablet 0    traZODone (DESYREL) 50 MG tablet Take 1 tablet by mouth nightly 90 tablet 1    torsemide (DEMADEX) 10 MG tablet TAKE 1 TABLET BY MOUTH ONE TIME A DAY 90 tablet 1    TURMERIC PO Take by mouth      APPLE CIDER VINEGAR PO Take by mouth      Linoleic Acid-Sunflower Oil (CLA PO) Take by mouth      ELIQUIS 5 MG TABS tablet TAKE 1 TABLET BY MOUTH TWO TIMES A DAY  60 tablet 0     No current facility-administered medications for this visit. I will continue her current medication regimen  which is part of the above treatment schedule. It has been helping with Ms. Lara's chronic  medical problems which for this visit include:   Diagnoses of Chronic pain syndrome, S/P foot surgery, right, 1/18/21 with Dr. Quentin Larson, Degeneration of lumbar or lumbosacral intervertebral disc, Lumbar nerve root impingement, L2, Spinal stenosis, lumbar region, without neurogenic claudication, Lumbosacral spondylosis without myelopathy, DDD (degenerative disc disease), cervical, Cervical stenosis of spine, mild, Carpal tunnel syndrome, bilateral, Bilateral elbow joint pain, Numbness and tingling in both hands, and Primary insomnia were pertinent to this visit. Risks and benefits of the medications and other alternative treatments  including no treatment were discussed with the patient. The common side effects of these medications were also explained to the patient. Informed verbal consent was obtained.

## 2021-03-22 ENCOUNTER — VIRTUAL VISIT (OUTPATIENT)
Dept: PAIN MANAGEMENT | Age: 51
End: 2021-03-22
Payer: COMMERCIAL

## 2021-03-22 DIAGNOSIS — M48.02 CERVICAL STENOSIS OF SPINE: ICD-10-CM

## 2021-03-22 DIAGNOSIS — M25.522 LEFT ELBOW PAIN: ICD-10-CM

## 2021-03-22 DIAGNOSIS — M50.30 DDD (DEGENERATIVE DISC DISEASE), CERVICAL: ICD-10-CM

## 2021-03-22 DIAGNOSIS — Z98.890 S/P FOOT SURGERY, RIGHT: ICD-10-CM

## 2021-03-22 DIAGNOSIS — R20.2 NUMBNESS AND TINGLING IN BOTH HANDS: ICD-10-CM

## 2021-03-22 DIAGNOSIS — M51.37 DEGENERATION OF LUMBAR OR LUMBOSACRAL INTERVERTEBRAL DISC: ICD-10-CM

## 2021-03-22 DIAGNOSIS — Z51.81 ENCOUNTER FOR THERAPEUTIC DRUG MONITORING: ICD-10-CM

## 2021-03-22 DIAGNOSIS — M48.061 SPINAL STENOSIS, LUMBAR REGION, WITHOUT NEUROGENIC CLAUDICATION: ICD-10-CM

## 2021-03-22 DIAGNOSIS — M54.2 CERVICALGIA: ICD-10-CM

## 2021-03-22 DIAGNOSIS — M47.817 LUMBOSACRAL SPONDYLOSIS WITHOUT MYELOPATHY: ICD-10-CM

## 2021-03-22 DIAGNOSIS — G56.03 CARPAL TUNNEL SYNDROME, BILATERAL: ICD-10-CM

## 2021-03-22 DIAGNOSIS — F51.01 PRIMARY INSOMNIA: ICD-10-CM

## 2021-03-22 DIAGNOSIS — M25.521 BILATERAL ELBOW JOINT PAIN: ICD-10-CM

## 2021-03-22 DIAGNOSIS — R20.0 NUMBNESS AND TINGLING IN BOTH HANDS: ICD-10-CM

## 2021-03-22 DIAGNOSIS — G89.4 CHRONIC PAIN SYNDROME: ICD-10-CM

## 2021-03-22 DIAGNOSIS — M25.522 BILATERAL ELBOW JOINT PAIN: ICD-10-CM

## 2021-03-22 DIAGNOSIS — M54.16 LUMBAR NERVE ROOT IMPINGEMENT: ICD-10-CM

## 2021-03-22 PROCEDURE — 99213 OFFICE O/P EST LOW 20 MIN: CPT | Performed by: NURSE PRACTITIONER

## 2021-03-22 RX ORDER — LIDOCAINE 36 MG/1
1 PATCH TOPICAL DAILY
Qty: 90 PATCH | Refills: 0 | Status: SHIPPED | OUTPATIENT
Start: 2021-03-22 | End: 2021-04-19 | Stop reason: SDUPTHER

## 2021-03-22 RX ORDER — OXYCODONE HYDROCHLORIDE AND ACETAMINOPHEN 5; 325 MG/1; MG/1
1 TABLET ORAL EVERY 8 HOURS PRN
Qty: 90 TABLET | Refills: 0 | Status: SHIPPED | OUTPATIENT
Start: 2021-03-22 | End: 2021-04-19 | Stop reason: SDUPTHER

## 2021-03-22 NOTE — PROGRESS NOTES
14. Primary insomnia    15. Encounter for therapeutic drug monitoring      On the Patients Pain Assessment form reviewed with the Medical Assistant:  She complains of pain in the mid lower back  with radiation to the left buttocks . She rates the pain 8/10 and describes it as aching. Current treatment regimen has helped relieve about 50% of the pain. She denies any side effects from the current pain regimen. Patient reports that since the last follow up visit the physical functioning is unchanged, family/social relationships are unchanged, mood is unchanged sleep patterns are worse, and that the overall functioning is unchanged. Patient denies misusing/abusing her narcotic pain medications or using any illegal drugs. Upon obtaining medical history from Ms. Lara states that pain is manageable on current pain therapy. Takes pain medications as prescribed. She has been having pain in the left, scheduled to f/u with Dr. Taylor Rodriguez in 2 weeks. Requesting to take the pain medications 3 times a day until she is evaluated by Dr. Taylor Rodriguez. Denies direct trauma or injury to the hip. Currently in PT. Mood is stable without anxiety. Sleep is fair with an average of 5-6 hours. Denies to having issues of constipation. Tolerating activities/house chores with moderate tenderness to the lower back/left hip. ALLERGIES: Patients list of allergies were reviewed     MEDICATIONS: Ms. Guerline More list of medications were reviewed. Her current medications are   Outpatient Medications Prior to Visit   Medication Sig Dispense Refill    traZODone (DESYREL) 50 MG tablet Take 1 tablet by mouth nightly 90 tablet 1    torsemide (DEMADEX) 10 MG tablet TAKE 1 TABLET BY MOUTH ONE TIME A DAY 90 tablet 1    TURMERIC PO Take by mouth      APPLE CIDER VINEGAR PO Take by mouth      Linoleic Acid-Sunflower Oil (CLA PO) Take by mouth      ELIQUIS 5 MG TABS tablet TAKE 1 TABLET BY MOUTH TWO TIMES A DAY  60 tablet 0    Lidocaine (ZTLIDO) 1.8 % PTCH Apply 1 patch topically daily 90 patch 0    oxyCODONE-acetaminophen (PERCOCET) 5-325 MG per tablet Take 1 tablet by mouth 2 times daily as needed for Pain for up to 30 days. Take no more than 2-3 tabs orally prn 60 tablet 0     No facility-administered medications prior to visit. SOCIAL/FAMILY/PAST MEDICAL HISTORY: Ms. Brent Angel social, family and past medical history was reviewed. REVIEW OF SYSTEMS:    Respiratory: Negative for apnea, chest tightness and shortness of breath or change in baseline breathing. Gastrointestinal: Negative for nausea, vomiting, abdominal pain, diarrhea, constipation, blood in stool and abdominal distention. PHYSICAL EXAM:   Nursing note and vitals reviewed. There were no vitals taken for this visit. as per patient  Constitutional: She appears well-developed and well-nourished. No acute distress. Skin: Skin appears to be warm and dry. No rashes or any other marks noted. She is not diaphoretic. Neurological/Psychiatric:She is alert and oriented to person, place, and time. Coordination is  normal.  Her mood isAppropriate and affect is Neutral/Euthymic(normal). Her behavior is normal.   thought content normal.   Musculoskeletal / Extremities: Gait is normal, assistive devices use: none. IMPRESSION:   1. Chronic pain syndrome    2. S/P foot surgery, right, 1/18/21 with Dr. Livier Nation    3. Degeneration of lumbar or lumbosacral intervertebral disc    4. Lumbar nerve root impingement, L2    5. Spinal stenosis, lumbar region, without neurogenic claudication    6. Lumbosacral spondylosis without myelopathy    7. DDD (degenerative disc disease), cervical    8. Cervical stenosis of spine, mild    9. Carpal tunnel syndrome, bilateral    10. Bilateral elbow joint pain    11. Numbness and tingling in both hands    12. Cervicalgia    13. Left elbow pain    14. Primary insomnia    15.  Encounter for therapeutic drug monitoring        PLAN:  Informed verbal consent regarding treatment was obtained  -Continue with Percocet, ZTlido, Trazodone  -Take Percocet 5-325 mg tabs tid prn  -Currently on PT  -Maintain f/u with Dr. Lorre Habermann, will re-evaluate pain on next o.v after possible injection, was reduced a couple of months ago due to patient request  -CBT techniques- relaxation therapies such as biofeedback, mindfulness based stress reduction, imagery, cognitive restructuring, problem solving discussed with patient  -Last UDS 5/11/20 Consistent  -Return in about 4 weeks (around 4/19/2021). Current Outpatient Medications   Medication Sig Dispense Refill    Lidocaine (ZTLIDO) 1.8 % PTCH Apply 1 patch topically daily 90 patch 0    oxyCODONE-acetaminophen (PERCOCET) 5-325 MG per tablet Take 1 tablet by mouth every 8 hours as needed for Pain for up to 30 days. Take no more than 2-3 tabs orally prn 90 tablet 0    traZODone (DESYREL) 50 MG tablet Take 1 tablet by mouth nightly 90 tablet 1    torsemide (DEMADEX) 10 MG tablet TAKE 1 TABLET BY MOUTH ONE TIME A DAY 90 tablet 1    TURMERIC PO Take by mouth      APPLE CIDER VINEGAR PO Take by mouth      Linoleic Acid-Sunflower Oil (CLA PO) Take by mouth      ELIQUIS 5 MG TABS tablet TAKE 1 TABLET BY MOUTH TWO TIMES A DAY  60 tablet 0     No current facility-administered medications for this visit. I will continue her current medication regimen  which is part of the above treatment schedule. It has been helping with Ms. Lara's chronic  medical problems which for this visit include:   Diagnoses of Chronic pain syndrome, S/P foot surgery, right, 1/18/21 with Dr. Desi Hanks, Degeneration of lumbar or lumbosacral intervertebral disc, Lumbar nerve root impingement, L2, Spinal stenosis, lumbar region, without neurogenic claudication, Lumbosacral spondylosis without myelopathy, DDD (degenerative disc disease), cervical, Cervical stenosis of spine, mild, Carpal tunnel syndrome, bilateral, Bilateral elbow joint pain, Numbness and tingling in both hands, Cervicalgia, Left elbow pain, Primary insomnia, and Encounter for therapeutic drug monitoring were pertinent to this visit. Risks and benefits of the medications and other alternative treatments  including no treatment were discussed with the patient. The common side effects of these medications were also explained to the patient. Informed verbal consent was obtained. Goals of current treatment regimen include improvement in pain, restoration of functioning- with focus on improvement in physical performance, general activity, work or disability,emotional distress, health care utilization and  decreased medication consumption. Will continue to monitor progress towards achieving/maintaining therapeutic goals with special emphasis on  1. Improvement in perceived interfernce  of pain with ADL's. Ability to do home exercises independently. Ability to do household chores indoor and/or outdoor work and social and leisure activities. Improve psychosocial and physical functioning. - she is showing progression towards this treatment goal with the current regimen. She was advised against drinking alcohol with the narcotic pain medicines, advised against driving or handling machinery while adjusting the dose of medicines or if having cognitive  issues related to the current medications. Risk of overdose and death, if medicines not taken as prescribed, were also discussed. If the patient develops new symptoms or if the symptoms worsen, the patient should call the office. While transcribing every attempt was made to maintain the accuracy of the note in terms of it's contents,there may have been some errors made inadvertently. Thank you for allowing me to participate in the care of this patient. Saadia Mena.  Faisal GOODWIN-CNP     Cc: Lexi Reed MD

## 2021-04-19 ENCOUNTER — VIRTUAL VISIT (OUTPATIENT)
Dept: PAIN MANAGEMENT | Age: 51
End: 2021-04-19
Payer: COMMERCIAL

## 2021-04-19 DIAGNOSIS — M25.522 BILATERAL ELBOW JOINT PAIN: ICD-10-CM

## 2021-04-19 DIAGNOSIS — M54.16 LUMBAR NERVE ROOT IMPINGEMENT: ICD-10-CM

## 2021-04-19 DIAGNOSIS — G89.4 CHRONIC PAIN SYNDROME: ICD-10-CM

## 2021-04-19 DIAGNOSIS — R20.2 NUMBNESS AND TINGLING IN BOTH HANDS: ICD-10-CM

## 2021-04-19 DIAGNOSIS — R20.0 NUMBNESS AND TINGLING IN BOTH HANDS: ICD-10-CM

## 2021-04-19 DIAGNOSIS — M51.37 DEGENERATION OF LUMBAR OR LUMBOSACRAL INTERVERTEBRAL DISC: ICD-10-CM

## 2021-04-19 DIAGNOSIS — G56.03 CARPAL TUNNEL SYNDROME, BILATERAL: ICD-10-CM

## 2021-04-19 DIAGNOSIS — M48.061 SPINAL STENOSIS, LUMBAR REGION, WITHOUT NEUROGENIC CLAUDICATION: ICD-10-CM

## 2021-04-19 DIAGNOSIS — M25.522 LEFT ELBOW PAIN: ICD-10-CM

## 2021-04-19 DIAGNOSIS — M48.02 CERVICAL STENOSIS OF SPINE: ICD-10-CM

## 2021-04-19 DIAGNOSIS — F51.01 PRIMARY INSOMNIA: ICD-10-CM

## 2021-04-19 DIAGNOSIS — Z98.890 S/P FOOT SURGERY, RIGHT: ICD-10-CM

## 2021-04-19 DIAGNOSIS — M50.30 DDD (DEGENERATIVE DISC DISEASE), CERVICAL: ICD-10-CM

## 2021-04-19 DIAGNOSIS — M25.521 BILATERAL ELBOW JOINT PAIN: ICD-10-CM

## 2021-04-19 DIAGNOSIS — M47.817 LUMBOSACRAL SPONDYLOSIS WITHOUT MYELOPATHY: ICD-10-CM

## 2021-04-19 DIAGNOSIS — M54.2 CERVICALGIA: ICD-10-CM

## 2021-04-19 PROCEDURE — 99213 OFFICE O/P EST LOW 20 MIN: CPT | Performed by: NURSE PRACTITIONER

## 2021-04-19 RX ORDER — LIDOCAINE 36 MG/1
1 PATCH TOPICAL DAILY
Qty: 90 PATCH | Refills: 0 | Status: SHIPPED | OUTPATIENT
Start: 2021-04-19 | End: 2021-05-17 | Stop reason: SDUPTHER

## 2021-04-19 RX ORDER — OXYCODONE HYDROCHLORIDE AND ACETAMINOPHEN 5; 325 MG/1; MG/1
1 TABLET ORAL EVERY 8 HOURS PRN
Qty: 90 TABLET | Refills: 0 | Status: SHIPPED | OUTPATIENT
Start: 2021-04-19 | End: 2021-05-17 | Stop reason: SDUPTHER

## 2021-04-19 NOTE — PROGRESS NOTES
TELE HEALTH VISIT (AUDIO-VISUAL)    Pursuant to the emergency declaration under the [facility], [facility] waiver authority and the [employer] Act, this Virtual  Visit was conducted, with patient's consent, to reduce the patient's risk of exposure to COVID-19 and provide continuity of care for an established patient. Service is  provided through a video synchronous discussion virtually to substitute for in-person clinic visit. Due to this being a TeleHealth encounter (During JFF-13 public health emergency), evaluation of the following organ systems was limited: Vitals/Constitutional/EENT/Resp/CV/GI//MS/Neuro/Skin/Rszi-Tlpi-Wkl. Gricelda Lara  1970  [id number]    Ms. Lara is being seen virtually for a follow up visit using [employer] visit/[employer] or b  Informed verbal consent to the virtual visit was obtained from Ms. Lara. Risks associated with HIPPA compliance with the virtual visit was explained to the patient. Ms. Shaun Moran is at her home and Leonor BALDWIN is in her office. HISTORY OF PRESENT ILLNESS:  Ms. Shaun Moran is a 53 y.o. female  being assessed for a follow up visit for pain management for evaluation of ongoing care regarding her symptoms and monitoring of compliance with long term use high risk medications. She has a diagnosis of   1. Chronic pain syndrome    2. S/P foot surgery, right, 1/18/21 with Dr. Justin Mayers    3. Degeneration of lumbar or lumbosacral intervertebral disc    4. Lumbar nerve root impingement, L2    5. Spinal stenosis, lumbar region, without neurogenic claudication    6. Lumbosacral spondylosis without myelopathy    7. DDD (degenerative disc disease), cervical    8. Cervical stenosis of spine, mild    9. Carpal tunnel syndrome, bilateral    10. Bilateral elbow joint pain    11. Numbness and tingling in both hands    12. Cervicalgia    13. Left elbow pain    14. Primary insomnia      On the Patients Pain Assessment form reviewed with the Medical Assistant:  She complains of pain in the left leg(s): entire area and bilateral lower back  . She rates the pain 8/10 and describes it as sharp, dull, aching. Current treatment regimen has helped relieve about 60% of the pain. She denies any side effects from the current pain regimen. Patient reports that since the last follow up visit the physical functioning is unchanged, family/social relationships are unchanged, mood is unchanged sleep patterns are worse, and that the overall functioning is unchanged. Patient denies misusing/abusing her narcotic pain medications or using any illegal drugs. Upon obtaining medical history from Ms. Lara states that pain is manageable on current pain therapy. Takes pain medications as prescribed. Currently under the care with [facility]oped[facility] at [facility] for hip pain, MRI of the left Hip pending. Last received PRASHANTH of the Lumbar spine from Dr. Tana Cai 5 months ago. Mood is stable without anxiety. Sleep is fair with an average of 5-6 hours. Denies to having issues of constipation. Tolerating activities/house chores with moderate tenderness to the lower back, left hip. ALLERGIES: Patients list of allergies were reviewed     MEDICATIONS: Ms. Naomi Bailey list of medications were reviewed. Her current medications are   Outpatient Medications Prior to Visit   Medication Sig Dispense Refill    traZODone (DESYREL) 50 MG tablet Take 1 tablet by mouth nightly 90 tablet 1    torsemide (DEMADEX) 10 MG tablet TAKE 1 TABLET BY MOUTH ONE TIME A DAY 90 tablet 1    TURMERIC PO Take by mouth      APPLE CIDER VINEGAR PO Take by mouth      Linoleic Acid-Sunflower Oil (CLA PO) Take by mouth      ELIQUIS 5 MG TABS tablet TAKE 1 TABLET BY MOUTH TWO TIMES A DAY  60 tablet 0    Lidocaine (ZTLIDO) 1.8 % PTCH Apply 1 patch topically daily 90 patch 0    oxyCODONE-acetaminophen (PERCOCET) 5-325 MG per tablet Take 1 tablet by mouth every 8 hours as needed for Pain for up to 30 days. Take no more than 2-3 tabs orally prn 90 tablet 0     No facility-administered medications prior to visit. SOCIAL/FAMILY/PAST MEDICAL HISTORY: Ms. Dorn Spurling social, family and past medical history was reviewed. REVIEW OF SYSTEMS:    Respiratory: Negative for apnea, chest tightness and shortness of breath or change in baseline breathing. Gastrointestinal: Negative for nausea, vomiting, abdominal pain, diarrhea, constipation, blood in stool and abdominal distention. PHYSICAL EXAM:   Nursing note and vitals reviewed. There were no vitals taken for this visit. as per patient  Constitutional: She appears well-developed and well-nourished. No acute distress. Skin: Skin appears to be warm and dry. No rashes or any other marks noted. She is not diaphoretic. Neurological/Psychiatric:She is alert and oriented to person, place, and time. Coordination is  normal.  Her mood isAppropriate and affect is Neutral/Euthymic(normal). Her behavior is normal.   thought content normal.   Musculoskeletal / Extremities: Range of motion is normal. Gait is normal, assistive devices use: none. IMPRESSION:   1. Chronic pain syndrome    2. S/P foot surgery, right, 1/18/21 with Dr. Dorice Favre    3. Degeneration of lumbar or lumbosacral intervertebral disc    4. Lumbar nerve root impingement, L2    5. Spinal stenosis, lumbar region, without neurogenic claudication    6. Lumbosacral spondylosis without myelopathy    7. DDD (degenerative disc disease), cervical    8. Cervical stenosis of spine, mild    9. Carpal tunnel syndrome, bilateral    10. Bilateral elbow joint pain    11. Numbness and tingling in both hands    12. Cervicalgia    13. Left elbow pain    14.  Primary insomnia        PLAN:  Informed verbal consent regarding treatment was obtained  -Continue with Percocet, ZTlido, Trazodone  -Corrine exercises/back stretches  -Maintain f/u with orthopedics for Hip Left Pain  -CBT techniques- relaxation therapies such as biofeedback, mindfulness based stress reduction, imagery, cognitive restructuring, problem solving discussed with patient  -Last  UDS 5/11/20 Consistent  -Return in about 4 weeks (around 5/17/2021). -[place] record was obtained and reviewed  for the last one year and no indicators of drug misuse  were found. Any other controlled substance prescriptions  seen on the record have been accounted for, I am aware of the patient receiving these medications. Solange Saltyshellie [place] record will be rechecked as part of office protocol. Current Outpatient Medications   Medication Sig Dispense Refill    oxyCODONE-acetaminophen (PERCOCET) 5-325 MG per tablet Take 1 tablet by mouth every 8 hours as needed for Pain for up to 30 days. Take no more than 2-3 tabs orally prn 90 tablet 0    Lidocaine (ZTLIDO) 1.8 % PTCH Apply 1 patch topically daily 90 patch 0    traZODone (DESYREL) 50 MG tablet Take 1 tablet by mouth nightly 90 tablet 1    torsemide (DEMADEX) 10 MG tablet TAKE 1 TABLET BY MOUTH ONE TIME A DAY 90 tablet 1    TURMERIC PO Take by mouth      APPLE CIDER VINEGAR PO Take by mouth      Linoleic Acid-Sunflower Oil (CLA PO) Take by mouth      ELIQUIS 5 MG TABS tablet TAKE 1 TABLET BY MOUTH TWO TIMES A DAY  60 tablet 0     No current facility-administered medications for this visit. I will continue her current medication regimen  which is part of the above treatment schedule. It has been helping with Ms. Lara's chronic  medical problems which for this visit include:   Diagnoses of Chronic pain syndrome, S/P foot surgery, right, 1/18/21 with Dr. Urvashi Martínez, Degeneration of lumbar or lumbosacral intervertebral disc, Lumbar nerve root impingement, L2, Spinal stenosis, lumbar region, without neurogenic claudication, Lumbosacral spondylosis without myelopathy, DDD (degenerative disc disease), cervical, Cervical stenosis of spine, mild, Carpal tunnel syndrome, bilateral, Bilateral elbow joint pain, Numbness and tingling in both hands, Cervicalgia, Left elbow pain, and Primary insomnia were pertinent to this visit. Risks and benefits of the medications and other alternative treatments  including no treatment were discussed with the patient. The common side effects of these medications were also explained to the patient. Informed verbal consent was obtained. Goals of current treatment regimen include improvement in pain, restoration of functioning- with focus on improvement in physical performance, general activity, work or disability,emotional distress, health care utilization and  decreased medication consumption. Will continue to monitor progress towards achieving/maintaining therapeutic goals with special emphasis on  1. Improvement in perceived interfernce  of pain with ADL's. Ability to do home exercises independently. Ability to do household chores indoor and/or outdoor work and social and leisure activities. Improve psychosocial and physical functioning. - she is showing progression towards this treatment goal with the current regimen. She was advised against drinking alcohol with the narcotic pain medicines, advised against driving or handling machinery while adjusting the dose of medicines or if having cognitive  issues related to the current medications. Risk of overdose and death, if medicines not taken as prescribed, were also discussed. If the patient develops new symptoms or if the symptoms worsen, the patient should call the office. While transcribing every attempt was made to maintain the accuracy of the note in terms of it's contents,there may have been some errors made inadvertently. Thank you for allowing me to participate in the care of this patient. Kana Agosto.  Faisal CRUMN-CNP     Cc: Foster Homans, MD

## 2021-04-26 ENCOUNTER — OFFICE VISIT (OUTPATIENT)
Dept: PRIMARY CARE CLINIC | Age: 51
End: 2021-04-26
Payer: COMMERCIAL

## 2021-04-26 VITALS
SYSTOLIC BLOOD PRESSURE: 118 MMHG | TEMPERATURE: 98.5 F | HEART RATE: 70 BPM | DIASTOLIC BLOOD PRESSURE: 70 MMHG | WEIGHT: 151 LBS | BODY MASS INDEX: 25.91 KG/M2 | OXYGEN SATURATION: 99 %

## 2021-04-26 DIAGNOSIS — M79.605 LEFT LEG PAIN: Primary | ICD-10-CM

## 2021-04-26 DIAGNOSIS — H01.001 BLEPHARITIS OF UPPER EYELIDS OF BOTH EYES, UNSPECIFIED TYPE: ICD-10-CM

## 2021-04-26 DIAGNOSIS — H01.004 BLEPHARITIS OF UPPER EYELIDS OF BOTH EYES, UNSPECIFIED TYPE: ICD-10-CM

## 2021-04-26 PROCEDURE — 99213 OFFICE O/P EST LOW 20 MIN: CPT | Performed by: STUDENT IN AN ORGANIZED HEALTH CARE EDUCATION/TRAINING PROGRAM

## 2021-04-26 RX ORDER — PREDNISONE 20 MG/1
20 TABLET ORAL 2 TIMES DAILY
Qty: 10 TABLET | Refills: 0 | Status: SHIPPED | OUTPATIENT
Start: 2021-04-26 | End: 2021-05-01

## 2021-04-26 NOTE — PROGRESS NOTES
Arely Lara (:  1970) is a 53 y.o. female,Established patient, here for evaluation of the following chief complaint(s): Other (herniated lumbar disc, mri of left hip and hamstrings, wants emg of ), Swelling (in left leg, had blood clots last year wants to make sure not related ), and Eye Pain (discharge and crustiness in eyes, eyelids swell, left eye draining )      ASSESSMENT/PLAN:  History of factor V Leiden deficiency and DVT last year on chronic Eliquis. Exam did not show any swelling in lower extremity and since she is on blood thinner already and compliant, low likelihood of clot. I do think that the left leg pain is likely coming from lumbar radiculopathy. Unsure of EMG will juarez any more information since she is not wanting to have any surgical intervention at this time. I do believe that this should be followed up by her orthopedic surgeon/pain management specialist for nonsurgical options. As for the blepharitis, conservative management for now if no improvement will send to ophthalmology. 1. Left leg pain  -     Raisa Mares MD (Inpatient and Outpatient EMG), [facility]  2. Blepharitis of upper eyelids of both eyes, unspecified type      No follow-ups on file. SUBJECTIVE/OBJECTIVE:  HPI    Herniated lumbar disc and left hip pain sees Cailin and had MRI left hip was WNL x 2 weeks ago, talked to Collis Romberg doctor about this and they said yeah EMG might be helpful but didn't order one. Has pain doctor that wants to do PrP but this would be out of pocket and very expensive. Has had gabapentin for wrists. Posterior back pain shooting down both legs, no pelvic numbness no weakness in legs no urinary or bowel complaints. Does have history of clots in her legs has factor V Leiden deficiency is on chronic Eliquis 5 mg, no leg swelling or shortness of breath. Review of Systems   Constitutional: Negative for chills and fever.    HENT: Negative for congestion, rhinorrhea and sore throat. Eyes: Negative for visual disturbance. Respiratory: Negative for cough and shortness of breath. Cardiovascular: Negative for chest pain. Gastrointestinal: Negative for constipation, diarrhea, nausea and vomiting. Endocrine: Negative for polydipsia and polyuria. Genitourinary: Negative for dysuria. Musculoskeletal: Negative for arthralgias. Skin: Negative for rash. Allergic/Immunologic: Negative for environmental allergies. Neurological: Negative for headaches. Psychiatric/Behavioral: The patient is not nervous/anxious. Physical Exam  Constitutional:       Appearance: Normal appearance. HENT:      Head: Normocephalic and atraumatic. Nose: Nose normal.      Mouth/Throat:      Mouth: Mucous membranes are moist.   Eyes:      Extraocular Movements: Extraocular movements intact. Cardiovascular:      Rate and Rhythm: Normal rate and regular rhythm. Heart sounds: Normal heart sounds. No murmur. No friction rub. No gallop. Pulmonary:      Effort: Pulmonary effort is normal.      Breath sounds: Normal breath sounds. No wheezing, rhonchi or rales. Musculoskeletal:      Comments: No lower extremity edema bilaterally  DP and PTs 2+ bilaterally and equal   Skin:     General: Skin is warm. Neurological:      General: No focal deficit present. Mental Status: She is alert. Psychiatric:         Mood and Affect: Mood normal.                 An electronic signature was used to authenticate this note.     --Viviana James MD

## 2021-04-27 ASSESSMENT — ENCOUNTER SYMPTOMS
DIARRHEA: 0
CONSTIPATION: 0
SHORTNESS OF BREATH: 0
VOMITING: 0
COUGH: 0
NAUSEA: 0
SORE THROAT: 0
RHINORRHEA: 0

## 2021-05-11 ENCOUNTER — PROCEDURE VISIT (OUTPATIENT)
Dept: NEUROLOGY | Age: 51
End: 2021-05-11
Payer: COMMERCIAL

## 2021-05-11 DIAGNOSIS — M79.605 LEFT LEG PAIN: Primary | ICD-10-CM

## 2021-05-11 PROCEDURE — 95908 NRV CNDJ TST 3-4 STUDIES: CPT | Performed by: PSYCHIATRY & NEUROLOGY

## 2021-05-11 PROCEDURE — 95886 MUSC TEST DONE W/N TEST COMP: CPT | Performed by: PSYCHIATRY & NEUROLOGY

## 2021-05-11 NOTE — PROGRESS NOTES
Matthew Bee M.D. [facility]) [facility]  Board Certified in [address], [address]    EMG / NERVE CONDUCTION STUDY      PATIENT:  Arely Lara       DATE OF EM21     YOB: 1970       REASON FOR EMG:   Left leg pain      REFERRING PHYSICIAN:  Pierce Brooks MD  [address] [place] [address]     SUMMARY:   The left peroneal motor nerve study was normal.  The left posterior tibial motor nerve study was normal.  The left sural sensory nerve study was normal.  Needle EMG of several muscles in the left lower extremity was normal.  Needle EMG of the left lumbar paraspinal muscles was normal.      CLINICAL DIAGNOSIS:  Lumbar radiculopathy        EMG RESULTS:   This is a normal EMG and nerve conduction study of the left lower extremity. There is no electrophysiological evidence for lumbar radiculopathy or neuropathy in this study. ---------------------------------------------  Matthew Bee M.D.   Electromyographer / Neurologist

## 2021-05-12 ENCOUNTER — TELEPHONE (OUTPATIENT)
Dept: NEUROLOGY | Age: 51
End: 2021-05-12

## 2021-05-12 NOTE — TELEPHONE ENCOUNTER
PT called and stated that Dr Sue Ghotra office has not gotten the results of the EMG that was done in the office by Dr Baltazar Whatley. I told the Pt the it was in her epic chart. She said that Dr. Dick Elliott can not see the results. Pt would like to have a all regarding this.

## 2021-05-13 ENCOUNTER — TELEPHONE (OUTPATIENT)
Dept: PRIMARY CARE CLINIC | Age: 51
End: 2021-05-13

## 2021-05-13 NOTE — TELEPHONE ENCOUNTER
Patient called said she recently got done an EMG on Tuesday at [facility] they told her they were trying to fax the results to us but will not go through, gave pt our fax number and she will call them to make sure it is correct.  However was confused why the results are not in her epic yet if [facility] was completed through [facility]

## 2021-05-17 ENCOUNTER — VIRTUAL VISIT (OUTPATIENT)
Dept: PAIN MANAGEMENT | Age: 51
End: 2021-05-17
Payer: COMMERCIAL

## 2021-05-17 DIAGNOSIS — M51.37 DEGENERATION OF LUMBAR OR LUMBOSACRAL INTERVERTEBRAL DISC: ICD-10-CM

## 2021-05-17 DIAGNOSIS — M25.521 BILATERAL ELBOW JOINT PAIN: ICD-10-CM

## 2021-05-17 DIAGNOSIS — F51.01 PRIMARY INSOMNIA: ICD-10-CM

## 2021-05-17 DIAGNOSIS — R20.2 NUMBNESS AND TINGLING IN BOTH HANDS: ICD-10-CM

## 2021-05-17 DIAGNOSIS — M48.02 CERVICAL STENOSIS OF SPINE: ICD-10-CM

## 2021-05-17 DIAGNOSIS — M48.061 SPINAL STENOSIS, LUMBAR REGION, WITHOUT NEUROGENIC CLAUDICATION: ICD-10-CM

## 2021-05-17 DIAGNOSIS — G89.4 CHRONIC PAIN SYNDROME: ICD-10-CM

## 2021-05-17 DIAGNOSIS — M25.522 BILATERAL ELBOW JOINT PAIN: ICD-10-CM

## 2021-05-17 DIAGNOSIS — M47.817 LUMBOSACRAL SPONDYLOSIS WITHOUT MYELOPATHY: ICD-10-CM

## 2021-05-17 DIAGNOSIS — Z98.890 S/P FOOT SURGERY, RIGHT: ICD-10-CM

## 2021-05-17 DIAGNOSIS — G56.03 CARPAL TUNNEL SYNDROME, BILATERAL: ICD-10-CM

## 2021-05-17 DIAGNOSIS — R20.0 NUMBNESS AND TINGLING IN BOTH HANDS: ICD-10-CM

## 2021-05-17 DIAGNOSIS — M54.16 LUMBAR NERVE ROOT IMPINGEMENT: ICD-10-CM

## 2021-05-17 DIAGNOSIS — M50.30 DDD (DEGENERATIVE DISC DISEASE), CERVICAL: ICD-10-CM

## 2021-05-17 PROCEDURE — 99213 OFFICE O/P EST LOW 20 MIN: CPT | Performed by: NURSE PRACTITIONER

## 2021-05-17 RX ORDER — LIDOCAINE 36 MG/1
1 PATCH TOPICAL DAILY
Qty: 90 PATCH | Refills: 0 | Status: SHIPPED | OUTPATIENT
Start: 2021-05-17 | End: 2021-06-17 | Stop reason: SDUPTHER

## 2021-05-17 RX ORDER — OXYCODONE HYDROCHLORIDE AND ACETAMINOPHEN 5; 325 MG/1; MG/1
1 TABLET ORAL EVERY 8 HOURS PRN
Qty: 90 TABLET | Refills: 0 | Status: SHIPPED | OUTPATIENT
Start: 2021-05-17 | End: 2021-06-17 | Stop reason: SDUPTHER

## 2021-05-17 NOTE — PATIENT INSTRUCTIONS
Patient Education        Back Stretches: Exercises  Introduction  Here are some examples of exercises for stretching your back. Start each exercise slowly. Ease off the exercise if you start to have pain. Your doctor or physical therapist will tell you when you can start these exercises and which ones will work best for you. How to do the exercises  Overhead stretch   1. Stand comfortably with your feet shoulder-width apart. 2. Looking straight ahead, raise both arms over your head and reach toward the ceiling. Do not allow your head to tilt back. 3. Hold for 15 to 30 seconds, then lower your arms to your sides. 4. Repeat 2 to 4 times. Side stretch   1. Stand comfortably with your feet shoulder-width apart. 2. Raise one arm over your head, and then lean to the other side. 3. Slide your hand down your leg as you let the weight of your arm gently stretch your side muscles. Hold for 15 to 30 seconds. 4. Repeat 2 to 4 times on each side. Press-up   1. Lie on your stomach, supporting your body with your forearms. 2. Press your elbows down into the floor to raise your upper back. As you do this, relax your stomach muscles and allow your back to arch without using your back muscles. As your press up, do not let your hips or pelvis come off the floor. 3. Hold for 15 to 30 seconds, then relax. 4. Repeat 2 to 4 times. Relax and rest   1. Lie on your back with a rolled towel under your neck and a pillow under your knees. Extend your arms comfortably to your sides. 2. Relax and breathe normally. 3. Remain in this position for about 10 minutes. 4. If you can, do this 2 or 3 times each day. Follow-up care is a key part of your treatment and safety. Be sure to make and go to all appointments, and call your doctor if you are having problems. It's also a good idea to know your test results and keep a list of the medicines you take. Where can you learn more? Go to [email or web address]

## 2021-05-17 NOTE — PROGRESS NOTES
TELE HEALTH VISIT (AUDIO-VISUAL)    Pursuant to the emergency declaration under the [facility], [facility] waiver authority and the [employer] and [employer] Act, this Virtual  Visit was conducted, with patient's consent, to reduce the patient's risk of exposure to COVID-19 and provide continuity of care for an established patient. Service is  provided through a video synchronous discussion virtually to substitute for in-person clinic visit. Due to this being a TeleHealth encounter (During BYB-78 public health emergency), evaluation of the following organ systems was limited: Vitals/Constitutional/EENT/Resp/CV/GI//MS/Neuro/Skin/Jfpv-Yuxb-Rpj. Jose Lara  1970  [id number]    Ms. Lara is being seen virtually for a follow up visit using Doxy. me/Vysr Video visit/VoteIto or face time  Informed verbal consent to the virtual visit was obtained from Ms. Lara. Risks associated with HIPPA compliance with the virtual visit was explained to the patient. Ms. Rosanna Bence is at her home and Pinky BALDWIN is in her office. HISTORY OF PRESENT ILLNESS:  Ms. Rosanna Bence is a 53 y.o. female  being assessed for a follow up visit for pain management for evaluation of ongoing care regarding her symptoms and monitoring of compliance with long term use high risk medications. She has a diagnosis of   1. Chronic pain syndrome    2. Degeneration of lumbar or lumbosacral intervertebral disc    3. Lumbar nerve root impingement, L2    4. Spinal stenosis, lumbar region, without neurogenic claudication    5. Lumbosacral spondylosis without myelopathy    6. DDD (degenerative disc disease), cervical    7. Cervical stenosis of spine, mild    8. Carpal tunnel syndrome, bilateral    9. Bilateral elbow joint pain    10. Numbness and tingling in both hands    11. S/P foot surgery, right, 1/18/21 with Dr. Chelsea Banda    12.  Primary insomnia      On the Patients Pain Assessment form reviewed with the Medical Assistant:  She complains of pain in the bilateral lower back  with radiation to the buttocks, hips Left, upper leg Left, knees Left and lower leg Left . She rates the pain 3/10 and describes it as dull, aching. Current treatment regimen has helped relieve about 0% of the pain. She denies any side effects from the current pain regimen. Patient reports that since the last follow up visit the physical functioning is unchanged, family/social relationships are unchanged, mood is unchanged sleep patterns are unchanged, and that the overall functioning is unchanged. Patient denies misusing/abusing her narcotic pain medications or using any illegal drugs. Upon obtaining medical history from Ms. Lara states that pain is manageable on current pain therapy. Takes pain medications as prescribed. EMG of the left leg was otherwise normal. Plans to invest in pilatis for exercises, over all stable on pain therapy. Was accidentally dispensed Oxaydo by her pharmacy, requested to be prescribed Percocet which is her regular therapy. Mood is stable without anxiety. Sleep is fair with an average of 5-6 hours. Denies to having issues of constipation. Tolerating activities/house chores with moderate tenderness to the lower back. ALLERGIES: Patients list of allergies were reviewed     MEDICATIONS: Ms. Virginie Motley list of medications were reviewed. Her current medications are   Outpatient Medications Prior to Visit   Medication Sig Dispense Refill    traZODone (DESYREL) 50 MG tablet Take 1 tablet by mouth nightly 90 tablet 1    torsemide (DEMADEX) 10 MG tablet TAKE 1 TABLET BY MOUTH ONE TIME A DAY 90 tablet 1    TURMERIC PO Take by mouth      APPLE CIDER VINEGAR PO Take by mouth      Linoleic Acid-Sunflower Oil (CLA PO) Take by mouth      ELIQUIS 5 MG TABS tablet TAKE 1 TABLET BY MOUTH TWO TIMES A DAY  60 tablet 0    oxyCODONE-acetaminophen (PERCOCET) 5-325 MG per tablet Take 1 tablet by mouth every 8 hours as needed for Pain for up to 30 days. Take no more than 2-3 tabs orally prn 90 tablet 0    Lidocaine (ZTLIDO) 1.8 % PTCH Apply 1 patch topically daily 90 patch 0     No facility-administered medications prior to visit. SOCIAL/FAMILY/PAST MEDICAL HISTORY: Ms. Nadeem Franks social, family and past medical history was reviewed. REVIEW OF SYSTEMS:    Respiratory: Negative for apnea, chest tightness and shortness of breath or change in baseline breathing. Gastrointestinal: Negative for nausea, vomiting, abdominal pain, diarrhea, constipation, blood in stool and abdominal distention. PHYSICAL EXAM:   Nursing note and vitals reviewed. There were no vitals taken for this visit. as per patient  Constitutional: She appears well-developed and well-nourished. No acute distress. Skin: Skin appears to be warm and dry. No rashes or any other marks noted. She is not diaphoretic. Neurological/Psychiatric:She is alert and oriented to person, place, and time. Coordination is  normal.  Her mood isAppropriate and affect is Neutral/Euthymic(normal). Her behavior is normal.   thought content normal.   Musculoskeletal / Extremities: Gait is normal, assistive devices use: none. IMPRESSION:   1. Chronic pain syndrome    2. Degeneration of lumbar or lumbosacral intervertebral disc    3. Lumbar nerve root impingement, L2    4. Spinal stenosis, lumbar region, without neurogenic claudication    5. Lumbosacral spondylosis without myelopathy    6. DDD (degenerative disc disease), cervical    7. Cervical stenosis of spine, mild    8. Carpal tunnel syndrome, bilateral    9. Bilateral elbow joint pain    10. Numbness and tingling in both hands    11. S/P foot surgery, right, 1/18/21 with Dr. Kisha Carlson    12.  Primary insomnia        PLAN:  Informed verbal consent regarding treatment was obtained  -Continue with Percocet, ZTlido, Trazodone  -Marley exercises/back stretches  -Left Leg EMG reviewed  -Called pharmacy to verify prescription for the patient. spoke to the pharmacist who admitted that there was an error, the correct medication will be sent to the patient this time. -CBT techniques- relaxation therapies such as biofeedback, mindfulness based stress reduction, imagery, cognitive restructuring, problem solving discussed with patient  -Last UDS 5/11/20 Consistent  -Return in about 4 weeks (around 6/14/2021). Current Outpatient Medications   Medication Sig Dispense Refill    oxyCODONE-acetaminophen (PERCOCET) 5-325 MG per tablet Take 1 tablet by mouth every 8 hours as needed for Pain for up to 30 days. Take no more than 2-3 tabs orally prn 90 tablet 0    Lidocaine (ZTLIDO) 1.8 % PTCH Apply 1 patch topically daily 90 patch 0    traZODone (DESYREL) 50 MG tablet Take 1 tablet by mouth nightly 90 tablet 1    torsemide (DEMADEX) 10 MG tablet TAKE 1 TABLET BY MOUTH ONE TIME A DAY 90 tablet 1    TURMERIC PO Take by mouth      APPLE CIDER VINEGAR PO Take by mouth      Linoleic Acid-Sunflower Oil (CLA PO) Take by mouth      ELIQUIS 5 MG TABS tablet TAKE 1 TABLET BY MOUTH TWO TIMES A DAY  60 tablet 0     No current facility-administered medications for this visit. I will continue her current medication regimen  which is part of the above treatment schedule. It has been helping with Ms. Lara's chronic  medical problems which for this visit include:   Diagnoses of Chronic pain syndrome, Degeneration of lumbar or lumbosacral intervertebral disc, Lumbar nerve root impingement, L2, Spinal stenosis, lumbar region, without neurogenic claudication, Lumbosacral spondylosis without myelopathy, DDD (degenerative disc disease), cervical, Cervical stenosis of spine, mild, Carpal tunnel syndrome, bilateral, Bilateral elbow joint pain, Numbness and tingling in both hands, S/P foot surgery, right, 1/18/21 with Dr. Chelsea Banda, and Primary insomnia were pertinent to this visit.    Risks and benefits of the medications and other alternative treatments  including no treatment were discussed with the patient. The common side effects of these medications were also explained to the patient. Informed verbal consent was obtained. Goals of current treatment regimen include improvement in pain, restoration of functioning- with focus on improvement in physical performance, general activity, work or disability,emotional distress, health care utilization and  decreased medication consumption. Will continue to monitor progress towards achieving/maintaining therapeutic goals with special emphasis on  1. Improvement in perceived interfernce  of pain with ADL's. Ability to do home exercises independently. Ability to do household chores indoor and/or outdoor work and social and leisure activities. Improve psychosocial and physical functioning. - she is showing progression towards this treatment goal with the current regimen. She was advised against drinking alcohol with the narcotic pain medicines, advised against driving or handling machinery while adjusting the dose of medicines or if having cognitive  issues related to the current medications. Risk of overdose and death, if medicines not taken as prescribed, were also discussed. If the patient develops new symptoms or if the symptoms worsen, the patient should call the office. While transcribing every attempt was made to maintain the accuracy of the note in terms of it's contents,there may have been some errors made inadvertently. Thank you for allowing me to participate in the care of this patient. Mitch Rubalcava.  Faisal GOODWIN-CNP     Cc: Angela Jolley MD

## 2021-06-17 ENCOUNTER — TELEPHONE (OUTPATIENT)
Dept: PAIN MANAGEMENT | Age: 51
End: 2021-06-17

## 2021-06-17 ENCOUNTER — VIRTUAL VISIT (OUTPATIENT)
Dept: PAIN MANAGEMENT | Age: 51
End: 2021-06-17
Payer: COMMERCIAL

## 2021-06-17 DIAGNOSIS — M25.521 BILATERAL ELBOW JOINT PAIN: ICD-10-CM

## 2021-06-17 DIAGNOSIS — M51.37 DEGENERATION OF LUMBAR OR LUMBOSACRAL INTERVERTEBRAL DISC: ICD-10-CM

## 2021-06-17 DIAGNOSIS — M25.522 BILATERAL ELBOW JOINT PAIN: ICD-10-CM

## 2021-06-17 DIAGNOSIS — R20.2 NUMBNESS AND TINGLING IN BOTH HANDS: ICD-10-CM

## 2021-06-17 DIAGNOSIS — G89.4 CHRONIC PAIN SYNDROME: Primary | ICD-10-CM

## 2021-06-17 DIAGNOSIS — M50.30 DDD (DEGENERATIVE DISC DISEASE), CERVICAL: ICD-10-CM

## 2021-06-17 DIAGNOSIS — G56.03 CARPAL TUNNEL SYNDROME, BILATERAL: ICD-10-CM

## 2021-06-17 DIAGNOSIS — M48.02 CERVICAL STENOSIS OF SPINE: ICD-10-CM

## 2021-06-17 DIAGNOSIS — Z98.890 S/P FOOT SURGERY, RIGHT: ICD-10-CM

## 2021-06-17 DIAGNOSIS — M54.16 LUMBAR NERVE ROOT IMPINGEMENT: ICD-10-CM

## 2021-06-17 DIAGNOSIS — M48.061 SPINAL STENOSIS, LUMBAR REGION, WITHOUT NEUROGENIC CLAUDICATION: ICD-10-CM

## 2021-06-17 DIAGNOSIS — R20.0 NUMBNESS AND TINGLING IN BOTH HANDS: ICD-10-CM

## 2021-06-17 DIAGNOSIS — M47.817 LUMBOSACRAL SPONDYLOSIS WITHOUT MYELOPATHY: ICD-10-CM

## 2021-06-17 DIAGNOSIS — F51.01 PRIMARY INSOMNIA: ICD-10-CM

## 2021-06-17 DIAGNOSIS — G89.4 CHRONIC PAIN SYNDROME: ICD-10-CM

## 2021-06-17 PROCEDURE — 99213 OFFICE O/P EST LOW 20 MIN: CPT | Performed by: NURSE PRACTITIONER

## 2021-06-17 RX ORDER — OXYCODONE HYDROCHLORIDE AND ACETAMINOPHEN 5; 325 MG/1; MG/1
1 TABLET ORAL EVERY 8 HOURS PRN
Qty: 90 TABLET | Refills: 0 | Status: SHIPPED | OUTPATIENT
Start: 2021-06-17 | End: 2021-07-19 | Stop reason: SDUPTHER

## 2021-06-17 RX ORDER — LIDOCAINE 36 MG/1
1 PATCH TOPICAL DAILY
Qty: 90 PATCH | Refills: 0 | Status: SHIPPED | OUTPATIENT
Start: 2021-06-17 | End: 2021-07-19 | Stop reason: SDUPTHER

## 2021-06-17 RX ORDER — LEMBOREXANT 5 MG/1
5 TABLET, FILM COATED ORAL NIGHTLY
Qty: 30 TABLET | Refills: 0 | Status: SHIPPED | OUTPATIENT
Start: 2021-06-17 | End: 2021-07-17

## 2021-06-17 NOTE — PROGRESS NOTES
TELE HEALTH VISIT (AUDIO-VISUAL)    Pursuant to the emergency declaration under the [facility], [facility] waiver authority and the [employer] and [employer] Act, this Virtual  Visit was conducted, with patient's consent, to reduce the patient's risk of exposure to COVID-19 and provide continuity of care for an established patient. Service is  provided through a video synchronous discussion virtually to substitute for in-person clinic visit. Due to this being a TeleHealth encounter (During PWURT-72 public health emergency), evaluation of the following organ systems was limited: Vitals/Constitutional/EENT/Resp/CV/GI//MS/Neuro/Skin/Dtem-Sqlj-Knr. Bria Lara  1970  [id number]    Ms. Lara is being seen virtually for a follow up visit using Doxy. me/[place] Video visit/GivUo or face time  Informed verbal consent to the virtual visit was obtained from Ms. Lara. Risks associated with HIPPA compliance with the virtual visit was explained to the patient. Ms. Aida Millard is at her home and Chery BALDWIN is in her office. HISTORY OF PRESENT ILLNESS:  Ms. Aida Millard is a 53 y.o. female  being assessed for a follow up visit for pain management for evaluation of ongoing care regarding her symptoms and monitoring of compliance with long term use high risk medications. She has a diagnosis of   1. Chronic pain syndrome    2. Degeneration of lumbar or lumbosacral intervertebral disc    3. Lumbar nerve root impingement, L2    4. Spinal stenosis, lumbar region, without neurogenic claudication    5. Lumbosacral spondylosis without myelopathy    6. DDD (degenerative disc disease), cervical    7. Cervical stenosis of spine, mild    8. Carpal tunnel syndrome, bilateral    9. Bilateral elbow joint pain    10. Numbness and tingling in both hands    11. S/P foot surgery, right, 1/18/21 with Dr. Monique Samuel    12.  Primary insomnia      On the Patients breathing. Gastrointestinal: Negative for nausea, vomiting, abdominal pain, diarrhea, constipation, blood in stool and abdominal distention. PHYSICAL EXAM:   Nursing note and vitals reviewed. There were no vitals taken for this visit. as per patient  Constitutional: She appears well-developed and well-nourished. No acute distress. Skin: Skin appears to be warm and dry. No rashes or any other marks noted. She is not diaphoretic. Neurological/Psychiatric:She is alert and oriented to person, place, and time. Coordination is  normal.  Her mood isAppropriate and affect is Neutral/Euthymic(normal). Her behavior is normal.   thought content normal.   Musculoskeletal / Extremities:Gait is normal, assistive devices use: none. IMPRESSION:   1. Chronic pain syndrome    2. Degeneration of lumbar or lumbosacral intervertebral disc    3. Lumbar nerve root impingement, L2    4. Spinal stenosis, lumbar region, without neurogenic claudication    5. Lumbosacral spondylosis without myelopathy    6. DDD (degenerative disc disease), cervical    7. Cervical stenosis of spine, mild    8. Carpal tunnel syndrome, bilateral    9. Bilateral elbow joint pain    10. Numbness and tingling in both hands    11. S/P foot surgery, right, 1/18/21 with Dr. Pamela Bonilla    12.  Primary insomnia        PLAN:  Informed verbal consent regarding treatment was obtained  -Continue with Percocet, ZTlido  -D/c Trazodone, Dayvigo 5 mg orally nightly  -Corrine exercises/back stretches recommended  -CBT techniques- relaxation therapies such as biofeedback, mindfulness based stress reduction, imagery, cognitive restructuring, problem solving discussed with patient  -she was advised proper sleep hygiene-told to avoid:use of caffeine or other stimulants after noon, alcohol use near bedtime, long or frequent naps during the day, erratic sleep schedule, heavy meals near bedtime, vigorous exercise near bedtime and use of electronic devices near bedtime  -Last UDS 5/24/21 Consistent  -Return in about 4 weeks (around 7/15/2021). Current Outpatient Medications   Medication Sig Dispense Refill    Lemborexant (DAYVIGO) 5 MG TABS Take 5 mg by mouth nightly 30 tablet 0    Lidocaine (ZTLIDO) 1.8 % PTCH Apply 1 patch topically daily 90 patch 0    oxyCODONE-acetaminophen (PERCOCET) 5-325 MG per tablet Take 1 tablet by mouth every 8 hours as needed for Pain for up to 30 days. Take no more than 2-3 tabs orally prn 90 tablet 0    torsemide (DEMADEX) 10 MG tablet TAKE 1 TABLET BY MOUTH ONE TIME A DAY 90 tablet 1    TURMERIC PO Take by mouth      APPLE CIDER VINEGAR PO Take by mouth      Linoleic Acid-Sunflower Oil (CLA PO) Take by mouth      ELIQUIS 5 MG TABS tablet TAKE 1 TABLET BY MOUTH TWO TIMES A DAY  60 tablet 0     No current facility-administered medications for this visit. I will continue her current medication regimen  which is part of the above treatment schedule. It has been helping with Ms. Lara's chronic  medical problems which for this visit include:   Diagnoses of Chronic pain syndrome, Degeneration of lumbar or lumbosacral intervertebral disc, Lumbar nerve root impingement, L2, Spinal stenosis, lumbar region, without neurogenic claudication, Lumbosacral spondylosis without myelopathy, DDD (degenerative disc disease), cervical, Cervical stenosis of spine, mild, Carpal tunnel syndrome, bilateral, Bilateral elbow joint pain, Numbness and tingling in both hands, S/P foot surgery, right, 1/18/21 with Dr. Leatha Martinez, and Primary insomnia were pertinent to this visit. Risks and benefits of the medications and other alternative treatments  including no treatment were discussed with the patient. The common side effects of these medications were also explained to the patient. Informed verbal consent was obtained.    Goals of current treatment regimen include improvement in pain, restoration of functioning- with focus on improvement in physical performance, general activity, work or disability,emotional distress, health care utilization and  decreased medication consumption. Will continue to monitor progress towards achieving/maintaining therapeutic goals with special emphasis on  1. Improvement in perceived interfernce  of pain with ADL's. Ability to do home exercises independently. Ability to do household chores indoor and/or outdoor work and social and leisure activities. Improve psychosocial and physical functioning. - she is showing progression towards this treatment goal with the current regimen. She was advised against drinking alcohol with the narcotic pain medicines, advised against driving or handling machinery while adjusting the dose of medicines or if having cognitive  issues related to the current medications. Risk of overdose and death, if medicines not taken as prescribed, were also discussed. If the patient develops new symptoms or if the symptoms worsen, the patient should call the office. While transcribing every attempt was made to maintain the accuracy of the note in terms of it's contents,there may have been some errors made inadvertently. Thank you for allowing me to participate in the care of this patient. Oc Malone APRN-CNP     Cc: Jorge De La O MD

## 2021-06-17 NOTE — PATIENT INSTRUCTIONS
Patient Education        Back Stretches: Exercises  Introduction  Here are some examples of exercises for stretching your back. Start each exercise slowly. Ease off the exercise if you start to have pain. Your doctor or physical therapist will tell you when you can start these exercises and which ones will work best for you. How to do the exercises  Overhead stretch   1. Stand comfortably with your feet shoulder-width apart. 2. Looking straight ahead, raise both arms over your head and reach toward the ceiling. Do not allow your head to tilt back. 3. Hold for 15 to 30 seconds, then lower your arms to your sides. 4. Repeat 2 to 4 times. Side stretch   1. Stand comfortably with your feet shoulder-width apart. 2. Raise one arm over your head, and then lean to the other side. 3. Slide your hand down your leg as you let the weight of your arm gently stretch your side muscles. Hold for 15 to 30 seconds. 4. Repeat 2 to 4 times on each side. Press-up   1. Lie on your stomach, supporting your body with your forearms. 2. Press your elbows down into the floor to raise your upper back. As you do this, relax your stomach muscles and allow your back to arch without using your back muscles. As your press up, do not let your hips or pelvis come off the floor. 3. Hold for 15 to 30 seconds, then relax. 4. Repeat 2 to 4 times. Relax and rest   1. Lie on your back with a rolled towel under your neck and a pillow under your knees. Extend your arms comfortably to your sides. 2. Relax and breathe normally. 3. Remain in this position for about 10 minutes. 4. If you can, do this 2 or 3 times each day. Follow-up care is a key part of your treatment and safety. Be sure to make and go to all appointments, and call your doctor if you are having problems. It's also a good idea to know your test results and keep a list of the medicines you take. Where can you learn more? Go to [email or web address] org and sign in to your [employer] account. Enter A070 in the [employer] Cloud box to learn more about \"Back Stretches: Exercises. \"     If you do not have an account, please click on the \"Sign Up Now\" link. Current as of: November 16, 2020               Content Version: 12.9  © 0114-4487 Healthwise, Incorporated. Care instructions adapted under license by [facility] ([facility]). If you have questions about a medical condition or this instruction, always ask your healthcare professional. [address] any warranty or liability for your use of this information.

## 2021-06-17 NOTE — TELEPHONE ENCOUNTER
Submitted PA for DAYVIGO  [facility]: [id number] STATUS: DENIED-    \"CaseId:[id number];Status:Denied; Review Type:Prior Auth; Appeal Information: [address] [facility] [id number],[facility],,[id number]; Important - Please read the below note on eAppeals: Please reference the denial letter for information on the rights for an appeal, rationale for the denial, and how to submit an appeal including if any information is needed to support the appeal. Note about urgent situations - Generally, an urgent situation is one which, in the opinion of the provider, the health of the patient may be in serious jeopardy or may experience pain that cannot be adequately controlled while waiting for a decision on the appeal.;\"    I will scan DENIAL letter once received. If this requires a response please respond to the pool ( [facility] [address]). Thank you please advise patient.

## 2021-07-06 RX ORDER — SUVOREXANT 5 MG/1
5 TABLET, FILM COATED ORAL NIGHTLY
Qty: 90 TABLET | Refills: 0 | Status: SHIPPED | OUTPATIENT
Start: 2021-07-06 | End: 2021-08-16 | Stop reason: SDUPTHER

## 2021-07-06 RX ORDER — SUVOREXANT 5 MG/1
5 TABLET, FILM COATED ORAL NIGHTLY
Qty: 90 TABLET | Refills: 0 | Status: SHIPPED | OUTPATIENT
Start: 2021-07-06 | End: 2021-07-06

## 2021-07-06 NOTE — TELEPHONE ENCOUNTER
Per PKA ok to order Belsomra 5mg #90. Patient is aware that the medication was denied and that we will be changing it too Belsomra.

## 2021-07-13 DIAGNOSIS — G89.4 CHRONIC PAIN SYNDROME: Primary | ICD-10-CM

## 2021-07-13 DIAGNOSIS — F51.01 PRIMARY INSOMNIA: ICD-10-CM

## 2021-07-13 RX ORDER — ZOLPIDEM TARTRATE 5 MG/1
5 TABLET ORAL NIGHTLY PRN
Qty: 30 TABLET | Refills: 0 | Status: SHIPPED | OUTPATIENT
Start: 2021-07-13 | End: 2021-08-12

## 2021-07-19 ENCOUNTER — VIRTUAL VISIT (OUTPATIENT)
Dept: PAIN MANAGEMENT | Age: 51
End: 2021-07-19
Payer: COMMERCIAL

## 2021-07-19 DIAGNOSIS — M50.30 DDD (DEGENERATIVE DISC DISEASE), CERVICAL: ICD-10-CM

## 2021-07-19 DIAGNOSIS — R20.2 NUMBNESS AND TINGLING IN BOTH HANDS: ICD-10-CM

## 2021-07-19 DIAGNOSIS — M54.16 LUMBAR NERVE ROOT IMPINGEMENT: ICD-10-CM

## 2021-07-19 DIAGNOSIS — Z98.890 S/P FOOT SURGERY, RIGHT: ICD-10-CM

## 2021-07-19 DIAGNOSIS — M25.522 BILATERAL ELBOW JOINT PAIN: ICD-10-CM

## 2021-07-19 DIAGNOSIS — F51.01 PRIMARY INSOMNIA: ICD-10-CM

## 2021-07-19 DIAGNOSIS — M48.02 CERVICAL STENOSIS OF SPINE: ICD-10-CM

## 2021-07-19 DIAGNOSIS — M48.061 SPINAL STENOSIS, LUMBAR REGION, WITHOUT NEUROGENIC CLAUDICATION: ICD-10-CM

## 2021-07-19 DIAGNOSIS — G89.4 CHRONIC PAIN SYNDROME: ICD-10-CM

## 2021-07-19 DIAGNOSIS — G56.03 CARPAL TUNNEL SYNDROME, BILATERAL: ICD-10-CM

## 2021-07-19 DIAGNOSIS — M47.817 LUMBOSACRAL SPONDYLOSIS WITHOUT MYELOPATHY: ICD-10-CM

## 2021-07-19 DIAGNOSIS — M25.521 BILATERAL ELBOW JOINT PAIN: ICD-10-CM

## 2021-07-19 DIAGNOSIS — R20.0 NUMBNESS AND TINGLING IN BOTH HANDS: ICD-10-CM

## 2021-07-19 DIAGNOSIS — M51.37 DEGENERATION OF LUMBAR OR LUMBOSACRAL INTERVERTEBRAL DISC: ICD-10-CM

## 2021-07-19 PROCEDURE — 99213 OFFICE O/P EST LOW 20 MIN: CPT | Performed by: NURSE PRACTITIONER

## 2021-07-19 RX ORDER — LIDOCAINE 36 MG/1
1 PATCH TOPICAL DAILY
Qty: 90 PATCH | Refills: 0 | Status: SHIPPED | OUTPATIENT
Start: 2021-07-19 | End: 2021-08-16 | Stop reason: SDUPTHER

## 2021-07-19 RX ORDER — OXYCODONE HYDROCHLORIDE AND ACETAMINOPHEN 5; 325 MG/1; MG/1
1 TABLET ORAL EVERY 8 HOURS PRN
Qty: 90 TABLET | Refills: 0 | Status: SHIPPED | OUTPATIENT
Start: 2021-07-19 | End: 2021-08-16 | Stop reason: SDUPTHER

## 2021-07-19 NOTE — PROGRESS NOTES
TELE HEALTH VISIT (AUDIO-VISUAL)    Pursuant to the emergency declaration under the [facility], [facility] waiver authority and the [employer] Act, this Virtual  Visit was conducted, with patient's consent, to reduce the patient's risk of exposure to COVID-19 and provide continuity of care for an established patient. Service is  provided through a video synchronous discussion virtually to substitute for in-person clinic visit. Due to this being a TeleHealth encounter (During [facility]- public health emergency), evaluation of the following organ systems was limited: Vitals/Constitutional/EENT/Resp/CV/GI//MS/Neuro/Skin/Xush-Xafc-Fyi. Marlon Lara  1970  [id number]    Ms. Lara is being seen virtually for a follow up visit using Doxy. me/[employer] Video visit/Hackers / Founderso or face time  Informed verbal consent to the virtual visit was obtained from Ms. Lara. Risks associated with HIPPA compliance with the virtual visit was explained to the patient. Ms. Kostas Beltran is at her home and [facility]. Silvia BALDWIN is in her office. HISTORY OF PRESENT ILLNESS:  Ms. Kostas Beltran is a 53 y.o. female  being assessed for a follow up visit for pain management for evaluation of ongoing care regarding her symptoms and monitoring of compliance with long term use high risk medications. She has a diagnosis of   1. Chronic pain syndrome    2. Degeneration of lumbar or lumbosacral intervertebral disc    3. Lumbar nerve root impingement    4. Spinal stenosis, lumbar region, without neurogenic claudication    5. Lumbosacral spondylosis without myelopathy    6. DDD (degenerative disc disease), cervical    7. Cervical stenosis of spine    8. Carpal tunnel syndrome, bilateral    9. Bilateral elbow joint pain    10. Numbness and tingling in both hands    11. S/P foot surgery, right, 1/18/21 with Dr. Bulmaro Awad    12. Primary insomnia    13.  Lumbar nerve root impingement, L2    14. Cervical stenosis of spine, mild      On the Patients Pain Assessment form reviewed with the Medical Assistant:  She complains of pain in the bilateral lower back  with radiation to the buttocks . She rates the pain 6/10 and describes it as dull, aching. Current treatment regimen has helped relieve about 50% of the pain. She denies any side effects from the current pain regimen. Patient reports that since the last follow up visit the physical functioning is unchanged, family/social relationships are unchanged, mood is unchanged sleep patterns are unchanged, and that the overall functioning is unchanged. Patient denies misusing/abusing her narcotic pain medications or using any illegal drugs. Upon obtaining medical history from Ms Jane states that pain is manageable on current pain therapy. Takes pain medications as prescribed. Mood is stable without anxiety. Sleep is fair with an average of 5-6 hours. Denies to having issues of constipation. Tolerating activities/house chores with moderate tenderness to the lower back. ALLERGIES: Patients list of allergies were reviewed     MEDICATIONS: Ms. Mendoza Party list of medications were reviewed. Her current medications are   Outpatient Medications Prior to Visit   Medication Sig Dispense Refill    zolpidem (AMBIEN) 5 MG tablet Take 1 tablet by mouth nightly as needed for Sleep for up to 30 days. 30 tablet 0    Suvorexant (BELSOMRA) 5 MG TABS Take 5 mg by mouth nightly for 90 days. 90 tablet 0    torsemide (DEMADEX) 10 MG tablet TAKE 1 TABLET DAILY 90 tablet 1    Lidocaine (ZTLIDO) 1.8 % PTCH Apply 1 patch topically daily 90 patch 0    TURMERIC PO Take by mouth      APPLE CIDER VINEGAR PO Take by mouth      Linoleic Acid-Sunflower Oil (CLA PO) Take by mouth      ELIQUIS 5 MG TABS tablet TAKE 1 TABLET BY MOUTH TWO TIMES A DAY  60 tablet 0     No facility-administered medications prior to visit.        SOCIAL/FAMILY/PAST MEDICAL HISTORY: Ms. Money social, family and past medical history was reviewed. REVIEW OF SYSTEMS:    Respiratory: Negative for apnea, chest tightness and shortness of breath or change in baseline breathing. Gastrointestinal: Negative for nausea, vomiting, abdominal pain, diarrhea, constipation, blood in stool and abdominal distention. PHYSICAL EXAM:   Nursing note and vitals reviewed. There were no vitals taken for this visit. as per patient  Constitutional: She appears well-developed and well-nourished. No acute distress. Skin: Skin appears to be warm and dry. No rashes or any other marks noted. She is not diaphoretic. Neurological/Psychiatric:She is alert and oriented to person, place, and time. Coordination is  normal.  Her mood isAppropriate and affect is Neutral/Euthymic(normal). Her behavior is normal.   thought content normal.   Musculoskeletal / Extremities: Gait is normal, assistive devices use: none. IMPRESSION:   1. Chronic pain syndrome    2. Degeneration of lumbar or lumbosacral intervertebral disc    3. Lumbar nerve root impingement    4. Spinal stenosis, lumbar region, without neurogenic claudication    5. Lumbosacral spondylosis without myelopathy    6. DDD (degenerative disc disease), cervical    7. Cervical stenosis of spine    8. Carpal tunnel syndrome, bilateral    9. Bilateral elbow joint pain    10. Numbness and tingling in both hands    11. S/P foot surgery, right, 1/18/21 with Dr. Pippa Still    12. Primary insomnia    13. Lumbar nerve root impingement, L2    14. Cervical stenosis of spine, mild        PLAN:  Informed verbal consent regarding treatment was obtained  -Continue with Percocet, ZTlido, Ambien  -Corrine exercises recommended  -CBT techniques- relaxation therapies such as biofeedback, mindfulness based stress reduction, imagery, cognitive restructuring, problem solving discussed with patient  -Last UDS 5/17/21 Consistent  -Return in about 4 weeks (around 8/16/2021).    -[place] record was obtained and reviewed  for the last one year and no indicators of drug misuse  were found. Any other controlled substance prescriptions  seen on the record have been accounted for, I am aware of the patient receiving these medications. Fer [place] record will be rechecked as part of office protocol. Current Outpatient Medications   Medication Sig Dispense Refill    Lidocaine (ZTLIDO) 1.8 % PTCH Apply 1 patch topically daily 90 patch 0    oxyCODONE-acetaminophen (PERCOCET) 5-325 MG per tablet Take 1 tablet by mouth every 8 hours as needed for Pain for up to 30 days. Take no more than 2-3 tabs orally prn 90 tablet 0    zolpidem (AMBIEN) 5 MG tablet Take 1 tablet by mouth nightly as needed for Sleep for up to 30 days. 30 tablet 0    Suvorexant (BELSOMRA) 5 MG TABS Take 5 mg by mouth nightly for 90 days. 90 tablet 0    torsemide (DEMADEX) 10 MG tablet TAKE 1 TABLET DAILY 90 tablet 1    TURMERIC PO Take by mouth      APPLE CIDER VINEGAR PO Take by mouth      Linoleic Acid-Sunflower Oil (CLA PO) Take by mouth      ELIQUIS 5 MG TABS tablet TAKE 1 TABLET BY MOUTH TWO TIMES A DAY  60 tablet 0     No current facility-administered medications for this visit. I will continue her current medication regimen  which is part of the above treatment schedule. It has been helping with Ms. Lara's chronic  medical problems which for this visit include:   Diagnoses of Chronic pain syndrome, Degeneration of lumbar or lumbosacral intervertebral disc, Lumbar nerve root impingement, Spinal stenosis, lumbar region, without neurogenic claudication, Lumbosacral spondylosis without myelopathy, DDD (degenerative disc disease), cervical, Cervical stenosis of spine, Carpal tunnel syndrome, bilateral, Bilateral elbow joint pain, Numbness and tingling in both hands, S/P foot surgery, right, 1/18/21 with Dr. Janett Llamas, Primary insomnia, Lumbar nerve root impingement, L2, and Cervical stenosis of spine, mild were pertinent to this visit. Risks and benefits of the medications and other alternative treatments  including no treatment were discussed with the patient. The common side effects of these medications were also explained to the patient. Informed verbal consent was obtained. Goals of current treatment regimen include improvement in pain, restoration of functioning- with focus on improvement in physical performance, general activity, work or disability,emotional distress, health care utilization and  decreased medication consumption. Will continue to monitor progress towards achieving/maintaining therapeutic goals with special emphasis on  1. Improvement in perceived interfernce  of pain with ADL's. Ability to do home exercises independently. Ability to do household chores indoor and/or outdoor work and social and leisure activities. Improve psychosocial and physical functioning. - she is showing progression towards this treatment goal with the current regimen. She was advised against drinking alcohol with the narcotic pain medicines, advised against driving or handling machinery while adjusting the dose of medicines or if having cognitive  issues related to the current medications. Risk of overdose and death, if medicines not taken as prescribed, were also discussed. If the patient develops new symptoms or if the symptoms worsen, the patient should call the office. While transcribing every attempt was made to maintain the accuracy of the note in terms of it's contents,there may have been some errors made inadvertently. Thank you for allowing me to participate in the care of this patient. Jeannine Dhillon.  Faisal CRUMN-CNP     Cc: Kendell Franks MD

## 2021-07-19 NOTE — PATIENT INSTRUCTIONS
Patient Education        Back Stretches: Exercises  Introduction  Here are some examples of exercises for stretching your back. Start each exercise slowly. Ease off the exercise if you start to have pain. Your doctor or physical therapist will tell you when you can start these exercises and which ones will work best for you. How to do the exercises  Overhead stretch   1. Stand comfortably with your feet shoulder-width apart. 2. Looking straight ahead, raise both arms over your head and reach toward the ceiling. Do not allow your head to tilt back. 3. Hold for 15 to 30 seconds, then lower your arms to your sides. 4. Repeat 2 to 4 times. Side stretch   1. Stand comfortably with your feet shoulder-width apart. 2. Raise one arm over your head, and then lean to the other side. 3. Slide your hand down your leg as you let the weight of your arm gently stretch your side muscles. Hold for 15 to 30 seconds. 4. Repeat 2 to 4 times on each side. Press-up   1. Lie on your stomach, supporting your body with your forearms. 2. Press your elbows down into the floor to raise your upper back. As you do this, relax your stomach muscles and allow your back to arch without using your back muscles. As your press up, do not let your hips or pelvis come off the floor. 3. Hold for 15 to 30 seconds, then relax. 4. Repeat 2 to 4 times. Relax and rest   1. Lie on your back with a rolled towel under your neck and a pillow under your knees. Extend your arms comfortably to your sides. 2. Relax and breathe normally. 3. Remain in this position for about 10 minutes. 4. If you can, do this 2 or 3 times each day. Follow-up care is a key part of your treatment and safety. Be sure to make and go to all appointments, and call your doctor if you are having problems. It's also a good idea to know your test results and keep a list of the medicines you take. Where can you learn more? Go to [email or web address].[email or web address].[email or web address] org and sign in to your [employer] account. Enter S228 in the [employer] box to learn more about \"Back Stretches: Exercises. \"     If you do not have an account, please click on the \"Sign Up Now\" link. Current as of: November 16, 2020               Content Version: 12.9  © 3695-7690 Healthwise, Incorporated. Care instructions adapted under license by [facility] ([facility]). If you have questions about a medical condition or this instruction, always ask your healthcare professional. [address] any warranty or liability for your use of this information.

## 2021-08-16 ENCOUNTER — TELEPHONE (OUTPATIENT)
Dept: PRIMARY CARE CLINIC | Age: 51
End: 2021-08-16

## 2021-08-16 ENCOUNTER — HOSPITAL ENCOUNTER (OUTPATIENT)
Dept: WOMENS IMAGING | Age: 51
Discharge: HOME | End: 2021-08-16
Payer: COMMERCIAL

## 2021-08-16 ENCOUNTER — OFFICE VISIT (OUTPATIENT)
Dept: PAIN MANAGEMENT | Age: 51
End: 2021-08-16
Payer: COMMERCIAL

## 2021-08-16 VITALS
WEIGHT: 141 LBS | BODY MASS INDEX: 24.2 KG/M2 | HEART RATE: 86 BPM | DIASTOLIC BLOOD PRESSURE: 72 MMHG | OXYGEN SATURATION: 98 % | SYSTOLIC BLOOD PRESSURE: 112 MMHG

## 2021-08-16 VITALS — HEIGHT: 64 IN | WEIGHT: 145 LBS | BODY MASS INDEX: 24.75 KG/M2

## 2021-08-16 DIAGNOSIS — Z98.890 S/P FOOT SURGERY, RIGHT: ICD-10-CM

## 2021-08-16 DIAGNOSIS — R20.0 NUMBNESS AND TINGLING IN BOTH HANDS: ICD-10-CM

## 2021-08-16 DIAGNOSIS — M25.522 BILATERAL ELBOW JOINT PAIN: ICD-10-CM

## 2021-08-16 DIAGNOSIS — M51.37 DEGENERATION OF LUMBAR OR LUMBOSACRAL INTERVERTEBRAL DISC: ICD-10-CM

## 2021-08-16 DIAGNOSIS — M48.061 SPINAL STENOSIS, LUMBAR REGION, WITHOUT NEUROGENIC CLAUDICATION: ICD-10-CM

## 2021-08-16 DIAGNOSIS — Z12.39 SCREENING BREAST EXAMINATION: ICD-10-CM

## 2021-08-16 DIAGNOSIS — M50.30 DDD (DEGENERATIVE DISC DISEASE), CERVICAL: ICD-10-CM

## 2021-08-16 DIAGNOSIS — M47.817 LUMBOSACRAL SPONDYLOSIS WITHOUT MYELOPATHY: ICD-10-CM

## 2021-08-16 DIAGNOSIS — M48.02 CERVICAL STENOSIS OF SPINE: ICD-10-CM

## 2021-08-16 DIAGNOSIS — R20.2 NUMBNESS AND TINGLING IN BOTH HANDS: ICD-10-CM

## 2021-08-16 DIAGNOSIS — G56.03 CARPAL TUNNEL SYNDROME, BILATERAL: ICD-10-CM

## 2021-08-16 DIAGNOSIS — M54.16 LUMBAR NERVE ROOT IMPINGEMENT: ICD-10-CM

## 2021-08-16 DIAGNOSIS — G89.4 CHRONIC PAIN SYNDROME: ICD-10-CM

## 2021-08-16 DIAGNOSIS — M25.521 BILATERAL ELBOW JOINT PAIN: ICD-10-CM

## 2021-08-16 DIAGNOSIS — R60.9 EDEMA, UNSPECIFIED TYPE: Primary | ICD-10-CM

## 2021-08-16 DIAGNOSIS — S39.012A STRAIN OF LUMBAR REGION, INITIAL ENCOUNTER: ICD-10-CM

## 2021-08-16 PROCEDURE — 77063 BREAST TOMOSYNTHESIS BI: CPT

## 2021-08-16 PROCEDURE — 20553 NJX 1/MLT TRIGGER POINTS 3/>: CPT | Performed by: NURSE PRACTITIONER

## 2021-08-16 PROCEDURE — 99214 OFFICE O/P EST MOD 30 MIN: CPT | Performed by: NURSE PRACTITIONER

## 2021-08-16 RX ORDER — LIDOCAINE 36 MG/1
1 PATCH TOPICAL DAILY
Qty: 90 PATCH | Refills: 0 | Status: SHIPPED | OUTPATIENT
Start: 2021-08-16 | End: 2021-11-14

## 2021-08-16 RX ORDER — TRIAMCINOLONE ACETONIDE 40 MG/ML
40 INJECTION, SUSPENSION INTRA-ARTICULAR; INTRAMUSCULAR ONCE
Status: COMPLETED | OUTPATIENT
Start: 2021-08-16 | End: 2021-08-16

## 2021-08-16 RX ORDER — SUVOREXANT 5 MG/1
5 TABLET, FILM COATED ORAL NIGHTLY
Qty: 90 TABLET | Refills: 0 | Status: SHIPPED | OUTPATIENT
Start: 2021-08-16 | End: 2021-09-13

## 2021-08-16 RX ORDER — OXYCODONE HYDROCHLORIDE AND ACETAMINOPHEN 5; 325 MG/1; MG/1
1 TABLET ORAL EVERY 8 HOURS PRN
Qty: 30 TABLET | Refills: 0 | Status: SHIPPED | OUTPATIENT
Start: 2021-08-16 | End: 2021-09-13 | Stop reason: SDUPTHER

## 2021-08-16 RX ORDER — METHYLPREDNISOLONE 4 MG/1
TABLET ORAL
Qty: 1 KIT | Refills: 0 | Status: SHIPPED | OUTPATIENT
Start: 2021-08-16 | End: 2021-08-22

## 2021-08-16 RX ORDER — OXYCODONE HYDROCHLORIDE AND ACETAMINOPHEN 5; 325 MG/1; MG/1
1 TABLET ORAL EVERY 8 HOURS PRN
Qty: 90 TABLET | Refills: 0 | Status: SHIPPED | OUTPATIENT
Start: 2021-08-16 | End: 2021-08-16 | Stop reason: SDUPTHER

## 2021-08-16 RX ADMIN — TRIAMCINOLONE ACETONIDE 40 MG: 40 INJECTION, SUSPENSION INTRA-ARTICULAR; INTRAMUSCULAR at 19:37

## 2021-08-16 NOTE — PROGRESS NOTES
Karis Lara  1970  [id number]      HISTORY OF PRESENT ILLNESS:  Ms. Jaspal Stokes is a 53 y.o. female returns for a follow up visit for pain management  She has a diagnosis of   1. Chronic pain syndrome    2. Strain of lumbar region, initial encounter    3. Lumbar nerve root impingement, L2    4. Degeneration of lumbar or lumbosacral intervertebral disc    5. Lumbar nerve root impingement    6. Spinal stenosis, lumbar region, without neurogenic claudication    7. Lumbosacral spondylosis without myelopathy    8. DDD (degenerative disc disease), cervical    9. Cervical stenosis of spine, mild    10. Carpal tunnel syndrome, bilateral    11. Bilateral elbow joint pain    12. Numbness and tingling in both hands    13. S/P foot surgery, right, 1/18/21 with Dr. John Case      On the Patients Pain Assessment form reviewed with the Medical Assistant:  She complains of pain in the Low back, left hip, left upper leg  She rates the pain 9/10 and describes it as aching. Current treatment regimen has helped relieve about 50% of the pain. She denies any side effects from the current pain regimen. Patient reports that since the last follow up visit the physical functioning is unchanged, family/social relationships are unchanged, mood is unchanged sleep patterns are unchanged, and that the overall functioning is unchanged. Patient denies misusing/abusing her narcotic pain medications or using any illegal drugs. Upon obtaining medical history from Ms. Lara states that pain is manageable on current pain therapy. Reports having strained her back while working at her home, tenderness mainly to the lower back. Takes pain medications as prescribed. Currently under the care of Dr. Genaro Wong at [facility] [address] for Trigger Finger to bilateral middle fingers, right thumb. Was advised to f/u with rheumatology after observing swelling to feet. Mood is stable without anxiety. Sleep is fair with an average of 5-6 hours.  Denies to having issues of constipation. Tolerating activities/house chores with moderate tenderness to the lower back. ALLERGIES: Patients list of allergies were reviewed     MEDICATIONS: Ms. Cristiana Rashid list of medications were reviewed. Her current medications are   Outpatient Medications Prior to Visit   Medication Sig Dispense Refill    torsemide (DEMADEX) 10 MG tablet TAKE 1 TABLET DAILY 90 tablet 1    TURMERIC PO Take by mouth      APPLE CIDER VINEGAR PO Take by mouth      Linoleic Acid-Sunflower Oil (CLA PO) Take by mouth      ELIQUIS 5 MG TABS tablet TAKE 1 TABLET BY MOUTH TWO TIMES A DAY  60 tablet 0    Lidocaine (ZTLIDO) 1.8 % PTCH Apply 1 patch topically daily 90 patch 0    oxyCODONE-acetaminophen (PERCOCET) 5-325 MG per tablet Take 1 tablet by mouth every 8 hours as needed for Pain for up to 30 days. Take no more than 2-3 tabs orally prn 90 tablet 0    Suvorexant (BELSOMRA) 5 MG TABS Take 5 mg by mouth nightly for 90 days. 90 tablet 0     No facility-administered medications prior to visit. SOCIAL/FAMILY/PAST MEDICAL HISTORY: Ms. Cristiana Rashid social, family and past medical history was reviewed. REVIEW OF SYSTEMS:    Respiratory: Negative for apnea, chest tightness and shortness of breath or change in baseline breathing. Gastrointestinal: Negative for nausea, vomiting, abdominal pain, diarrhea, constipation, blood in stool and abdominal distention. PHYSICAL EXAM:   Nursing note and vitals reviewed. /72   Pulse 86   Wt 141 lb (64 kg)   SpO2 98%   BMI 24.20 kg/m²   Constitutional: She appears well-developed and well-nourished. No acute distress. Skin: Skin is warm and dry, good turgor. No rash noted. She is not diaphoretic. Cardiovascular: Normal rate, regular rhythm, normal heart sounds, and does not have murmur. Pulmonary/Chest: Effort normal. No respiratory distress. She does not have wheezes in the lung fields. She has no rales.      Neurological/Psychiatric:She is alert and oriented to person, place, and time. Coordination is  normal.  Her mood isAppropriate and affect is Neutral/Euthymic(normal) . IMPRESSION:   1. Chronic pain syndrome    2. Strain of lumbar region, initial encounter    3. Lumbar nerve root impingement, L2    4. Degeneration of lumbar or lumbosacral intervertebral disc    5. Lumbar nerve root impingement    6. Spinal stenosis, lumbar region, without neurogenic claudication    7. Lumbosacral spondylosis without myelopathy    8. DDD (degenerative disc disease), cervical    9. Cervical stenosis of spine, mild    10. Carpal tunnel syndrome, bilateral    11. Bilateral elbow joint pain    12. Numbness and tingling in both hands    13. S/P foot surgery, right, 1/18/21 with Dr. Car Adams:  Informed verbal consent was obtained  -Continue with Percocet, ZTlido, Bernis Lyndon was prescribed x10 days pending, Medrol dose pack  -Patient has an appointment with rheumatology  -Corrine exercises/back stretches recommended  -Coaching completed on UDS negative for opioid in May, reports taking them as prescribed, UDS will be completed today  -Educations provided on TPI of the Lumbar spine, side effects reviewed including a temporary elevation in blood sugars, advised to monitor closely. She verbalized understanding, and voiced interest  -CBT techniques- relaxation therapies such as biofeedback, mindfulness based stress reduction, imagery, cognitive restructuring, problem solving discussed with patient  -Last UDS 5/17/21 Consistent  -Return in about 4 weeks (around 9/13/2021). -Informed verbal consent was obtained from the patient. Risks and benefits of the procedure were explained. Complications of the procedure and side effects of kenalog/ lidocaine were discussed with patient. Using 0.25% marcaine and 1cc of kenalog, the the tender trigger point areas in the  bilateral paraspinal lumbar muscles -erector spinae and longgissmus muscles were injected under aseptic condition. Mobilization attempted by stretching. Patient tolerated procedure well. Adv to apply ice today. Current Outpatient Medications   Medication Sig Dispense Refill    Lidocaine (ZTLIDO) 1.8 % PTCH Apply 1 patch topically daily 90 patch 0    Suvorexant (BELSOMRA) 5 MG TABS Take 5 mg by mouth nightly for 90 days. 90 tablet 0    methylPREDNISolone (MEDROL DOSEPACK) 4 MG tablet Take by mouth. 1 kit 0    oxyCODONE-acetaminophen (PERCOCET) 5-325 MG per tablet Take 1 tablet by mouth every 8 hours as needed for Pain for up to 10 days. Take no more than 2-3 tabs orally prn 30 tablet 0    torsemide (DEMADEX) 10 MG tablet TAKE 1 TABLET DAILY 90 tablet 1    TURMERIC PO Take by mouth      APPLE CIDER VINEGAR PO Take by mouth      Linoleic Acid-Sunflower Oil (CLA PO) Take by mouth      ELIQUIS 5 MG TABS tablet TAKE 1 TABLET BY MOUTH TWO TIMES A DAY  60 tablet 0    traZODone (DESYREL) 50 MG tablet Take 1 tablet by mouth nightly as needed for Sleep 90 tablet 1     No current facility-administered medications for this visit. I will continue her current medication regimen  which is part of the above treatment schedule. It has been helping with Ms. Lara's chronic  medical problems which for this visit include:   Diagnoses of Chronic pain syndrome, Strain of lumbar region, initial encounter, Lumbar nerve root impingement, L2, Degeneration of lumbar or lumbosacral intervertebral disc, Lumbar nerve root impingement, Spinal stenosis, lumbar region, without neurogenic claudication, Lumbosacral spondylosis without myelopathy, DDD (degenerative disc disease), cervical, Cervical stenosis of spine, mild, Carpal tunnel syndrome, bilateral, Bilateral elbow joint pain, Numbness and tingling in both hands, and S/P foot surgery, right, 1/18/21 with Dr. Edna Mcginnis were pertinent to this visit. Risks and benefits of the medications and other alternative treatments  including no treatment were discussed with the patient. The common side effects of these medications were also explained to the patient. Informed verbal consent was obtained. Goals of current treatment regimen include improvement in pain, restoration of functioning- with focus on improvement in physical performance, general activity, work or disability,emotional distress, health care utilization and  decreased medication consumption. Will continue to monitor progress towards achieving/maintaining therapeutic goals with special emphasis on  1. Improvement in perceived interfernce  of pain with ADL's. Ability to do home exercises independently. Ability to do household chores indoor and/or outdoor work and social and leisure activities. Improve psychosocial and physical functioning. - she is showing progression towards this treatment goal with the current regimen. She was advised against drinking alcohol with the narcotic pain medicines, advised against driving or handling machinery while adjusting the dose of medicines or if having cognitive  issues related to the current medications. Risk of overdose and death, if medicines not taken as prescribed, were also discussed. If the patient develops new symptoms or if the symptoms worsen, the patient should call the office. While transcribing every attempt was made to maintain the accuracy of the note in terms of it's contents,there may have been some errors made inadvertently. Thank you for allowing me to participate in the care of this patient.     Yadi Olivares, JUSTICE    Cc: Enedina Roman MD

## 2021-08-16 NOTE — PATIENT INSTRUCTIONS
Patient Education        Back Stretches: Exercises  Introduction  Here are some examples of exercises for stretching your back. Start each exercise slowly. Ease off the exercise if you start to have pain. Your doctor or physical therapist will tell you when you can start these exercises and which ones will work best for you. How to do the exercises  Overhead stretch   1. Stand comfortably with your feet shoulder-width apart. 2. Looking straight ahead, raise both arms over your head and reach toward the ceiling. Do not allow your head to tilt back. 3. Hold for 15 to 30 seconds, then lower your arms to your sides. 4. Repeat 2 to 4 times. Side stretch   1. Stand comfortably with your feet shoulder-width apart. 2. Raise one arm over your head, and then lean to the other side. 3. Slide your hand down your leg as you let the weight of your arm gently stretch your side muscles. Hold for 15 to 30 seconds. 4. Repeat 2 to 4 times on each side. Press-up   1. Lie on your stomach, supporting your body with your forearms. 2. Press your elbows down into the floor to raise your upper back. As you do this, relax your stomach muscles and allow your back to arch without using your back muscles. As your press up, do not let your hips or pelvis come off the floor. 3. Hold for 15 to 30 seconds, then relax. 4. Repeat 2 to 4 times. Relax and rest   1. Lie on your back with a rolled towel under your neck and a pillow under your knees. Extend your arms comfortably to your sides. 2. Relax and breathe normally. 3. Remain in this position for about 10 minutes. 4. If you can, do this 2 or 3 times each day. Follow-up care is a key part of your treatment and safety. Be sure to make and go to all appointments, and call your doctor if you are having problems. It's also a good idea to know your test results and keep a list of the medicines you take. Where can you learn more? Go to https://gagan.healthEnabled Employment. org and sign in to your Hitsbook account. Enter Z501 in the [employer] box to learn more about \"Back Stretches: Exercises. \"     If you do not have an account, please click on the \"Sign Up Now\" link. Current as of: November 16, 2020               Content Version: 12.9  © 3686-2109 Healthwise, Incorporated. Care instructions adapted under license by [facility] ([facility]). If you have questions about a medical condition or this instruction, always ask your healthcare professional. [address] any warranty or liability for your use of this information.

## 2021-08-16 NOTE — TELEPHONE ENCOUNTER
Pt went to see an orthopaedic surgeon for trigger finger. She received injections in her hands. She was referred to rheumatology. She can't get in until October. She wants to know if Dr. Junior Wisdom can do something for her to help her get in somewhere sooner? Pt was not sure of the name of the rheumatologist but she says it was someone in [facility].

## 2021-08-18 ENCOUNTER — TELEPHONE (OUTPATIENT)
Dept: PRIMARY CARE CLINIC | Age: 51
End: 2021-08-18

## 2021-08-18 DIAGNOSIS — G47.9 SLEEP DISORDER: Primary | ICD-10-CM

## 2021-08-18 PROBLEM — D68.9 COAGULATION DISORDER (HCC): Chronic | Status: ACTIVE | Noted: 2020-11-06

## 2021-08-18 RX ORDER — TRAZODONE HYDROCHLORIDE 50 MG/1
50 TABLET ORAL NIGHTLY PRN
Qty: 90 TABLET | Refills: 1 | Status: SHIPPED | OUTPATIENT
Start: 2021-08-18 | End: 2022-01-28

## 2021-09-13 ENCOUNTER — OFFICE VISIT (OUTPATIENT)
Dept: PAIN MANAGEMENT | Age: 51
End: 2021-09-13
Payer: COMMERCIAL

## 2021-09-13 VITALS
BODY MASS INDEX: 24.72 KG/M2 | TEMPERATURE: 98.6 F | SYSTOLIC BLOOD PRESSURE: 122 MMHG | DIASTOLIC BLOOD PRESSURE: 72 MMHG | WEIGHT: 144 LBS | OXYGEN SATURATION: 98 % | HEART RATE: 73 BPM

## 2021-09-13 DIAGNOSIS — M54.16 LUMBAR NERVE ROOT IMPINGEMENT: ICD-10-CM

## 2021-09-13 DIAGNOSIS — G89.4 CHRONIC PAIN SYNDROME: ICD-10-CM

## 2021-09-13 DIAGNOSIS — M25.521 BILATERAL ELBOW JOINT PAIN: ICD-10-CM

## 2021-09-13 DIAGNOSIS — M51.37 DEGENERATION OF LUMBAR OR LUMBOSACRAL INTERVERTEBRAL DISC: ICD-10-CM

## 2021-09-13 DIAGNOSIS — M47.817 LUMBOSACRAL SPONDYLOSIS WITHOUT MYELOPATHY: ICD-10-CM

## 2021-09-13 DIAGNOSIS — S39.012A STRAIN OF LUMBAR REGION, INITIAL ENCOUNTER: ICD-10-CM

## 2021-09-13 DIAGNOSIS — R20.0 NUMBNESS AND TINGLING IN BOTH HANDS: ICD-10-CM

## 2021-09-13 DIAGNOSIS — M50.30 DDD (DEGENERATIVE DISC DISEASE), CERVICAL: ICD-10-CM

## 2021-09-13 DIAGNOSIS — M25.522 BILATERAL ELBOW JOINT PAIN: ICD-10-CM

## 2021-09-13 DIAGNOSIS — G56.03 CARPAL TUNNEL SYNDROME, BILATERAL: ICD-10-CM

## 2021-09-13 DIAGNOSIS — M48.061 SPINAL STENOSIS, LUMBAR REGION, WITHOUT NEUROGENIC CLAUDICATION: ICD-10-CM

## 2021-09-13 DIAGNOSIS — R20.2 NUMBNESS AND TINGLING IN BOTH HANDS: ICD-10-CM

## 2021-09-13 DIAGNOSIS — M48.02 CERVICAL STENOSIS OF SPINE: ICD-10-CM

## 2021-09-13 PROCEDURE — 99213 OFFICE O/P EST LOW 20 MIN: CPT | Performed by: NURSE PRACTITIONER

## 2021-09-13 RX ORDER — OXYCODONE HYDROCHLORIDE AND ACETAMINOPHEN 5; 325 MG/1; MG/1
1 TABLET ORAL EVERY 8 HOURS PRN
Qty: 90 TABLET | Refills: 0 | Status: SHIPPED | OUTPATIENT
Start: 2021-09-13 | End: 2021-10-18 | Stop reason: SDUPTHER

## 2021-09-13 RX ORDER — LIDOCAINE 36 MG/1
1 PATCH TOPICAL DAILY
Qty: 90 PATCH | Refills: 0 | Status: SHIPPED | OUTPATIENT
Start: 2021-09-13 | End: 2021-10-05

## 2021-09-13 NOTE — PATIENT INSTRUCTIONS
Patient Education        Back Stretches: Exercises  Introduction  Here are some examples of exercises for stretching your back. Start each exercise slowly. Ease off the exercise if you start to have pain. Your doctor or physical therapist will tell you when you can start these exercises and which ones will work best for you. How to do the exercises  Overhead stretch   1. Stand comfortably with your feet shoulder-width apart. 2. Looking straight ahead, raise both arms over your head and reach toward the ceiling. Do not allow your head to tilt back. 3. Hold for 15 to 30 seconds, then lower your arms to your sides. 4. Repeat 2 to 4 times. Side stretch   1. Stand comfortably with your feet shoulder-width apart. 2. Raise one arm over your head, and then lean to the other side. 3. Slide your hand down your leg as you let the weight of your arm gently stretch your side muscles. Hold for 15 to 30 seconds. 4. Repeat 2 to 4 times on each side. Press-up   1. Lie on your stomach, supporting your body with your forearms. 2. Press your elbows down into the floor to raise your upper back. As you do this, relax your stomach muscles and allow your back to arch without using your back muscles. As your press up, do not let your hips or pelvis come off the floor. 3. Hold for 15 to 30 seconds, then relax. 4. Repeat 2 to 4 times. Relax and rest   1. Lie on your back with a rolled towel under your neck and a pillow under your knees. Extend your arms comfortably to your sides. 2. Relax and breathe normally. 3. Remain in this position for about 10 minutes. 4. If you can, do this 2 or 3 times each day. Follow-up care is a key part of your treatment and safety. Be sure to make and go to all appointments, and call your doctor if you are having problems. It's also a good idea to know your test results and keep a list of the medicines you take. Where can you learn more? Go to [email or web address] and sign in to your Tumblr account. Enter D240 in the adRise box to learn more about \"Back Stretches: Exercises. \"     If you do not have an account, please click on the \"Sign Up Now\" link. Current as of: November 16, 2020               Content Version: 12.9  © 7982-1973 [employer]wise, Incorporated. Care instructions adapted under license by [facility] ([facility]). If you have questions about a medical condition or this instruction, always ask your healthcare professional. [address] any warranty or liability for your use of this information.

## 2021-09-13 NOTE — PROGRESS NOTES
Francisco Lara  1970  [id number]      HISTORY OF PRESENT ILLNESS:  Ms. Josi Reddy is a 53 y.o. female returns for a follow up visit for pain management  She has a diagnosis of   1. Chronic pain syndrome    2. Degeneration of lumbar or lumbosacral intervertebral disc    3. DDD (degenerative disc disease), cervical    4. Bilateral elbow joint pain    5. Strain of lumbar region, initial encounter    6. Spinal stenosis, lumbar region, without neurogenic claudication    7. Cervical stenosis of spine, mild    8. Numbness and tingling in both hands    9. Lumbar nerve root impingement, L2    10. Lumbosacral spondylosis without myelopathy    11. Carpal tunnel syndrome, bilateral      On the Patients Pain Assessment form reviewed with the Medical Assistant:  She complains of pain in the bilateral lower back and left upper left thigh pain She rates the pain 6/10 to 7/10 and describes it as aching. Current treatment regimen has helped relieve about 40% to 50% of the pain. She denies any side effects from the current pain regimen. Patient reports that since the last follow up visit the physical functioning is worse, family/social relationships are better, mood is unchanged sleep patterns are worse, and that the overall functioning is unchanged. Patient denies misusing/abusing her narcotic pain medications or using any illegal drugs. There are No indicators for possible drug abuse, addiction or diversion problems. Upon obtaining medical history from Ms. Lara states that pain is manageable on current pain therapy. Takes pain medications as prescribed. Continues to f/u with hand specialists for CTS. Scheduled to f/u with rheumatology for swelling to joints next month. She was referred to Dr. Loly Guy by Dr. Leann Marie for left Hip, imaging studies were normal. Mood is stable without anxiety. Sleep is poor on Trazodone with 6-7 hours, sundays seem to be the days that cause less sleep. Denies to having issues of constipation. Tolerating activities/house chores with moderate tenderness to the lower back. ALLERGIES: Patients list of allergies were reviewed     MEDICATIONS: Ms. Aurelio Olszewski list of medications were reviewed. Her current medications are   Outpatient Medications Prior to Visit   Medication Sig Dispense Refill    traZODone (DESYREL) 50 MG tablet Take 1 tablet by mouth nightly as needed for Sleep 90 tablet 1    Lidocaine (ZTLIDO) 1.8 % PTCH Apply 1 patch topically daily 90 patch 0    torsemide (DEMADEX) 10 MG tablet TAKE 1 TABLET DAILY 90 tablet 1    TURMERIC PO Take by mouth      APPLE CIDER VINEGAR PO Take by mouth      Linoleic Acid-Sunflower Oil (CLA PO) Take by mouth      ELIQUIS 5 MG TABS tablet TAKE 1 TABLET BY MOUTH TWO TIMES A DAY  60 tablet 0    Suvorexant (BELSOMRA) 5 MG TABS Take 5 mg by mouth nightly for 90 days. 90 tablet 0     No facility-administered medications prior to visit. SOCIAL/FAMILY/PAST MEDICAL HISTORY: Ms. Aurelio Olszewski social, family and past medical history was reviewed. REVIEW OF SYSTEMS:    Respiratory: Negative for apnea, chest tightness and shortness of breath or change in baseline breathing. Gastrointestinal: Negative for nausea, vomiting, abdominal pain, diarrhea, constipation, blood in stool and abdominal distention. PHYSICAL EXAM:   Nursing note and vitals reviewed. /72 (Site: Left Upper Arm, Position: Sitting, Cuff Size: Medium Adult)   Pulse 73   Temp 98.6 °F (37 °C) (Infrared)   Wt 144 lb (65.3 kg)   SpO2 98%   BMI 24.72 kg/m²   Constitutional: She appears well-developed and well-nourished. No acute distress. Skin: Skin is warm and dry, good turgor. No rash noted. She is not diaphoretic. Cardiovascular: Normal rate, regular rhythm, normal heart sounds, and does not have murmur. Pulmonary/Chest: Effort normal. No respiratory distress. She does not have wheezes in the lung fields. She has no rales.      Neurological/Psychiatric:She is alert and oriented to person, place, and time. Coordination is  normal.  Her mood isAppropriate and affect is Neutral/Euthymic(normal) . IMPRESSION:   1. Chronic pain syndrome    2. Degeneration of lumbar or lumbosacral intervertebral disc    3. DDD (degenerative disc disease), cervical    4. Bilateral elbow joint pain    5. Strain of lumbar region, initial encounter    6. Spinal stenosis, lumbar region, without neurogenic claudication    7. Cervical stenosis of spine, mild    8. Numbness and tingling in both hands    9. Lumbar nerve root impingement, L2    10. Lumbosacral spondylosis without myelopathy    11. Carpal tunnel syndrome, bilateral        PLAN:  Informed verbal consent was obtained  -Continue with Percocet, ZTlido  -Currently on Trazodone for insomnia  -Corrine exercises/back stretches recommended  -Maintain f/u with Dr. Kaz Gomez for Left Hip pain  -CBT techniques- relaxation therapies such as biofeedback, mindfulness based stress reduction, imagery, cognitive restructuring, problem solving discussed with patient  -Last UDS 8/16/21 Consistent  -Return in about 4 weeks (around 10/11/2021). Current Outpatient Medications   Medication Sig Dispense Refill    oxyCODONE-acetaminophen (PERCOCET) 5-325 MG per tablet Take 1 tablet by mouth every 8 hours as needed for Pain for up to 30 days.  Take no more than 2-3 tabs orally prn 90 tablet 0    Lidocaine (ZTLIDO) 1.8 % PTCH Apply 1 patch topically daily 90 patch 0    traZODone (DESYREL) 50 MG tablet Take 1 tablet by mouth nightly as needed for Sleep 90 tablet 1    Lidocaine (ZTLIDO) 1.8 % PTCH Apply 1 patch topically daily 90 patch 0    torsemide (DEMADEX) 10 MG tablet TAKE 1 TABLET DAILY 90 tablet 1    TURMERIC PO Take by mouth      APPLE CIDER VINEGAR PO Take by mouth      Linoleic Acid-Sunflower Oil (CLA PO) Take by mouth      ELIQUIS 5 MG TABS tablet TAKE 1 TABLET BY MOUTH TWO TIMES A DAY  60 tablet 0     No current facility-administered medications for this visit.     I will continue her current medication regimen  which is part of the above treatment schedule. It has been helping with Ms. Lara's chronic  medical problems which for this visit include:   Diagnoses of Chronic pain syndrome, Degeneration of lumbar or lumbosacral intervertebral disc, DDD (degenerative disc disease), cervical, Bilateral elbow joint pain, Strain of lumbar region, initial encounter, Spinal stenosis, lumbar region, without neurogenic claudication, Cervical stenosis of spine, mild, Numbness and tingling in both hands, Lumbar nerve root impingement, L2, Lumbosacral spondylosis without myelopathy, and Carpal tunnel syndrome, bilateral were pertinent to this visit. Risks and benefits of the medications and other alternative treatments  including no treatment were discussed with the patient. The common side effects of these medications were also explained to the patient. Informed verbal consent was obtained. Goals of current treatment regimen include improvement in pain, restoration of functioning- with focus on improvement in physical performance, general activity, work or disability,emotional distress, health care utilization and  decreased medication consumption. Will continue to monitor progress towards achieving/maintaining therapeutic goals with special emphasis on  1. Improvement in perceived interfernce  of pain with ADL's. Ability to do home exercises independently. Ability to do household chores indoor and/or outdoor work and social and leisure activities. Improve psychosocial and physical functioning. - she is showing progression towards this treatment goal with the current regimen. She was advised against drinking alcohol with the narcotic pain medicines, advised against driving or handling machinery while adjusting the dose of medicines or if having cognitive  issues related to the current medications. Risk of overdose and death, if medicines not taken as prescribed, were also

## 2021-10-01 ENCOUNTER — OFFICE VISIT (OUTPATIENT)
Dept: PRIMARY CARE CLINIC | Age: 51
End: 2021-10-01
Payer: COMMERCIAL

## 2021-10-01 VITALS
OXYGEN SATURATION: 99 % | HEART RATE: 74 BPM | SYSTOLIC BLOOD PRESSURE: 118 MMHG | TEMPERATURE: 98.1 F | WEIGHT: 143.8 LBS | BODY MASS INDEX: 24.55 KG/M2 | DIASTOLIC BLOOD PRESSURE: 78 MMHG | HEIGHT: 64 IN

## 2021-10-01 DIAGNOSIS — Z13.220 LIPID SCREENING: ICD-10-CM

## 2021-10-01 DIAGNOSIS — Z00.8 ENCOUNTER FOR BIOMETRIC SCREENING: ICD-10-CM

## 2021-10-01 DIAGNOSIS — Z13.1 DIABETES MELLITUS SCREENING: ICD-10-CM

## 2021-10-01 DIAGNOSIS — D68.9 COAGULATION DISORDER (HCC): ICD-10-CM

## 2021-10-01 DIAGNOSIS — G89.4 CHRONIC PAIN SYNDROME: Primary | ICD-10-CM

## 2021-10-01 DIAGNOSIS — Z11.59 ENCOUNTER FOR HCV SCREENING TEST FOR LOW RISK PATIENT: ICD-10-CM

## 2021-10-01 PROCEDURE — 99214 OFFICE O/P EST MOD 30 MIN: CPT | Performed by: FAMILY MEDICINE

## 2021-10-01 RX ORDER — CARTILAGE/COLLAGEN/BOR/HYALUR 40-10-5 MG
TABLET ORAL DAILY
Status: ON HOLD | COMMUNITY
End: 2022-02-19

## 2021-10-01 RX ORDER — DOCUSATE SODIUM 100 MG/1
100 CAPSULE, LIQUID FILLED ORAL 2 TIMES DAILY
COMMUNITY
End: 2022-08-23 | Stop reason: ALTCHOICE

## 2021-10-01 RX ORDER — FERROUS SULFATE 325(65) MG
TABLET ORAL
COMMUNITY
Start: 2021-09-22

## 2021-10-01 RX ORDER — CHOLECALCIFEROL (VITAMIN D3) 125 MCG
500 CAPSULE ORAL DAILY
COMMUNITY

## 2021-10-01 RX ORDER — PHENOL 1.4 %
AEROSOL, SPRAY (ML) MUCOUS MEMBRANE DAILY
COMMUNITY

## 2021-10-01 SDOH — ECONOMIC STABILITY: FOOD INSECURITY: WITHIN THE PAST 12 MONTHS, THE FOOD YOU BOUGHT JUST DIDN'T LAST AND YOU DIDN'T HAVE MONEY TO GET MORE.: NEVER TRUE

## 2021-10-01 SDOH — ECONOMIC STABILITY: FOOD INSECURITY: WITHIN THE PAST 12 MONTHS, YOU WORRIED THAT YOUR FOOD WOULD RUN OUT BEFORE YOU GOT MONEY TO BUY MORE.: NEVER TRUE

## 2021-10-01 ASSESSMENT — SOCIAL DETERMINANTS OF HEALTH (SDOH): HOW HARD IS IT FOR YOU TO PAY FOR THE VERY BASICS LIKE FOOD, HOUSING, MEDICAL CARE, AND HEATING?: NOT HARD AT ALL

## 2021-10-01 ASSESSMENT — ENCOUNTER SYMPTOMS
ALLERGIC/IMMUNOLOGIC COMMENTS: OTC
SHORTNESS OF BREATH: 0
EYES NEGATIVE: 1
COUGH: 0
CHEST TIGHTNESS: 0

## 2021-10-01 NOTE — PROGRESS NOTES
SUBJECTIVE:  Patient ID: Jenise Boothe is a 53 y.o. female.   Chief Complaint:  Chief Complaint   Patient presents with    Edema     extremities    Flank Pain     lower left       HPI   53year old Female  Trigger Finger Middle bilateral by Dr Trina Sanz  2021  Told check for Diabetes  Hx Swollen feet off/on  Rheumatology ho Dr Horace Carrel next month   Rebekah Gutting feeling LLQ area Hx Diverticulosis   Spasm in leg off/on  Hx DVT  Under care Hematology Dr Frieda Parrish visit Q 6 month last seen 2021    Past Medical History:   Diagnosis Date    Acne     Dr Cristina Escalona 5 Leiden mutation, heterozygous [facility])     Medical history reviewed with no changes      Past Surgical History:   Procedure Laterality Date    CARPAL TUNNEL RELEASE Bilateral      SECTION  2001     x1    PAIN MANAGEMENT PROCEDURE Left 10/26/2020    LEFT L4 AND L5 TRANSFORAMINAL EPDIURAL STEROID INJECTION WITH FLUOROSCOPY (87082, 55119) performed by Johann Oconnell MD at [address] Left 2020    LEFT LUMBAR FOUR LUMBAR FIVE TRANSFORAMINAL  EPIDURAL STEROID INJECTION SITE CONFIRMED BY FLUOROSCOPY performed by Johann Oconnell MD at [address]     No Known Allergies    Family History   Problem Relation Age of Onset    No Known Problems Mother         Healthy 66year old     Heart Disease Father 78        +Stent    High Cholesterol Father     Other Brother         do not talk +drug      Social History     Social History Narrative    Divorce    BF    Son 23year old  & 2 step son 21 @ 25    All children @[employer]    No tobacco       Patient Active Problem List   Diagnosis    Allergic state    Diverticulosis of intestine without bleeding    Degeneration of lumbar or lumbosacral intervertebral disc    Lumbosacral spondylosis without myelopathy    Spinal stenosis, lumbar region, without neurogenic claudication    Disc displacement, lumbar    Lumbar stenosis    Chronic pain syndrome    Lumbar nerve root impingement, L2    Depression    Primary insomnia    Drug induced constipation    Strain of neck, initial encounter    Acute deep vein thrombosis (DVT) of distal vein of lower extremity (HCC)    Carpal tunnel syndrome, bilateral    Coagulation disorder (HCC)     Current Outpatient Medications   Medication Sig Dispense Refill    vitamin B-12 (CYANOCOBALAMIN) 500 MCG tablet Take 500 mcg by mouth daily      docusate sodium (COLACE) 100 MG capsule Take 100 mg by mouth 2 times daily      Saccharomyces boulardii (FLORASTOR PO) Take by mouth daily      ferrous sulfate (IRON 325) 325 (65 Fe) MG tablet TAKE 1 TABLET EVERY OTHER DAY      Resveratrol 250 MG CAPS Take by mouth daily      Collagen-Boron-Hyaluronic Acid ([employer]) 40-5-3.3 MG TABS Take by mouth daily      oxyCODONE-acetaminophen (PERCOCET) 5-325 MG per tablet Take 1 tablet by mouth every 8 hours as needed for Pain for up to 30 days. Take no more than 2-3 tabs orally prn 90 tablet 0    traZODone (DESYREL) 50 MG tablet Take 1 tablet by mouth nightly as needed for Sleep 90 tablet 1    Lidocaine (ZTLIDO) 1.8 % PTCH Apply 1 patch topically daily 90 patch 0    torsemide (DEMADEX) 10 MG tablet TAKE 1 TABLET DAILY 90 tablet 1    TURMERIC PO Take by mouth      APPLE CIDER VINEGAR PO Take by mouth      Linoleic Acid-Sunflower Oil (CLA PO) Take by mouth      ELIQUIS 5 MG TABS tablet TAKE 1 TABLET BY MOUTH TWO TIMES A DAY  60 tablet 0     No current facility-administered medications for this visit.      Lab Results   Component Value Date    WBC 6.7 03/22/2021    HGB 13.1 03/22/2021    HCT 38.9 03/22/2021    MCV 93.0 03/22/2021     03/22/2021     Lab Results   Component Value Date    CHOL 241 (H) 11/16/2020    CHOL 204 (H) 10/11/2012    CHOL 198 11/02/2010     Lab Results   Component Value Date    TRIG 71 11/16/2020    TRIG 93 10/11/2012    TRIG 120 11/02/2010     Lab Results   Component Value Date    HDL 76 (H) 11/16/2020 HDL 85 (H) 10/11/2012    HDL 48 11/02/2010     Lab Results   Component Value Date    LDLCALC 151 (H) 11/16/2020    LDLCALC 101 (H) 10/11/2012    LDLCALC 126 11/02/2010     Lab Results   Component Value Date    LABVLDL 14 11/16/2020    LABVLDL 19 10/11/2012     Lab Results   Component Value Date    CHOLHDLRATIO 4.1 11/02/2010       Chemistry        Component Value Date/Time     11/16/2020 1121    K 4.5 11/16/2020 1121    CL 99 11/16/2020 1121    CO2 28 11/16/2020 1121    BUN 20 11/16/2020 1121    CREATININE 0.8 11/16/2020 1121        Component Value Date/Time    CALCIUM 9.8 11/16/2020 1121    ALKPHOS 131 (H) 11/16/2020 1121    AST 19 11/16/2020 1121    ALT 18 11/16/2020 1121    BILITOT 0.4 11/16/2020 1121        Lab Results   Component Value Date    LABA1C 5.2 11/16/2020     Lab Results   Component Value Date    .5 11/16/2020     Lab Results   Component Value Date    TSH 4.41 10/11/2012         Review of Systems   Constitutional:        Function is good   HENT: Negative. Eyes: Negative. Respiratory: Negative for cough, chest tightness and shortness of breath. No Tobacco   Cardiovascular: Negative for chest pain, palpitations and leg swelling. Gastrointestinal:        BM good  Colace due Pain Rx   Dx Diverticulitis   Dx Sigmoid abscess +Hosp July 2021   Endocrine: Negative. Genitourinary: Negative for dysuria and flank pain. GYN June 2021  Hx UTI  2 week ago  Urgent care  Mammogram done   Musculoskeletal: Positive for back pain. She feels inflammation  CNP prescribe steroid off/on  Dr Otilia Dang visit is coming  Chronic back   Herniated Disc  Pain Specialist with [facility]  Dr Ernestine Stafford in past   S/P Bilateral Middle finger surgery   Allergic/Immunologic: Positive for environmental allergies. OTC   Neurological: Negative for dizziness, light-headedness and headaches.    Hematological:        Hematology care  Rx Eliquis  Rx Iron   Psychiatric/Behavioral: Negative for behavioral problems. CNP @  Rx Wellbutrin  To help vaginal dryness & Mood relaxer  Rx Trazodone       OBJECTIVE:  /78 (Site: Right Upper Arm, Position: Sitting, Cuff Size: Medium Adult)   Pulse 74   Temp 98.1 °F (36.7 °C) (Oral)   Ht 5' 4\" (1.626 m)   Wt 143 lb 12.8 oz (65.2 kg)   SpO2 99%   BMI 24.68 kg/m²   Physical Exam  HENT:      Head: Normocephalic. Eyes:      Extraocular Movements: Extraocular movements intact. Pupils: Pupils are equal, round, and reactive to light. Cardiovascular:      Rate and Rhythm: Normal rate and regular rhythm. Heart sounds: Normal heart sounds. Pulmonary:      Effort: Pulmonary effort is normal.      Breath sounds: Normal breath sounds. No wheezing or rhonchi. Musculoskeletal:      Cervical back: Normal range of motion and neck supple. Right lower leg: No edema. Left lower leg: No edema. Skin:     Findings: No rash. Neurological:      General: No focal deficit present. Mental Status: She is alert and oriented to person, place, and time. Psychiatric:         Behavior: Behavior normal.         Thought Content: Thought content normal.         Judgment: Judgment normal.         ASSESSMENT/PLAN:      Diagnosis Orders   1. Chronic pain syndrome     2. Diabetes mellitus screening  CBC Auto Differential    Comprehensive Metabolic Panel    Hemoglobin A1C    Microalbumin / Creatinine Urine Ratio   3. Coagulation disorder (HCC)  CBC Auto Differential   4. Encounter for HCV screening test for low risk patient  Hepatitis C Antibody   5. Lipid screening  Lipid Panel    TSH without Reflex   6.  Encounter for biometric screening  CBC Auto Differential    Comprehensive Metabolic Panel    Hemoglobin A1C    Lipid Panel    TSH without Reflex         No Flu Vaccine  Covid Vaccine done  oaars  Lab as above  Visit 30 minutes

## 2021-10-05 ASSESSMENT — ENCOUNTER SYMPTOMS: BACK PAIN: 1

## 2021-10-18 ENCOUNTER — OFFICE VISIT (OUTPATIENT)
Dept: PAIN MANAGEMENT | Age: 51
End: 2021-10-18
Payer: COMMERCIAL

## 2021-10-18 VITALS
BODY MASS INDEX: 24.24 KG/M2 | HEIGHT: 64 IN | OXYGEN SATURATION: 99 % | DIASTOLIC BLOOD PRESSURE: 76 MMHG | HEART RATE: 80 BPM | WEIGHT: 142 LBS | SYSTOLIC BLOOD PRESSURE: 110 MMHG

## 2021-10-18 DIAGNOSIS — M48.061 SPINAL STENOSIS, LUMBAR REGION, WITHOUT NEUROGENIC CLAUDICATION: ICD-10-CM

## 2021-10-18 DIAGNOSIS — M25.521 BILATERAL ELBOW JOINT PAIN: ICD-10-CM

## 2021-10-18 DIAGNOSIS — M50.30 DDD (DEGENERATIVE DISC DISEASE), CERVICAL: ICD-10-CM

## 2021-10-18 DIAGNOSIS — M48.02 CERVICAL STENOSIS OF SPINE: ICD-10-CM

## 2021-10-18 DIAGNOSIS — M47.817 LUMBOSACRAL SPONDYLOSIS WITHOUT MYELOPATHY: ICD-10-CM

## 2021-10-18 DIAGNOSIS — G89.4 CHRONIC PAIN SYNDROME: ICD-10-CM

## 2021-10-18 DIAGNOSIS — M54.16 LUMBAR NERVE ROOT IMPINGEMENT: ICD-10-CM

## 2021-10-18 DIAGNOSIS — G56.03 CARPAL TUNNEL SYNDROME, BILATERAL: ICD-10-CM

## 2021-10-18 DIAGNOSIS — M51.37 DEGENERATION OF LUMBAR OR LUMBOSACRAL INTERVERTEBRAL DISC: ICD-10-CM

## 2021-10-18 DIAGNOSIS — S39.012A STRAIN OF LUMBAR REGION, INITIAL ENCOUNTER: ICD-10-CM

## 2021-10-18 DIAGNOSIS — M25.522 BILATERAL ELBOW JOINT PAIN: ICD-10-CM

## 2021-10-18 PROCEDURE — 99213 OFFICE O/P EST LOW 20 MIN: CPT | Performed by: NURSE PRACTITIONER

## 2021-10-18 RX ORDER — LIDOCAINE 36 MG/1
1 PATCH TOPICAL DAILY
Qty: 90 PATCH | Refills: 0 | Status: SHIPPED | OUTPATIENT
Start: 2021-10-18 | End: 2021-11-15 | Stop reason: SDUPTHER

## 2021-10-18 RX ORDER — OXYCODONE HYDROCHLORIDE AND ACETAMINOPHEN 5; 325 MG/1; MG/1
1 TABLET ORAL EVERY 8 HOURS PRN
Qty: 90 TABLET | Refills: 0 | Status: SHIPPED | OUTPATIENT
Start: 2021-10-18 | End: 2021-11-15 | Stop reason: SDUPTHER

## 2021-10-18 NOTE — PROGRESS NOTES
Betty Laar  1970  [id number]      HISTORY OF PRESENT ILLNESS:  Ms. Esperanza Sandy is a 53 y.o. female returns for a follow up visit for pain management  She has a diagnosis of   1. Chronic pain syndrome    2. Degeneration of lumbar or lumbosacral intervertebral disc    3. Spinal stenosis, lumbar region, without neurogenic claudication    4. Lumbosacral spondylosis without myelopathy    5. DDD (degenerative disc disease), cervical    6. Strain of lumbar region, initial encounter    7. Lumbar nerve root impingement, L2    8. Cervical stenosis of spine, mild    9. Bilateral elbow joint pain    10. Carpal tunnel syndrome, bilateral      On the Patients Pain Assessment form reviewed with the Medical Assistant:  She complains of pain in the lower back and left leg. She rates the pain 8/10 and describes it as aching. Current treatment regimen has helped relieve about 50% of the pain. She denies any side effects from the current pain regimen. Patient reports that since the last follow up visit the physical functioning is unchanged, family/social relationships are unchanged, mood is unchanged sleep patterns are unchanged, and that the overall functioning is unchanged. Patient denies misusing/abusing her narcotic pain medications or using any illegal drugs. Upon obtaining medical history from Ms. Lara states that pain is manageable on current pain therapy. Takes pain medications as prescribed. Mood is stable without anxiety. Sleep is fair with an average of 5-6 hours. Denies to having issues of constipation. Tolerating activities/house chores with moderate tenderness to the lower back. ALLERGIES: Patients list of allergies were reviewed     MEDICATIONS: Ms. Esperanza Sandy list of medications were reviewed. Her current medications are   Outpatient Medications Prior to Visit   Medication Sig Dispense Refill    vitamin B-12 (CYANOCOBALAMIN) 500 MCG tablet Take 500 mcg by mouth daily      docusate sodium (COLACE) 100 MG capsule Take 100 mg by mouth 2 times daily      Saccharomyces boulardii (FLORASTOR PO) Take by mouth daily      ferrous sulfate (IRON 325) 325 (65 Fe) MG tablet TAKE 1 TABLET EVERY OTHER DAY      Resveratrol 250 MG CAPS Take by mouth daily      Collagen-Boron-Hyaluronic Acid ([employer] JOINT HEALTH TRIPLE ACTION) 40-5-3.3 MG TABS Take by mouth daily      traZODone (DESYREL) 50 MG tablet Take 1 tablet by mouth nightly as needed for Sleep 90 tablet 1    Lidocaine (ZTLIDO) 1.8 % PTCH Apply 1 patch topically daily 90 patch 0    torsemide (DEMADEX) 10 MG tablet TAKE 1 TABLET DAILY 90 tablet 1    TURMERIC PO Take by mouth      APPLE CIDER VINEGAR PO Take by mouth      Linoleic Acid-Sunflower Oil (CLA PO) Take by mouth      ELIQUIS 5 MG TABS tablet TAKE 1 TABLET BY MOUTH TWO TIMES A DAY  60 tablet 0     No facility-administered medications prior to visit. SOCIAL/FAMILY/PAST MEDICAL HISTORY: Ms. Monna Rubinstein social, family and past medical history was reviewed. REVIEW OF SYSTEMS:    Respiratory: Negative for apnea, chest tightness and shortness of breath or change in baseline breathing. Gastrointestinal: Negative for nausea, vomiting, abdominal pain, diarrhea, constipation, blood in stool and abdominal distention. PHYSICAL EXAM:   Nursing note and vitals reviewed. /76   Pulse 80   Ht 5' 4\" (1.626 m)   Wt 142 lb (64.4 kg)   SpO2 99%   BMI 24.37 kg/m²   Constitutional: She appears well-developed and well-nourished. No acute distress. Skin: Skin is warm and dry, good turgor. No rash noted. She is not diaphoretic. Cardiovascular: Normal rate, regular rhythm, normal heart sounds, and does not have murmur. Pulmonary/Chest: Effort normal. No respiratory distress. She does not have wheezes in the lung fields. She has no rales. Neurological/Psychiatric:She is alert and oriented to person, place, and time. Coordination is  normal.  Her mood isAppropriate and affect is Neutral/Euthymic(normal) . IMPRESSION:   1. Chronic pain syndrome    2. Degeneration of lumbar or lumbosacral intervertebral disc    3. Spinal stenosis, lumbar region, without neurogenic claudication    4. Lumbosacral spondylosis without myelopathy    5. DDD (degenerative disc disease), cervical    6. Strain of lumbar region, initial encounter    7. Lumbar nerve root impingement, L2    8. Cervical stenosis of spine, mild    9. Bilateral elbow joint pain    10. Carpal tunnel syndrome, bilateral        PLAN:  Informed verbal consent was obtained  -Continue with Percocet, ZTlido  -Corrine exercises/back stretches recommended  -CBT techniques- relaxation therapies such as biofeedback, mindfulness based stress reduction, imagery, cognitive restructuring, problem solving discussed with patient  -Last UDS 8/16/21 Consistent  -Return in about 4 weeks (around 11/15/2021). -[place] record was obtained and reviewed  for the last one year and no indicators of drug misuse  were found. Any other controlled substance prescriptions  seen on the record have been accounted for, I am aware of the patient receiving these medications. Jade Steinberg [place] record will be rechecked as part of office protocol. Current Outpatient Medications   Medication Sig Dispense Refill    oxyCODONE-acetaminophen (PERCOCET) 5-325 MG per tablet Take 1 tablet by mouth every 8 hours as needed for Pain for up to 30 days.  Take no more than 2-3 tabs orally prn 90 tablet 0    Lidocaine (ZTLIDO) 1.8 % PTCH Apply 1 patch topically daily 90 patch 0    vitamin B-12 (CYANOCOBALAMIN) 500 MCG tablet Take 500 mcg by mouth daily      docusate sodium (COLACE) 100 MG capsule Take 100 mg by mouth 2 times daily      Saccharomyces boulardii (FLORASTOR PO) Take by mouth daily      ferrous sulfate (IRON 325) 325 (65 Fe) MG tablet TAKE 1 TABLET EVERY OTHER DAY      Resveratrol 250 MG CAPS Take by mouth daily      Collagen-Boron-Hyaluronic Acid ([employer]S JOINT HEALTH TRIPLE ACTION) 40-5-3.3 MG TABS Take by mouth daily      traZODone (DESYREL) 50 MG tablet Take 1 tablet by mouth nightly as needed for Sleep 90 tablet 1    Lidocaine (ZTLIDO) 1.8 % PTCH Apply 1 patch topically daily 90 patch 0    torsemide (DEMADEX) 10 MG tablet TAKE 1 TABLET DAILY 90 tablet 1    TURMERIC PO Take by mouth      APPLE CIDER VINEGAR PO Take by mouth      Linoleic Acid-Sunflower Oil (CLA PO) Take by mouth      ELIQUIS 5 MG TABS tablet TAKE 1 TABLET BY MOUTH TWO TIMES A DAY  60 tablet 0     No current facility-administered medications for this visit. I will continue her current medication regimen  which is part of the above treatment schedule. It has been helping with Ms. Lara's chronic  medical problems which for this visit include:   Diagnoses of Chronic pain syndrome, Degeneration of lumbar or lumbosacral intervertebral disc, Spinal stenosis, lumbar region, without neurogenic claudication, Lumbosacral spondylosis without myelopathy, DDD (degenerative disc disease), cervical, Strain of lumbar region, initial encounter, Lumbar nerve root impingement, L2, Cervical stenosis of spine, mild, Bilateral elbow joint pain, and Carpal tunnel syndrome, bilateral were pertinent to this visit. Risks and benefits of the medications and other alternative treatments  including no treatment were discussed with the patient. The common side effects of these medications were also explained to the patient. Informed verbal consent was obtained. Goals of current treatment regimen include improvement in pain, restoration of functioning- with focus on improvement in physical performance, general activity, work or disability,emotional distress, health care utilization and  decreased medication consumption. Will continue to monitor progress towards achieving/maintaining therapeutic goals with special emphasis on  1. Improvement in perceived interfernce  of pain with ADL's. Ability to do home exercises independently.  Ability to do household chores

## 2021-10-18 NOTE — PROGRESS NOTES
Julio C Koo MD  [facility]) Physicians - Rheumatology    [x] [facility] HOSPITAL:  [address] [facility]  [place] [] [place] Office:  [address], [address]   Office: [phone]  Fax: [phone]     RHEUMATOLOGY CONSULT NOTE    HISTORY OF PRESENT ILLNESS:    The pt is a 53 y.o. female referred by Kira Basilio MD for finger triggering, systemic inflammation. Current rheum meds:  OTC APAP  Lidocaine patches  Percocet PRN    Prior rheum meds:  NSAIDs: told to avoid d/t chronic anti-coagulation and Hx of diverticulitis    Pt states she underwent b/l 3rd trigger finger surgery by Dr. José Manuel Mendoza on 9/24/2021. Pt states Dr. Hollis Flores reportedly noticed swelling in her fingers and feet around the time of her surgery and suggested Rheumatology evaluation for inflammatory arthritis. Pt reports stiffness in her hands since 7/2021, stiffness is worst in the morning. She reports difficulty bending her fingers at night time. She denies arthralgias. She's noticed swelling mainly in her b/l 2-3rd fingers and sometimes her thumbs, swelling is worst in the morning. Pt reports generalized swelling in her L foot since she developed a popliteal vein thrombosis in 3/2021. Foot swelling is constant. She tells me that she was evaluated by Hematology and was found to have Factor V Leiden mutation. She tells me that she will be taking Eliquis indefinitely. Denies any other Hx of blood clots, strokes or recurrent miscarriages. Pt reports chronic pain in her L buttock that radiates down her L leg. Pain is constant. Denies any alleviating or exacerbating factors. She reports some pain in her low back and reports prolonged morning stiffness in her lower back lasting 60-90 min. She reports occasional nocturnal awakening from L buttock/hip pain. She reports known \"disc bulges or herniations\". Pt states she previously saw Dr. Mony Pisano who did PRASHANTH x 2 last yr which did not improve her back pain.  Pt states she also saw Ortho for her L hip pain and had unremarkable MRI of the L hip. Denies Hx of enthesitis. Denies Hx of PsO, Sx of IBD or uveitis. Denies known FHx of autoimmune disease. She previously smoked in her 25s. She works as a  for an insurance company.     REVIEW OF SYSTEMS:    Constitutional: denies chronic fatigue, fever/chills, night sweats, unintentional weight loss  Integumentary: denies photosensitivity, rash, patchy alopecia, or Sx of Raynaud's phenomenon  Eyes: denies dry eyes, redness or pain, visual disturbance, or floaters  Ears: denies hearing loss, tinnitus, vertigo, or recurrent ear infections  Nose: denies nasal ulcers or recurrent sinusitis  Oral cavity: denies dry mouth or oral ulcers  Cardiovascular: denies CP, palpitations, Hx of pericardial effusion or pericarditis  Respiratory: denies SOB, cough, hemoptysis, or pleurisy  Gastrointestinal: denies heart burn, dysphagia or esophageal dysmotility, denies change in bowel habits or Sx of IBD  Hematologic/Lymphatic: +Hx of popliteal vein thrombosis in 3/2021 found to have Factor V Leiden mutation on life long anticoagulation, denies recurrent miscarriages, denies swollen LNs  Musculoskeletal:  refer to above HPI   Neurological: denies focal weakness, paresthesias/hyperesthesias or change in sensation, denies Hx of seizure, denies change in gait, balance, or memory    Past Medical History:   Diagnosis Date    Acne     Dr Berry Embs 5 Leiden mutation, heterozygous ([facility] [address]     Medical history reviewed with no changes         Past Surgical History:   Procedure Laterality Date    CARPAL TUNNEL RELEASE Bilateral      SECTION  2001     x1    PAIN MANAGEMENT PROCEDURE Left 10/26/2020    LEFT L4 AND L5 TRANSFORAMINAL EPDIURAL STEROID INJECTION WITH FLUOROSCOPY (57114, [id number]) performed by Alyssa Salinas MD at [address] Left 2020    LEFT LUMBAR FOUR LUMBAR FIVE TRANSFORAMINAL  EPIDURAL STEROID INJECTION SITE CONFIRMED BY FLUOROSCOPY performed by Barbara Shane MD at [address] History     Socioeconomic History    Marital status:      Spouse name: Keshia Orozco    Number of children: 1    Years of education: college    Highest education level: Not on file   Occupational History     Comment: [employer]   Tobacco Use    Smoking status: Never Smoker    Smokeless tobacco: Never Used   Vaping Use    Vaping Use: Never used   Substance and Sexual Activity    Alcohol use: No    Drug use: No    Sexual activity: Yes     Partners: Male     Birth control/protection: Pill     Comment: D  son 23 y old    Other Topics Concern    Not on file   Social History Narrative    Divorce    BF    Son 23year old  & 2 step son 21 @ 25    All children @[employer]    No tobacco     Social Determinants of Health     Financial Resource Strain: Low Risk     Difficulty of Paying Living Expenses: Not hard at all   Food Insecurity: No Food Insecurity    Worried About Running Out of Food in the Last Year: Never true    Darryl of Food in the Last Year: Never true   Transportation Needs:     Lack of Transportation (Medical):      Lack of Transportation (Non-Medical):    Physical Activity:     Days of Exercise per Week:     Minutes of Exercise per Session:    Stress:     Feeling of Stress :    Social Connections:     Frequency of Communication with Friends and Family:     Frequency of Social Gatherings with Friends and Family:     Attends Mormonism Services:     Active Member of Clubs or Organizations:     Attends Club or Organization Meetings:     Marital Status:    Intimate Partner Violence:     Fear of Current or Ex-Partner:     Emotionally Abused:     Physically Abused:     Sexually Abused:         Family History   Problem Relation Age of Onset    No Known Problems Mother         Healthy 66year old     Heart Disease Father 78        +Stent    High Cholesterol Father     Other Brother         do not talk +drug        MEDICATIONS:    Current Outpatient Medications:     oxyCODONE-acetaminophen (PERCOCET) 5-325 MG per tablet, Take 1 tablet by mouth every 8 hours as needed for Pain for up to 30 days. Take no more than 2-3 tabs orally prn, Disp: 90 tablet, Rfl: 0    Lidocaine (ZTLIDO) 1.8 % PTCH, Apply 1 patch topically daily, Disp: 90 patch, Rfl: 0    vitamin B-12 (CYANOCOBALAMIN) 500 MCG tablet, Take 500 mcg by mouth daily, Disp: , Rfl:     docusate sodium (COLACE) 100 MG capsule, Take 100 mg by mouth 2 times daily, Disp: , Rfl:     Saccharomyces boulardii (FLORASTOR PO), Take by mouth daily, Disp: , Rfl:     ferrous sulfate (IRON 325) 325 (65 Fe) MG tablet, TAKE 1 TABLET EVERY OTHER DAY, Disp: , Rfl:     Resveratrol 250 MG CAPS, Take by mouth daily, Disp: , Rfl:     Collagen-Boron-Hyaluronic Acid ([employer] JOINT HEALTH TRIPLE ACTION) 40-5-3.3 MG TABS, Take by mouth daily, Disp: , Rfl:     traZODone (DESYREL) 50 MG tablet, Take 1 tablet by mouth nightly as needed for Sleep, Disp: 90 tablet, Rfl: 1    Lidocaine (ZTLIDO) 1.8 % PTCH, Apply 1 patch topically daily, Disp: 90 patch, Rfl: 0    torsemide (DEMADEX) 10 MG tablet, TAKE 1 TABLET DAILY, Disp: 90 tablet, Rfl: 1    TURMERIC PO, Take by mouth, Disp: , Rfl:     APPLE CIDER VINEGAR PO, Take by mouth, Disp: , Rfl:     Linoleic Acid-Sunflower Oil (CLA PO), Take by mouth, Disp: , Rfl:     ELIQUIS 5 MG TABS tablet, TAKE 1 TABLET BY MOUTH TWO TIMES A DAY , Disp: 60 tablet, Rfl: 0    ALLERGIES:  Patient has no known allergies.     PHYSICAL EXAM:    Vitals:    Pulse 74   Ht 5' 4\" (1.626 m)   Wt 140 lb (63.5 kg)   SpO2 99%   BMI 24.03 kg/m²     GEN: AAOx3, in NAD, well-appearing  HEAD: normocephalic, atraumatic  EYES: no injection or icterus  CVS: RRR  LUNGS: in no acute respiratory distress, CTAB  MSK:  Spine: no kyphosis or lordosis, C-spine w/ FROM, no paraspinal muscle or vertebral tenderness, L SI joint TTP  Upper visualized spinal cord has normal signal and morphology. No evidence of mass or abnormal fluid collection within the spinal canal.     SOFT TISSUES: Paraspinal soft tissues are unremarkable. C2-C3: Disc height and signal maintained. No neural foraminal narrowing or spinal canal stenosis. C3-C4: Disc height and signal maintained. No neural foraminal narrowing or spinal canal stenosis. C4-C5: Disc height and signal maintained. No neural foraminal narrowing or spinal canal stenosis. C5-C6: Mild disc height loss. No disc signal abnormality. No left neural foraminal narrowing. Mild right neural foraminal narrowing secondary to uncovertebral hypertrophy. Mild spinal canal stenosis secondary to disc bulge. C6-C7: Disc height and signal maintained. No neural foraminal narrowing or spinal canal stenosis. C7-T1: Disc height and signal maintained. No neural foraminal narrowing or spinal canal stenosis. IMPRESSION:  1. Mild C5-6 degenerative disc height loss. 2. Mild C5-6 spinal canal stenosis secondary to disc bulge. 3. Mild narrowing of the right C6 neural foramen secondary to C5-6 uncovertebral hypertrophy. MRI L-spine (10/8/20): FINDINGS:    Assume 5 lumbar vertebrae. Lordosis is moderate. Conus medullaris termination is normal.  Slight anterior disc displacements multilevel. T11-12: Disc is dehydrated, slightly height reduced; shallow left lateral disc protrusion gently deforms left ventral dural sac. T12-L1: Disc shows no posterior displacement. L1-2: Disc shows no posterior displacement. L2-3: Disc is dehydrated; trace disc bulge; right extraforaminal annular fissure; disc effaces ventral dural sac. Mild facet hypertrophy, scant facet fluid. L3-4: Disc is dehydrated; shallow disc bulge with annular fissure straightens ventral dural sac. Scant facet fluid.      L4-5: Disc is dehydrated, slightly height reduced; 2mm retrolisthesis; disc bulge with annular Antigen; Future  -     XR SacroilIAC Joints PA and Oblique; Future  2. Polyarthralgia  Assessment & Plan:  Pt reported significant stiffness in her hands, denied arthralgias. Physical exam revealed mild dactylitis of her b/l 2-3rd fingers. Given pt report of inflammatory L SI pain, suspect she may have early PsA/seronegative SpA. Obtain rheum w/u to evaluate for inflammatory arthritis. Avoid NSAIDs d/t pt being on anticoagulation. Pt following w/ Pain Management. Orders:  -     Uric Acid; Future  -     C-Reactive Protein; Future  -     Sedimentation Rate; Future  -     Hepatitis B Core Antibody, Total; Future  -     Hepatitis C Antibody; Future  -     Hepatitis B Surface Antigen; Future  -     HLA-B27 Antigen; Future  -     Cyclic Citrul Peptide Antibody, IgG; Future  -     Rheumatoid Factor; Future  -     SHILOH Reflex to Antibody Cascade; Future  -     C3 Complement; Future  -     C4 Complement; Future  -     XR HAND LEFT (MIN 3 VIEWS); Future  -     XR HAND RIGHT (MIN 3 VIEWS); Future  3. Antiphospholipid antibody positive  Assessment & Plan:  Discussed her positive B2GP IgG on 7/22/20. Pt told me she is on life long anticoagulation for Hx of popliteal thrombosis and Factor V mutation, followed by Hematology. Orders:  -     SHILOH Reflex to Antibody Cascade; Future  -     C3 Complement; Future  -     C4 Complement; Future  4. Chronic midline low back pain with left-sided sciatica  Assessment & Plan:  Reviewed prior MRI L-spine. Pt previously saw Dr. Ernestine Stafford, failed PT and PRASHANTH x 2. She is currently seeing Pain Management midlevel. Obtain X-ray of L-spine to evaluate for PsA/seronegative spondyloarthropathy. Orders:  -     HLA-B27 Antigen; Future  -     XR LUMBAR SPINE (2-3 VIEWS); Future     Return in about 4 weeks (around 11/17/2021) for lab result discussion and treatment plan, medication monitoring.    10/20/2021 9:49 AM      Sending consult note to referring provider Shira Gupta MD.    Thank you very much for allowing me to participate in this pt's care. Please do not hesitate to contact me if I can be of further assistance. NOTE: This report is transcribed by using voice recognition software dragon. Every effort is made to ensure accuracy; however, inadvertent computerized transcription errors may be present.

## 2021-10-18 NOTE — PATIENT INSTRUCTIONS
Patient Education        Back Stretches: Exercises  Introduction  Here are some examples of exercises for stretching your back. Start each exercise slowly. Ease off the exercise if you start to have pain. Your doctor or physical therapist will tell you when you can start these exercises and which ones will work best for you. How to do the exercises  Overhead stretch    1. Stand comfortably with your feet shoulder-width apart. 2. Looking straight ahead, raise both arms over your head and reach toward the ceiling. Do not allow your head to tilt back. 3. Hold for 15 to 30 seconds, then lower your arms to your sides. 4. Repeat 2 to 4 times. Side stretch    1. Stand comfortably with your feet shoulder-width apart. 2. Raise one arm over your head, and then lean to the other side. 3. Slide your hand down your leg as you let the weight of your arm gently stretch your side muscles. Hold for 15 to 30 seconds. 4. Repeat 2 to 4 times on each side. Press-up    1. Lie on your stomach, supporting your body with your forearms. 2. Press your elbows down into the floor to raise your upper back. As you do this, relax your stomach muscles and allow your back to arch without using your back muscles. As your press up, do not let your hips or pelvis come off the floor. 3. Hold for 15 to 30 seconds, then relax. 4. Repeat 2 to 4 times. Relax and rest    1. Lie on your back with a rolled towel under your neck and a pillow under your knees. Extend your arms comfortably to your sides. 2. Relax and breathe normally. 3. Remain in this position for about 10 minutes. 4. If you can, do this 2 or 3 times each day. Follow-up care is a key part of your treatment and safety. Be sure to make and go to all appointments, and call your doctor if you are having problems. It's also a good idea to know your test results and keep a list of the medicines you take. Where can you learn more? Go to [email or web address].[email or web address] org and sign in to your [employer] account. Enter N745 in the "[employer] box to learn more about \"Back Stretches: Exercises. \"     If you do not have an account, please click on the \"Sign Up Now\" link. Current as of: July 1, 2021               Content Version: 13.0  © 9796-9275 Healthwise, Incorporated. Care instructions adapted under license by [facility] ([facility]). If you have questions about a medical condition or this instruction, always ask your healthcare professional. [address] any warranty or liability for your use of this information.

## 2021-10-20 ENCOUNTER — HOSPITAL ENCOUNTER (OUTPATIENT)
Dept: GENERAL RADIOLOGY | Age: 51
Discharge: HOME | End: 2021-10-20
Payer: COMMERCIAL

## 2021-10-20 ENCOUNTER — OFFICE VISIT (OUTPATIENT)
Dept: RHEUMATOLOGY | Age: 51
End: 2021-10-20
Payer: COMMERCIAL

## 2021-10-20 VITALS — HEART RATE: 74 BPM | BODY MASS INDEX: 23.9 KG/M2 | HEIGHT: 64 IN | WEIGHT: 140 LBS | OXYGEN SATURATION: 99 %

## 2021-10-20 DIAGNOSIS — G89.29 CHRONIC MIDLINE LOW BACK PAIN WITH LEFT-SIDED SCIATICA: ICD-10-CM

## 2021-10-20 DIAGNOSIS — M53.3 CHRONIC LEFT SACROILIAC PAIN: Primary | ICD-10-CM

## 2021-10-20 DIAGNOSIS — M25.50 POLYARTHRALGIA: ICD-10-CM

## 2021-10-20 DIAGNOSIS — M54.42 CHRONIC MIDLINE LOW BACK PAIN WITH LEFT-SIDED SCIATICA: ICD-10-CM

## 2021-10-20 DIAGNOSIS — G89.29 CHRONIC LEFT SACROILIAC PAIN: Primary | ICD-10-CM

## 2021-10-20 DIAGNOSIS — M53.3 CHRONIC LEFT SACROILIAC PAIN: ICD-10-CM

## 2021-10-20 DIAGNOSIS — G89.29 CHRONIC LEFT SACROILIAC PAIN: ICD-10-CM

## 2021-10-20 DIAGNOSIS — R76.0 ANTIPHOSPHOLIPID ANTIBODY POSITIVE: ICD-10-CM

## 2021-10-20 LAB
C-REACTIVE PROTEIN: <3 MG/L (ref 0–5.1)
C3 COMPLEMENT: 133.4 MG/DL (ref 90–180)
C4 COMPLEMENT: 19.8 MG/DL (ref 10–40)
HEPATITIS B SURFACE ANTIGEN INTERPRETATION: NORMAL
HEPATITIS C ANTIBODY INTERPRETATION: NORMAL
RHEUMATOID FACTOR: <10 IU/ML
SEDIMENTATION RATE, ERYTHROCYTE: 9 MM/HR (ref 0–20)
URIC ACID, SERUM: 3.2 MG/DL (ref 2.6–6)

## 2021-10-20 PROCEDURE — 99204 OFFICE O/P NEW MOD 45 MIN: CPT | Performed by: INTERNAL MEDICINE

## 2021-10-20 PROCEDURE — 73130 X-RAY EXAM OF HAND: CPT

## 2021-10-20 PROCEDURE — 72100 X-RAY EXAM L-S SPINE 2/3 VWS: CPT

## 2021-10-20 PROCEDURE — 72202 X-RAY EXAM SI JOINTS 3/> VWS: CPT

## 2021-10-20 NOTE — ASSESSMENT & PLAN NOTE
Discussed her positive B2GP IgG on 7/22/20. Pt told me she is on life long anticoagulation for Hx of popliteal thrombosis and Factor V mutation, followed by Hematology.

## 2021-10-20 NOTE — ASSESSMENT & PLAN NOTE
Pt reported significant stiffness in her hands, denied arthralgias. Physical exam revealed mild dactylitis of her b/l 2-3rd fingers. Given pt report of inflammatory L SI pain, suspect she may have early PsA/seronegative SpA. Obtain rheum w/u to evaluate for inflammatory arthritis. Avoid NSAIDs d/t pt being on anticoagulation. Pt following w/ Pain Management.

## 2021-10-20 NOTE — ASSESSMENT & PLAN NOTE
Chronic inflammatory L SI joint pain suspicious for seronegative SpA. Mild dactylitis suspicious for PsA. Pt reportedly had normal MRI of L hip done by Ortho in the past. Will check dedicated X-ray of SI joints to evaluate for sacroiliitis. Instructed pt to have her prior MRI pelvis ordered by Dr. Wilber Shea done at [facility] faxed to our office. Check CRP and HLA B27. Avoid NSAIDs d/t chronic anti-coagulation. Pt following w/ Pain Management.

## 2021-10-20 NOTE — PATIENT INSTRUCTIONS
After your visit with Dr. Jeremy Gerber today, please obtain all labs and imaging as ordered prior to your 2 week follow up visit. Please make sure to obtain all X-rays at a [facility]) facility as Dr. Jeremy Gerber personally reviews the films to make the proper diagnosis for you. If you are referred to another doctor, make sure to call the provided number to schedule an appointment for yourself. If you dont hear anything from that doctors office after a few business days, please give our office a call so we help you or reach out to them on your own if you can find their contact information    Please note:   Lab and imaging results will be discussed at follow up appointments (not over the phone or via [address] If you would like a copy of your labs before your follow up appointment, you can call the office and request for them to be mailed to you. Take pictures on your phone! Many manifestations of rheumatic disease are transient. If you take a picture of your bothersome physical finding (rash, swollen joint, ulcer, etc), we will have more information at your next appointment. Prescriptions and refills will be handled at scheduled office visits and enough medicine will be prescribed to last at least until the patients next appointment. Since we expect to fill prescriptions at the office visit, running out may be a signal that it is time to call our office to schedule an appointment. 90-day refills will be provided at [address] [place] [address] as most of our medications are high risk medications that require toxicity monitoring. It is your responsibility to schedule your follow up appointment on time to ensure that you have enough refills in between office visits. Patients with rheumatic disease are more susceptible to a variety of infections, whether or not they are on medicine to suppress their immune system.  Please discuss vaccinations with your primary care doctor,  including but not limited to:  o Influenza vaccination (yearly)  o Pneumococcal vaccinations (Prevnar 13, Pneumovax)  o Shingles vaccination (Shingrix)  o HPV vaccines (SLE patients have a higher rate of HPV infection and precancerous cervical lesions)  o TDaP vaccination  o Hepatitis B vaccination    Patients with rheumatic disease are at a higher risk for a variety of cancers. Please make sure you discuss age appropriate screening with your primary care doctor, including but not limited to:  o Breast, cervical, colon, and prostate cancer screenings  o Yearly full body skin exams with a dermatologist    Knowledge is power. There is a wealth of free patient information available on www. [email or web address]. Some articles require a subscription, so if you are unable to access an article, please let Dr. Keshia Larry know and she can print it or e-mail the article to you. We encourage you to read as much as you can about your diagnosis and the recommended medications.

## 2021-10-20 NOTE — LETTER
[facility] Rheumatology  [facility] [address]  Phone: [phone]  Fax: [phone]           Brien Michael MD      October 20, 2021     Patient: Pau Jang   MR Number: [id number]   YOB: 1970   Date of Visit: 10/20/2021       Dear Dr. Janie Atwood:    Thank you for referring Homero Lara to me for evaluation/treatment. Below are the relevant portions of my assessment and plan of care. If you have questions, please do not hesitate to call me. I look forward to following Arely along with you.     Sincerely,        Brien Michael MD    CC providers:  Moni Garcia MD  [address] [address]  [place]

## 2021-10-20 NOTE — ASSESSMENT & PLAN NOTE
Reviewed prior MRI L-spine. Pt previously saw Dr. Inga Millan, failed PT and PRASHANTH x 2. She is currently seeing Pain Management midlevel. Obtain X-ray of L-spine to evaluate for PsA/seronegative spondyloarthropathy.

## 2021-10-21 LAB
ANTI-NUCLEAR ANTIBODY (ANA): NEGATIVE
CYCLIC CITRULLINATED PEPTIDE ANTIBODY IGG: 0.7 U/ML (ref 0–2.9)

## 2021-10-22 LAB
HEPATITIS B CORE TOTAL ANTIBODY: NEGATIVE
HLA B27: NEGATIVE

## 2021-11-15 ENCOUNTER — OFFICE VISIT (OUTPATIENT)
Dept: PAIN MANAGEMENT | Age: 51
End: 2021-11-15
Payer: COMMERCIAL

## 2021-11-15 VITALS
WEIGHT: 144 LBS | TEMPERATURE: 97.2 F | BODY MASS INDEX: 24.72 KG/M2 | OXYGEN SATURATION: 98 % | DIASTOLIC BLOOD PRESSURE: 78 MMHG | HEART RATE: 73 BPM | SYSTOLIC BLOOD PRESSURE: 134 MMHG

## 2021-11-15 DIAGNOSIS — M47.817 LUMBOSACRAL SPONDYLOSIS WITHOUT MYELOPATHY: ICD-10-CM

## 2021-11-15 DIAGNOSIS — M48.02 CERVICAL STENOSIS OF SPINE: ICD-10-CM

## 2021-11-15 DIAGNOSIS — M50.30 DDD (DEGENERATIVE DISC DISEASE), CERVICAL: ICD-10-CM

## 2021-11-15 DIAGNOSIS — M48.061 SPINAL STENOSIS, LUMBAR REGION, WITHOUT NEUROGENIC CLAUDICATION: ICD-10-CM

## 2021-11-15 DIAGNOSIS — M51.37 DEGENERATION OF LUMBAR OR LUMBOSACRAL INTERVERTEBRAL DISC: ICD-10-CM

## 2021-11-15 DIAGNOSIS — G56.03 CARPAL TUNNEL SYNDROME, BILATERAL: ICD-10-CM

## 2021-11-15 DIAGNOSIS — G89.4 CHRONIC PAIN SYNDROME: ICD-10-CM

## 2021-11-15 DIAGNOSIS — M25.522 BILATERAL ELBOW JOINT PAIN: ICD-10-CM

## 2021-11-15 DIAGNOSIS — S39.012A STRAIN OF LUMBAR REGION, INITIAL ENCOUNTER: ICD-10-CM

## 2021-11-15 DIAGNOSIS — M54.16 LUMBAR NERVE ROOT IMPINGEMENT: ICD-10-CM

## 2021-11-15 DIAGNOSIS — M25.521 BILATERAL ELBOW JOINT PAIN: ICD-10-CM

## 2021-11-15 PROCEDURE — 99213 OFFICE O/P EST LOW 20 MIN: CPT | Performed by: NURSE PRACTITIONER

## 2021-11-15 RX ORDER — OXYCODONE HYDROCHLORIDE AND ACETAMINOPHEN 5; 325 MG/1; MG/1
1 TABLET ORAL EVERY 8 HOURS PRN
Qty: 90 TABLET | Refills: 0 | Status: SHIPPED | OUTPATIENT
Start: 2021-11-15 | End: 2021-12-16 | Stop reason: SDUPTHER

## 2021-11-15 RX ORDER — LIDOCAINE 36 MG/1
1 PATCH TOPICAL DAILY
Qty: 90 PATCH | Refills: 0 | Status: SHIPPED | OUTPATIENT
Start: 2021-11-15 | End: 2021-12-16 | Stop reason: SDUPTHER

## 2021-11-15 NOTE — PATIENT INSTRUCTIONS
Patient Education        Back Stretches: Exercises  Introduction  Here are some examples of exercises for stretching your back. Start each exercise slowly. Ease off the exercise if you start to have pain. Your doctor or physical therapist will tell you when you can start these exercises and which ones will work best for you. How to do the exercises  Overhead stretch    1. Stand comfortably with your feet shoulder-width apart. 2. Looking straight ahead, raise both arms over your head and reach toward the ceiling. Do not allow your head to tilt back. 3. Hold for 15 to 30 seconds, then lower your arms to your sides. 4. Repeat 2 to 4 times. Side stretch    1. Stand comfortably with your feet shoulder-width apart. 2. Raise one arm over your head, and then lean to the other side. 3. Slide your hand down your leg as you let the weight of your arm gently stretch your side muscles. Hold for 15 to 30 seconds. 4. Repeat 2 to 4 times on each side. Press-up    1. Lie on your stomach, supporting your body with your forearms. 2. Press your elbows down into the floor to raise your upper back. As you do this, relax your stomach muscles and allow your back to arch without using your back muscles. As your press up, do not let your hips or pelvis come off the floor. 3. Hold for 15 to 30 seconds, then relax. 4. Repeat 2 to 4 times. Relax and rest    1. Lie on your back with a rolled towel under your neck and a pillow under your knees. Extend your arms comfortably to your sides. 2. Relax and breathe normally. 3. Remain in this position for about 10 minutes. 4. If you can, do this 2 or 3 times each day. Follow-up care is a key part of your treatment and safety. Be sure to make and go to all appointments, and call your doctor if you are having problems. It's also a good idea to know your test results and keep a list of the medicines you take. Where can you learn more? Go to https://yuridiaeb.[email or web address] org and sign in to your [employer] account. Enter X974 in the [employer] box to learn more about \"Back Stretches: Exercises. \"     If you do not have an account, please click on the \"Sign Up Now\" link. Current as of: July 1, 2021               Content Version: 13.0  © 0246-6608 Healthwise, Incorporated. Care instructions adapted under license by [facility] ([facility]). If you have questions about a medical condition or this instruction, always ask your healthcare professional. [address] any warranty or liability for your use of this information.

## 2021-11-15 NOTE — PROGRESS NOTES
Adam Lara  1970  [id number]      HISTORY OF PRESENT ILLNESS:  Ms. Blaine Beck is a 49 y.o. female returns for a follow up visit for pain management  She has a diagnosis of   1. Chronic pain syndrome    2. Degeneration of lumbar or lumbosacral intervertebral disc    3. Spinal stenosis, lumbar region, without neurogenic claudication    4. Lumbosacral spondylosis without myelopathy    5. DDD (degenerative disc disease), cervical    6. Lumbar nerve root impingement, L2    7. Cervical stenosis of spine, mild    8. Strain of lumbar region, initial encounter    9. Bilateral elbow joint pain    10. Carpal tunnel syndrome, bilateral      On the Patients Pain Assessment form reviewed with the Medical Assistant:  She complains of pain in the left leg(s): upper and bilateral lower back She rates the pain 7- 8/10 and describes it as aching. Current treatment regimen has helped relieve about 50% of the pain. She denies any side effects from the current pain regimen. Patient reports that since the last follow up visit the physical functioning is unchanged, family/social relationships are unchanged, mood is unchanged sleep patterns are unchanged, and that the overall functioning is unchanged. Patient denies misusing/abusing her narcotic pain medications or using any illegal drugs. There are No indicators for possible drug abuse, addiction or diversion problems. Upon obtaining medical history from Ms. Lara states that pain is manageable on current pain therapy. Reports having experienced some SI type pain to the left. Was evaluated by Rheumatology with Dr. Jose Ramon Leon as well as imaging studies Mood is stable without anxiety. Sleep is fair with an average of 5-6 hours. Denies to having issues of constipation. Tolerating activities/house chores with moderate tenderness to the lower back/left hip. ALLERGIES: Patients list of allergies were reviewed     MEDICATIONS: Ms. Blaine Beck list of medications were reviewed. Her current medications are   Outpatient Medications Prior to Visit   Medication Sig Dispense Refill    vitamin B-12 (CYANOCOBALAMIN) 500 MCG tablet Take 500 mcg by mouth daily      docusate sodium (COLACE) 100 MG capsule Take 100 mg by mouth 2 times daily      Saccharomyces boulardii (FLORASTOR PO) Take by mouth daily      ferrous sulfate (IRON 325) 325 (65 Fe) MG tablet TAKE 1 TABLET EVERY OTHER DAY      Resveratrol 250 MG CAPS Take by mouth daily      Collagen-Boron-Hyaluronic Acid ([employer] JOINT HEALTH TRIPLE ACTION) 40-5-3.3 MG TABS Take by mouth daily      traZODone (DESYREL) 50 MG tablet Take 1 tablet by mouth nightly as needed for Sleep 90 tablet 1    TURMERIC PO Take by mouth      APPLE CIDER VINEGAR PO Take by mouth      Linoleic Acid-Sunflower Oil (CLA PO) Take by mouth      ELIQUIS 5 MG TABS tablet TAKE 1 TABLET BY MOUTH TWO TIMES A DAY  60 tablet 0    oxyCODONE-acetaminophen (PERCOCET) 5-325 MG per tablet Take 1 tablet by mouth every 8 hours as needed for Pain for up to 30 days. Take no more than 2-3 tabs orally prn 90 tablet 0    Lidocaine (ZTLIDO) 1.8 % PTCH Apply 1 patch topically daily 90 patch 0    torsemide (DEMADEX) 10 MG tablet TAKE 1 TABLET DAILY (Patient not taking: Reported on 11/17/2021) 90 tablet 1     No facility-administered medications prior to visit. SOCIAL/FAMILY/PAST MEDICAL HISTORY: Ms. Garfield Cabral social, family and past medical history was reviewed. REVIEW OF SYSTEMS:    Respiratory: Negative for apnea, chest tightness and shortness of breath or change in baseline breathing. Gastrointestinal: Negative for nausea, vomiting, abdominal pain, diarrhea, constipation, blood in stool and abdominal distention. PHYSICAL EXAM:   Nursing note and vitals reviewed. /78   Pulse 73   Temp 97.2 °F (36.2 °C)   Wt 144 lb (65.3 kg)   SpO2 98%   BMI 24.72 kg/m²   Constitutional: She appears well-developed and well-nourished. No acute distress.    Skin: Skin is warm and dry, good turgor. No rash noted. She is not diaphoretic. Cardiovascular: Normal rate, regular rhythm, normal heart sounds, and does not have murmur. Pulmonary/Chest: Effort normal. No respiratory distress. She does not have wheezes in the lung fields. She has no rales. Neurological/Psychiatric:She is alert and oriented to person, place, and time. Coordination is  normal. Left Hip pain with palpation Her mood isAppropriate and affect is Neutral/Euthymic(normal) . IMPRESSION:   1. Chronic pain syndrome    2. Degeneration of lumbar or lumbosacral intervertebral disc    3. Spinal stenosis, lumbar region, without neurogenic claudication    4. Lumbosacral spondylosis without myelopathy    5. DDD (degenerative disc disease), cervical    6. Lumbar nerve root impingement, L2    7. Cervical stenosis of spine, mild    8. Strain of lumbar region, initial encounter    9. Bilateral elbow joint pain    10. Carpal tunnel syndrome, bilateral        PLAN:  Informed verbal consent was obtained  -Continue with Percocet, ZTlido  -Corrine exercises/back exercises recommended  -Recommended patient have MRI of the Left Hip completed with [facility] with Dr. Vitaly Birch faxed to our office  -Patient currently works from home which is a sitting position, recommended standing desk with good support while working. Xray of the SI joint was otherwise normal. Pain could also be radiating from the Lumbar spine  -CBT techniques- relaxation therapies such as biofeedback, mindfulness based stress reduction, imagery, cognitive restructuring, problem solving discussed with patient  -Last UDS 8/16/21 Consistent  -Return in about 4 weeks (around 12/13/2021). Current Outpatient Medications   Medication Sig Dispense Refill    Lidocaine (ZTLIDO) 1.8 % PTCH Apply 1 patch topically daily 90 patch 0    oxyCODONE-acetaminophen (PERCOCET) 5-325 MG per tablet Take 1 tablet by mouth every 8 hours as needed for Pain for up to 30 days.  Take no more than 2-3 tabs orally prn 90 tablet 0    vitamin B-12 (CYANOCOBALAMIN) 500 MCG tablet Take 500 mcg by mouth daily      docusate sodium (COLACE) 100 MG capsule Take 100 mg by mouth 2 times daily      Saccharomyces boulardii (FLORASTOR PO) Take by mouth daily      ferrous sulfate (IRON 325) 325 (65 Fe) MG tablet TAKE 1 TABLET EVERY OTHER DAY      Resveratrol 250 MG CAPS Take by mouth daily      Collagen-Boron-Hyaluronic Acid ([employer] JOINT HEALTH TRIPLE ACTION) 40-5-3.3 MG TABS Take by mouth daily      traZODone (DESYREL) 50 MG tablet Take 1 tablet by mouth nightly as needed for Sleep 90 tablet 1    TURMERIC PO Take by mouth      APPLE CIDER VINEGAR PO Take by mouth      Linoleic Acid-Sunflower Oil (CLA PO) Take by mouth      ELIQUIS 5 MG TABS tablet TAKE 1 TABLET BY MOUTH TWO TIMES A DAY  60 tablet 0     No current facility-administered medications for this visit. I will continue her current medication regimen  which is part of the above treatment schedule. It has been helping with Ms. Lara's chronic  medical problems which for this visit include:   Diagnoses of Chronic pain syndrome, Degeneration of lumbar or lumbosacral intervertebral disc, Spinal stenosis, lumbar region, without neurogenic claudication, Lumbosacral spondylosis without myelopathy, DDD (degenerative disc disease), cervical, Lumbar nerve root impingement, L2, Cervical stenosis of spine, mild, Strain of lumbar region, initial encounter, Bilateral elbow joint pain, and Carpal tunnel syndrome, bilateral were pertinent to this visit. Risks and benefits of the medications and other alternative treatments  including no treatment were discussed with the patient. The common side effects of these medications were also explained to the patient. Informed verbal consent was obtained.    Goals of current treatment regimen include improvement in pain, restoration of functioning- with focus on improvement in physical performance, general activity, work or disability,emotional distress, health care utilization and  decreased medication consumption. Will continue to monitor progress towards achieving/maintaining therapeutic goals with special emphasis on  1. Improvement in perceived interfernce  of pain with ADL's. Ability to do home exercises independently. Ability to do household chores indoor and/or outdoor work and social and leisure activities. Improve psychosocial and physical functioning. - she is showing progression towards this treatment goal with the current regimen. She was advised against drinking alcohol with the narcotic pain medicines, advised against driving or handling machinery while adjusting the dose of medicines or if having cognitive  issues related to the current medications. Risk of overdose and death, if medicines not taken as prescribed, were also discussed. If the patient develops new symptoms or if the symptoms worsen, the patient should call the office. While transcribing every attempt was made to maintain the accuracy of the note in terms of it's contents,there may have been some errors made inadvertently. Thank you for allowing me to participate in the care of this patient.     Deborah Blandon, JUSTICE    Cc: Josh Thao MD

## 2021-11-17 ENCOUNTER — OFFICE VISIT (OUTPATIENT)
Dept: PRIMARY CARE CLINIC | Age: 51
End: 2021-11-17
Payer: COMMERCIAL

## 2021-11-17 VITALS
OXYGEN SATURATION: 99 % | DIASTOLIC BLOOD PRESSURE: 74 MMHG | TEMPERATURE: 98.5 F | HEIGHT: 64 IN | WEIGHT: 142.8 LBS | HEART RATE: 64 BPM | SYSTOLIC BLOOD PRESSURE: 112 MMHG | BODY MASS INDEX: 24.38 KG/M2

## 2021-11-17 DIAGNOSIS — Z13.220 LIPID SCREENING: ICD-10-CM

## 2021-11-17 DIAGNOSIS — Z00.8 ENCOUNTER FOR BIOMETRIC SCREENING: ICD-10-CM

## 2021-11-17 DIAGNOSIS — Z11.59 ENCOUNTER FOR HCV SCREENING TEST FOR LOW RISK PATIENT: ICD-10-CM

## 2021-11-17 DIAGNOSIS — D68.9 COAGULATION DISORDER (HCC): ICD-10-CM

## 2021-11-17 DIAGNOSIS — G89.4 CHRONIC PAIN SYNDROME: ICD-10-CM

## 2021-11-17 DIAGNOSIS — Z13.1 DIABETES MELLITUS SCREENING: ICD-10-CM

## 2021-11-17 DIAGNOSIS — Z00.00 ROUTINE PHYSICAL EXAMINATION: Primary | ICD-10-CM

## 2021-11-17 LAB
A/G RATIO: 2.6 (ref 1.1–2.2)
ALBUMIN SERPL-MCNC: 4.9 G/DL (ref 3.4–5)
ALP BLD-CCNC: 133 U/L (ref 40–129)
ALT SERPL-CCNC: 18 U/L (ref 10–40)
ANION GAP SERPL CALCULATED.3IONS-SCNC: 11 MMOL/L (ref 3–16)
AST SERPL-CCNC: 20 U/L (ref 15–37)
BASOPHILS ABSOLUTE: 0 K/UL (ref 0–0.2)
BASOPHILS RELATIVE PERCENT: 0.6 %
BILIRUB SERPL-MCNC: 0.4 MG/DL (ref 0–1)
BUN BLDV-MCNC: 13 MG/DL (ref 7–20)
CALCIUM SERPL-MCNC: 9.6 MG/DL (ref 8.3–10.6)
CHLORIDE BLD-SCNC: 101 MMOL/L (ref 99–110)
CHOLESTEROL, TOTAL: 195 MG/DL (ref 0–199)
CO2: 26 MMOL/L (ref 21–32)
CREAT SERPL-MCNC: 0.8 MG/DL (ref 0.6–1.1)
CREATININE URINE: 20.1 MG/DL (ref 28–259)
EOSINOPHILS ABSOLUTE: 0 K/UL (ref 0–0.6)
EOSINOPHILS RELATIVE PERCENT: 1.2 %
GFR AFRICAN AMERICAN: >60
GFR NON-AFRICAN AMERICAN: >60
GLUCOSE BLD-MCNC: 91 MG/DL (ref 70–99)
HCT VFR BLD CALC: 37.5 % (ref 36–48)
HDLC SERPL-MCNC: 54 MG/DL (ref 40–60)
HEMOGLOBIN: 12.5 G/DL (ref 12–16)
HEPATITIS C ANTIBODY INTERPRETATION: NORMAL
LDL CHOLESTEROL CALCULATED: 122 MG/DL
LYMPHOCYTES ABSOLUTE: 1.3 K/UL (ref 1–5.1)
LYMPHOCYTES RELATIVE PERCENT: 33 %
MCH RBC QN AUTO: 32 PG (ref 26–34)
MCHC RBC AUTO-ENTMCNC: 33.3 G/DL (ref 31–36)
MCV RBC AUTO: 95.9 FL (ref 80–100)
MICROALBUMIN UR-MCNC: <1.2 MG/DL
MICROALBUMIN/CREAT UR-RTO: ABNORMAL MG/G (ref 0–30)
MONOCYTES ABSOLUTE: 0.3 K/UL (ref 0–1.3)
MONOCYTES RELATIVE PERCENT: 6.5 %
NEUTROPHILS ABSOLUTE: 2.3 K/UL (ref 1.7–7.7)
NEUTROPHILS RELATIVE PERCENT: 58.7 %
PDW BLD-RTO: 12.5 % (ref 12.4–15.4)
PLATELET # BLD: 269 K/UL (ref 135–450)
PMV BLD AUTO: 7.9 FL (ref 5–10.5)
POTASSIUM SERPL-SCNC: 4.7 MMOL/L (ref 3.5–5.1)
RBC # BLD: 3.91 M/UL (ref 4–5.2)
SODIUM BLD-SCNC: 138 MMOL/L (ref 136–145)
TOTAL PROTEIN: 6.8 G/DL (ref 6.4–8.2)
TRIGL SERPL-MCNC: 97 MG/DL (ref 0–150)
TSH SERPL DL<=0.05 MIU/L-ACNC: 1.97 UIU/ML (ref 0.27–4.2)
VLDLC SERPL CALC-MCNC: 19 MG/DL
WBC # BLD: 4 K/UL (ref 4–11)

## 2021-11-17 PROCEDURE — 99396 PREV VISIT EST AGE 40-64: CPT | Performed by: FAMILY MEDICINE

## 2021-11-17 ASSESSMENT — ENCOUNTER SYMPTOMS
EYES NEGATIVE: 1
NAUSEA: 0
ALLERGIC/IMMUNOLOGIC NEGATIVE: 1
DIARRHEA: 0
VOMITING: 0
WHEEZING: 0
SHORTNESS OF BREATH: 0
BACK PAIN: 1
RESPIRATORY NEGATIVE: 1
ABDOMINAL PAIN: 0

## 2021-11-17 NOTE — PROGRESS NOTES
SUBJECTIVE:  Patient ID: Jane Yo is a 53 y.o. female.   Chief Complaint:  Chief Complaint   Patient presents with    Annual Exam       HPI   53year old Female  Annual    Past Medical History:   Diagnosis Date    Acne     Dr Praveena Webster 5 Leiden mutation, heterozygous ([address]     Medical history reviewed with no changes      Past Surgical History:   Procedure Laterality Date    CARPAL TUNNEL RELEASE Bilateral      SECTION  2001     x1    PAIN MANAGEMENT PROCEDURE Left 10/26/2020    LEFT L4 AND L5 TRANSFORAMINAL EPDIURAL STEROID INJECTION WITH FLUOROSCOPY (99797, 65542) performed by Christine Hennessy MD at [address] Left 2020    LEFT LUMBAR FOUR LUMBAR FIVE TRANSFORAMINAL  EPIDURAL STEROID INJECTION SITE CONFIRMED BY FLUOROSCOPY performed by Christine Hennessy MD at [facility]     No Known Allergies    Family History   Problem Relation Age of Onset    No Known Problems Mother         Healthy 78year old    Heart Disease Father [de-identified]        +Stent    High Cholesterol Father     Other Brother         do not talk +drug      Social History     Social History Narrative    Divorce    BF    Son 21year old  @ [employer] & 2 step son 21 @ 25    All children @[employer]    No tobacco         Patient Active Problem List   Diagnosis    Diverticulosis of intestine without bleeding    Degeneration of lumbar or lumbosacral intervertebral disc    Lumbosacral spondylosis without myelopathy    Spinal stenosis, lumbar region, without neurogenic claudication    Disc displacement, lumbar    Lumbar stenosis    Chronic pain syndrome    Lumbar nerve root impingement, L2    Primary insomnia    Drug induced constipation    Strain of neck, initial encounter    Acute deep vein thrombosis (DVT) of distal vein of lower extremity (HCC)    Carpal tunnel syndrome, bilateral    Coagulation disorder (HCC)    Chronic left sacroiliac pain    Polyarthralgia    Antiphospholipid antibody positive    Chronic midline low back pain with left-sided sciatica     Current Outpatient Medications   Medication Sig Dispense Refill    Lidocaine (ZTLIDO) 1.8 % PTCH Apply 1 patch topically daily 90 patch 0    oxyCODONE-acetaminophen (PERCOCET) 5-325 MG per tablet Take 1 tablet by mouth every 8 hours as needed for Pain for up to 30 days. Take no more than 2-3 tabs orally prn 90 tablet 0    vitamin B-12 (CYANOCOBALAMIN) 500 MCG tablet Take 500 mcg by mouth daily      docusate sodium (COLACE) 100 MG capsule Take 100 mg by mouth 2 times daily      Saccharomyces boulardii (FLORASTOR PO) Take by mouth daily      ferrous sulfate (IRON 325) 325 (65 Fe) MG tablet TAKE 1 TABLET EVERY OTHER DAY      Resveratrol 250 MG CAPS Take by mouth daily      Collagen-Boron-Hyaluronic Acid ([employer] JOINT HEALTH TRIPLE ACTION) 40-5-3.3 MG TABS Take by mouth daily      traZODone (DESYREL) 50 MG tablet Take 1 tablet by mouth nightly as needed for Sleep 90 tablet 1    TURMERIC PO Take by mouth      APPLE CIDER VINEGAR PO Take by mouth      Linoleic Acid-Sunflower Oil (CLA PO) Take by mouth      ELIQUIS 5 MG TABS tablet TAKE 1 TABLET BY MOUTH TWO TIMES A DAY  60 tablet 0    torsemide (DEMADEX) 10 MG tablet TAKE 1 TABLET DAILY (Patient not taking: Reported on 11/17/2021) 90 tablet 1     No current facility-administered medications for this visit.      Lab Results   Component Value Date    WBC 3.7 (L) 10/20/2021    HGB 13.1 10/20/2021    HCT 39.1 10/20/2021    MCV 96.5 10/20/2021     10/20/2021     Lab Results   Component Value Date    CHOL 241 (H) 11/16/2020    CHOL 204 (H) 10/11/2012    CHOL 198 11/02/2010     Lab Results   Component Value Date    TRIG 71 11/16/2020    TRIG 93 10/11/2012    TRIG 120 11/02/2010     Lab Results   Component Value Date    HDL 76 (H) 11/16/2020    HDL 85 (H) 10/11/2012    HDL 48 11/02/2010     Lab Results   Component Value Date    LDLCALC 151 (H) 11/16/2020    LDLCALC 101 (H) 10/11/2012 LDLCALC 126 11/02/2010     Lab Results   Component Value Date    LABVLDL 14 11/16/2020    LABVLDL 19 10/11/2012     Lab Results   Component Value Date    CHOLHDLRATIO 4.1 11/02/2010       Chemistry        Component Value Date/Time     11/16/2020 1121    K 4.5 11/16/2020 1121    CL 99 11/16/2020 1121    CO2 28 11/16/2020 1121    BUN 20 11/16/2020 1121    CREATININE 0.8 11/16/2020 1121        Component Value Date/Time    CALCIUM 9.8 11/16/2020 1121    ALKPHOS 131 (H) 11/16/2020 1121    AST 19 11/16/2020 1121    ALT 18 11/16/2020 1121    BILITOT 0.4 11/16/2020 1121            Review of Systems   Constitutional:        Stable   HENT: Negative. Eyes: Negative. Respiratory: Negative. Negative for shortness of breath and wheezing. Cardiovascular: Negative for chest pain, palpitations and leg swelling. Gastrointestinal: Negative for abdominal pain, diarrhea, nausea and vomiting. Colonoscopy Sept 2020  Selma Elliott MD  Hx Abcess  Hx Hospitalization x 5 days  Dx Diverticulitis     Endocrine: Negative. Genitourinary: Negative for dysuria and flank pain. Rx Wellbutrin 300 mg daily  By GYN  Dr Lindsay Beltran office  Mammogram done July 2021   Musculoskeletal: Positive for arthralgias and back pain. Recent middle finger surgery bilateral  Chronic pain specialist through [facility]  Chronic pain  Arthritis Dr Horace Carrel   Allergic/Immunologic: Negative. Neurological: Negative for dizziness and headaches. Hematological:        Hematology   Dx DVT  Dx Factor V  Rx Eliquis   Psychiatric/Behavioral: Negative for behavioral problems, confusion, self-injury, sleep disturbance and suicidal ideas. The patient is not nervous/anxious and is not hyperactive.          [facility] state & federal regulation  Partner Nate  Sleep disorder Rx Trazodone       OBJECTIVE:  /74 (Site: Right Upper Arm, Position: Sitting, Cuff Size: Medium Adult)   Pulse 64   Temp 98.5 °F (36.9 °C) (Oral)   Ht 5' 4\" (1.626 m)   Wt 142 lb 12.8 oz (64.8 kg)   SpO2 99%   BMI 24.51 kg/m²   Physical Exam  Constitutional:       General: She is not in acute distress. Appearance: She is well-developed. She is not diaphoretic. HENT:      Head: Normocephalic. Right Ear: External ear normal.      Left Ear: External ear normal.      Nose: Nose normal.   Eyes:      General: No scleral icterus. Right eye: No discharge. Left eye: No discharge. Conjunctiva/sclera: Conjunctivae normal.      Pupils: Pupils are equal, round, and reactive to light. Neck:      Thyroid: No thyromegaly. Cardiovascular:      Rate and Rhythm: Normal rate and regular rhythm. Heart sounds: Normal heart sounds. No murmur heard. Pulmonary:      Effort: Pulmonary effort is normal. No respiratory distress. Breath sounds: Normal breath sounds. No wheezing or rales. Chest:      Chest wall: No tenderness. Abdominal:      General: Bowel sounds are normal. There is no distension. Palpations: Abdomen is soft. There is no mass. Tenderness: There is no abdominal tenderness. There is no guarding or rebound. Musculoskeletal:         General: Normal range of motion. Cervical back: Normal range of motion and neck supple. Lymphadenopathy:      Cervical: No cervical adenopathy. Skin:     General: Skin is warm. Findings: No rash. Neurological:      Mental Status: She is alert and oriented to person, place, and time. Deep Tendon Reflexes: Reflexes are normal and symmetric. Psychiatric:         Behavior: Behavior normal.         Thought Content: Thought content normal.         Judgment: Judgment normal.         ASSESSMENT/PLAN:      Diagnosis Orders   1. Routine physical examination     2. Lipid screening     3. Chronic pain syndrome     4.  Coagulation disorder (HCC)           Covid vaccine x 2 done  No Flu   Td ok today  Chronic back & Left hip  Dr Destiny Bennett  Hx Epidural x 2 Oct & Nov 2020  Chronic pain medication with [facility] CNP  Factor 5 Liden & DVT  Hematology Care Dr Roberto Goldberg  Rx Eliquis BID  Arthritis Dr Messer  initial visit [facility]U next month  Colonoscopy 2020 Dr Decker Links Dx Diverticulosis  Hx Trigger finger Middle surgery Bilateral Dr Ruddy Lopez

## 2021-11-18 LAB
ESTIMATED AVERAGE GLUCOSE: 99.7 MG/DL
HBA1C MFR BLD: 5.1 %

## 2021-11-29 DIAGNOSIS — R74.8 HIGH ALKALINE PHOSPHATASE: Primary | ICD-10-CM

## 2021-11-29 NOTE — PROGRESS NOTES
Raghav Thrasher MD  [address]  Physicians - Rheumatology    [x] [facility] HOSPITAL:  [address] [facility] [] [facility] Office:  [address]  [facility], [address]   Office: [phone]  Fax: [phone]     RHEUMATOLOGY PROGRESS NOTE    SUBJECTIVE:    Background:   Jameel Burgos is a 49 y.o. female w/ polyarthralgias and degenerative arthritis of the L-spine. Pt is s/p b/l 3rd trigger finger surgery on 9/24/2021 and b/l carpal tunnel surgery by by Dr. Caty Torres in 10/2020. PMHx pertinent for Hx of popliteal thrombosis in 3/2021 (follows w/ Hematology Dr. Dieter Sanders, per pt on lifelong anticoagulation, found to have Factor V Leiden mutation and positive B2GP IgG) and Hx of diverticulitis w/ sigmoid abscess (hospitalized in 7/2020). Current rheum meds:  OTC APAP  Lidocaine patches  Percocet PRN     Prior rheum meds:  NSAIDs: told to avoid d/t chronic anti-coagulation and Hx of diverticulitis    Past medical/surgical history, medications and allergies are reviewed and updated as appropriate. Interval Hx:   Pt reports persistent arthralgias in her hands and wrist joints. She reports pain in the palmar aspect of her b/l MCP joints. Arthralgias are worse in the morning. She reports prolonged morning stiffness. Finger swelling has improved since her last visit. Pain in her left buttock region has now moved to her lateral hip, it hurts to lay on her side in bed. Pt is following w/ Pain Management for her chronic back pain. PCP recently ordered nuclear bone scan. Denies Hx of enthesitis.     Denies Hx of PsO, Sx of IBD or uveitis.     Rheumatologic ROS:  Constitutional: denies chronic fatigue, fever/chills, night sweats, unintentional weight loss  Integumentary: denies photosensitivity, rash, patchy alopecia, or Sx of Raynaud's phenomenon  Eyes: denies dry eyes, redness or pain, visual disturbance, or floaters  Nose: denies nasal ulcers or recurrent sinusitis  Oral cavity: denies dry mouth or oral ulcers  Cardiovascular: denies CP, palpitations, Hx of pericardial effusion or pericarditis  Respiratory: denies SOB, cough, hemoptysis, or pleurisy  Gastrointestinal: denies heart burn, dysphagia or esophageal dysmotility, denies change in bowel habits or Sx of IBD  Hematologic/Lymphatic: +Hx of popliteal vein thrombosis in 3/2021 found to have Factor V Leiden mutation on life long anticoagulation, denies recurrent miscarriages, denies swollen LNs  Musculoskeletal:  refer to above HPI   Neurological: denies focal weakness, paresthesias/hyperesthesias or change in sensation, denies Hx of seizure, denies change in gait, balance, or memory    No Known Allergies    Past Medical History:        Diagnosis Date    Acne     Dr Bria Cobian 5 Leiden mutation, heterozygous ([address]     Medical history reviewed with no changes        Past Surgical History:        Procedure Laterality Date    CARPAL TUNNEL RELEASE Bilateral      SECTION  2001     x1    PAIN MANAGEMENT PROCEDURE Left 10/26/2020    LEFT L4 AND L5 TRANSFORAMINAL EPDIURAL STEROID INJECTION WITH FLUOROSCOPY (59818, [id number]) performed by Kierra Peres MD at [address] Left 2020    LEFT LUMBAR FOUR LUMBAR FIVE TRANSFORAMINAL  EPIDURAL STEROID INJECTION SITE CONFIRMED BY FLUOROSCOPY performed by Kierra Peres MD at [address] [facility] OR       Medications:    Current Outpatient Medications   Medication Sig Dispense Refill    predniSONE (DELTASONE) 10 MG tablet Take 2 tablets by mouth daily for 10 days, THEN 1 tablet daily for 10 days, THEN 0.5 tablets daily for 10 days. 35 tablet 0    Lidocaine (ZTLIDO) 1.8 % PTCH Apply 1 patch topically daily 90 patch 0    oxyCODONE-acetaminophen (PERCOCET) 5-325 MG per tablet Take 1 tablet by mouth every 8 hours as needed for Pain for up to 30 days.  Take no more than 2-3 tabs orally prn 90 tablet 0    vitamin B-12 (CYANOCOBALAMIN) 500 MCG tablet Take 500 mcg by mouth daily  docusate sodium (COLACE) 100 MG capsule Take 100 mg by mouth 2 times daily      Saccharomyces boulardii (FLORASTOR PO) Take by mouth daily      ferrous sulfate (IRON 325) 325 (65 Fe) MG tablet TAKE 1 TABLET EVERY OTHER DAY      Resveratrol 250 MG CAPS Take by mouth daily      Collagen-Boron-Hyaluronic Acid ([employer] TRIPLE ACTION) 40-5-3.3 MG TABS Take by mouth daily      traZODone (DESYREL) 50 MG tablet Take 1 tablet by mouth nightly as needed for Sleep 90 tablet 1    TURMERIC PO Take by mouth      APPLE CIDER VINEGAR PO Take by mouth      Linoleic Acid-Sunflower Oil (CLA PO) Take by mouth      ELIQUIS 5 MG TABS tablet TAKE 1 TABLET BY MOUTH TWO TIMES A DAY  60 tablet 0     No current facility-administered medications for this visit.         OBJECTIVE:  Physical Exam:  /74   Pulse 81   Temp 98 °F (36.7 °C)   Resp 14   Ht 5' 4\" (1.626 m)   Wt 135 lb (61.2 kg)   SpO2 99%   BMI 23.17 kg/m²     GEN: AAOx3, in NAD, well-appearing  HEAD: normocephalic, atraumatic  EYES: no injection or icterus  CVS: RRR  LUNGS: in no acute respiratory distress, CTAB  MSK:  Spine: no kyphosis or lordosis, C-spine w/ FROM, no paraspinal muscle or vertebral tenderness, SI joints NTTP  Upper extremities:              Hands: prior mild dactylitis of the b/l 2-3rd fingers appears to have mostly resolved, b/l MCP joints ko palmar aspect slightly TTP, b/l PIP and DIP joints w/o swelling NTTP, full fist formation w/ good  strength, no evidence of sclerodactyly, calcinosis, digital ulcers or pitting              Wrist: no synovitis in the wrist joints b/l, FROM              Elbow: no synovitis or bursitis, FROM  Lower extremities:              Hip: L trochanteric bursa TTP              Knees: no warmth or effusion present, FROM              Ankles: no synovitis, FROM, Achilles tendons w/o swelling or warmth NTTP              Feet: no toe swelling or pain or warmth on palpation w/ FROM, negative MTP squeeze test  INTEGUMENT: no rash or psoriatic lesions, no petechiae, bruises, or palpable purpura, no patchy alopecia, no nail or periungual changes, no clubbing or digital ulcers    DATA:  Labs: I personally reviewed interval labs and discussed w/ the pt in detail which showed:    Lab Results   Component Value Date    WBC 4.0 11/17/2021    HGB 12.5 11/17/2021    HCT 37.5 11/17/2021    MCV 95.9 11/17/2021     11/17/2021    GRAN 54 04/08/2020    LYMPHOPCT 33.0 11/17/2021    RBC 3.91 (L) 11/17/2021    MCH 32.0 11/17/2021    MCHC 33.3 11/17/2021    RDW 12.5 11/17/2021     Lab Results   Component Value Date     11/17/2021    K 4.7 11/17/2021     11/17/2021    CO2 26 11/17/2021    BUN 13 11/17/2021    CREATININE 0.8 11/17/2021    GLUCOSE 91 11/17/2021    CALCIUM 9.6 11/17/2021    PROT 6.8 11/17/2021    LABALBU 4.9 11/17/2021    BILITOT 0.4 11/17/2021    ALKPHOS 133 (H) 11/17/2021    AST 20 11/17/2021    ALT 18 11/17/2021    LABGLOM >60 11/17/2021    GFRAA >60 11/17/2021    AGRATIO 2.6 (H) 11/17/2021    GLOB 2.6 11/16/2020     Lab Results   Component Value Date    VITD25 33.1 11/16/2020     Lab Results   Component Value Date    C3 133.4 10/20/2021     Lab Results   Component Value Date    C4 19.8 10/20/2021     No results found for: ANTIDSDNAIGG     Lab Results   Component Value Date    LABURIC 3.2 10/20/2021     Lab Results   Component Value Date    CRP <3.0 10/20/2021     Lab Results   Component Value Date    SEDRATE 9 10/20/2021     Negative SHILOH (10/20/21)  Negative RF, CCP (10/20/21)  Negative HLA B27 (10/20/21)  Negative hepatitis B surface Ag and core total Ab, hepatitis C Ab (10/20/21)    Imaging:  I personally reviewed interval imaging and discussed w/ the pt in detail which included:    MRI C-spine (7/1/20): FINDINGS:   BONES/ALIGNMENT: There is normal alignment of the spine. The vertebral body heights are maintained.  The bone marrow signal appears unremarkable.     SPINAL CORD: The visualized spinal cord has normal signal and morphology.  No evidence of mass or abnormal fluid collection within the spinal canal.     SOFT TISSUES: Paraspinal soft tissues are unremarkable.     C2-C3: Disc height and signal maintained.  No neural foraminal narrowing or spinal canal stenosis.     C3-C4: Disc height and signal maintained.  No neural foraminal narrowing or spinal canal stenosis.     C4-C5: Disc height and signal maintained.  No neural foraminal narrowing or spinal canal stenosis.     C5-C6: Mild disc height loss.  No disc signal abnormality.  No left neural foraminal narrowing.  Mild right neural foraminal narrowing secondary to uncovertebral hypertrophy.  Mild spinal canal stenosis secondary to disc bulge.     C6-C7: Disc height and signal maintained.  No neural foraminal narrowing or spinal canal stenosis.     C7-T1: Disc height and signal maintained.  No neural foraminal narrowing or spinal canal stenosis. IMPRESSION:  1. Mild C5-6 degenerative disc height loss. 2. Mild C5-6 spinal canal stenosis secondary to disc bulge. 3. Mild narrowing of the right C6 neural foramen secondary to C5-6 uncovertebral hypertrophy.      MRI L-spine (10/8/20):   FINDINGS:    Assume 5 lumbar vertebrae.  Lordosis is moderate.  Conus medullaris termination is normal.  Slight anterior disc displacements multilevel.     T11-12: Disc is dehydrated, slightly height reduced; shallow left lateral disc protrusion gently deforms left ventral dural sac.     T12-L1: Disc shows no posterior displacement.     L1-2: Disc shows no posterior displacement.     L2-3: Disc is dehydrated; trace disc bulge; right extraforaminal annular fissure; disc effaces ventral dural sac.  Mild facet hypertrophy, scant facet fluid.     L3-4: Disc is dehydrated; shallow disc bulge with annular fissure straightens ventral dural sac.  Scant facet fluid.     L4-5: Disc is dehydrated, slightly height reduced; 2mm retrolisthesis; disc bulge with annular fissure gently encroaches upon ventral dural sac, minimally greater on left; disc contacts foraminal L4 nerve roots.  Mild spinal stenosis.  Mild bilateral lateral recess stenosis.  Mild  biforaminal stenosis.     L5-S1: Disc is dehydrated, slightly height reduced; shallow central disc protrusion effaces ventral dural sac.  Disc contacts foraminal L5 nerve roots.  Mild biforaminal narrowing.   No focal bone lesion to suggest acute osseous stress injury or acute stress fracture.     CONCLUSION:   1. L4-5 mild spinal stenosis.  Trace retrolisthesis; disc bulge with annular fissure; disc contacting foraminal L4 nerve roots.  Mild biforaminal narrowing. 2. L5-S1 shallow central disc protrusion effacing ventral dural sac; disc contacts foraminal L5  nerve roots.  Mild biforaminal narrowing. 3. T11-12 shallow left lateral disc protrusion gently encroaching upon left ventral dural sac. X-rays (10/20/21):  Left hand findings:     3 projections. Normal bone mineralization. No fracture or subluxation. No evidence of bony erosion or ankylosis. Joint spaces are maintained. 1. No abnormality of left hand identified. Right hand findings:     3 projections. Normal mineralization. No fracture or dislocation. No soft tissue abnormality. No evidence of erosion or ankylosis. Joint spaces are maintained. IMPRESSION:   1. No abnormality of right hand identified. Lumbar spine findings:     3 projections. There are 5 nonrib-bearing lumbar vertebral bodies. Normal vertebral body height and alignment. There is mild disc space narrowing at L4-L5. Mild L4-L5 facet arthrosis also demonstrated. Impression:   1. Mild degenerative disc and facet disease in the lumbar spine and L4-L5. SI joints findings:     3 projections of the sacroiliac joints. No evidence of SI joint fusion. No evidence of bony erosion or significant sclerosis. Evaluation of pelvic soft tissues demonstrates the presence of an intrauterine device. IMPRESSION:   1. No abnormalities of the sacroiliac joints are detected. Above results were discussed w/ the pt in detail during today's visit. ASSESSMENT/PLAN:   1. Polyarthralgia  Assessment & Plan:  - inflammatory polyarthralgias in the hand and wrist joints, prior exam showed mild dactylitis of her b/l 2-3rd fingers mostly resolved today, no active synovitis appreciated on exam today. She had some tenderness in the flexor tendon sheaths of her hands. Discussed her negative rheum serologies and X-ray findings. - she will obtain MRI of the R hand to evaluate for PsA/inflammatory arthritis then start a low dose Prednisone taper for diagnostic and therapeutic purposes. - will f/u on nuclear bone scan ordered by PCP. - avoid NSAIDs d/t pt being on anticoagulation. - pt following w/ Pain Management. Orders:  -     MRI HAND RIGHT W WO CONTRAST; Future  -     predniSONE (DELTASONE) 10 MG tablet; Take 2 tablets by mouth daily for 10 days, THEN 1 tablet daily for 10 days, THEN 0.5 tablets daily for 10 days. , Disp-35 tablet, R-0Normal  2. Degeneration of lumbar or lumbosacral intervertebral disc  Assessment & Plan:  - discussed negative CRP, HLA B27 and unremarkable radiographic findings of SI joints and lumbar spine. Pt stated she had MRI pelvis ordered by Dr. Elvis Fung in January, she will have her radiology report faxed to our office - need to f/u on SI joints. - avoid NSAIDs d/t pt being on anticoagulation. - pt following w/ Pain Management. 3. Antiphospholipid antibody positive  Assessment & Plan:  - discussed her positive B2GP IgG on 7/22/20. Pt told me she is on life long anticoagulation for Hx of popliteal thrombosis and Factor V mutation, followed by Hematology. 4. Encounter for therapeutic drug monitoring  -     Creatinine, Serum; Future     Return in about 2 months (around 2/1/2022) for lab result discussion and treatment plan, medication monitoring.     The risks and benefits of my recommendations, as well as other treatment options, benefits and side effects were discussed with the patient today. Questions were answered. NOTE: This report is transcribed by using voice recognition software dragon. Every effort is made to ensure accuracy; however, inadvertent computerized  transcription errors may be present.

## 2021-12-01 ENCOUNTER — OFFICE VISIT (OUTPATIENT)
Dept: RHEUMATOLOGY | Age: 51
End: 2021-12-01
Payer: COMMERCIAL

## 2021-12-01 VITALS
RESPIRATION RATE: 14 BRPM | BODY MASS INDEX: 23.05 KG/M2 | HEIGHT: 64 IN | OXYGEN SATURATION: 99 % | WEIGHT: 135 LBS | HEART RATE: 81 BPM | TEMPERATURE: 98 F | SYSTOLIC BLOOD PRESSURE: 120 MMHG | DIASTOLIC BLOOD PRESSURE: 74 MMHG

## 2021-12-01 DIAGNOSIS — R76.0 ANTIPHOSPHOLIPID ANTIBODY POSITIVE: ICD-10-CM

## 2021-12-01 DIAGNOSIS — Z51.81 ENCOUNTER FOR THERAPEUTIC DRUG MONITORING: ICD-10-CM

## 2021-12-01 DIAGNOSIS — M25.50 POLYARTHRALGIA: Primary | ICD-10-CM

## 2021-12-01 DIAGNOSIS — M51.37 DEGENERATION OF LUMBAR OR LUMBOSACRAL INTERVERTEBRAL DISC: ICD-10-CM

## 2021-12-01 PROBLEM — G89.29 CHRONIC MIDLINE LOW BACK PAIN WITH LEFT-SIDED SCIATICA: Status: RESOLVED | Noted: 2021-10-20 | Resolved: 2021-12-01

## 2021-12-01 PROBLEM — M54.42 CHRONIC MIDLINE LOW BACK PAIN WITH LEFT-SIDED SCIATICA: Status: RESOLVED | Noted: 2021-10-20 | Resolved: 2021-12-01

## 2021-12-01 PROCEDURE — 99214 OFFICE O/P EST MOD 30 MIN: CPT | Performed by: INTERNAL MEDICINE

## 2021-12-01 RX ORDER — PREDNISONE 10 MG/1
TABLET ORAL
Qty: 35 TABLET | Refills: 0 | Status: SHIPPED | OUTPATIENT
Start: 2021-12-01 | End: 2021-12-31

## 2021-12-01 NOTE — ASSESSMENT & PLAN NOTE
- discussed negative CRP, HLA B27 and unremarkable radiographic findings of SI joints and lumbar spine. Pt stated she had MRI pelvis ordered by Dr. Duncan De Anda in January, she will have her radiology report faxed to our office - need to f/u on SI joints. - avoid NSAIDs d/t pt being on anticoagulation. - pt following w/ Pain Management.

## 2021-12-01 NOTE — ASSESSMENT & PLAN NOTE
- discussed her positive B2GP IgG on 7/22/20. Pt told me she is on life long anticoagulation for Hx of popliteal thrombosis and Factor V mutation, followed by Hematology.

## 2021-12-01 NOTE — ASSESSMENT & PLAN NOTE
- inflammatory polyarthralgias in the hand and wrist joints, prior exam showed mild dactylitis of her b/l 2-3rd fingers mostly resolved today, no active synovitis appreciated on exam today. She had some tenderness in the flexor tendon sheaths of her hands. Discussed her negative rheum serologies and X-ray findings. - she will obtain MRI of the R hand to evaluate for PsA/inflammatory arthritis then start a low dose Prednisone taper for diagnostic and therapeutic purposes. - will f/u on nuclear bone scan ordered by PCP. - avoid NSAIDs d/t pt being on anticoagulation. - pt following w/ Pain Management.

## 2021-12-06 ENCOUNTER — HOSPITAL ENCOUNTER (OUTPATIENT)
Dept: NUCLEAR MEDICINE | Age: 51
Discharge: HOME | End: 2021-12-06
Payer: COMMERCIAL

## 2021-12-06 DIAGNOSIS — R74.8 HIGH ALKALINE PHOSPHATASE: ICD-10-CM

## 2021-12-06 PROCEDURE — A9503 TC99M MEDRONATE: HCPCS | Performed by: FAMILY MEDICINE

## 2021-12-06 PROCEDURE — 78306 BONE IMAGING WHOLE BODY: CPT

## 2021-12-06 PROCEDURE — 3430000000 HC RX DIAGNOSTIC RADIOPHARMACEUTICAL: Performed by: FAMILY MEDICINE

## 2021-12-06 RX ORDER — TC 99M MEDRONATE 20 MG/10ML
25 INJECTION, POWDER, LYOPHILIZED, FOR SOLUTION INTRAVENOUS
Status: COMPLETED | OUTPATIENT
Start: 2021-12-06 | End: 2021-12-06

## 2021-12-06 RX ADMIN — TC 99M MEDRONATE 25 MILLICURIE: 20 INJECTION, POWDER, LYOPHILIZED, FOR SOLUTION INTRAVENOUS at 11:13

## 2021-12-08 ENCOUNTER — TELEPHONE (OUTPATIENT)
Dept: RHEUMATOLOGY | Age: 51
End: 2021-12-08

## 2021-12-08 NOTE — TELEPHONE ENCOUNTER
Reviewed MRI pelvis ordered by Dr. Dana Iqbal, did not show any inflammation in her SI joints. I am still waiting on the results of MRI R hand, ordered study at her last visit.

## 2021-12-08 NOTE — TELEPHONE ENCOUNTER
Patient called in to see if MRI on file that was done at [facility] can be reviewed, wants to know if she need another MRI. Please advise.

## 2021-12-09 ENCOUNTER — HOSPITAL ENCOUNTER (OUTPATIENT)
Dept: MRI IMAGING | Age: 51
Discharge: HOME | End: 2021-12-09
Payer: COMMERCIAL

## 2021-12-09 ENCOUNTER — TELEPHONE (OUTPATIENT)
Dept: RHEUMATOLOGY | Age: 51
End: 2021-12-09

## 2021-12-09 DIAGNOSIS — M06.00 SERONEGATIVE RHEUMATOID ARTHRITIS (HCC): Primary | ICD-10-CM

## 2021-12-09 DIAGNOSIS — M25.50 POLYARTHRALGIA: ICD-10-CM

## 2021-12-09 PROCEDURE — A9579 GAD-BASE MR CONTRAST NOS,1ML: HCPCS | Performed by: INTERNAL MEDICINE

## 2021-12-09 PROCEDURE — 73220 MRI UPPR EXTREMITY W/O&W/DYE: CPT

## 2021-12-09 PROCEDURE — 6360000004 HC RX CONTRAST MEDICATION: Performed by: INTERNAL MEDICINE

## 2021-12-09 RX ORDER — HYDROXYCHLOROQUINE SULFATE 200 MG/1
300 TABLET, FILM COATED ORAL DAILY
Qty: 135 TABLET | Refills: 0 | Status: SHIPPED | OUTPATIENT
Start: 2021-12-09 | End: 2022-02-01 | Stop reason: SDUPTHER

## 2021-12-09 RX ADMIN — GADOTERIDOL 12 ML: 279.3 INJECTION, SOLUTION INTRAVENOUS at 10:59

## 2021-12-09 NOTE — TELEPHONE ENCOUNTER
Called pt and discussed MRI hand findings of tenosynovitis. Also reviewed her nuclear bone scan findings suggestive of inflammatory arthropathy. DDx includes early seronegative RA vs PsA. Pt stated she started low dose Prednisone 5 days ago and has not noticed any significant improvement in her arthralgias. She remains hesitant to start immunotherapy that would weaken her immune system. She agreed to try  mg daily. Sent Rx to pharmacy. Risk, benefits and side effects were fully explained to my best knowledge, including but not limited to skin rash, GI side effects, visual blurring, and the risk of retinal toxicity and need to have yearly eye/retinal exam. I also gave hand out on drug information. Pt verbalized understanding.

## 2021-12-16 ENCOUNTER — OFFICE VISIT (OUTPATIENT)
Dept: PAIN MANAGEMENT | Age: 51
End: 2021-12-16
Payer: COMMERCIAL

## 2021-12-16 VITALS
DIASTOLIC BLOOD PRESSURE: 80 MMHG | SYSTOLIC BLOOD PRESSURE: 119 MMHG | WEIGHT: 134 LBS | HEART RATE: 75 BPM | HEIGHT: 64 IN | TEMPERATURE: 97.5 F | BODY MASS INDEX: 22.88 KG/M2 | OXYGEN SATURATION: 93 %

## 2021-12-16 DIAGNOSIS — G56.03 CARPAL TUNNEL SYNDROME, BILATERAL: ICD-10-CM

## 2021-12-16 DIAGNOSIS — M54.16 LUMBAR NERVE ROOT IMPINGEMENT: ICD-10-CM

## 2021-12-16 DIAGNOSIS — S39.012A STRAIN OF LUMBAR REGION, INITIAL ENCOUNTER: ICD-10-CM

## 2021-12-16 DIAGNOSIS — M47.817 LUMBOSACRAL SPONDYLOSIS WITHOUT MYELOPATHY: ICD-10-CM

## 2021-12-16 DIAGNOSIS — G89.4 CHRONIC PAIN SYNDROME: ICD-10-CM

## 2021-12-16 DIAGNOSIS — K59.03 DRUG INDUCED CONSTIPATION: ICD-10-CM

## 2021-12-16 DIAGNOSIS — F51.01 PRIMARY INSOMNIA: ICD-10-CM

## 2021-12-16 DIAGNOSIS — M48.061 SPINAL STENOSIS, LUMBAR REGION, WITHOUT NEUROGENIC CLAUDICATION: ICD-10-CM

## 2021-12-16 DIAGNOSIS — M51.37 DEGENERATION OF LUMBAR OR LUMBOSACRAL INTERVERTEBRAL DISC: ICD-10-CM

## 2021-12-16 DIAGNOSIS — M25.521 BILATERAL ELBOW JOINT PAIN: ICD-10-CM

## 2021-12-16 DIAGNOSIS — M25.522 BILATERAL ELBOW JOINT PAIN: ICD-10-CM

## 2021-12-16 DIAGNOSIS — M48.02 CERVICAL STENOSIS OF SPINE: ICD-10-CM

## 2021-12-16 DIAGNOSIS — M50.30 DDD (DEGENERATIVE DISC DISEASE), CERVICAL: ICD-10-CM

## 2021-12-16 PROCEDURE — 99213 OFFICE O/P EST LOW 20 MIN: CPT | Performed by: NURSE PRACTITIONER

## 2021-12-16 RX ORDER — LIDOCAINE 36 MG/1
1 PATCH TOPICAL DAILY
Qty: 90 PATCH | Refills: 0 | Status: SHIPPED | OUTPATIENT
Start: 2021-12-16 | End: 2022-01-20 | Stop reason: SDUPTHER

## 2021-12-16 RX ORDER — OXYCODONE HYDROCHLORIDE AND ACETAMINOPHEN 5; 325 MG/1; MG/1
1 TABLET ORAL EVERY 8 HOURS PRN
Qty: 90 TABLET | Refills: 0 | Status: SHIPPED | OUTPATIENT
Start: 2021-12-16 | End: 2022-01-20 | Stop reason: SDUPTHER

## 2021-12-16 NOTE — PROGRESS NOTES
Ignacio Lara  1970  [id number]      HISTORY OF PRESENT ILLNESS:  Ms. Julio Dunlap is a 49 y.o. female returns for a follow up visit for pain management  She has a diagnosis of   1. Chronic pain syndrome    2. Degeneration of lumbar or lumbosacral intervertebral disc    3. Spinal stenosis, lumbar region, without neurogenic claudication    4. Lumbosacral spondylosis without myelopathy    5. DDD (degenerative disc disease), cervical    6. Lumbar nerve root impingement, L2    7. Cervical stenosis of spine, mild    8. Strain of lumbar region, initial encounter    9. Bilateral elbow joint pain    10. Carpal tunnel syndrome, bilateral    11. Drug induced constipation    12. Primary insomnia      On the Patients Pain Assessment form:  She complains of pain in the lower left back radiating down the left leg She rates the pain 6/10 and describes it as aching. Current treatment regimen has helped relieve about 60% of the pain. She denies any side effects from the current pain regimen. Patient reports that since the last follow up visit the physical functioning is unchanged, family/social relationships are unchanged, mood is unchanged sleep patterns are unchanged, and that the overall functioning is unchanged. Patient denies misusing/abusing her narcotic pain medications or using any illegal drugs. Upon obtaining medical history from Ms. Lara states that pain is manageable on current pain therapy. Takes pain medications as prescribed. She is currently on Plaquenil with steroids for early stages of psoriatic arthritis under the care of Dr. Holley Rankin. Had bone scan through hematology. Mood is stable without anxiety. Sleep is fair with an average of 5-6 hours. Denies to having issues of constipation. Tolerating activities/house chores with moderate tenderness to the lower back, cervical spine. ALLERGIES: Patients list of allergies were reviewed     MEDICATIONS: Ms. Julio Dunlap list of medications were reviewed. Her current medications are   Outpatient Medications Prior to Visit   Medication Sig Dispense Refill    hydroxychloroquine (PLAQUENIL) 200 MG tablet Take 1.5 tablets by mouth daily 135 tablet 0    predniSONE (DELTASONE) 10 MG tablet Take 2 tablets by mouth daily for 10 days, THEN 1 tablet daily for 10 days, THEN 0.5 tablets daily for 10 days. 35 tablet 0    vitamin B-12 (CYANOCOBALAMIN) 500 MCG tablet Take 500 mcg by mouth daily      docusate sodium (COLACE) 100 MG capsule Take 100 mg by mouth 2 times daily      Saccharomyces boulardii (FLORASTOR PO) Take by mouth daily      ferrous sulfate (IRON 325) 325 (65 Fe) MG tablet TAKE 1 TABLET EVERY OTHER DAY      Resveratrol 250 MG CAPS Take by mouth daily      Collagen-Boron-Hyaluronic Acid ([employer] JOINT HEALTH TRIPLE ACTION) 40-5-3.3 MG TABS Take by mouth daily      traZODone (DESYREL) 50 MG tablet Take 1 tablet by mouth nightly as needed for Sleep 90 tablet 1    TURMERIC PO Take by mouth      APPLE CIDER VINEGAR PO Take by mouth      Linoleic Acid-Sunflower Oil (CLA PO) Take by mouth      ELIQUIS 5 MG TABS tablet TAKE 1 TABLET BY MOUTH TWO TIMES A DAY  60 tablet 0    Lidocaine (ZTLIDO) 1.8 % PTCH Apply 1 patch topically daily 90 patch 0     No facility-administered medications prior to visit. SOCIAL/FAMILY/PAST MEDICAL HISTORY: Ms. Laurel Castillo social, family and past medical history was reviewed. REVIEW OF SYSTEMS:    Respiratory: Negative for apnea, chest tightness and shortness of breath or change in baseline breathing. Gastrointestinal: Negative for nausea, vomiting, abdominal pain, diarrhea, constipation, blood in stool and abdominal distention. PHYSICAL EXAM:   Nursing note and vitals reviewed. /80   Pulse 75   Temp 97.5 °F (36.4 °C)   Ht 5' 4\" (1.626 m)   Wt 134 lb (60.8 kg)   SpO2 93%   BMI 23.00 kg/m²   Constitutional: She appears well-developed and well-nourished. No acute distress. Skin: Skin is warm and dry, good turgor.  No rash noted. She is not diaphoretic. Cardiovascular: Normal rate, regular rhythm, normal heart sounds, and does not have murmur. Pulmonary/Chest: Effort normal. No respiratory distress. She does not have wheezes in the lung fields. She has no rales. Neurological/Psychiatric:She is alert and oriented to person, place, and time. Coordination is  normal.  Her mood isAppropriate and affect is Neutral/Euthymic(normal) . IMPRESSION:   1. Chronic pain syndrome    2. Degeneration of lumbar or lumbosacral intervertebral disc    3. Spinal stenosis, lumbar region, without neurogenic claudication    4. Lumbosacral spondylosis without myelopathy    5. DDD (degenerative disc disease), cervical    6. Lumbar nerve root impingement, L2    7. Cervical stenosis of spine, mild    8. Strain of lumbar region, initial encounter    9. Bilateral elbow joint pain    10. Carpal tunnel syndrome, bilateral    11. Drug induced constipation    12. Primary insomnia        PLAN:  Informed verbal consent was obtained  -Continue with Percocet, ZTlido  -Corrine exercises/back stretches recommended  -Maintain f/u with rheumatology for PA  -CBT techniques- relaxation therapies such as biofeedback, mindfulness based stress reduction, imagery, cognitive restructuring, problem solving discussed with patient  -Last UDS 8/16/21 Consistent  -Return in about 4 weeks (around 1/13/2022). Current Outpatient Medications   Medication Sig Dispense Refill    Lidocaine (ZTLIDO) 1.8 % PTCH Apply 1 patch topically daily 90 patch 0    oxyCODONE-acetaminophen (PERCOCET) 5-325 MG per tablet Take 1 tablet by mouth every 8 hours as needed for Pain for up to 30 days. Take no more than 2-3 tabs orally prn 90 tablet 0    hydroxychloroquine (PLAQUENIL) 200 MG tablet Take 1.5 tablets by mouth daily 135 tablet 0    predniSONE (DELTASONE) 10 MG tablet Take 2 tablets by mouth daily for 10 days, THEN 1 tablet daily for 10 days, THEN 0.5 tablets daily for 10 days.  28 tablet 0    vitamin B-12 (CYANOCOBALAMIN) 500 MCG tablet Take 500 mcg by mouth daily      docusate sodium (COLACE) 100 MG capsule Take 100 mg by mouth 2 times daily      Saccharomyces boulardii (FLORASTOR PO) Take by mouth daily      ferrous sulfate (IRON 325) 325 (65 Fe) MG tablet TAKE 1 TABLET EVERY OTHER DAY      Resveratrol 250 MG CAPS Take by mouth daily      Collagen-Boron-Hyaluronic Acid ([employer] JOINT HEALTH TRIPLE ACTION) 40-5-3.3 MG TABS Take by mouth daily      traZODone (DESYREL) 50 MG tablet Take 1 tablet by mouth nightly as needed for Sleep 90 tablet 1    TURMERIC PO Take by mouth      APPLE CIDER VINEGAR PO Take by mouth      Linoleic Acid-Sunflower Oil (CLA PO) Take by mouth      ELIQUIS 5 MG TABS tablet TAKE 1 TABLET BY MOUTH TWO TIMES A DAY  60 tablet 0     No current facility-administered medications for this visit. I will continue her current medication regimen  which is part of the above treatment schedule. It has been helping with Ms. Lara's chronic  medical problems which for this visit include:   Diagnoses of Chronic pain syndrome, Degeneration of lumbar or lumbosacral intervertebral disc, Spinal stenosis, lumbar region, without neurogenic claudication, Lumbosacral spondylosis without myelopathy, DDD (degenerative disc disease), cervical, Lumbar nerve root impingement, L2, Cervical stenosis of spine, mild, Strain of lumbar region, initial encounter, Bilateral elbow joint pain, Carpal tunnel syndrome, bilateral, Drug induced constipation, and Primary insomnia were pertinent to this visit. Risks and benefits of the medications and other alternative treatments  including no treatment were discussed with the patient. The common side effects of these medications were also explained to the patient. Informed verbal consent was obtained.    Goals of current treatment regimen include improvement in pain, restoration of functioning- with focus on improvement in physical performance, general activity, work or disability,emotional distress, health care utilization and  decreased medication consumption. Will continue to monitor progress towards achieving/maintaining therapeutic goals with special emphasis on  1. Improvement in perceived interfernce  of pain with ADL's. Ability to do home exercises independently. Ability to do household chores indoor and/or outdoor work and social and leisure activities. Improve psychosocial and physical functioning. - she is showing progression towards this treatment goal with the current regimen. She was advised against drinking alcohol with the narcotic pain medicines, advised against driving or handling machinery while adjusting the dose of medicines or if having cognitive  issues related to the current medications. Risk of overdose and death, if medicines not taken as prescribed, were also discussed. If the patient develops new symptoms or if the symptoms worsen, the patient should call the office. While transcribing every attempt was made to maintain the accuracy of the note in terms of it's contents,there may have been some errors made inadvertently. Thank you for allowing me to participate in the care of this patient.     Sharol Ormond, CNP    Cc: Yordan Rivera MD

## 2021-12-16 NOTE — PATIENT INSTRUCTIONS
Patient Education        Back Stretches: Exercises  Introduction  Here are some examples of exercises for stretching your back. Start each exercise slowly. Ease off the exercise if you start to have pain. Your doctor or physical therapist will tell you when you can start these exercises and which ones will work best for you. How to do the exercises  Overhead stretch    1. Stand comfortably with your feet shoulder-width apart. 2. Looking straight ahead, raise both arms over your head and reach toward the ceiling. Do not allow your head to tilt back. 3. Hold for 15 to 30 seconds, then lower your arms to your sides. 4. Repeat 2 to 4 times. Side stretch    1. Stand comfortably with your feet shoulder-width apart. 2. Raise one arm over your head, and then lean to the other side. 3. Slide your hand down your leg as you let the weight of your arm gently stretch your side muscles. Hold for 15 to 30 seconds. 4. Repeat 2 to 4 times on each side. Press-up    1. Lie on your stomach, supporting your body with your forearms. 2. Press your elbows down into the floor to raise your upper back. As you do this, relax your stomach muscles and allow your back to arch without using your back muscles. As your press up, do not let your hips or pelvis come off the floor. 3. Hold for 15 to 30 seconds, then relax. 4. Repeat 2 to 4 times. Relax and rest    1. Lie on your back with a rolled towel under your neck and a pillow under your knees. Extend your arms comfortably to your sides. 2. Relax and breathe normally. 3. Remain in this position for about 10 minutes. 4. If you can, do this 2 or 3 times each day. Follow-up care is a key part of your treatment and safety. Be sure to make and go to all appointments, and call your doctor if you are having problems. It's also a good idea to know your test results and keep a list of the medicines you take. Where can you learn more? Go to [email or web address] and sign in to your [employer] account. Enter Y127 in the [employer] box to learn more about \"Back Stretches: Exercises. \"     If you do not have an account, please click on the \"Sign Up Now\" link. Current as of: July 1, 2021               Content Version: 13.0  © 1419-8168 Healthwise, Incorporated. Care instructions adapted under license by [facility] ([facility]). If you have questions about a medical condition or this instruction, always ask your healthcare professional. [address] any warranty or liability for your use of this information.

## 2022-01-20 ENCOUNTER — VIRTUAL VISIT (OUTPATIENT)
Dept: PAIN MANAGEMENT | Age: 52
End: 2022-01-20
Payer: COMMERCIAL

## 2022-01-20 DIAGNOSIS — M25.521 BILATERAL ELBOW JOINT PAIN: ICD-10-CM

## 2022-01-20 DIAGNOSIS — M47.817 LUMBOSACRAL SPONDYLOSIS WITHOUT MYELOPATHY: ICD-10-CM

## 2022-01-20 DIAGNOSIS — G56.03 CARPAL TUNNEL SYNDROME, BILATERAL: ICD-10-CM

## 2022-01-20 DIAGNOSIS — G89.4 CHRONIC PAIN SYNDROME: ICD-10-CM

## 2022-01-20 DIAGNOSIS — K59.03 DRUG INDUCED CONSTIPATION: ICD-10-CM

## 2022-01-20 DIAGNOSIS — M51.37 DEGENERATION OF LUMBAR OR LUMBOSACRAL INTERVERTEBRAL DISC: ICD-10-CM

## 2022-01-20 DIAGNOSIS — M48.061 SPINAL STENOSIS, LUMBAR REGION, WITHOUT NEUROGENIC CLAUDICATION: ICD-10-CM

## 2022-01-20 DIAGNOSIS — M50.30 DDD (DEGENERATIVE DISC DISEASE), CERVICAL: ICD-10-CM

## 2022-01-20 DIAGNOSIS — M54.16 LUMBAR NERVE ROOT IMPINGEMENT: ICD-10-CM

## 2022-01-20 DIAGNOSIS — M25.522 BILATERAL ELBOW JOINT PAIN: ICD-10-CM

## 2022-01-20 DIAGNOSIS — M48.02 CERVICAL STENOSIS OF SPINE: ICD-10-CM

## 2022-01-20 DIAGNOSIS — F51.01 PRIMARY INSOMNIA: ICD-10-CM

## 2022-01-20 PROCEDURE — 99213 OFFICE O/P EST LOW 20 MIN: CPT | Performed by: NURSE PRACTITIONER

## 2022-01-20 RX ORDER — LIDOCAINE 36 MG/1
1 PATCH TOPICAL DAILY
Qty: 90 PATCH | Refills: 0 | Status: SHIPPED | OUTPATIENT
Start: 2022-01-20 | End: 2022-02-17 | Stop reason: SDUPTHER

## 2022-01-20 RX ORDER — OXYCODONE HYDROCHLORIDE AND ACETAMINOPHEN 5; 325 MG/1; MG/1
1 TABLET ORAL EVERY 8 HOURS PRN
Qty: 90 TABLET | Refills: 0 | Status: SHIPPED | OUTPATIENT
Start: 2022-01-20 | End: 2022-02-17 | Stop reason: SDUPTHER

## 2022-01-20 NOTE — PROGRESS NOTES
left leg  . She rates the pain 7/10 and describes it as aching. Current treatment regimen has helped relieve about 70% of the pain. She denies any side effects from the current pain regimen. Patient reports that since the last follow up visit the physical functioning is unchanged, family/social relationships are unchanged, mood is unchanged sleep patterns are unchanged, and that the overall functioning is unchanged. Patient denies misusing/abusing her narcotic pain medications or using any illegal drugs. Upon obtaining medical history from Ms. Lara states that pain is manageable on current pain therapy. Takes pain medications as prescribed. Mood is stable without anxiety. Sleep is fair with an average of 5-6 hours. Denies to having issues of constipation. Tolerating activities/house chores with moderate tenderness to the lower back. ALLERGIES: Patients list of allergies were reviewed     MEDICATIONS: Ms. Martina Macias list of medications were reviewed. Her current medications are   Outpatient Medications Prior to Visit   Medication Sig Dispense Refill    hydroxychloroquine (PLAQUENIL) 200 MG tablet Take 1.5 tablets by mouth daily 135 tablet 0    vitamin B-12 (CYANOCOBALAMIN) 500 MCG tablet Take 500 mcg by mouth daily      docusate sodium (COLACE) 100 MG capsule Take 100 mg by mouth 2 times daily      Saccharomyces boulardii (FLORASTOR PO) Take by mouth daily      ferrous sulfate (IRON 325) 325 (65 Fe) MG tablet TAKE 1 TABLET EVERY OTHER DAY      Resveratrol 250 MG CAPS Take by mouth daily      Collagen-Boron-Hyaluronic Acid ([employer] JOINT HEALTH TRIPLE ACTION) 40-5-3.3 MG TABS Take by mouth daily      traZODone (DESYREL) 50 MG tablet Take 1 tablet by mouth nightly as needed for Sleep 90 tablet 1    TURMERIC PO Take by mouth      APPLE CIDER VINEGAR PO Take by mouth      Linoleic Acid-Sunflower Oil (CLA PO) Take by mouth      ELIQUIS 5 MG TABS tablet TAKE 1 TABLET BY MOUTH TWO TIMES A DAY  60 tablet 0    Lidocaine (ZTLIDO) 1.8 % PTCH Apply 1 patch topically daily 90 patch 0     No facility-administered medications prior to visit. SOCIAL/FAMILY/PAST MEDICAL HISTORY: Ms. Tucker Rivas social, family and past medical history was reviewed. REVIEW OF SYSTEMS:    Respiratory: Negative for apnea, chest tightness and shortness of breath or change in baseline breathing. Gastrointestinal: Negative for nausea, vomiting, abdominal pain, diarrhea, constipation, blood in stool and abdominal distention. PHYSICAL EXAM:   Nursing note and vitals reviewed. There were no vitals taken for this visit. as per patient  Constitutional: She appears well-developed and well-nourished. No acute distress. Skin: Skin appears to be warm and dry. No rashes or any other marks noted. She is not diaphoretic. Neurological/Psychiatric:She is alert and oriented to person, place, and time. Coordination is  normal.  Her mood isAppropriate and affect is Neutral/Euthymic(normal). Her behavior is normal.   thought content normal.   Musculoskeletal / Extremities: Gait is normal, assistive devices use: none. IMPRESSION:   1. Chronic pain syndrome    2. Degeneration of lumbar or lumbosacral intervertebral disc    3. Spinal stenosis, lumbar region, without neurogenic claudication    4. Lumbosacral spondylosis without myelopathy    5. Lumbar nerve root impingement, L2    6. DDD (degenerative disc disease), cervical    7. Cervical stenosis of spine, mild    8. Carpal tunnel syndrome, bilateral    9. Bilateral elbow joint pain    10. Drug induced constipation    11.  Primary insomnia        PLAN:  Informed verbal consent regarding treatment was obtained  -Continue with Percocet, ZTlido  -Corrine exercises, back stretches recommended  -Maintain f/u with rheumatology for RA  -CBT techniques- relaxation therapies such as biofeedback, mindfulness based stress reduction, imagery, cognitive restructuring, problem solving discussed with patient  -Last UDS 8/16/21 Consistent  -Return in about 4 weeks (around 2/17/2022). -[place] was obtained and reviewed  for the last one year and no indicators of drug misuse  were found. Any other controlled substance prescriptions  seen on the record have been accounted for, I am aware of the patient receiving these medications. Lexie Rodriguez [place] record will be rechecked as part of office protocol. Current Outpatient Medications   Medication Sig Dispense Refill    Lidocaine (ZTLIDO) 1.8 % PTCH Apply 1 patch topically daily 90 patch 0    oxyCODONE-acetaminophen (PERCOCET) 5-325 MG per tablet Take 1 tablet by mouth every 8 hours as needed for Pain for up to 30 days. Take no more than 2-3 tabs orally prn 90 tablet 0    hydroxychloroquine (PLAQUENIL) 200 MG tablet Take 1.5 tablets by mouth daily 135 tablet 0    vitamin B-12 (CYANOCOBALAMIN) 500 MCG tablet Take 500 mcg by mouth daily      docusate sodium (COLACE) 100 MG capsule Take 100 mg by mouth 2 times daily      Saccharomyces boulardii (FLORASTOR PO) Take by mouth daily      ferrous sulfate (IRON 325) 325 (65 Fe) MG tablet TAKE 1 TABLET EVERY OTHER DAY      Resveratrol 250 MG CAPS Take by mouth daily      Collagen-Boron-Hyaluronic Acid ([employer] JOINT HEALTH TRIPLE ACTION) 40-5-3.3 MG TABS Take by mouth daily      traZODone (DESYREL) 50 MG tablet Take 1 tablet by mouth nightly as needed for Sleep 90 tablet 1    TURMERIC PO Take by mouth      APPLE CIDER VINEGAR PO Take by mouth      Linoleic Acid-Sunflower Oil (CLA PO) Take by mouth      ELIQUIS 5 MG TABS tablet TAKE 1 TABLET BY MOUTH TWO TIMES A DAY  60 tablet 0     No current facility-administered medications for this visit. I will continue her current medication regimen  which is part of the above treatment schedule. It has been helping with Ms. Lara's chronic  medical problems which for this visit include:   Diagnoses of Chronic pain syndrome, Degeneration of lumbar or lumbosacral intervertebral disc, Spinal stenosis, lumbar region, without neurogenic claudication, Lumbosacral spondylosis without myelopathy, Lumbar nerve root impingement, L2, DDD (degenerative disc disease), cervical, Cervical stenosis of spine, mild, Carpal tunnel syndrome, bilateral, Bilateral elbow joint pain, Drug induced constipation, and Primary insomnia were pertinent to this visit. Risks and benefits of the medications and other alternative treatments  including no treatment were discussed with the patient. The common side effects of these medications were also explained to the patient. Informed verbal consent was obtained. Goals of current treatment regimen include improvement in pain, restoration of functioning- with focus on improvement in physical performance, general activity, work or disability,emotional distress, health care utilization and  decreased medication consumption. Will continue to monitor progress towards achieving/maintaining therapeutic goals with special emphasis on  1. Improvement in perceived interfernce  of pain with ADL's. Ability to do home exercises independently. Ability to do household chores indoor and/or outdoor work and social and leisure activities. Improve psychosocial and physical functioning. - she is showing progression towards this treatment goal with the current regimen. She was advised against drinking alcohol with the narcotic pain medicines, advised against driving or handling machinery while adjusting the dose of medicines or if having cognitive  issues related to the current medications. Risk of overdose and death, if medicines not taken as prescribed, were also discussed. If the patient develops new symptoms or if the symptoms worsen, the patient should call the office. While transcribing every attempt was made to maintain the accuracy of the note in terms of it's contents,there may have been some errors made inadvertently.   Thank you for allowing me to participate in the care of this patient. Chery Vaca.  Faisal GOODWIN-CNP     Cc: Meaghan Fuentes MD

## 2022-01-20 NOTE — PATIENT INSTRUCTIONS
Patient Education        Back Stretches: Exercises  Introduction  Here are some examples of exercises for stretching your back. Start each exercise slowly. Ease off the exercise if you start to have pain. Your doctor or physical therapist will tell you when you can start these exercises and which ones will work best for you. How to do the exercises  Overhead stretch    1. Stand comfortably with your feet shoulder-width apart. 2. Looking straight ahead, raise both arms over your head and reach toward the ceiling. Do not allow your head to tilt back. 3. Hold for 15 to 30 seconds, then lower your arms to your sides. 4. Repeat 2 to 4 times. Side stretch    1. Stand comfortably with your feet shoulder-width apart. 2. Raise one arm over your head, and then lean to the other side. 3. Slide your hand down your leg as you let the weight of your arm gently stretch your side muscles. Hold for 15 to 30 seconds. 4. Repeat 2 to 4 times on each side. Press-up    1. Lie on your stomach, supporting your body with your forearms. 2. Press your elbows down into the floor to raise your upper back. As you do this, relax your stomach muscles and allow your back to arch without using your back muscles. As your press up, do not let your hips or pelvis come off the floor. 3. Hold for 15 to 30 seconds, then relax. 4. Repeat 2 to 4 times. Relax and rest    1. Lie on your back with a rolled towel under your neck and a pillow under your knees. Extend your arms comfortably to your sides. 2. Relax and breathe normally. 3. Remain in this position for about 10 minutes. 4. If you can, do this 2 or 3 times each day. Follow-up care is a key part of your treatment and safety. Be sure to make and go to all appointments, and call your doctor if you are having problems. It's also a good idea to know your test results and keep a list of the medicines you take. Where can you learn more? Go to [email or web address].[email or web address] and sign in to your [facility] account. Enter S699 in the [employer] box to learn more about \"Back Stretches: Exercises. \"     If you do not have an account, please click on the \"Sign Up Now\" link. Current as of: July 1, 2021               Content Version: 13.1  © 5106-7884 Healthwise, Incorporated. Care instructions adapted under license by [facility] ([facility]). If you have questions about a medical condition or this instruction, always ask your healthcare professional. [address] any warranty or liability for your use of this information.

## 2022-01-29 NOTE — PROGRESS NOTES
Randy Ornelas MD  [facility]) Physicians - Rheumatology    [x] [facility]:  [facility]  [address] [facility] [] [facility]:  [facility] [place], [address]   Office: [phone]  Fax: [phone]     RHEUMATOLOGY PROGRESS NOTE    SUBJECTIVE:    Background:   Edelmira Lou is a 49 y.o. female w/ seronegative inflammatory polyarthritis (PsA vs seronegative RA) and degenerative arthritis of the L-spine. Pt is s/p b/l 3rd trigger finger surgery on 9/24/2021 and b/l carpal tunnel surgery by by Dr. Enoch Duran in 10/2020. PMHx pertinent for Hx of popliteal thrombosis in 3/2021 (follows w/ Hematology Dr. Javed Dietz, per pt on lifelong anticoagulation, found to have Factor V Leiden mutation and positive B2GP IgG) and Hx of diverticulitis w/ sigmoid abscess (hospitalized in 7/2020). Current rheum meds:   mg daily: started in 12/2021  OTC APAP  Lidocaine patches  Percocet PRN     Prior rheum meds:  Pred taper starting w/ 20 mg daily: took in 12/2021, provided significant pain relief  NSAIDs: told to avoid d/t chronic anti-coagulation and Hx of diverticulitis    Past medical/surgical history, medications and allergies are reviewed and updated as appropriate. Interval Hx:   Pt reports significant pain relief from low-dose Prednisone taper in 12/2021. She reports return of arthralgias and stiffness in her hand and wrist joints after tapering off Prednisone. She tells me she started HCQ approximately 2 months ago, so far she has not noticed any significant improvement in her arthralgias. She reports chronic arthralgias in her b/l MCP, PIP, DIP and wrist joints. Morning stiffness lasts 90+ min. Denies Hx of enthesitis.     Denies Hx of PsO, Sx of IBD or uveitis.     Rheumatologic ROS:  Constitutional: denies chronic fatigue, fever/chills, night sweats, unintentional weight loss  Integumentary: denies photosensitivity, rash, patchy alopecia, or Sx of Raynaud's phenomenon  Eyes: denies dry eyes, redness or pain, visual disturbance, or floaters  Nose: denies nasal ulcers or recurrent sinusitis  Oral cavity: denies dry mouth or oral ulcers  Cardiovascular: +pt reported remote Hx of cardiac arrhthymias and intermittent dizziness, denies CP, palpitations, Hx of pericardial effusion or pericarditis  Respiratory: denies SOB, cough, hemoptysis, or pleurisy  Gastrointestinal: denies heart burn, dysphagia or esophageal dysmotility, denies change in bowel habits or Sx of IBD  Hematologic/Lymphatic: +Hx of popliteal vein thrombosis in 3/2021 found to have Factor V Leiden mutation on life long anticoagulation, denies recurrent miscarriages, denies swollen LNs  Musculoskeletal:  refer to above HPI   Neurological: denies focal weakness, paresthesias/hyperesthesias or change in sensation, denies Hx of seizure, denies change in gait, balance, or memory    No Known Allergies    Past Medical History:        Diagnosis Date    Acne     Dr Bigg Lopez 5 Leiden mutation, heterozygous ([facility] [address]     Medical history reviewed with no changes        Past Surgical History:        Procedure Laterality Date    CARPAL TUNNEL RELEASE Bilateral      SECTION  2001     x1    PAIN MANAGEMENT PROCEDURE Left 10/26/2020    LEFT L4 AND L5 TRANSFORAMINAL EPDIURAL STEROID INJECTION WITH FLUOROSCOPY (77877, [id number]) performed by Pushpa Caldera MD at [address] Left 2020    LEFT LUMBAR FOUR LUMBAR FIVE TRANSFORAMINAL  EPIDURAL STEROID INJECTION SITE CONFIRMED BY FLUOROSCOPY performed by Pushpa Caldera MD at [facility] OR       Medications:    Current Outpatient Medications   Medication Sig Dispense Refill    hydroxychloroquine (PLAQUENIL) 200 MG tablet Take 1.5 tablets by mouth daily 860 tablet 0    folic acid (FOLVITE) 1 MG tablet Take 1 tablet by mouth daily 90 tablet 0    methotrexate (RHEUMATREX) 2.5 MG chemo tablet Take 5 tablets by mouth once a week 60 tablet 0    predniSONE (DELTASONE) 10 MG tablet Take 3 tablets by mouth daily for 4 days, THEN 2 tablets daily for 4 days, THEN 1 tablet daily for 4 days, THEN 0.5 tablets daily for 4 days. 26 tablet 2    traZODone (DESYREL) 50 MG tablet TAKE 1 TABLET NIGHTLY AS NEEDED FOR SLEEP 90 tablet 1    oxyCODONE-acetaminophen (PERCOCET) 5-325 MG per tablet Take 1 tablet by mouth every 8 hours as needed for Pain for up to 30 days. Take no more than 2-3 tabs orally prn 90 tablet 0    vitamin B-12 (CYANOCOBALAMIN) 500 MCG tablet Take 500 mcg by mouth daily      docusate sodium (COLACE) 100 MG capsule Take 100 mg by mouth 2 times daily      Saccharomyces boulardii (FLORASTOR PO) Take by mouth daily      ferrous sulfate (IRON 325) 325 (65 Fe) MG tablet TAKE 1 TABLET EVERY OTHER DAY      Resveratrol 250 MG CAPS Take by mouth daily      Collagen-Boron-Hyaluronic Acid ([employer] TRIPLE ACTION) 40-5-3.3 MG TABS Take by mouth daily      TURMERIC PO Take by mouth      ELIQUIS 5 MG TABS tablet TAKE 1 TABLET BY MOUTH TWO TIMES A DAY  60 tablet 0    Lidocaine (ZTLIDO) 1.8 % PTCH Apply 1 patch topically daily (Patient not taking: Reported on 2/1/2022) 90 patch 0    APPLE CIDER VINEGAR PO Take by mouth (Patient not taking: Reported on 2/1/2022)      Linoleic Acid-Sunflower Oil (CLA PO) Take by mouth (Patient not taking: Reported on 2/1/2022)       No current facility-administered medications for this visit.         OBJECTIVE:  Physical Exam:  /68   Pulse 77   Ht 5' 4\" (1.626 m)   Wt 143 lb (64.9 kg)   SpO2 99%   BMI 24.55 kg/m²     GEN: AAOx3, in NAD, well-appearing  HEAD: normocephalic, atraumatic  EYES: no injection or icterus  CVS: RRR  LUNGS: in no acute respiratory distress, CTAB  MSK:  Upper extremities:              Hands: no significant synovitis, b/l MCP joints w/o significant swelling but TTP ko palmar aspect, b/l PIP and DIP joints w/ mild synovitis TTP, full fist formation w/ fair  strength (10/20/21)  Negative hepatitis B surface Ag and core total Ab, hepatitis C Ab (10/20/21)    Imaging:  I personally reviewed interval imaging and discussed w/ the pt in detail which included:    MRI C-spine (7/1/20): FINDINGS:   BONES/ALIGNMENT: There is normal alignment of the spine. The vertebral body heights are maintained. The bone marrow signal appears unremarkable.     SPINAL CORD: The visualized spinal cord has normal signal and morphology.  No evidence of mass or abnormal fluid collection within the spinal canal.     SOFT TISSUES: Paraspinal soft tissues are unremarkable.     C2-C3: Disc height and signal maintained.  No neural foraminal narrowing or spinal canal stenosis.     C3-C4: Disc height and signal maintained.  No neural foraminal narrowing or spinal canal stenosis.     C4-C5: Disc height and signal maintained.  No neural foraminal narrowing or spinal canal stenosis.     C5-C6: Mild disc height loss.  No disc signal abnormality.  No left neural foraminal narrowing.  Mild right neural foraminal narrowing secondary to uncovertebral hypertrophy.  Mild spinal canal stenosis secondary to disc bulge.     C6-C7: Disc height and signal maintained.  No neural foraminal narrowing or spinal canal stenosis.     C7-T1: Disc height and signal maintained.  No neural foraminal narrowing or spinal canal stenosis. IMPRESSION:  1. Mild C5-6 degenerative disc height loss. 2. Mild C5-6 spinal canal stenosis secondary to disc bulge. 3. Mild narrowing of the right C6 neural foramen secondary to C5-6 uncovertebral hypertrophy.      MRI L-spine (10/8/20):   FINDINGS:    Assume 5 lumbar vertebrae.  Lordosis is moderate.  Conus medullaris termination is normal.  Slight anterior disc displacements multilevel.     T11-12: Disc is dehydrated, slightly height reduced; shallow left lateral disc protrusion gently deforms left ventral dural sac.     T12-L1: Disc shows no posterior displacement.     L1-2: Disc shows no posterior displacement.     L2-3: Disc is dehydrated; trace disc bulge; right extraforaminal annular fissure; disc effaces ventral dural sac.  Mild facet hypertrophy, scant facet fluid.     L3-4: Disc is dehydrated; shallow disc bulge with annular fissure straightens ventral dural sac.  Scant facet fluid.     L4-5: Disc is dehydrated, slightly height reduced; 2mm retrolisthesis; disc bulge with annular fissure gently encroaches upon ventral dural sac, minimally greater on left; disc contacts foraminal L4 nerve roots.  Mild spinal stenosis.  Mild bilateral lateral recess stenosis.  Mild  biforaminal stenosis.     L5-S1: Disc is dehydrated, slightly height reduced; shallow central disc protrusion effaces ventral dural sac.  Disc contacts foraminal L5 nerve roots.  Mild biforaminal narrowing.   No focal bone lesion to suggest acute osseous stress injury or acute stress fracture.     CONCLUSION:   1. L4-5 mild spinal stenosis.  Trace retrolisthesis; disc bulge with annular fissure; disc contacting foraminal L4 nerve roots.  Mild biforaminal narrowing. 2. L5-S1 shallow central disc protrusion effacing ventral dural sac; disc contacts foraminal L5  nerve roots.  Mild biforaminal narrowing. 3. T11-12 shallow left lateral disc protrusion gently encroaching upon left ventral dural sac. X-rays (10/20/21):  Left hand findings:     3 projections. Normal bone mineralization. No fracture or subluxation. No evidence of bony erosion or ankylosis. Joint spaces are maintained. 1. No abnormality of left hand identified. Right hand findings:     3 projections. Normal mineralization. No fracture or dislocation. No soft tissue abnormality. No evidence of erosion or ankylosis. Joint spaces are maintained. IMPRESSION:   1. No abnormality of right hand identified. Lumbar spine findings:     3 projections. There are 5 nonrib-bearing lumbar vertebral bodies. Normal vertebral body height and alignment.  There is mild disc space narrowing at L4-L5. Mild L4-L5 facet arthrosis also demonstrated. Impression:   1. Mild degenerative disc and facet disease in the lumbar spine and L4-L5. SI joints findings:     3 projections of the sacroiliac joints. No evidence of SI joint fusion. No evidence of bony erosion or significant sclerosis. Evaluation of pelvic soft tissues demonstrates the presence of an intrauterine device. IMPRESSION:   1. No abnormalities of the sacroiliac joints are detected. MRI pelvis (2/11/21): Findings:  No acute fracture or contusion. No AVN of the femoral heads. Mild spurring of left acetabulum. Subtle spurring of right acetabulum. No hip joint effusion. No acute labral tear. No acute muscle or tendon strain. No trochanteric bursitis. Moderate tendinopathy of right hamstring tendon with meniscal tears. Mild tendinopathy of left hamstring tendon with no tear. Sciatic nerve normal in appearance and symmetrical.  Visualized SI joints and pubic symphysis are unremarkable. No lymphadenopathy in the visualized pelvis. An IUD. Conclusion:  1. Moderate tendinopathy of right hamstring tendon with meniscal tears at the origin. 2. Mild tendinopathy of the left hamstring tendon, no tear. Nuclear bone scan (12/6/21): Findings: There is relatively diffuse and fairly symmetric increased polyarticular uptake identified in the distal appendicular skeleton particularly elbows and wrists/hands, and ankles/feet. This is nonspecific but raises the possibility of inflammatory arthropathy. There is slightly greater asymmetrical uptake within the right knee, and the right greater than left forefeet, and these findings can also be seen with degenerative arthropathy. No other suspicious focal nonarticular osseous activity.    IMPRESSION:  Increased bilateral periarticular activity identified in the distal appendicular skeleton, nonspecific but raises the possibility of inflammatory arthropathy, with possible superimposed degenerative arthropathy, as described. MRI R hand (12/9/21): FINDINGS:   There is abnormal edema and enhancement identified within the flexor digitorum tendon sheath of the third digit acid courses volar to the third metacarpal head, proximal third phalanx and middle third phalanx. The findings are compatible with tenosynovitis. No discrete tendon tear is identified. No tendinopathy is identified. No joint effusions are identified. No abnormal synovial thickening or enhancement is identified. No osseous erosion is identified. Marrow signal is normal.   IMPRESSION:  1. Findings consistent with tenosynovitis of the flexor digitorum tendon of the third digit. 2. No evidence of any synovitis or osseous erosion. Above results were discussed w/ the pt in detail during today's visit. ASSESSMENT/PLAN:   1. Psoriatic arthritis ([facility] [address]  Assessment & Plan:  - seronegative inflammatory polyarthritis involving the b/l wrists, MCP, PIP and DIP joints w/ significant response to low dose Pred taper. Hx of b/l 2-3rd dactylitis. MRI R hand from 12/2021 showed flexor tenosynovitis of the 3rd digit. Nuclear bone scan ordered by PCP on 12/6/21 showed uptake pattern suggestive of inflammatory arthropathy w/ superimposed degenerative arthropathy. Suspect she likely has early PsA vs seronegative RA, favor PsA given prior findings of dactylitis. - cont  mg daily. - she will add MTX 5 tabs/wk. - avoid NSAIDs d/t pt being on anticoagulation. - pt following w/ Pain Management. Orders:  -     hydroxychloroquine (PLAQUENIL) 200 MG tablet; Take 1.5 tablets by mouth daily, Disp-135 tablet, R-0Normal  -     methotrexate (RHEUMATREX) 2.5 MG chemo tablet; Take 5 tablets by mouth once a week, Disp-60 tablet, R-0Normal  -     predniSONE (DELTASONE) 10 MG tablet; Take 3 tablets by mouth daily for 4 days, THEN 2 tablets daily for 4 days, THEN 1 tablet daily for 4 days, THEN 0.5 tablets daily for 4 days. , Disp-26 tablet, R-2Normal  -     C-Reactive Protein; Future  2. High risk medication use  Assessment & Plan:  - made referral to Ophthalmology for baseline eye exam for HCQ toxicity monitoring.  - d/w pt that MTX increases the risk of infections, birth defects, liver toxicity, rare lung problems, probable malignancy and bone marrow toxicity. Discussed need to check safety labs 4 weeks after starting MTX followed by labs every 3 months once we reach a steady dose. Strongly recommended alcohol abstinence. Orders:  -     AFL - Early, Carine Mckinley MD, Ophthalmology, [facility]  -     Gamma GT; Future  -     Hepatic Function Panel; Future  -     ALKALINE PHOSPHATASE, ISOENZYMES; Future  -     Hepatic Function Panel; Future  -     CBC Auto Differential; Future  -     Creatinine, Serum; Future  3. Elevated alkaline phosphatase level  Assessment & Plan:  - discussed her elevated ALP, vitamin D level wnl. Repeat liver enzymes now. Check fractionated ALP isoenzymes. Orders:  -     Gamma GT; Future  -     Hepatic Function Panel; Future  -     ALKALINE PHOSPHATASE, ISOENZYMES; Future  -     Hepatic Function Panel; Future  4. Dizziness  Assessment & Plan:  - pt reported remote Hx of ?cardiac arhythmias and intermittent episodes of bradycardia and dizziness at home. Discussed that HCQ can rarely cause QT prolongation however her Sx appeared to have present prior to starting HCQ. Instructed pt to f/u w/ PCP, consider EKG to measure her QT interval.      Return in about 2 months (around 4/1/2022) for lab result discussion and treatment plan, medication monitoring. The risks and benefits of my recommendations, as well as other treatment options, benefits and side effects were discussed with the patient today. Questions were answered. NOTE: This report is transcribed by using voice recognition software dragon. Every effort is made to ensure accuracy; however, inadvertent computerized  transcription errors may be present.

## 2022-02-01 ENCOUNTER — OFFICE VISIT (OUTPATIENT)
Dept: RHEUMATOLOGY | Age: 52
End: 2022-02-01
Payer: COMMERCIAL

## 2022-02-01 VITALS
HEIGHT: 64 IN | BODY MASS INDEX: 24.41 KG/M2 | OXYGEN SATURATION: 99 % | WEIGHT: 143 LBS | HEART RATE: 77 BPM | SYSTOLIC BLOOD PRESSURE: 114 MMHG | DIASTOLIC BLOOD PRESSURE: 68 MMHG

## 2022-02-01 DIAGNOSIS — L40.50 PSORIATIC ARTHRITIS (HCC): Primary | ICD-10-CM

## 2022-02-01 DIAGNOSIS — R42 DIZZINESS: ICD-10-CM

## 2022-02-01 DIAGNOSIS — R74.8 ELEVATED ALKALINE PHOSPHATASE LEVEL: ICD-10-CM

## 2022-02-01 DIAGNOSIS — Z79.899 HIGH RISK MEDICATION USE: ICD-10-CM

## 2022-02-01 DIAGNOSIS — Z51.81 ENCOUNTER FOR THERAPEUTIC DRUG MONITORING: ICD-10-CM

## 2022-02-01 DIAGNOSIS — L40.50 PSORIATIC ARTHRITIS (HCC): ICD-10-CM

## 2022-02-01 LAB
BASOPHILS ABSOLUTE: 0 K/UL (ref 0–0.2)
BASOPHILS RELATIVE PERCENT: 0.4 %
EOSINOPHILS ABSOLUTE: 0 K/UL (ref 0–0.6)
EOSINOPHILS RELATIVE PERCENT: 0.9 %
HCT VFR BLD CALC: 38.7 % (ref 36–48)
HEMOGLOBIN: 13.2 G/DL (ref 12–16)
LYMPHOCYTES ABSOLUTE: 2 K/UL (ref 1–5.1)
LYMPHOCYTES RELATIVE PERCENT: 52 %
MCH RBC QN AUTO: 32.5 PG (ref 26–34)
MCHC RBC AUTO-ENTMCNC: 34.2 G/DL (ref 31–36)
MCV RBC AUTO: 95.1 FL (ref 80–100)
MONOCYTES ABSOLUTE: 0.3 K/UL (ref 0–1.3)
MONOCYTES RELATIVE PERCENT: 6.6 %
NEUTROPHILS ABSOLUTE: 1.6 K/UL (ref 1.7–7.7)
NEUTROPHILS RELATIVE PERCENT: 40.1 %
PDW BLD-RTO: 12 % (ref 12.4–15.4)
PLATELET # BLD: 245 K/UL (ref 135–450)
PMV BLD AUTO: 7.7 FL (ref 5–10.5)
RBC # BLD: 4.07 M/UL (ref 4–5.2)
WBC # BLD: 3.9 K/UL (ref 4–11)

## 2022-02-01 PROCEDURE — 99215 OFFICE O/P EST HI 40 MIN: CPT | Performed by: INTERNAL MEDICINE

## 2022-02-01 RX ORDER — FOLIC ACID 1 MG/1
1 TABLET ORAL DAILY
Qty: 90 TABLET | Refills: 0 | Status: SHIPPED | OUTPATIENT
Start: 2022-02-01 | End: 2022-03-21

## 2022-02-01 RX ORDER — PREDNISONE 10 MG/1
TABLET ORAL
Qty: 26 TABLET | Refills: 2 | Status: SHIPPED | OUTPATIENT
Start: 2022-02-01 | End: 2022-03-30 | Stop reason: SDUPTHER

## 2022-02-01 RX ORDER — HYDROXYCHLOROQUINE SULFATE 200 MG/1
300 TABLET, FILM COATED ORAL DAILY
Qty: 135 TABLET | Refills: 0 | Status: ON HOLD | OUTPATIENT
Start: 2022-02-01 | End: 2022-02-22 | Stop reason: HOSPADM

## 2022-02-01 NOTE — ASSESSMENT & PLAN NOTE
- pt reported remote Hx of ?cardiac arhythmias and intermittent episodes of bradycardia and dizziness at home. Discussed that HCQ can rarely cause QT prolongation however her Sx appeared to have present prior to starting HCQ.  Instructed pt to f/u w/ PCP, consider EKG to measure her QT interval.

## 2022-02-01 NOTE — ASSESSMENT & PLAN NOTE
- seronegative inflammatory polyarthritis involving the b/l wrists, MCP, PIP and DIP joints w/ significant response to low dose Pred taper. Hx of b/l 2-3rd dactylitis. MRI R hand from 12/2021 showed flexor tenosynovitis of the 3rd digit. Nuclear bone scan ordered by PCP on 12/6/21 showed uptake pattern suggestive of inflammatory arthropathy w/ superimposed degenerative arthropathy. Suspect she likely has early PsA vs seronegative RA, favor PsA given prior findings of dactylitis. - cont  mg daily. - she will add MTX 5 tabs/wk. - avoid NSAIDs d/t pt being on anticoagulation. - pt following w/ Pain Management.

## 2022-02-01 NOTE — ASSESSMENT & PLAN NOTE
- made referral to Ophthalmology for baseline eye exam for HCQ toxicity monitoring.  - d/w pt that MTX increases the risk of infections, birth defects, liver toxicity, rare lung problems, probable malignancy and bone marrow toxicity. Discussed need to check safety labs 4 weeks after starting MTX followed by labs every 3 months once we reach a steady dose. Strongly recommended alcohol abstinence.

## 2022-02-01 NOTE — ASSESSMENT & PLAN NOTE
- discussed her elevated ALP, vitamin D level wnl. Repeat liver enzymes now. Check fractionated ALP isoenzymes.

## 2022-02-01 NOTE — PATIENT INSTRUCTIONS
METHOTREXATE DOSING    1. Start taking 4 tablets of Methotrexate once a week for the first 2 weeks. If well-tolerated, then start taking 5 tablets all at once once a week. 2. Check laboratory studies in 4 weeks after starting the Methotrexate  3. Take Folic Acid 1 mg daily     Hold Methotrexate if you are on antibiotics. Restart once and have recovered from the infection. No alcohol while on Methotrexate! PLEASE CALL WITH ANY QUESTIONS OR CONCERNS [phone]    Methotrexate (oral)     Pronunciation: meth oh TREX ate   Brand: Rheumatrex Dose Pack, Trexall   What is methotrexate? Methotrexate interferes with the growth of certain cells of the body, especially cells that reproduce quickly, such as cancer cells, bone marrow cells, and skin cells. Methotrexate is used to treat certain types of cancer of the breast, skin, head and neck, or lung. Methotrexate is also used to treat severe psoriasis and rheumatoid arthritis. Methotrexate is usually given after other medications have been tried without successful treatment of symptoms. Methotrexate may also be used for purposes not listed in this medication guide. What should I discuss with my healthcare provider before taking methotrexate? You should not use this medicine if you are allergic to methotrexate. Do not use methotrexate to treat psoriasis or rheumatoid arthritis if you have:    alcoholism, cirrhosis, or other liver disease;    a blood cell disorder such as anemia (lack of red blood cells) or leukopenia (lack of white blood cells);    a bone marrow disorder; or    if you are breast-feeding a baby. Methotrexate is sometimes used to treat cancer even when patients do have one of the conditions listed above. Your doctor will decide if this treatment is right for you.    To make sure methotrexate is safe for you, tell your doctor if you have:    kidney disease;    a folate deficiency;    pneumonia or lung disease;    stomach ulcers;  any type of infection; or    if you are receiving radiation treatments. Methotrexate can cause birth defects in an unborn baby. Do not use methotrexate to treat psoriasis or rheumatoid arthritis if you are pregnant. Tell your doctor right away if you become pregnant during treatment. You may need to have a negative pregnancy test before starting this treatment. Use birth control to prevent pregnancy while you are using methotrexate, whether you are a man or a woman. Methotrexate use by either parent may cause birth defects. If you are a man, use a condom to keep from causing a pregnancy while you are using methotrexate. Continue using condoms for at least 90 days after your treatment ends. If you are a woman, use an effective form of birth control while you are taking methotrexate, and for at least one cycle of ovulation after your treatment ends. Do not give this medicine to a child without the advice of a doctor. How should I take methotrexate? Follow all directions on your prescription label. Do not take this medicine in larger or smaller amounts or for longer than recommended. You must use the correct dose of methotrexate for your condition. Methotrexate is sometimes taken once or twice per week and not every day. Follow the directions on your prescription label. Some people have  after taking methotrexate every day by accident. Ask your doctor or pharmacist if you have questions about your dose of methotrexate or how often to take it. Use methotrexate regularly to get the most benefit. Get your prescription refilled before you run out of medicine completely. Methotrexate can lower blood cells that help your body fight infections and help your blood to clot. Your blood will need to be tested often, and you may need an occasional liver biopsy. Your cancer treatments may be delayed based on the results of these tests. Store at room temperature away from moisture and heat.    What happens if I miss a dose? Call your doctor for instructions if you miss a dose of methotrexate. What happens if I overdose? Seek emergency medical attention or call the Poison Help line at 1-960.255.6767. An overdose of methotrexate can be fatal.   What should I avoid while taking methotrexate? This medicine can pass into body fluids (including urine, feces, vomit, semen, vaginal fluid). Patients and caregivers should wear rubber gloves while cleaning up body fluids, handling contaminated trash or laundry or changing diapers. Wash hands before and after removing gloves. Wash soiled clothing and linens separately from other laundry. Body fluids should not be handled by a woman who is pregnant or who may become pregnant. Use condoms during sexual activity to avoid exposure to body fluids. Avoid exposure to sunlight or artificial UV rays (sunlamps or tanning beds), especially if you are being treated for psoriasis. Methotrexate can make your skin more sensitive to sunlight and your psoriasis may worsen. Avoid drinking alcohol while taking methotrexate. What are the possible side effects of methotrexate? Get emergency medical help if you have signs of an allergic reaction: hives; difficulty breathing; swelling of your face, lips, tongue, or throat.    Stop using methotrexate and call your doctor at once if you have:    dry cough, shortness of breath;    diarrhea, vomiting, white patches or sores inside your mouth or on your lips;    blood in your urine or stools;    swelling, rapid weight gain, little or no urinating;    seizure (convulsions);    fever, chills, body aches, flu symptoms;    pale skin, easy bruising, unusual bleeding, weakness, feeling light-headed or short of breath;    liver problems --nausea, upper stomach pain, itching, tired feeling, loss of appetite, dark urine, sarah-colored stools, jaundice (yellowing of the skin or eyes); or    severe skin reaction --fever, sore throat, swelling in your face or tongue, burning in your eyes, skin pain, followed by a red or purple skin rash that spreads (especially in the face or upper body) and causes blistering and peeling. Older adults may be more likely to have side effects from this medicine. Common side effects may include:    vomiting, upset stomach;    headache, dizziness, tired feeling; or    blurred vision. This is not a complete list of side effects and others may occur. Call your doctor for medical advice about side effects. You may report side effects to FDA at 5-913-FDA-9116. What other drugs will affect methotrexate? Many drugs can interact with methotrexate. Not all possible interactions are listed here. Tell your doctor about all your medications and any you start or stop using during treatment with methotrexate, especially:    azathioprine;    leucovorin;    phenytoin;    probenecid;    theophylline;    an antibiotic or sulfa drugs;    isotretinoin, retinol, tretinoin;    NSAIDs (non-steroidal anti-inflammatory drugs) --ibuprofen (Advil, Motrin), naproxen (Aleve), celecoxib, diclofenac, indomethacin, meloxicam, and others; or    salicylates --aspirin, Nuprin Backache Caplet, Kaopectate, KneeRelief, Pamprin Cramp Formula, Pepto-Bismol, Tricosal, Trilisate, and others. This list is not complete and many other drugs can interact with methotrexate. This includes prescription and over-the-counter medicines, vitamins, and herbal products. Give a list of all your medicines to any healthcare provider who treats you. Where can I get more information? Your pharmacist can provide more information about methotrexate. Remember, keep this and all other medicines out of the reach of children, never share your medicines with others, and use this medication only for the indication prescribed.    Every effort has been made to ensure that the information provided by [address] is accurate, up-to-date, and complete, but no guarantee is made to that effect. Drug information contained herein may be time sensitive. Collective information has been compiled for use by healthcare practitioners and consumers in the [place] and therefore [employer] does not warrant that uses outside of the [place] are appropriate, unless specifically indicated otherwise. [facility]'s drug information does not endorse drugs, diagnose patients or recommend therapy. [facility] drug information is an informational resource designed to assist licensed healthcare practitioners in caring for their patients and/or to serve consumers viewing this service as a supplement to, and not a substitute for, the expertise, skill, knowledge and judgment of healthcare practitioners. The absence of a warning for a given drug or drug combination in no way should be construed to indicate that the drug or drug combination is safe, effective or appropriate for any given patient. [facility] does not assume any responsibility for any aspect of healthcare administered with the aid of information [facility] provides. The information contained herein is not intended to cover all possible uses, directions, precautions, warnings, drug interactions, allergic reactions, or adverse effects. If you have questions about the drugs you are taking, check with your doctor, nurse or pharmacist.   Copyright 6676-8561 [address]. Version: 11.03. Revision date: 1/6/2015. This information does not replace the advice of a doctor. [employer] disclaims any warranty or liability for your use of this information.    Content Version: 16.7.841407

## 2022-02-02 LAB
ALBUMIN SERPL-MCNC: 5 G/DL (ref 3.4–5)
ALP BLD-CCNC: 110 U/L (ref 40–129)
ALT SERPL-CCNC: 8 U/L (ref 10–40)
AST SERPL-CCNC: 19 U/L (ref 15–37)
BILIRUB SERPL-MCNC: <0.2 MG/DL (ref 0–1)
BILIRUBIN DIRECT: <0.2 MG/DL (ref 0–0.3)
BILIRUBIN, INDIRECT: ABNORMAL MG/DL (ref 0–1)
C-REACTIVE PROTEIN: <3 MG/L (ref 0–5.1)
CREAT SERPL-MCNC: 0.7 MG/DL (ref 0.6–1.1)
GAMMA GLUTAMYL TRANSFERASE: 26 U/L (ref 5–36)
GFR AFRICAN AMERICAN: >60
GFR NON-AFRICAN AMERICAN: >60
TOTAL PROTEIN: 7.4 G/DL (ref 6.4–8.2)

## 2022-02-03 LAB
ALK PHOS OTHER CALC: 0 U/L
ALK PHOSPHATASE: 115 U/L (ref 40–120)
ALKALINE PHOSPHATASE BONE FRACTION: 74 U/L (ref 0–55)
ALKALINE PHOSPHATASE LIVER FRACTION: 41 U/L (ref 0–94)

## 2022-02-07 ENCOUNTER — TELEPHONE (OUTPATIENT)
Dept: PRIMARY CARE CLINIC | Age: 52
End: 2022-02-07

## 2022-02-07 NOTE — TELEPHONE ENCOUNTER
Patient states [facility] is holding on her RX refills on her HCX, MTX and folic acid-want to know if ok for #90 day supply. Patient also said she was to get her lab results back before refills.   Labs results are in from 2/1/22

## 2022-02-07 NOTE — TELEPHONE ENCOUNTER
----- Message from Vlad Herbert sent at 2/7/2022  1:23 PM EST -----  Subject: Medication Problem    QUESTIONS  Name of Medication? hydroxychloroquine (PLAQUENIL) 200 MG tablet  Patient-reported dosage and instructions? hydroxychloroquine (PLAQUENIL)   200 MG tablet  What question or problem do you have with the medication? Pt called   stating their pharmacy reached out to them regarding this medication. Their pharmacy is in need of more details for this script. Please advise. Preferred Pharmacy? [facility] [address] [facility]  Pharmacy phone number (if available)? [phone]  Additional Information for Provider?   ---------------------------------------------------------------------------  --------------  [address]  What is the best way for the office to contact you? OK to leave message on   voicemail  Preferred Call Back Phone Number? [phone]  ---------------------------------------------------------------------------  --------------  SCRIPT ANSWERS  Relationship to Patient?  Self

## 2022-02-07 NOTE — TELEPHONE ENCOUNTER
----- Message from Shaileshdimple Castillo sent at 2/7/2022  1:20 PM EST -----  Subject: Results Request    QUESTIONS  Which lab or imaging result is the patient calling about? ALKALINE   PHOSPHATASE, ISOENZYMES   Which provider ordered the test? Johnathan Holloway   At what location was the test performed? Date the test was performed? 2022-02-01  Additional Information for Provider? Pt called to check the status of   their results. Pt states these labs were drawn to determine if they can   begin taking \"Methotrexate\". Pt would like to know based on these results   if they will be approved to begin taking this medication. Please advise.   ---------------------------------------------------------------------------  --------------  CALL BACK INFO  What is the best way for the office to contact you? OK to leave message on   voicemail  Preferred Call Back Phone Number?  [phone]

## 2022-02-08 NOTE — TELEPHONE ENCOUNTER
OK to authorize 90 refill, I sent 90 day refills. WBC was minimally low at 3.9, low normal is 4.0. OK to start meds.  Rest of labs are normal.

## 2022-02-17 ENCOUNTER — TELEMEDICINE (OUTPATIENT)
Dept: PAIN MANAGEMENT | Age: 52
End: 2022-02-17
Payer: COMMERCIAL

## 2022-02-17 DIAGNOSIS — M25.521 BILATERAL ELBOW JOINT PAIN: ICD-10-CM

## 2022-02-17 DIAGNOSIS — M48.061 SPINAL STENOSIS, LUMBAR REGION, WITHOUT NEUROGENIC CLAUDICATION: ICD-10-CM

## 2022-02-17 DIAGNOSIS — K59.03 DRUG INDUCED CONSTIPATION: ICD-10-CM

## 2022-02-17 DIAGNOSIS — M47.817 LUMBOSACRAL SPONDYLOSIS WITHOUT MYELOPATHY: ICD-10-CM

## 2022-02-17 DIAGNOSIS — F51.01 PRIMARY INSOMNIA: ICD-10-CM

## 2022-02-17 DIAGNOSIS — M48.02 CERVICAL STENOSIS OF SPINE: ICD-10-CM

## 2022-02-17 DIAGNOSIS — G56.03 CARPAL TUNNEL SYNDROME, BILATERAL: ICD-10-CM

## 2022-02-17 DIAGNOSIS — M51.37 DEGENERATION OF LUMBAR OR LUMBOSACRAL INTERVERTEBRAL DISC: ICD-10-CM

## 2022-02-17 DIAGNOSIS — M25.522 BILATERAL ELBOW JOINT PAIN: ICD-10-CM

## 2022-02-17 DIAGNOSIS — G89.4 CHRONIC PAIN SYNDROME: ICD-10-CM

## 2022-02-17 DIAGNOSIS — M54.16 LUMBAR NERVE ROOT IMPINGEMENT: ICD-10-CM

## 2022-02-17 DIAGNOSIS — M50.30 DDD (DEGENERATIVE DISC DISEASE), CERVICAL: ICD-10-CM

## 2022-02-17 PROCEDURE — 99213 OFFICE O/P EST LOW 20 MIN: CPT | Performed by: NURSE PRACTITIONER

## 2022-02-17 RX ORDER — OXYCODONE HYDROCHLORIDE AND ACETAMINOPHEN 5; 325 MG/1; MG/1
1 TABLET ORAL EVERY 8 HOURS PRN
Qty: 90 TABLET | Refills: 0 | Status: SHIPPED | OUTPATIENT
Start: 2022-02-17 | End: 2022-03-17 | Stop reason: SDUPTHER

## 2022-02-17 RX ORDER — LIDOCAINE 36 MG/1
1 PATCH TOPICAL DAILY
Qty: 90 PATCH | Refills: 0 | Status: SHIPPED | OUTPATIENT
Start: 2022-02-17 | End: 2022-03-17 | Stop reason: SDUPTHER

## 2022-02-17 NOTE — PATIENT INSTRUCTIONS
Patient Education        Back Stretches: Exercises  Introduction  Here are some examples of exercises for stretching your back. Start each exercise slowly. Ease off the exercise if you start to have pain. Your doctor or physical therapist will tell you when you can start these exercises and which ones will work best for you. How to do the exercises  Overhead stretch    1. Stand comfortably with your feet shoulder-width apart. 2. Looking straight ahead, raise both arms over your head and reach toward the ceiling. Do not allow your head to tilt back. 3. Hold for 15 to 30 seconds, then lower your arms to your sides. 4. Repeat 2 to 4 times. Side stretch    1. Stand comfortably with your feet shoulder-width apart. 2. Raise one arm over your head, and then lean to the other side. 3. Slide your hand down your leg as you let the weight of your arm gently stretch your side muscles. Hold for 15 to 30 seconds. 4. Repeat 2 to 4 times on each side. Press-up    1. Lie on your stomach, supporting your body with your forearms. 2. Press your elbows down into the floor to raise your upper back. As you do this, relax your stomach muscles and allow your back to arch without using your back muscles. As your press up, do not let your hips or pelvis come off the floor. 3. Hold for 15 to 30 seconds, then relax. 4. Repeat 2 to 4 times. Relax and rest    1. Lie on your back with a rolled towel under your neck and a pillow under your knees. Extend your arms comfortably to your sides. 2. Relax and breathe normally. 3. Remain in this position for about 10 minutes. 4. If you can, do this 2 or 3 times each day. Follow-up care is a key part of your treatment and safety. Be sure to make and go to all appointments, and call your doctor if you are having problems. It's also a good idea to know your test results and keep a list of the medicines you take. Where can you learn more? Go to [email or web address] and sign in to your [employer] account. Enter S156 in the [employer] box to learn more about \"Back Stretches: Exercises. \"     If you do not have an account, please click on the \"Sign Up Now\" link. Current as of: July 1, 2021               Content Version: 13.1  © 4481-8608 [email or web address]. Care instructions adapted under license by [employer]. If you have questions about a medical condition or this instruction, always ask your healthcare professional. [address] any warranty or liability for your use of this information.

## 2022-02-17 NOTE — PROGRESS NOTES
TELE HEALTH VISIT (AUDIO-VISUAL)    Arely Lara, was evaluated through a synchronous (real-time) audio-video encounter. The patient (or guardian if applicable) is aware that this is a billable service, which includes applicable co-pays. This Virtual Visit was conducted with patient's (and/or legal guardian's) consent. The visit was conducted pursuant to the emergency declaration under the [address] [place] [address] and the app[employer]evr and [employer] General Act. Patient identification was verified, and a caregiver was present when appropriate. The patient was located in a state where the provider was licensed to provide care. Juli Lara  1970  [id number]    Ms. Lara is being seen virtually for a follow up visit using [employer] Video visit  Informed verbal consent to the virtual visit was obtained from Ms. Lara. Risks associated with HIPPA compliance with the virtual visit was explained to the patient. Ms. Domitila Wong is at her home and Yolie BALDWIN is in her office. HISTORY OF PRESENT ILLNESS:  Ms. Domitila Wong is a 49 y.o. female  being assessed for a follow up visit for pain management for evaluation of ongoing care regarding her symptoms and monitoring of compliance with long term use high risk medications. She has a diagnosis of   1. Chronic pain syndrome    2. Degeneration of lumbar or lumbosacral intervertebral disc    3. Spinal stenosis, lumbar region, without neurogenic claudication    4. Lumbosacral spondylosis without myelopathy    5. Lumbar nerve root impingement, L2    6. DDD (degenerative disc disease), cervical    7. Cervical stenosis of spine, mild    8. Bilateral elbow joint pain    9. Carpal tunnel syndrome, bilateral    10. Primary insomnia    11.  Drug induced constipation      On the Patients Pain Assessment form reviewed with the Medical Assistant:  She complains of pain in the left buttock, left upper leg, left lower leg, middle and lower back. She rates the pain 7/10 and describes it as dull, aching. Current treatment regimen has helped relieve about 40% of the pain. She denies any side effects from the current pain regimen. Patient reports that since the last follow up visit the physical functioning is unchanged, family/social relationships are unchanged, mood is unchanged sleep patterns are worse, and that the overall functioning is unchanged. Patient denies misusing/abusing her narcotic pain medications or using any illegal drugs. Upon obtaining medical history from Ms. Lara states that pain is manageable on current pain therapy. Takes pain medications as prescribed. Maintains f/u with rheumatology for psoriatic arthritis, currently on MTX. Mood is stable without anxiety. Sleep is fair with an average of 5-6 hours. Denies to having issues of constipation. Tolerating activities/house chores with moderate tenderness to the lower back. ALLERGIES: Patients list of allergies were reviewed     MEDICATIONS: Ms. Staton Side list of medications were reviewed. Her current medications are   Outpatient Medications Prior to Visit   Medication Sig Dispense Refill    hydroxychloroquine (PLAQUENIL) 200 MG tablet Take 1.5 tablets by mouth daily 497 tablet 0    folic acid (FOLVITE) 1 MG tablet Take 1 tablet by mouth daily 90 tablet 0    methotrexate (RHEUMATREX) 2.5 MG chemo tablet Take 5 tablets by mouth once a week 60 tablet 0    predniSONE (DELTASONE) 10 MG tablet Take 3 tablets by mouth daily for 4 days, THEN 2 tablets daily for 4 days, THEN 1 tablet daily for 4 days, THEN 0.5 tablets daily for 4 days.  26 tablet 2    traZODone (DESYREL) 50 MG tablet TAKE 1 TABLET NIGHTLY AS NEEDED FOR SLEEP 90 tablet 1    vitamin B-12 (CYANOCOBALAMIN) 500 MCG tablet Take 500 mcg by mouth daily      docusate sodium (COLACE) 100 MG capsule Take 100 mg by mouth 2 times daily      Saccharomyces boulardii (FLORASTOR PO) Take by spondylosis without myelopathy    5. Lumbar nerve root impingement, L2    6. DDD (degenerative disc disease), cervical    7. Cervical stenosis of spine, mild    8. Bilateral elbow joint pain    9. Carpal tunnel syndrome, bilateral    10. Primary insomnia    11. Drug induced constipation        PLAN:  Informed verbal consent regarding treatment was obtained  -Continue with Percocet, ZTlido  -Home exercises, back stretches recommended  -Maintains care under Dr. Gallo Fisher with rheumatology  -CBT techniques- relaxation therapies such as biofeedback, mindfulness based stress reduction, imagery, cognitive restructuring, problem solving discussed with patient  -Last UDS 8/16/21 Consistent  -Return in about 4 weeks (around 3/17/2022). Current Outpatient Medications   Medication Sig Dispense Refill    Lidocaine (ZTLIDO) 1.8 % PTCH Apply 1 patch topically daily 90 patch 0    oxyCODONE-acetaminophen (PERCOCET) 5-325 MG per tablet Take 1 tablet by mouth every 8 hours as needed for Pain for up to 30 days.  Take no more than 2-3 tabs orally prn 90 tablet 0    hydroxychloroquine (PLAQUENIL) 200 MG tablet Take 1.5 tablets by mouth daily 973 tablet 0    folic acid (FOLVITE) 1 MG tablet Take 1 tablet by mouth daily 90 tablet 0    methotrexate (RHEUMATREX) 2.5 MG chemo tablet Take 5 tablets by mouth once a week 60 tablet 0    traZODone (DESYREL) 50 MG tablet TAKE 1 TABLET NIGHTLY AS NEEDED FOR SLEEP 90 tablet 1    vitamin B-12 (CYANOCOBALAMIN) 500 MCG tablet Take 500 mcg by mouth daily      docusate sodium (COLACE) 100 MG capsule Take 100 mg by mouth 2 times daily      Saccharomyces boulardii (FLORASTOR PO) Take by mouth daily      ferrous sulfate (IRON 325) 325 (65 Fe) MG tablet TAKE 1 TABLET EVERY OTHER DAY      Resveratrol 250 MG CAPS Take by mouth daily      Collagen-Boron-Hyaluronic Acid ([facility] JOINT [facility] TRIPLE ACTION) 40-5-3.3 MG TABS Take by mouth daily      TURMERIC PO Take by mouth      APPLE CIDER VINEGAR PO Take by mouth       Linoleic Acid-Sunflower Oil (CLA PO) Take by mouth       ELIQUIS 5 MG TABS tablet TAKE 1 TABLET BY MOUTH TWO TIMES A DAY  60 tablet 0     No current facility-administered medications for this visit. I will continue her current medication regimen  which is part of the above treatment schedule. It has been helping with Ms. Lara's chronic  medical problems which for this visit include:   Diagnoses of Chronic pain syndrome, Degeneration of lumbar or lumbosacral intervertebral disc, Spinal stenosis, lumbar region, without neurogenic claudication, Lumbosacral spondylosis without myelopathy, Lumbar nerve root impingement, L2, DDD (degenerative disc disease), cervical, Cervical stenosis of spine, mild, Bilateral elbow joint pain, Carpal tunnel syndrome, bilateral, Primary insomnia, and Drug induced constipation were pertinent to this visit. Risks and benefits of the medications and other alternative treatments  including no treatment were discussed with the patient. The common side effects of these medications were also explained to the patient. Informed verbal consent was obtained. Goals of current treatment regimen include improvement in pain, restoration of functioning- with focus on improvement in physical performance, general activity, work or disability,emotional distress, health care utilization and  decreased medication consumption. Will continue to monitor progress towards achieving/maintaining therapeutic goals with special emphasis on  1. Improvement in perceived interfernce  of pain with ADL's. Ability to do home exercises independently. Ability to do household chores indoor and/or outdoor work and social and leisure activities. Improve psychosocial and physical functioning. - she is showing progression towards this treatment goal with the current regimen.     She was advised against drinking alcohol with the narcotic pain medicines, advised against driving or handling machinery while adjusting the dose of medicines or if having cognitive  issues related to the current medications. Risk of overdose and death, if medicines not taken as prescribed, were also discussed. If the patient develops new symptoms or if the symptoms worsen, the patient should call the office. While transcribing every attempt was made to maintain the accuracy of the note in terms of it's contents,there may have been some errors made inadvertently. Thank you for allowing me to participate in the care of this patient. Cintia Kaplan.  Faisal GOODWIN-CNP     Cc: Debbi Kulkarni MD

## 2022-02-19 ENCOUNTER — HOSPITAL ENCOUNTER (INPATIENT)
Age: 52
LOS: 3 days | Discharge: HOME | DRG: 392 | End: 2022-02-22
Attending: STUDENT IN AN ORGANIZED HEALTH CARE EDUCATION/TRAINING PROGRAM | Admitting: INTERNAL MEDICINE
Payer: COMMERCIAL

## 2022-02-19 ENCOUNTER — APPOINTMENT (OUTPATIENT)
Dept: CT IMAGING | Age: 52
DRG: 392 | End: 2022-02-19
Payer: COMMERCIAL

## 2022-02-19 DIAGNOSIS — K57.92 ACUTE DIVERTICULITIS: Primary | ICD-10-CM

## 2022-02-19 LAB
A/G RATIO: 2.1 (ref 1.1–2.2)
ALBUMIN SERPL-MCNC: 4.7 G/DL (ref 3.4–5)
ALP BLD-CCNC: 113 U/L (ref 40–129)
ALT SERPL-CCNC: <5 U/L (ref 10–40)
ANION GAP SERPL CALCULATED.3IONS-SCNC: 12 MMOL/L (ref 3–16)
AST SERPL-CCNC: 14 U/L (ref 15–37)
BASOPHILS ABSOLUTE: 0 K/UL (ref 0–0.2)
BASOPHILS RELATIVE PERCENT: 0.3 %
BILIRUB SERPL-MCNC: 0.5 MG/DL (ref 0–1)
BUN BLDV-MCNC: 19 MG/DL (ref 7–20)
CALCIUM SERPL-MCNC: 9.7 MG/DL (ref 8.3–10.6)
CHLORIDE BLD-SCNC: 99 MMOL/L (ref 99–110)
CO2: 23 MMOL/L (ref 21–32)
CREAT SERPL-MCNC: 0.8 MG/DL (ref 0.6–1.1)
EOSINOPHILS ABSOLUTE: 0 K/UL (ref 0–0.6)
EOSINOPHILS RELATIVE PERCENT: 0.4 %
GFR AFRICAN AMERICAN: >60
GFR NON-AFRICAN AMERICAN: >60
GLUCOSE BLD-MCNC: 127 MG/DL (ref 70–99)
HCT VFR BLD CALC: 35 % (ref 36–48)
HEMOGLOBIN: 12.3 G/DL (ref 12–16)
LYMPHOCYTES ABSOLUTE: 1.5 K/UL (ref 1–5.1)
LYMPHOCYTES RELATIVE PERCENT: 19 %
MCH RBC QN AUTO: 33 PG (ref 26–34)
MCHC RBC AUTO-ENTMCNC: 35.3 G/DL (ref 31–36)
MCV RBC AUTO: 93.6 FL (ref 80–100)
MONOCYTES ABSOLUTE: 0.4 K/UL (ref 0–1.3)
MONOCYTES RELATIVE PERCENT: 5.1 %
NEUTROPHILS ABSOLUTE: 5.8 K/UL (ref 1.7–7.7)
NEUTROPHILS RELATIVE PERCENT: 75.2 %
PDW BLD-RTO: 12.1 % (ref 12.4–15.4)
PLATELET # BLD: 261 K/UL (ref 135–450)
PMV BLD AUTO: 7.6 FL (ref 5–10.5)
POTASSIUM REFLEX MAGNESIUM: 4.1 MMOL/L (ref 3.5–5.1)
RBC # BLD: 3.74 M/UL (ref 4–5.2)
SODIUM BLD-SCNC: 134 MMOL/L (ref 136–145)
TOTAL PROTEIN: 6.9 G/DL (ref 6.4–8.2)
WBC # BLD: 7.7 K/UL (ref 4–11)

## 2022-02-19 PROCEDURE — 2580000003 HC RX 258: Performed by: STUDENT IN AN ORGANIZED HEALTH CARE EDUCATION/TRAINING PROGRAM

## 2022-02-19 PROCEDURE — 6370000000 HC RX 637 (ALT 250 FOR IP): Performed by: INTERNAL MEDICINE

## 2022-02-19 PROCEDURE — 96365 THER/PROPH/DIAG IV INF INIT: CPT

## 2022-02-19 PROCEDURE — 96375 TX/PRO/DX INJ NEW DRUG ADDON: CPT

## 2022-02-19 PROCEDURE — 6360000002 HC RX W HCPCS: Performed by: INTERNAL MEDICINE

## 2022-02-19 PROCEDURE — 99283 EMERGENCY DEPT VISIT LOW MDM: CPT

## 2022-02-19 PROCEDURE — 85025 COMPLETE CBC W/AUTO DIFF WBC: CPT

## 2022-02-19 PROCEDURE — 1200000000 HC SEMI PRIVATE

## 2022-02-19 PROCEDURE — 6360000002 HC RX W HCPCS: Performed by: STUDENT IN AN ORGANIZED HEALTH CARE EDUCATION/TRAINING PROGRAM

## 2022-02-19 PROCEDURE — 74177 CT ABD & PELVIS W/CONTRAST: CPT

## 2022-02-19 PROCEDURE — 80053 COMPREHEN METABOLIC PANEL: CPT

## 2022-02-19 PROCEDURE — 2580000003 HC RX 258: Performed by: INTERNAL MEDICINE

## 2022-02-19 PROCEDURE — 6360000004 HC RX CONTRAST MEDICATION: Performed by: STUDENT IN AN ORGANIZED HEALTH CARE EDUCATION/TRAINING PROGRAM

## 2022-02-19 PROCEDURE — 96376 TX/PRO/DX INJ SAME DRUG ADON: CPT

## 2022-02-19 RX ORDER — ONDANSETRON 4 MG/1
4 TABLET, ORALLY DISINTEGRATING ORAL EVERY 8 HOURS PRN
Status: DISCONTINUED | OUTPATIENT
Start: 2022-02-19 | End: 2022-02-22 | Stop reason: HOSPADM

## 2022-02-19 RX ORDER — TRAZODONE HYDROCHLORIDE 50 MG/1
50 TABLET ORAL NIGHTLY
Status: DISCONTINUED | OUTPATIENT
Start: 2022-02-19 | End: 2022-02-22 | Stop reason: HOSPADM

## 2022-02-19 RX ORDER — ONDANSETRON 2 MG/ML
4 INJECTION INTRAMUSCULAR; INTRAVENOUS EVERY 6 HOURS PRN
Status: DISCONTINUED | OUTPATIENT
Start: 2022-02-19 | End: 2022-02-22 | Stop reason: HOSPADM

## 2022-02-19 RX ORDER — SODIUM CHLORIDE 9 MG/ML
25 INJECTION, SOLUTION INTRAVENOUS PRN
Status: DISCONTINUED | OUTPATIENT
Start: 2022-02-19 | End: 2022-02-22 | Stop reason: HOSPADM

## 2022-02-19 RX ORDER — MORPHINE SULFATE 4 MG/ML
4 INJECTION, SOLUTION INTRAMUSCULAR; INTRAVENOUS EVERY 4 HOURS PRN
Status: DISCONTINUED | OUTPATIENT
Start: 2022-02-19 | End: 2022-02-20

## 2022-02-19 RX ORDER — SODIUM CHLORIDE 0.9 % (FLUSH) 0.9 %
5-40 SYRINGE (ML) INJECTION EVERY 12 HOURS SCHEDULED
Status: DISCONTINUED | OUTPATIENT
Start: 2022-02-19 | End: 2022-02-22 | Stop reason: HOSPADM

## 2022-02-19 RX ORDER — ACETAMINOPHEN 650 MG/1
650 SUPPOSITORY RECTAL EVERY 6 HOURS PRN
Status: DISCONTINUED | OUTPATIENT
Start: 2022-02-19 | End: 2022-02-22 | Stop reason: HOSPADM

## 2022-02-19 RX ORDER — MORPHINE SULFATE 4 MG/ML
4 INJECTION, SOLUTION INTRAMUSCULAR; INTRAVENOUS ONCE
Status: COMPLETED | OUTPATIENT
Start: 2022-02-19 | End: 2022-02-19

## 2022-02-19 RX ORDER — DOCUSATE SODIUM 100 MG/1
100 CAPSULE, LIQUID FILLED ORAL DAILY
Status: DISCONTINUED | OUTPATIENT
Start: 2022-02-19 | End: 2022-02-22 | Stop reason: HOSPADM

## 2022-02-19 RX ORDER — SODIUM CHLORIDE, SODIUM LACTATE, POTASSIUM CHLORIDE, CALCIUM CHLORIDE 600; 310; 30; 20 MG/100ML; MG/100ML; MG/100ML; MG/100ML
INJECTION, SOLUTION INTRAVENOUS CONTINUOUS
Status: DISCONTINUED | OUTPATIENT
Start: 2022-02-19 | End: 2022-02-22

## 2022-02-19 RX ORDER — MORPHINE SULFATE 2 MG/ML
2 INJECTION, SOLUTION INTRAMUSCULAR; INTRAVENOUS EVERY 4 HOURS PRN
Status: DISCONTINUED | OUTPATIENT
Start: 2022-02-19 | End: 2022-02-20

## 2022-02-19 RX ORDER — POLYETHYLENE GLYCOL 3350 17 G/17G
17 POWDER, FOR SOLUTION ORAL DAILY PRN
Status: DISCONTINUED | OUTPATIENT
Start: 2022-02-19 | End: 2022-02-22 | Stop reason: HOSPADM

## 2022-02-19 RX ORDER — SODIUM CHLORIDE, SODIUM LACTATE, POTASSIUM CHLORIDE, AND CALCIUM CHLORIDE .6; .31; .03; .02 G/100ML; G/100ML; G/100ML; G/100ML
1000 INJECTION, SOLUTION INTRAVENOUS ONCE
Status: COMPLETED | OUTPATIENT
Start: 2022-02-19 | End: 2022-02-19

## 2022-02-19 RX ORDER — ACETAMINOPHEN 325 MG/1
650 TABLET ORAL EVERY 6 HOURS PRN
Status: DISCONTINUED | OUTPATIENT
Start: 2022-02-19 | End: 2022-02-22 | Stop reason: HOSPADM

## 2022-02-19 RX ORDER — SODIUM CHLORIDE 0.9 % (FLUSH) 0.9 %
5-40 SYRINGE (ML) INJECTION PRN
Status: DISCONTINUED | OUTPATIENT
Start: 2022-02-19 | End: 2022-02-22 | Stop reason: HOSPADM

## 2022-02-19 RX ADMIN — PIPERACILLIN AND TAZOBACTAM 3375 MG: 3; .375 INJECTION, POWDER, LYOPHILIZED, FOR SOLUTION INTRAVENOUS at 17:10

## 2022-02-19 RX ADMIN — MORPHINE SULFATE 4 MG: 4 INJECTION INTRAVENOUS at 23:21

## 2022-02-19 RX ADMIN — ACETAMINOPHEN 650 MG: 325 TABLET ORAL at 23:58

## 2022-02-19 RX ADMIN — HYDROMORPHONE HYDROCHLORIDE 1 MG: 1 INJECTION, SOLUTION INTRAMUSCULAR; INTRAVENOUS; SUBCUTANEOUS at 15:00

## 2022-02-19 RX ADMIN — SODIUM CHLORIDE, POTASSIUM CHLORIDE, SODIUM LACTATE AND CALCIUM CHLORIDE 1000 ML: 600; 310; 30; 20 INJECTION, SOLUTION INTRAVENOUS at 15:37

## 2022-02-19 RX ADMIN — ENOXAPARIN SODIUM 40 MG: 100 INJECTION SUBCUTANEOUS at 23:21

## 2022-02-19 RX ADMIN — DOCUSATE SODIUM 100 MG: 100 CAPSULE, LIQUID FILLED ORAL at 23:21

## 2022-02-19 RX ADMIN — SODIUM CHLORIDE, PRESERVATIVE FREE 10 ML: 5 INJECTION INTRAVENOUS at 20:39

## 2022-02-19 RX ADMIN — TRAZODONE HYDROCHLORIDE 50 MG: 50 TABLET ORAL at 23:21

## 2022-02-19 RX ADMIN — IOPAMIDOL 80 ML: 755 INJECTION, SOLUTION INTRAVENOUS at 15:03

## 2022-02-19 RX ADMIN — SODIUM CHLORIDE, POTASSIUM CHLORIDE, SODIUM LACTATE AND CALCIUM CHLORIDE: 600; 310; 30; 20 INJECTION, SOLUTION INTRAVENOUS at 20:39

## 2022-02-19 RX ADMIN — MORPHINE SULFATE 4 MG: 4 INJECTION INTRAVENOUS at 14:09

## 2022-02-19 RX ADMIN — HYDROMORPHONE HYDROCHLORIDE 1 MG: 1 INJECTION, SOLUTION INTRAMUSCULAR; INTRAVENOUS; SUBCUTANEOUS at 17:39

## 2022-02-19 RX ADMIN — PIPERACILLIN AND TAZOBACTAM 3375 MG: 3; .375 INJECTION, POWDER, LYOPHILIZED, FOR SOLUTION INTRAVENOUS at 23:17

## 2022-02-19 ASSESSMENT — ENCOUNTER SYMPTOMS
ABDOMINAL PAIN: 1
DIARRHEA: 0
BLOOD IN STOOL: 0
VOMITING: 0
NAUSEA: 0
BACK PAIN: 1
CONSTIPATION: 0
SHORTNESS OF BREATH: 0

## 2022-02-19 ASSESSMENT — PAIN SCALES - GENERAL
PAINLEVEL_OUTOF10: 10
PAINLEVEL_OUTOF10: 10
PAINLEVEL_OUTOF10: 9
PAINLEVEL_OUTOF10: 5
PAINLEVEL_OUTOF10: 7
PAINLEVEL_OUTOF10: 10
PAINLEVEL_OUTOF10: 7

## 2022-02-19 ASSESSMENT — PAIN DESCRIPTION - ORIENTATION: ORIENTATION: LEFT

## 2022-02-19 ASSESSMENT — PAIN DESCRIPTION - FREQUENCY: FREQUENCY: CONTINUOUS

## 2022-02-19 ASSESSMENT — PAIN DESCRIPTION - PROGRESSION: CLINICAL_PROGRESSION: GRADUALLY WORSENING

## 2022-02-19 ASSESSMENT — PAIN DESCRIPTION - ONSET: ONSET: ON-GOING

## 2022-02-19 ASSESSMENT — PAIN DESCRIPTION - PAIN TYPE: TYPE: CHRONIC PAIN

## 2022-02-19 ASSESSMENT — PAIN DESCRIPTION - DESCRIPTORS: DESCRIPTORS: CONSTANT

## 2022-02-19 ASSESSMENT — PAIN DESCRIPTION - LOCATION: LOCATION: FLANK

## 2022-02-19 NOTE — ED NOTES
Bed: B23-23  Expected date:   Expected time:   Means of arrival:   Comments:  amna Cameron [facility]  02/19/22 [id number]

## 2022-02-19 NOTE — ED PROVIDER NOTES
ED Attending Attestation Note     Date of evaluation: 2/19/2022    This patient was seen by the resident. I have seen and examined the patient, agree with the workup, evaluation, management and diagnosis. The care plan has been discussed. My assessment reveals-year-old female with past medical history of factor V Leiden, psoriatic arthritis presenting with chief complaint of left flank pain and left lower quadrant pain. Has a history of diverticulitis requiring hospital admission, never required surgery. On physical exam tender to palpation left lower quadrant, no tenderness in remaining quadrants. Laboratory studies and imaging pending, pain control, IV fluids.      Renee Hernandez MD  02/19/22 1840

## 2022-02-19 NOTE — ED PROVIDER NOTES
pain.   Musculoskeletal: Positive for back pain (chronic). Negative for myalgias, neck pain and neck stiffness. Skin: Negative. Neurological: Negative. Negative for headaches. Psychiatric/Behavioral: Negative. Past Medical, Surgical, Family, and Social History     She has a past medical history of Acne, Diverticulitis, Factor 5 Leiden mutation, heterozygous [facility]), and Medical history reviewed with no changes. She has a past surgical history that includes  section (); Carpal tunnel release (Bilateral); Pain management procedure (Left, 10/26/2020); and Pain management procedure (Left, 2020). Her family history includes Heart Disease (age of onset: [de-identified]) in her father; High Cholesterol in her father; No Known Problems in her mother; Other in her brother. She reports that she has never smoked. She has never used smokeless tobacco. She reports that she does not drink alcohol and does not use drugs. Medications     Previous Medications    APPLE CIDER VINEGAR PO    Take by mouth     COLLAGEN-BORON-HYALURONIC ACID ([facility] TRIPLE ACTION) 40-5-3.3 MG TABS    Take by mouth daily    DOCUSATE SODIUM (COLACE) 100 MG CAPSULE    Take 100 mg by mouth 2 times daily    ELIQUIS 5 MG TABS TABLET    TAKE 1 TABLET BY MOUTH TWO TIMES A DAY     FERROUS SULFATE (IRON 325) 325 (65 FE) MG TABLET    TAKE 1 TABLET EVERY OTHER DAY    FOLIC ACID (FOLVITE) 1 MG TABLET    Take 1 tablet by mouth daily    HYDROXYCHLOROQUINE (PLAQUENIL) 200 MG TABLET    Take 1.5 tablets by mouth daily    LIDOCAINE (ZTLIDO) 1.8 % PTCH    Apply 1 patch topically daily    LINOLEIC ACID-SUNFLOWER OIL (CLA PO)    Take by mouth     METHOTREXATE (RHEUMATREX) 2.5 MG CHEMO TABLET    Take 5 tablets by mouth once a week    OXYCODONE-ACETAMINOPHEN (PERCOCET) 5-325 MG PER TABLET    Take 1 tablet by mouth every 8 hours as needed for Pain for up to 30 days.  Take no more than 2-3 tabs orally prn    RESVERATROL 250 MG CAPS    Take by mouth daily    SACCHAROMYCES BOULARDII (FLORASTOR PO)    Take by mouth daily    TRAZODONE (DESYREL) 50 MG TABLET    TAKE 1 TABLET NIGHTLY AS NEEDED FOR SLEEP    TURMERIC PO    Take by mouth    VITAMIN B-12 (CYANOCOBALAMIN) 500 MCG TABLET    Take 500 mcg by mouth daily       Allergies     She has No Known Allergies. Physical Exam     INITIAL VITALS: BP: 105/75, Temp: 98 °F (36.7 °C), Pulse: 69, Resp: 16, SpO2: 100 %   Physical Exam  Constitutional:       General: She is in acute distress. Appearance: Normal appearance. She is not ill-appearing, toxic-appearing or diaphoretic. HENT:      Head: Normocephalic and atraumatic. Right Ear: External ear normal.      Left Ear: External ear normal.      Nose: Nose normal.      Mouth/Throat:      Mouth: Mucous membranes are moist.      Pharynx: Oropharynx is clear. Eyes:      General: No scleral icterus. Right eye: No discharge. Left eye: No discharge. Extraocular Movements: Extraocular movements intact. Pupils: Pupils are equal, round, and reactive to light. Cardiovascular:      Rate and Rhythm: Normal rate and regular rhythm. Heart sounds: Normal heart sounds. No murmur heard. No friction rub. No gallop. Pulmonary:      Effort: Pulmonary effort is normal. No respiratory distress. Breath sounds: Normal breath sounds. No stridor. No wheezing or rales. Abdominal:      General: Abdomen is flat. There is no distension. Palpations: Abdomen is soft. There is no mass. Tenderness: There is abdominal tenderness (LLQ). There is no guarding or rebound. Hernia: No hernia is present. Musculoskeletal:      Cervical back: Normal range of motion. Right lower leg: No edema. Left lower leg: No edema. Skin:     General: Skin is warm and dry. Coloration: Skin is not jaundiced. Neurological:      Mental Status: She is alert and oriented to person, place, and time. Mental status is at baseline.    Psychiatric: Mood and Affect: Mood normal.         Behavior: Behavior normal.         Thought Content: Thought content normal.         Judgment: Judgment normal.         Diagnostic Results     EKG       RADIOLOGY:  CT ABDOMEN PELVIS W IV CONTRAST Additional Contrast? Radiologist Recommendation   Final Result      Sigmoid diverticulosis is present. There is segmental mild wall thickening and pericolonic stranding seen involving the proximal sigmoid colon which is suspicious for superimposed acute uncomplicated diverticulitis. Otherwise no acute abnormality identified.              LABS:   Results for orders placed or performed during the hospital encounter of 02/19/22   Comprehensive Metabolic Panel w/ Reflex to MG   Result Value Ref Range    Sodium 134 (L) 136 - 145 mmol/L    Potassium reflex Magnesium 4.1 3.5 - 5.1 mmol/L    Chloride 99 99 - 110 mmol/L    CO2 23 21 - 32 mmol/L    Anion Gap 12 3 - 16    Glucose 127 (H) 70 - 99 mg/dL    BUN 19 7 - 20 mg/dL    CREATININE 0.8 0.6 - 1.1 mg/dL    GFR Non-African American >60 >60    GFR African American >60 >60    Calcium 9.7 8.3 - 10.6 mg/dL    Total Protein 6.9 6.4 - 8.2 g/dL    Albumin 4.7 3.4 - 5.0 g/dL    Albumin/Globulin Ratio 2.1 1.1 - 2.2    Total Bilirubin 0.5 0.0 - 1.0 mg/dL    Alkaline Phosphatase 113 40 - 129 U/L    ALT <5 (L) 10 - 40 U/L    AST 14 (L) 15 - 37 U/L   CBC with Auto Differential   Result Value Ref Range    WBC 7.7 4.0 - 11.0 K/uL    RBC 3.74 (L) 4.00 - 5.20 M/uL    Hemoglobin 12.3 12.0 - 16.0 g/dL    Hematocrit 35.0 (L) 36.0 - 48.0 %    MCV 93.6 80.0 - 100.0 fL    MCH 33.0 26.0 - 34.0 pg    MCHC 35.3 31.0 - 36.0 g/dL    RDW 12.1 (L) 12.4 - 15.4 %    Platelets 941 166 - 242 K/uL    MPV 7.6 5.0 - 10.5 fL    Neutrophils % 75.2 %    Lymphocytes % 19.0 %    Monocytes % 5.1 %    Eosinophils % 0.4 %    Basophils % 0.3 %    Neutrophils Absolute 5.8 1.7 - 7.7 K/uL    Lymphocytes Absolute 1.5 1.0 - 5.1 K/uL    Monocytes Absolute 0.4 0.0 - 1.3 K/uL Eosinophils Absolute 0.0 0.0 - 0.6 K/uL    Basophils Absolute 0.0 0.0 - 0.2 K/uL       ED BEDSIDE ULTRASOUND:      RECENT VITALS:  BP: 105/75, Temp: 98 °F (36.7 °C),Pulse: 69, Resp: 16, SpO2: 100 %     Procedures         ED Course     Nursing Notes, Past Medical Hx, Past Surgical Hx, Social Hx, Allergies, and FamilyHx were reviewed. The patient was giventhe following medications:  Orders Placed This Encounter   Medications    morphine injection 4 mg    lactated ringers bolus    HYDROmorphone (DILAUDID) injection 1 mg    iopamidol (ISOVUE-370) 76 % injection 80 mL    piperacillin-tazobactam (ZOSYN) 3,375 mg in dextrose 5 % 50 mL IVPB (mini-bag)     Order Specific Question:   Antimicrobial Indications     Answer:   Intra-Abdominal Infection       CONSULTS:  IP CONSULT TO HOSPITALIST    Aaliyah Bright / ASSESSMENT / Sierra Rick is a 49 y.o. female who presents with left sided abdominal pain that began this morning. Patient has a history of diverticulitis in the past, in July of 2020, patient was found to have sigmoidal diverticulitis with pericolonic abscess and for which she was hospitalized. Upon arrival to the ED, patient has stable vital signs: afebrile, HR 69. RR 16. /75 around baseline, satting 100% on RA. CMP and CBC obtained that was unremarkable for significant abnormalities. Morphine 4mg given for pain control but was ineffective. Patient given 1mg dilaudid and 1L LR bolus. CT abdomen pelvis w/ contrast revealed segmental mild wall thickening and pericolonic stranding seen involving the proximal sigmoid colon suspicious for superimposed acute uncomplicated diverticulitis. Patient continues to be in significant pain that she rates 9 out of 10 despite 2 doses of pain control. Patient does not believe she will be able to manage the pain at home as well as tolerated oral antibiotics. Patient given 1 dose of IV Zosyn. Patient given a third dose of dilaudid.     Patient to be admitted for management of acute diverticulitis with IV antibiotics as well as pain control. This patient was also evaluated by the attending physician. All care plans were discussed and agreed upon. Clinical Impression     1. Acute diverticulitis        Disposition     PATIENT REFERRED TO:  No follow-up provider specified.     DISCHARGE MEDICATIONS:  New Prescriptions    No medications on file       DISPOSITION Decision To Admit 02/19/2022 04:34:30 PM            Francis Rivera DO  Resident  02/19/22 2331

## 2022-02-19 NOTE — ED TRIAGE NOTES
Pt presents to ED with left flank pain that radiates down left leg and into back. Pt has Hx of diverticulitis, but states this feels different than last incident.

## 2022-02-19 NOTE — H&P
Hospital Medicine History & Physical      PCP: Luciana Thapa MD    Date of Admission: 2022    Date of Service: Pt seen/examined on 22 and Admitted to Inpatient with expected LOS greater than two midnights due to medical therapy. Chief Complaint:  Abdominal pain    History Of Present Illness:      Raine Dotson is a 49 y.o. female with past medical history as below, including psoriatic arthritis recently started on methotrexate, also on plaquenil but not currently on prednisone, history of C. Diff with 3 occurrences since  (at which time she had sigmoid abscess), presents with LLQ pain which radiates to her back and states pain feels like prior episodes of diverticulitis. Pain is constant, cramping, 10/10. Does not believe she has had any blood in stool, does not think she has had fever. CT scan shows wall thickening and stranding involving the proximal sigmoid colon suspicious for uncomplicated diverticulitis. Past Medical History:          Diagnosis Date    Acne     Dr Maddie Millard Diverticulitis 2020    Factor 5 Leiden mutation, heterozygous ([facility].)     Medical history reviewed with no changes        Past Surgical History:          Procedure Laterality Date    CARPAL TUNNEL RELEASE Bilateral      SECTION  2001     x1    PAIN MANAGEMENT PROCEDURE Left 10/26/2020    LEFT L4 AND L5 TRANSFORAMINAL EPDIURAL STEROID INJECTION WITH FLUOROSCOPY (24859, [id number]) performed by Tanmay Lombardi MD at [facility] Left 2020    LEFT LUMBAR FOUR LUMBAR FIVE TRANSFORAMINAL  EPIDURAL STEROID INJECTION SITE CONFIRMED BY FLUOROSCOPY performed by Tanmay Lombardi MD at [place] [address]       Medications Prior to Admission:      Prior to Admission medications    Medication Sig Start Date End Date Taking?  Authorizing Provider   Lidocaine (ZTLIDO) 1.8 % PTCH Apply 1 patch topically daily 22  CHRISTA Bain - JUSTICE   oxyCODONE-acetaminophen (PERCOCET) 5-325 MG per tablet Take 1 tablet by mouth every 8 hours as needed for Pain for up to 30 days. Take no more than 2-3 tabs orally prn 2/17/22 3/19/22  CHRISTA Coombs CNP   hydroxychloroquine (PLAQUENIL) 200 MG tablet Take 1.5 tablets by mouth daily 2/1/22 5/2/22  Dc Becker MD   folic acid (FOLVITE) 1 MG tablet Take 1 tablet by mouth daily 2/1/22   Dc Becker MD   methotrexate (RHEUMATREX) 2.5 MG chemo tablet Take 5 tablets by mouth once a week 2/1/22 5/2/22  Dc Becker MD   traZODone (DESYREL) 50 MG tablet TAKE 1 TABLET NIGHTLY AS NEEDED FOR SLEEP 1/28/22   Malorie Post MD   vitamin B-12 (CYANOCOBALAMIN) 500 MCG tablet Take 500 mcg by mouth daily    Historical Provider, MD   docusate sodium (COLACE) 100 MG capsule Take 100 mg by mouth 2 times daily    Historical Provider, MD   Saccharomyces boulardii (FLORASTOR PO) Take by mouth daily    Historical Provider, MD   ferrous sulfate (IRON 325) 325 (65 Fe) MG tablet TAKE 1 TABLET EVERY OTHER DAY 9/22/21   Historical Provider, MD   Resveratrol 250 MG CAPS Take by mouth daily    Historical Provider, MD   Collagen-Boron-Hyaluronic Acid ([facility] TRIPLE ACTION) 40-5-3.3 MG TABS Take by mouth daily    Historical Provider, MD   TURMERIC PO Take by mouth    Historical Provider, MD   APPLE CIDER VINEGAR PO Take by mouth     Historical Provider, MD   Linoleic Acid-Sunflower Oil (CLA PO) Take by mouth     Historical Provider, MD   ELIQUIS 5 MG TABS tablet TAKE 1 TABLET BY MOUTH TWO TIMES A DAY  8/7/20   Malorie Post MD       Allergies:  Patient has no known allergies. Social History:      The patient currently lives in private residence. TOBACCO:   reports that she has never smoked. She has never used smokeless tobacco.  ETOH:   reports no history of alcohol use.       Family History:      Reviewed in detail and positive as follows:        Problem Relation Age of Onset    No Known Problems Mother         Healthy 78year old   [facility] [address] Disease Father [de-identified]        +Stent    High Cholesterol Father     Other Brother         do not talk +drug        REVIEW OF SYSTEMS:   Pertinent positives as noted in the HPI. All other systems reviewed and negative. PHYSICAL EXAM:    /72   Pulse 69   Temp 98 °F (36.7 °C) (Oral)   Resp 16   Ht 5' 4\" (1.626 m)   Wt 143 lb (64.9 kg)   SpO2 99%   BMI 24.55 kg/m²     General appearance: No apparent distress, appears stated age and cooperative. HEENT: Normal cephalic, atraumatic without obvious deformity. Pupils equal, round, and reactive to light. Extra ocular muscles intact. Conjunctivae/corneas clear. Neck: Supple, with full range of motion. No jugular venous distention. Trachea midline. Respiratory:  Normal respiratory effort. Clear to auscultation, bilaterally without Rales/Wheezes/Rhonchi. Cardiovascular: Regular rate and rhythm with normal S1/S2 without murmurs, rubs or gallops. Abdomen: Soft, non-tender, non-distended with normal bowel sounds. Musculoskelatal: No clubbing, cyanosis or edema bilaterally. Full range of motion without deformity. Skin: Skin color, texture, turgor normal.  No rashes or lesions. Neurologic:  Neurovascularly intact without any focal sensory/motor deficits. Cranial nerves: II-XII intact, grossly non-focal.  Psychiatric: Alert and oriented, thought content appropriate, normal insight    Labs:     Recent Labs     02/19/22  1405   WBC 7.7   HGB 12.3   HCT 35.0*        Recent Labs     02/19/22  1405   *   K 4.1   CL 99   CO2 23   BUN 19   CREATININE 0.8   CALCIUM 9.7     Recent Labs     02/19/22  1405   AST 14*   ALT <5*   BILITOT 0.5   ALKPHOS 113     No results for input(s): INR in the last 72 hours. No results for input(s): [place] in the last 72 hours.     Urinalysis:      Lab Results   Component Value Date    BLOODU trace 11/28/2016    SPECGRAV 1.005 11/28/2016    GLUCOSEU neg 11/28/2016       Studies:  CT ABDOMEN PELVIS W IV CONTRAST Additional Contrast? Radiologist Recommendation   Final Result      Sigmoid diverticulosis is present. There is segmental mild wall thickening and pericolonic stranding seen involving the proximal sigmoid colon which is suspicious for superimposed acute uncomplicated diverticulitis. Otherwise no acute abnormality identified. ASSESSMENT:    Diverticulitis  Psoriatic arthritis on immune modifying therapy with plaquenil, methotrexate, not on prednisone currently (recently on a short low dose course)  Hx of DVT on ppx dosing Eliquis  Chronic pain    PLAN:    Diverticulitis with no signs of complication on CT imaging however it is her third episode since 2020. She also had diverticulitis in 2018. Most recent episode treated with cipro/flagyl. Was started on Zosyn, will continue for now and will also consult GI given her immune compromised status  Bowel rest  IV fluids  Pain control  Hold methotrexate and Plaquenil     DVT Prophylaxis: Lovenox  Diet: No diet orders on file  Code Status: No Order    PT/OT Eval Status:     Dispo - Inpatient      Sofia Pruett DO     Thank you Adarsh Hernandez MD for the opportunity to be involved in this patient's care. If you have any questions or concerns please feel free to contact me at [facility].

## 2022-02-20 LAB
ANION GAP SERPL CALCULATED.3IONS-SCNC: 10 MMOL/L (ref 3–16)
BASOPHILS ABSOLUTE: 0 K/UL (ref 0–0.2)
BASOPHILS RELATIVE PERCENT: 0.3 %
BUN BLDV-MCNC: 11 MG/DL (ref 7–20)
CALCIUM SERPL-MCNC: 9.2 MG/DL (ref 8.3–10.6)
CHLORIDE BLD-SCNC: 102 MMOL/L (ref 99–110)
CO2: 24 MMOL/L (ref 21–32)
CREAT SERPL-MCNC: 0.8 MG/DL (ref 0.6–1.1)
EOSINOPHILS ABSOLUTE: 0 K/UL (ref 0–0.6)
EOSINOPHILS RELATIVE PERCENT: 0.1 %
GFR AFRICAN AMERICAN: >60
GFR NON-AFRICAN AMERICAN: >60
GLUCOSE BLD-MCNC: 101 MG/DL (ref 70–99)
HCT VFR BLD CALC: 34.2 % (ref 36–48)
HEMOGLOBIN: 12 G/DL (ref 12–16)
LYMPHOCYTES ABSOLUTE: 0.8 K/UL (ref 1–5.1)
LYMPHOCYTES RELATIVE PERCENT: 10.5 %
MCH RBC QN AUTO: 33.5 PG (ref 26–34)
MCHC RBC AUTO-ENTMCNC: 35.1 G/DL (ref 31–36)
MCV RBC AUTO: 95.5 FL (ref 80–100)
MONOCYTES ABSOLUTE: 0.4 K/UL (ref 0–1.3)
MONOCYTES RELATIVE PERCENT: 5 %
NEUTROPHILS ABSOLUTE: 6.4 K/UL (ref 1.7–7.7)
NEUTROPHILS RELATIVE PERCENT: 84.1 %
PDW BLD-RTO: 11.8 % (ref 12.4–15.4)
PLATELET # BLD: 202 K/UL (ref 135–450)
PMV BLD AUTO: 7.6 FL (ref 5–10.5)
POTASSIUM REFLEX MAGNESIUM: 3.9 MMOL/L (ref 3.5–5.1)
RBC # BLD: 3.58 M/UL (ref 4–5.2)
SODIUM BLD-SCNC: 136 MMOL/L (ref 136–145)
WBC # BLD: 7.6 K/UL (ref 4–11)

## 2022-02-20 PROCEDURE — 6370000000 HC RX 637 (ALT 250 FOR IP): Performed by: INTERNAL MEDICINE

## 2022-02-20 PROCEDURE — 1200000000 HC SEMI PRIVATE

## 2022-02-20 PROCEDURE — 80048 BASIC METABOLIC PNL TOTAL CA: CPT

## 2022-02-20 PROCEDURE — 99223 1ST HOSP IP/OBS HIGH 75: CPT | Performed by: SURGERY

## 2022-02-20 PROCEDURE — 6360000002 HC RX W HCPCS: Performed by: INTERNAL MEDICINE

## 2022-02-20 PROCEDURE — 36415 COLL VENOUS BLD VENIPUNCTURE: CPT

## 2022-02-20 PROCEDURE — 85025 COMPLETE CBC W/AUTO DIFF WBC: CPT

## 2022-02-20 PROCEDURE — 2580000003 HC RX 258: Performed by: INTERNAL MEDICINE

## 2022-02-20 RX ADMIN — DOCUSATE SODIUM 100 MG: 100 CAPSULE, LIQUID FILLED ORAL at 07:57

## 2022-02-20 RX ADMIN — MORPHINE SULFATE 4 MG: 4 INJECTION INTRAVENOUS at 09:51

## 2022-02-20 RX ADMIN — ENOXAPARIN SODIUM 40 MG: 100 INJECTION SUBCUTANEOUS at 07:57

## 2022-02-20 RX ADMIN — MORPHINE SULFATE 4 MG: 4 INJECTION INTRAVENOUS at 05:51

## 2022-02-20 RX ADMIN — PIPERACILLIN AND TAZOBACTAM 3375 MG: 3; .375 INJECTION, POWDER, LYOPHILIZED, FOR SOLUTION INTRAVENOUS at 07:57

## 2022-02-20 RX ADMIN — HYDROMORPHONE HYDROCHLORIDE 0.5 MG: 1 INJECTION, SOLUTION INTRAMUSCULAR; INTRAVENOUS; SUBCUTANEOUS at 21:36

## 2022-02-20 RX ADMIN — PIPERACILLIN AND TAZOBACTAM 3375 MG: 3; .375 INJECTION, POWDER, LYOPHILIZED, FOR SOLUTION INTRAVENOUS at 17:03

## 2022-02-20 RX ADMIN — SODIUM CHLORIDE, POTASSIUM CHLORIDE, SODIUM LACTATE AND CALCIUM CHLORIDE: 600; 310; 30; 20 INJECTION, SOLUTION INTRAVENOUS at 15:20

## 2022-02-20 RX ADMIN — SODIUM CHLORIDE, PRESERVATIVE FREE 10 ML: 5 INJECTION INTRAVENOUS at 09:51

## 2022-02-20 RX ADMIN — PIPERACILLIN AND TAZOBACTAM 3375 MG: 3; .375 INJECTION, POWDER, LYOPHILIZED, FOR SOLUTION INTRAVENOUS at 22:52

## 2022-02-20 RX ADMIN — TRAZODONE HYDROCHLORIDE 50 MG: 50 TABLET ORAL at 20:58

## 2022-02-20 RX ADMIN — HYDROMORPHONE HYDROCHLORIDE 0.5 MG: 1 INJECTION, SOLUTION INTRAMUSCULAR; INTRAVENOUS; SUBCUTANEOUS at 17:05

## 2022-02-20 ASSESSMENT — PAIN DESCRIPTION - PAIN TYPE
TYPE: ACUTE PAIN

## 2022-02-20 ASSESSMENT — PAIN DESCRIPTION - ORIENTATION
ORIENTATION: LEFT
ORIENTATION: LEFT;LOWER

## 2022-02-20 ASSESSMENT — PAIN - FUNCTIONAL ASSESSMENT: PAIN_FUNCTIONAL_ASSESSMENT: PREVENTS OR INTERFERES SOME ACTIVE ACTIVITIES AND ADLS

## 2022-02-20 ASSESSMENT — PAIN DESCRIPTION - DESCRIPTORS: DESCRIPTORS: ACHING

## 2022-02-20 ASSESSMENT — PAIN SCALES - GENERAL
PAINLEVEL_OUTOF10: 7
PAINLEVEL_OUTOF10: 9
PAINLEVEL_OUTOF10: 7
PAINLEVEL_OUTOF10: 9
PAINLEVEL_OUTOF10: 8
PAINLEVEL_OUTOF10: 9

## 2022-02-20 ASSESSMENT — PAIN DESCRIPTION - LOCATION
LOCATION: FLANK
LOCATION: ABDOMEN
LOCATION: FLANK

## 2022-02-20 ASSESSMENT — PAIN DESCRIPTION - ONSET: ONSET: ON-GOING

## 2022-02-20 ASSESSMENT — PAIN DESCRIPTION - PROGRESSION: CLINICAL_PROGRESSION: NOT CHANGED

## 2022-02-20 ASSESSMENT — PAIN DESCRIPTION - FREQUENCY: FREQUENCY: CONTINUOUS

## 2022-02-20 NOTE — PROGRESS NOTES
pt would like her home meds reordered, mostly her Trazodone 50 mg Qhs and Colace one tab daily.  [facility]

## 2022-02-20 NOTE — PROGRESS NOTES
Pt states her bed is uncomfortable and is alienating her chronic back pain. Special mattress ordered.

## 2022-02-20 NOTE — PROGRESS NOTES
Pt admitted to room 6314, awake and oriented x 4. Pt admits to mild pain currently. Vital signs stable, afebrile. Oriented to bed and room. Call light in reach. NPO except ice chips maintained. (2) more than 100 beats/min

## 2022-02-20 NOTE — PROGRESS NOTES
Medicated with Morphine 4 mg IVP for complaints of abdominal pain. NPO status in place.  IVF @ Vital signs stable, afebrile

## 2022-02-20 NOTE — PROGRESS NOTES
Hospitalist Progress Note      PCP: Tomasa Minaya MD    Date of Admission: 2/19/2022    Chief Complaint: Abdominal pain    History Of Present Illness:       Arely Lara is a 49 y.o. female with past medical history as below, including psoriatic arthritis recently started on methotrexate, also on plaquenil but not currently on prednisone, history of C. Diff with 3 occurrences since 2020 (at which time she had sigmoid abscess), presents with LLQ pain which radiates to her back and states pain feels like prior episodes of diverticulitis. Subjective:     Pain is still severe. It is in the same location. No stool output. Medications:  Reviewed    Infusion Medications    lactated ringers 100 mL/hr at 02/19/22 2039    sodium chloride       Scheduled Medications    sodium chloride flush  5-40 mL IntraVENous 2 times per day    enoxaparin  40 mg SubCUTAneous Daily    piperacillin-tazobactam  3,375 mg IntraVENous Q8H    traZODone  50 mg Oral Nightly    docusate sodium  100 mg Oral Daily     PRN Meds: morphine **OR** morphine, sodium chloride flush, sodium chloride, ondansetron **OR** ondansetron, polyethylene glycol, acetaminophen **OR** acetaminophen    No intake or output data in the 24 hours ending 02/20/22 0910    Exam:    BP (!) 91/51   Pulse 76   Temp 98.7 °F (37.1 °C) (Oral)   Resp 16   Ht 5' 4\" (1.626 m)   Wt 142 lb 6.7 oz (64.6 kg)   SpO2 97%   BMI 24.45 kg/m²     General appearance: No apparent distress, appears stated age and cooperative. HEENT: Pupils equal, round, and reactive to light. Conjunctivae/corneas clear. Neck: Supple, with full range of motion. No jugular venous distention. Trachea midline. Respiratory:  Normal respiratory effort. Clear to auscultation, bilaterally without Rales/Wheezes/Rhonchi. Cardiovascular: Regular rate and rhythm with normal S1/S2 without murmurs, rubs or gallops. Abdomen: Soft, non-tender, non-distended with normal bowel sounds.   Musculoskelatal: No Eval Status:     Dispo - Inpatient    Sofia Pruett DO

## 2022-02-20 NOTE — CONSULTS
[address] [place] [address]  GI Consultation                                                                 Patient: Joanie Lara  : 1970       Date:  2022    Subjective:       History of Present Illness  Patient is a 49 y.o.  female admitted with Diverticulitis [K57.92]  Acute diverticulitis [K57.92] who is seen in consult for diverticulitis. She has past medical history as below, including psoriatic arthritis recently started on methotrexate, also on plaquenil but not currently on prednisone, history of C. Diff with 3 occurrences since  (at which time she had sigmoid abscess), presents with LLQ pain which radiates to her back and states pain feels like prior episodes of diverticulitis. Pain is constant, cramping, 10/10. Does not believe she has had any blood in stool, does not think she has had fever. CT scan shows wall thickening and stranding involving the proximal sigmoid colon suspicious for uncomplicated diverticulitis. Past Medical History:   Diagnosis Date    Acne     Dr Romo Fus Diverticulitis 2020    [facility]     Medical history reviewed with no changes       Past Surgical History:   Procedure Laterality Date    CARPAL TUNNEL RELEASE Bilateral      SECTION  2001     x1    PAIN MANAGEMENT PROCEDURE Left 10/26/2020    LEFT L4 AND L5 TRANSFORAMINAL EPDIURAL STEROID INJECTION WITH FLUOROSCOPY (08331, [id number]) performed by Ziggy Gan MD at [address] Left 2020    LEFT LUMBAR FOUR LUMBAR FIVE TRANSFORAMINAL  EPIDURAL STEROID INJECTION SITE CONFIRMED BY FLUOROSCOPY performed by Ziggy Gan MD at [facility]      Past Endoscopic History Colonoscopy showed left sided diverticulosis in Sep 2020. Admission Meds  No current facility-administered medications on file prior to encounter.      Current Outpatient Medications on File Prior to Encounter   Medication Sig Dispense

## 2022-02-20 NOTE — CONSULTS
General Surgery   Resident Consult Note    Reason for Consult: Diverticulitis    History of Present Illness:   Anders Rivas is a 49 y.o. female with past medical history as below, including psoriatic arthritis recently started on methotrexate, also on plaquenil but not currently on prednisone, history of C. Diff and factor V Leiden with popliteal DVT on Eliquis for which general surgery is consulted for diverticulitis. The patient previously had 3 episodes of diverticulitis in  which were all treated with outpatient antibiotics. She presented to the ED with LLQ pain at which time a CT scan demonstrated uncomplicated diverticulitis with fat stranding and no signs of abscess or perforation. The patient states her pain started yesterday and has gotten progressively worse. She endorsees nausea with no vomiting and no changes in her BMs including no blood in her stool. Last colonoscopy 2020 which demonstrated diverticulosis and external hemorrhoids. Past Medical History:        Diagnosis Date    Shmuel Grullon Diverticulitis 2020    Factor 5 Leiden mutation, heterozygous ([facility]     Medical history reviewed with no changes        Past Surgical History:        Procedure Laterality Date    CARPAL TUNNEL RELEASE Bilateral      SECTION  2001     x1    PAIN MANAGEMENT PROCEDURE Left 10/26/2020    LEFT L4 AND L5 TRANSFORAMINAL EPDIURAL STEROID INJECTION WITH FLUOROSCOPY ([id number], [id number]) performed by Humphrey Tavarez MD at [address] Left 2020    LEFT LUMBAR FOUR LUMBAR FIVE TRANSFORAMINAL  EPIDURAL STEROID INJECTION SITE CONFIRMED BY FLUOROSCOPY performed by Humphrey Tavarez MD at [place] [address]       Allergies:  Patient has no known allergies. Medications:   Home Meds  No current facility-administered medications on file prior to encounter.      Current Outpatient Medications on File Prior to Encounter   Medication Sig Dispense Refill    Lidocaine (ZTLIDO) 1.8 % PTCH Apply 1 patch topically daily 90 patch 0    oxyCODONE-acetaminophen (PERCOCET) 5-325 MG per tablet Take 1 tablet by mouth every 8 hours as needed for Pain for up to 30 days.  Take no more than 2-3 tabs orally prn 90 tablet 0    hydroxychloroquine (PLAQUENIL) 200 MG tablet Take 1.5 tablets by mouth daily 086 tablet 0    folic acid (FOLVITE) 1 MG tablet Take 1 tablet by mouth daily 90 tablet 0    methotrexate (RHEUMATREX) 2.5 MG chemo tablet Take 5 tablets by mouth once a week 60 tablet 0    traZODone (DESYREL) 50 MG tablet TAKE 1 TABLET NIGHTLY AS NEEDED FOR SLEEP 90 tablet 1    vitamin B-12 (CYANOCOBALAMIN) 500 MCG tablet Take 500 mcg by mouth daily      docusate sodium (COLACE) 100 MG capsule Take 100 mg by mouth 2 times daily      Saccharomyces boulardii (FLORASTOR PO) Take by mouth daily      ferrous sulfate (IRON 325) 325 (65 Fe) MG tablet TAKE 1 TABLET EVERY OTHER DAY      Resveratrol 250 MG CAPS Take by mouth daily      TURMERIC PO Take by mouth      ELIQUIS 5 MG TABS tablet TAKE 1 TABLET BY MOUTH TWO TIMES A DAY  60 tablet 0       Current Meds  morphine (PF) injection 2 mg, Q4H PRN   Or  morphine injection 4 mg, Q4H PRN  lactated ringers infusion, Continuous  sodium chloride flush 0.9 % injection 5-40 mL, 2 times per day  sodium chloride flush 0.9 % injection 5-40 mL, PRN  0.9 % sodium chloride infusion, PRN  ondansetron (ZOFRAN-ODT) disintegrating tablet 4 mg, Q8H PRN   Or  ondansetron (ZOFRAN) injection 4 mg, Q6H PRN  polyethylene glycol (GLYCOLAX) packet 17 g, Daily PRN  acetaminophen (TYLENOL) tablet 650 mg, Q6H PRN   Or  acetaminophen (TYLENOL) suppository 650 mg, Q6H PRN  enoxaparin (LOVENOX) injection 40 mg, Daily  piperacillin-tazobactam (ZOSYN) 3,375 mg in dextrose 5 % 50 mL IVPB extended infusion (mini-bag), Q8H  traZODone (DESYREL) tablet 50 mg, Nightly  docusate sodium (COLACE) capsule 100 mg, Daily        Family History:   Family History   Problem Relation Age of Onset    No Known Problems Mother         Healthy 78year old    Heart Disease Father [de-identified]        +Stent    High Cholesterol Father     Other Brother         do not talk +drug        Social History:   TOBACCO:   reports that she has never smoked. She has never used smokeless tobacco.  ETOH:   reports no history of alcohol use. DRUGS:   reports no history of drug use. Review of Systems:   14 point review of systems was conducted and all pertinent positives and negatives were included in the HPI. Physical exam:    Vitals:    02/19/22 2330 02/20/22 0745 02/20/22 0951 02/20/22 1145   BP: (!) 98/52 (!) 91/51 (!) 92/55 (!) 96/59   Pulse: 85 76  73   Resp: 16 16  16   Temp: 100 °F (37.8 °C) 98.7 °F (37.1 °C)  98.3 °F (36.8 °C)   TempSrc: Oral Oral  Oral   SpO2:  97%  98%   Weight:       Height:           General appearance: alert, no acute distress, grooming appropriate  Eyes: normal gaze, no scleral icterus  Neck: trachea midline, no JVD  Chest/Lungs: symmetrical chest rise, normal effort  Cardiovascular: RRR  Abdomen: soft, tender to palpation in her LLQ, non-distended, no guarding/rigidity  Skin: warm and dry, no rashes  Extremities: no edema, no cyanosis  Neuro: A&Ox3, no focal deficits, sensation intact    Labs:    CBC:   Recent Labs     02/19/22  1405 02/20/22  0705   WBC 7.7 7.6   HGB 12.3 12.0   HCT 35.0* 34.2*   MCV 93.6 95.5    202     BMP:   Recent Labs     02/19/22  1405 02/20/22  0705   * 136   K 4.1 3.9   CL 99 102   CO2 23 24   BUN 19 11   CREATININE 0.8 0.8     PT/INR: No results for input(s): PROTIME, INR in the last 72 hours. APTT: No results for input(s): APTT in the last 72 hours.   Liver Profile:   Lab Results   Component Value Date    AST 14 02/19/2022    ALT <5 02/19/2022    BILIDIR <0.2 02/01/2022    BILITOT 0.5 02/19/2022    ALKPHOS 113 02/19/2022    GGT 26 02/01/2022     Lab Results   Component Value Date    CHOL 195 11/17/2021    HDL 54 11/17/2021    TRIG 97 11/17/2021 UA:   Lab Results   Component Value Date    NITRITE neg 11/28/2016    COLORU yellow 11/28/2016    PHUR 5.5 11/28/2016    CLARITYU clear 11/28/2016    SPECGRAV 1.005 11/28/2016    LEUKOCYTESUR neg 11/28/2016    BILIRUBINUR neg 11/28/2016    BLOODU trace 11/28/2016    GLUCOSEU neg 11/28/2016       Imaging:   CT ABDOMEN PELVIS W IV CONTRAST Additional Contrast? Radiologist Recommendation   Final Result      Sigmoid diverticulosis is present. There is segmental mild wall thickening and pericolonic stranding seen involving the proximal sigmoid colon which is suspicious for superimposed acute uncomplicated diverticulitis. Otherwise no acute abnormality identified. Assessment/Plan: This is a 49 y.o. female  with past medical history as below, including psoriatic arthritis recently started on methotrexate, also on plaquenil but not currently on prednisone, history of C. Diff and factor V Leiden with popliteal DVT on Eliquis for which general surgery is consulted for diverticulitis.      - Surgery discussed with patient and she can follow up outpatient with Dr. Susanna Bazan after discharge from hospital to discuss further surgical intervention and timing  - Continue abx  - Further recommendations per GI  - Further medical management per primary team  - General surgery will follow closely given immunocompromised state  - Patient seen with senior resident and Dr. Wynelle Burkitt, DO  02/20/22  3:14 PM

## 2022-02-20 NOTE — PLAN OF CARE
Problem: Pain:  Goal: Pain level will decrease  Description: Pain level will decrease  2/20/2022 1328 by Viry Dietz RN  Outcome: Ongoing

## 2022-02-21 LAB
ANION GAP SERPL CALCULATED.3IONS-SCNC: 10 MMOL/L (ref 3–16)
BASOPHILS ABSOLUTE: 0 K/UL (ref 0–0.2)
BASOPHILS RELATIVE PERCENT: 0.1 %
BUN BLDV-MCNC: 13 MG/DL (ref 7–20)
CALCIUM SERPL-MCNC: 9 MG/DL (ref 8.3–10.6)
CHLORIDE BLD-SCNC: 100 MMOL/L (ref 99–110)
CO2: 26 MMOL/L (ref 21–32)
CREAT SERPL-MCNC: 0.8 MG/DL (ref 0.6–1.1)
EOSINOPHILS ABSOLUTE: 0 K/UL (ref 0–0.6)
EOSINOPHILS RELATIVE PERCENT: 0.1 %
GFR AFRICAN AMERICAN: >60
GFR NON-AFRICAN AMERICAN: >60
GLUCOSE BLD-MCNC: 85 MG/DL (ref 70–99)
HCT VFR BLD CALC: 31.3 % (ref 36–48)
HEMOGLOBIN: 10.8 G/DL (ref 12–16)
LYMPHOCYTES ABSOLUTE: 0.7 K/UL (ref 1–5.1)
LYMPHOCYTES RELATIVE PERCENT: 9.7 %
MAGNESIUM: 1.9 MG/DL (ref 1.8–2.4)
MCH RBC QN AUTO: 33.1 PG (ref 26–34)
MCHC RBC AUTO-ENTMCNC: 34.6 G/DL (ref 31–36)
MCV RBC AUTO: 95.6 FL (ref 80–100)
MONOCYTES ABSOLUTE: 0.4 K/UL (ref 0–1.3)
MONOCYTES RELATIVE PERCENT: 6 %
NEUTROPHILS ABSOLUTE: 5.7 K/UL (ref 1.7–7.7)
NEUTROPHILS RELATIVE PERCENT: 84.1 %
PDW BLD-RTO: 11.7 % (ref 12.4–15.4)
PHOSPHORUS: 3.4 MG/DL (ref 2.5–4.9)
PLATELET # BLD: 182 K/UL (ref 135–450)
PMV BLD AUTO: 7.5 FL (ref 5–10.5)
POTASSIUM REFLEX MAGNESIUM: 4.2 MMOL/L (ref 3.5–5.1)
RBC # BLD: 3.28 M/UL (ref 4–5.2)
SODIUM BLD-SCNC: 136 MMOL/L (ref 136–145)
WBC # BLD: 6.8 K/UL (ref 4–11)

## 2022-02-21 PROCEDURE — 6360000002 HC RX W HCPCS: Performed by: INTERNAL MEDICINE

## 2022-02-21 PROCEDURE — 36415 COLL VENOUS BLD VENIPUNCTURE: CPT

## 2022-02-21 PROCEDURE — 2580000003 HC RX 258: Performed by: INTERNAL MEDICINE

## 2022-02-21 PROCEDURE — 84100 ASSAY OF PHOSPHORUS: CPT

## 2022-02-21 PROCEDURE — 1200000000 HC SEMI PRIVATE

## 2022-02-21 PROCEDURE — 85025 COMPLETE CBC W/AUTO DIFF WBC: CPT

## 2022-02-21 PROCEDURE — 83735 ASSAY OF MAGNESIUM: CPT

## 2022-02-21 PROCEDURE — 80048 BASIC METABOLIC PNL TOTAL CA: CPT

## 2022-02-21 PROCEDURE — 99232 SBSQ HOSP IP/OBS MODERATE 35: CPT | Performed by: SURGERY

## 2022-02-21 PROCEDURE — 6370000000 HC RX 637 (ALT 250 FOR IP): Performed by: INTERNAL MEDICINE

## 2022-02-21 RX ADMIN — HYDROMORPHONE HYDROCHLORIDE 0.5 MG: 1 INJECTION, SOLUTION INTRAMUSCULAR; INTRAVENOUS; SUBCUTANEOUS at 03:03

## 2022-02-21 RX ADMIN — HYDROMORPHONE HYDROCHLORIDE 0.5 MG: 1 INJECTION, SOLUTION INTRAMUSCULAR; INTRAVENOUS; SUBCUTANEOUS at 08:10

## 2022-02-21 RX ADMIN — PIPERACILLIN AND TAZOBACTAM 3375 MG: 3; .375 INJECTION, POWDER, LYOPHILIZED, FOR SOLUTION INTRAVENOUS at 08:18

## 2022-02-21 RX ADMIN — TRAZODONE HYDROCHLORIDE 50 MG: 50 TABLET ORAL at 20:06

## 2022-02-21 RX ADMIN — SODIUM CHLORIDE, PRESERVATIVE FREE 10 ML: 5 INJECTION INTRAVENOUS at 20:06

## 2022-02-21 RX ADMIN — PIPERACILLIN AND TAZOBACTAM 3375 MG: 3; .375 INJECTION, POWDER, LYOPHILIZED, FOR SOLUTION INTRAVENOUS at 15:15

## 2022-02-21 RX ADMIN — HYDROMORPHONE HYDROCHLORIDE 0.5 MG: 1 INJECTION, SOLUTION INTRAMUSCULAR; INTRAVENOUS; SUBCUTANEOUS at 13:43

## 2022-02-21 RX ADMIN — ENOXAPARIN SODIUM 40 MG: 100 INJECTION SUBCUTANEOUS at 08:11

## 2022-02-21 RX ADMIN — ACETAMINOPHEN 650 MG: 325 TABLET ORAL at 13:43

## 2022-02-21 RX ADMIN — DOCUSATE SODIUM 100 MG: 100 CAPSULE, LIQUID FILLED ORAL at 08:11

## 2022-02-21 RX ADMIN — PIPERACILLIN AND TAZOBACTAM 3375 MG: 3; .375 INJECTION, POWDER, LYOPHILIZED, FOR SOLUTION INTRAVENOUS at 22:25

## 2022-02-21 RX ADMIN — HYDROMORPHONE HYDROCHLORIDE 0.5 MG: 1 INJECTION, SOLUTION INTRAMUSCULAR; INTRAVENOUS; SUBCUTANEOUS at 22:24

## 2022-02-21 RX ADMIN — HYDROMORPHONE HYDROCHLORIDE 0.5 MG: 1 INJECTION, SOLUTION INTRAMUSCULAR; INTRAVENOUS; SUBCUTANEOUS at 18:21

## 2022-02-21 RX ADMIN — SODIUM CHLORIDE, PRESERVATIVE FREE 10 ML: 5 INJECTION INTRAVENOUS at 08:11

## 2022-02-21 ASSESSMENT — PAIN SCALES - GENERAL
PAINLEVEL_OUTOF10: 6
PAINLEVEL_OUTOF10: 7
PAINLEVEL_OUTOF10: 5
PAINLEVEL_OUTOF10: 8
PAINLEVEL_OUTOF10: 8
PAINLEVEL_OUTOF10: 6
PAINLEVEL_OUTOF10: 8
PAINLEVEL_OUTOF10: 8
PAINLEVEL_OUTOF10: 5

## 2022-02-21 NOTE — PROGRESS NOTES
Surgery Daily Progress Note      CC: diverticulitis    SUBJECTIVE:  No issues overnight. Denies abdominal pain. No nausea or vomiting. No flatus or BMs. AF and HDS.    ROS:   A 14 point review of systems was conducted, significant findings as noted above. All other systems negative. OBJECTIVE:    PHYSICAL EXAM:  Vitals:    02/20/22 1953 02/20/22 2053 02/20/22 2302 02/21/22 0303   BP: (!) 94/59 (!) 98/55 (!) 100/58 102/65   Pulse: 84 88 80 76   Resp: 17 18 16 16   Temp: 100.9 °F (38.3 °C) 100.1 °F (37.8 °C) 99.4 °F (37.4 °C) 99.2 °F (37.3 °C)   TempSrc: Oral Oral Oral Oral   SpO2: 98% 99% 98% 98%   Weight:       Height:           General appearance: alert, no acute distress, grooming appropriate  Eyes: normal gaze, no scleral icterus  Neck: trachea midline, no JVD  Chest/Lungs: symmetrical chest rise, normal effort  Cardiovascular: RRR  Abdomen: soft, tender to palpation in her LLQ, non-distended, no guarding/rigidity  Skin: warm and dry, no rashes  Extremities: no edema, no cyanosis  Neuro: A&Ox3, no focal deficits, sensation intact      ASSESSMENT & PLAN:   This is a 49 y.o. female  with past medical history as below, including psoriatic arthritis recently started on methotrexate, also on plaquenil but not currently on prednisone, history of C. Diff and factor V Leiden with popliteal DVT on Eliquis for which general surgery is consulted for diverticulitis.      - f/u AM labs  - if continues to do well, will start CLD and AAT  - cont IV Zosyn  - cont to hold Eliquis and immunosuppressants  - given multiple episodes of diverticulitis, patient would benefit from elective resection as outpatient; will manage conservatively at this time  - will cont to follow    Sukhdev Duncan DO, PGY-1  02/21/22  6:12 AM  [phone]

## 2022-02-21 NOTE — PROGRESS NOTES
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 History of small pericolonic abscess associated with diverticulitis in 2020. Her last colonoscopy was performed following this episode 9/2020 which demonstrated small external hemorrhoids and sigmoid diverticulosis but no evidence of neoplasia or inflammatory bowel disease. Colorectal Surgery following and planning for elective sigmoid resection upon recovery. Plan:   Continue CLD today then advance as tolerated tomorrow    Continue current Zosyn    Given negative colonoscopy less than 2 years ago for secondary causes of diverticulitis, no repeat colonoscopy indicated upon recovery. Relatively contraindicated presently with active diverticulitis.  Agree with Lovenox ppx and SCDs while in bed. Timing of resumption of anticoagulation per surgery. Subjective: We are following for diverticulitis. Patient notes that her abdominal pain continues to slowly improve. She continues to note pain particularly in the LLQ. Denies nausea or vomiting. Tolerating CLD. Objective:    Review of Systems:    Constitutional: Negative for fever, chills, and unexpected weight change. HENT: Negative for trouble swallowing. Respiratory: Negative for cough, chest tightness and shortness of breath. Cardiovascular: Negative for chest pain  Gastrointestinal: see HPI  Musculoskeletal: Negative for unusual arthralgias. Skin: Negative for rash.      Scheduled Meds:   sodium chloride flush  5-40 mL IntraVENous 2 times per day    enoxaparin  40 mg SubCUTAneous Daily    piperacillin-tazobactam  3,375 mg IntraVENous Q8H    traZODone  50 mg Oral Nightly    docusate sodium  100 mg Oral Daily     Continuous Infusions:   lactated ringers 100 mL/hr at 02/20/22 1520    sodium chloride

## 2022-02-21 NOTE — PLAN OF CARE
Problem: Pain:  Goal: Pain level will decrease  Description: Pain level will decrease  2/20/2022 2144 by Marquetta Nissen, RN  Outcome: Ongoing   Medicated with Dilaudid 0.5 mg IVP as ordered for complaints of left lower abdominal pain. Vital signs stable, afebrile, will continue to monitor.

## 2022-02-21 NOTE — PROGRESS NOTES
Hospitalist Progress Note      PCP: Ulysses rFazier MD    Date of Admission: 2/19/2022    Chief Complaint: Abdominal pain    History Of Present Illness:       Arely Lara is a 49 y.o. female with past medical history as below, including psoriatic arthritis recently started on methotrexate, also on plaquenil but not currently on prednisone, history of C. Diff with 3 occurrences since 2020 (at which time she had sigmoid abscess), presents with LLQ pain which radiates to her back and states pain feels like prior episodes of diverticulitis. Subjective:     Pain is better today. No new symptoms. Medications:  Reviewed    Infusion Medications    lactated ringers 100 mL/hr at 02/20/22 1520    sodium chloride       Scheduled Medications    sodium chloride flush  5-40 mL IntraVENous 2 times per day    enoxaparin  40 mg SubCUTAneous Daily    piperacillin-tazobactam  3,375 mg IntraVENous Q8H    traZODone  50 mg Oral Nightly    docusate sodium  100 mg Oral Daily     PRN Meds: HYDROmorphone, sodium chloride flush, sodium chloride, ondansetron **OR** ondansetron, polyethylene glycol, acetaminophen **OR** acetaminophen      Intake/Output Summary (Last 24 hours) at 2/21/2022 1705  Last data filed at 2/21/2022 1348  Gross per 24 hour   Intake 1240 ml   Output 450 ml   Net 790 ml       Exam:    BP (!) 94/59   Pulse 81   Temp 98.7 °F (37.1 °C) (Oral)   Resp 18   Ht 5' 4\" (1.626 m)   Wt 142 lb 6.7 oz (64.6 kg)   SpO2 96%   BMI 24.45 kg/m²     General appearance: No apparent distress, appears stated age and cooperative. HEENT: Pupils equal, round, and reactive to light. Conjunctivae/corneas clear. Neck: Supple, with full range of motion. No jugular venous distention. Trachea midline. Respiratory:  Normal respiratory effort. Clear to auscultation, bilaterally without Rales/Wheezes/Rhonchi. Cardiovascular: Regular rate and rhythm with normal S1/S2 without murmurs, rubs or gallops.   Abdomen: Soft, non-tender, non-distended with normal bowel sounds. Musculoskelatal: No clubbing, cyanosis or edema bilaterally. Skin: Skin color, texture, turgor normal.  No rashes or lesions. Neurologic:  Cranial nerves: II-XII intact, grossly non-focal.  Psychiatric: Alert and oriented, thought content appropriate, normal insight    Labs:   Recent Labs     02/19/22  1405 02/20/22  0705 02/21/22  0626   WBC 7.7 7.6 6.8   HGB 12.3 12.0 10.8*   HCT 35.0* 34.2* 31.3*    202 182     Recent Labs     02/19/22  1405 02/20/22  0705 02/21/22  0626   * 136 136   K 4.1 3.9 4.2   CL 99 102 100   CO2 23 24 26   BUN 19 11 13   CREATININE 0.8 0.8 0.8   CALCIUM 9.7 9.2 9.0   PHOS  --   --  3.4     Recent Labs     02/19/22  1405   AST 14*   ALT <5*   BILITOT 0.5   ALKPHOS 113     No results for input(s): INR in the last 72 hours. No results for input(s): Sam Zamora in the last 72 hours. Studies:  CT ABDOMEN PELVIS W IV CONTRAST Additional Contrast? Radiologist Recommendation   Final Result      Sigmoid diverticulosis is present. There is segmental mild wall thickening and pericolonic stranding seen involving the proximal sigmoid colon which is suspicious for superimposed acute uncomplicated diverticulitis. Otherwise no acute abnormality identified. Assessment/Plan:    Active Hospital Problems    Diagnosis Date Noted    Acute diverticulitis [K57.92]     Anticoagulated [Z79.01]     Factor [facility]iden [facility] [D68.51]     Hx of methotrexate therapy [Z92.21]     Diverticulitis [K57.92] 02/19/2022     Diverticulitis  Psoriatic arthritis on immune modifying therapy with plaquenil, methotrexate, not on prednisone currently (recently on a short low dose course)  Hx of DVT on ppx dosing Eliquis  Chronic pain     PLAN:     Diverticulitis with no signs of complication on CT imaging however it is her third episode since 2020. She also had diverticulitis in 2018.  Most recent episode treated with cipro/flagyl. Was started on Zosyn, will continue for now and will also consult GI given her immune compromised status, appreciate recommendations  General Surgery consulted also, consideration for elective   Bowel rest  IV fluids  Pain control  Holding methotrexate and Plaquenil   Started on CLD today  Had recent colonoscopy, does not need repeat     DVT Prophylaxis: Lovenox  Diet: ADULT DIET;  Clear Liquid  Code Status: Full Code    PT/OT Eval Status:     Dispo - Inpatient    Sofia Pruett DO

## 2022-02-21 NOTE — CARE COORDINATION
Case Management Assessment           Initial Evaluation                Date / Time of Evaluation: 2/21/2022 3:53 PM                 Assessment Completed by: Mundo Jane RN    Patient Name: Donell May     YOB: 1970  Diagnosis: Diverticulitis [K57.92]  Acute diverticulitis [K57.92]     Date / Time: 2/19/2022  1:43 PM    Patient Admission Status: Inpatient    If patient is discharged prior to next notation, then this note serves as note for discharge by case management.      Current PCP: Tomasa Minaya MD  Clinic Patient: No    Chart Reviewed: Yes  Patient/ Family Interviewed: Yes    Initial assessment completed at bedside with: pt yes    Hospitalization in the last 30 days: No    Emergency Contacts:  Extended Emergency Contact Information  Primary Emergency Contact: Eva Lemus [facility] [address] [place] [address] Phone: [phone]  Mobile Phone: [phone]  Relation: Parent  Secondary Emergency Contact: Rudy Osullivan   Janeth [address] Phone: [phone]  Mobile Phone: [phone]  Relation: Other    Advance Directives:   Code Status: Full [address]: NoFinancial  Payor: Laveda Shallow / Plan: Laveda Shallow / Product Type: *No Product type* /     Pre-cert required for SNF: Yes    Pharmacy    [id number] [place], [address] [place] [address] [address] [place] [address]  Phone: [phone] Fax: [address] [place] [address]. [place] [address], [address] [place] [phone] Brianna Rear [phone]  [address] [place] [address]  Phone: [phone] Fax: [phone]    [address] [place] [address] [phone] - f [phone]  [address] [place] [address]  Phone: [phone] Fax: [phone]      Potential assistance Purchasing Medications:    Does Patient want to participate in local refill/ meds to beds program?: Meds To Beds General Rules:  1. Can ONLY be done Monday- Friday between 8:30am-5pm  2. Prescription(s) must be in pharmacy by 3pm to be filled same day  3. Copy of patient's insurance/ prescription drug card and patient face sheet must be sent along with the prescription(s)  4. Cost of Rx cannot be added to hospital bill. If financial assistance is needed, please contact unit  or ;  or  CANNOT provide pharmacy voucher for patients co-pays  5.  Patients can then  the prescription on their way out of the hospital at discharge, or pharmacy can deliver to the bedside if staff is available. (payment due at time of pick-up or delivery - cash, check, or card accepted)     Able to afford home medications/ co-pay costs: Yes    ADLS  Support Systems: Family Members    PT AM-PAC:     OT AM-PAC:       [place]: Home w/ family   Steps: NA    Plans to RETURN to current housing: Yes  Barriers to RETURNING to current housin [address]  Currently ACTIVE with  [employer] [address]: No  Home Care Agency: Not Applicable    Currently ACTIVE with [place] on Aging: No  Passport/ Waiver: No  Passport/ Waiver Services: Not Applicable    Durable Medical Equipment  DME Provider: NA  Equipment: NA    Home Oxygen and [address] prior to admission: No  [address]: Not Applicable  Dialysis  Active with HD/PD prior to admission: No  Nephrologist: [facility]    HD Center:  Not Applicable    DISCHARGE PLAN:  Disposition: Home- No Services Needed    Transportation PLAN for discharge: family     Factors facilitating achievement of predicted outcomes: Family support, Cooperative and Pleasant    Barriers to discharge: Pain    Additional Case Management Notes:       CM met with pt at bedside  :  patient states she lives a home  indep pta  : plans to return home  No current services: and  No DME : states  She  Does not anticipate  Any CM Needs at d/c.       Gi consulted  :  Currently on CLD  Adv as  Tolerated. She will have transportation home  And requested  Meds to Beds at  D/C  If needed. Patient  Would like  Info on Adv Directives and  HCPOA  And  CM placed  Consult to Spiritual Care. Cont to follow should any needs arise. The Plan for Transition of Care is related to the following treatment goals of Diverticulitis [K57.92]  Acute diverticulitis [K57.92]    The Patient and/or patient representative Kierra Elder and her family were provided with a choice of provider and agrees with the discharge plan Yes    Freedom of choice list was provided with basic dialogue that supports the patient's individualized plan of care/goals and shares the quality data associated with the providers.  Yes    Care Transition patient: No    Baldo Baker RN  [facility]  Case Management Department  Ph: [phone]

## 2022-02-22 VITALS
TEMPERATURE: 98.5 F | HEIGHT: 64 IN | BODY MASS INDEX: 24.31 KG/M2 | RESPIRATION RATE: 18 BRPM | DIASTOLIC BLOOD PRESSURE: 57 MMHG | OXYGEN SATURATION: 99 % | HEART RATE: 72 BPM | SYSTOLIC BLOOD PRESSURE: 109 MMHG | WEIGHT: 142.42 LBS

## 2022-02-22 LAB
ALBUMIN SERPL-MCNC: 3.7 G/DL (ref 3.4–5)
ANION GAP SERPL CALCULATED.3IONS-SCNC: 10 MMOL/L (ref 3–16)
BASOPHILS ABSOLUTE: 0 K/UL (ref 0–0.2)
BASOPHILS RELATIVE PERCENT: 0.3 %
BUN BLDV-MCNC: 6 MG/DL (ref 7–20)
CALCIUM SERPL-MCNC: 8.8 MG/DL (ref 8.3–10.6)
CHLORIDE BLD-SCNC: 103 MMOL/L (ref 99–110)
CO2: 25 MMOL/L (ref 21–32)
CREAT SERPL-MCNC: 0.7 MG/DL (ref 0.6–1.1)
EOSINOPHILS ABSOLUTE: 0 K/UL (ref 0–0.6)
EOSINOPHILS RELATIVE PERCENT: 0.5 %
GFR AFRICAN AMERICAN: >60
GFR NON-AFRICAN AMERICAN: >60
GLUCOSE BLD-MCNC: 99 MG/DL (ref 70–99)
HCT VFR BLD CALC: 28.6 % (ref 36–48)
HEMOGLOBIN: 10.1 G/DL (ref 12–16)
LYMPHOCYTES ABSOLUTE: 0.9 K/UL (ref 1–5.1)
LYMPHOCYTES RELATIVE PERCENT: 19 %
MAGNESIUM: 2 MG/DL (ref 1.8–2.4)
MCH RBC QN AUTO: 33.2 PG (ref 26–34)
MCHC RBC AUTO-ENTMCNC: 35.2 G/DL (ref 31–36)
MCV RBC AUTO: 94.4 FL (ref 80–100)
MONOCYTES ABSOLUTE: 0.3 K/UL (ref 0–1.3)
MONOCYTES RELATIVE PERCENT: 7.1 %
NEUTROPHILS ABSOLUTE: 3.5 K/UL (ref 1.7–7.7)
NEUTROPHILS RELATIVE PERCENT: 73.1 %
PDW BLD-RTO: 11.9 % (ref 12.4–15.4)
PHOSPHORUS: 2.9 MG/DL (ref 2.5–4.9)
PLATELET # BLD: 171 K/UL (ref 135–450)
PMV BLD AUTO: 7.8 FL (ref 5–10.5)
POTASSIUM SERPL-SCNC: 3.9 MMOL/L (ref 3.5–5.1)
RBC # BLD: 3.04 M/UL (ref 4–5.2)
SODIUM BLD-SCNC: 138 MMOL/L (ref 136–145)
WBC # BLD: 4.7 K/UL (ref 4–11)

## 2022-02-22 PROCEDURE — 6360000002 HC RX W HCPCS: Performed by: INTERNAL MEDICINE

## 2022-02-22 PROCEDURE — 6370000000 HC RX 637 (ALT 250 FOR IP)

## 2022-02-22 PROCEDURE — 83735 ASSAY OF MAGNESIUM: CPT

## 2022-02-22 PROCEDURE — 80069 RENAL FUNCTION PANEL: CPT

## 2022-02-22 PROCEDURE — 36415 COLL VENOUS BLD VENIPUNCTURE: CPT

## 2022-02-22 PROCEDURE — 85025 COMPLETE CBC W/AUTO DIFF WBC: CPT

## 2022-02-22 PROCEDURE — 2580000003 HC RX 258: Performed by: INTERNAL MEDICINE

## 2022-02-22 PROCEDURE — 6370000000 HC RX 637 (ALT 250 FOR IP): Performed by: INTERNAL MEDICINE

## 2022-02-22 PROCEDURE — 99231 SBSQ HOSP IP/OBS SF/LOW 25: CPT | Performed by: SURGERY

## 2022-02-22 RX ORDER — POTASSIUM CHLORIDE 750 MG/1
10 TABLET, EXTENDED RELEASE ORAL ONCE
Status: COMPLETED | OUTPATIENT
Start: 2022-02-22 | End: 2022-02-22

## 2022-02-22 RX ORDER — AMOXICILLIN AND CLAVULANATE POTASSIUM 875; 125 MG/1; MG/1
1 TABLET, FILM COATED ORAL 2 TIMES DAILY
Qty: 24 TABLET | Refills: 0 | Status: SHIPPED | OUTPATIENT
Start: 2022-02-22 | End: 2022-03-06

## 2022-02-22 RX ADMIN — PIPERACILLIN AND TAZOBACTAM 3375 MG: 3; .375 INJECTION, POWDER, LYOPHILIZED, FOR SOLUTION INTRAVENOUS at 07:11

## 2022-02-22 RX ADMIN — ENOXAPARIN SODIUM 40 MG: 100 INJECTION SUBCUTANEOUS at 08:37

## 2022-02-22 RX ADMIN — POTASSIUM CHLORIDE 10 MEQ: 10 TABLET, EXTENDED RELEASE ORAL at 09:05

## 2022-02-22 RX ADMIN — DOCUSATE SODIUM 100 MG: 100 CAPSULE, LIQUID FILLED ORAL at 08:37

## 2022-02-22 RX ADMIN — DIBASIC SODIUM PHOSPHATE, MONOBASIC POTASSIUM PHOSPHATE AND MONOBASIC SODIUM PHOSPHATE 1 TABLET: 852; 155; 130 TABLET ORAL at 09:05

## 2022-02-22 RX ADMIN — HYDROMORPHONE HYDROCHLORIDE 0.5 MG: 1 INJECTION, SOLUTION INTRAMUSCULAR; INTRAVENOUS; SUBCUTANEOUS at 04:38

## 2022-02-22 RX ADMIN — HYDROMORPHONE HYDROCHLORIDE 0.5 MG: 1 INJECTION, SOLUTION INTRAMUSCULAR; INTRAVENOUS; SUBCUTANEOUS at 08:37

## 2022-02-22 RX ADMIN — SODIUM CHLORIDE, PRESERVATIVE FREE 10 ML: 5 INJECTION INTRAVENOUS at 08:37

## 2022-02-22 ASSESSMENT — PAIN SCALES - GENERAL
PAINLEVEL_OUTOF10: 5
PAINLEVEL_OUTOF10: 6
PAINLEVEL_OUTOF10: 8
PAINLEVEL_OUTOF10: 6
PAINLEVEL_OUTOF10: 6

## 2022-02-22 ASSESSMENT — PAIN DESCRIPTION - FREQUENCY
FREQUENCY: CONTINUOUS
FREQUENCY: CONTINUOUS

## 2022-02-22 ASSESSMENT — PAIN DESCRIPTION - PAIN TYPE
TYPE: CHRONIC PAIN
TYPE: CHRONIC PAIN

## 2022-02-22 ASSESSMENT — PAIN DESCRIPTION - LOCATION
LOCATION: ABDOMEN
LOCATION: ABDOMEN

## 2022-02-22 ASSESSMENT — PAIN DESCRIPTION - ORIENTATION
ORIENTATION: LEFT
ORIENTATION: LEFT

## 2022-02-22 NOTE — CARE COORDINATION
CM following for  D/C planning:    Per MD Documentation:    Diverticulitis in setting of immunosuppression  Overall improving  Try reg diet today     Aim for D/C later today if doing well  F/u with me in 2 wks in office, recommend 2 wks ATBx at D/C  -  OK to be discharged home; can restart Eliquis on discharge; will recommend holding immunosuppressants until follow up with Dr. Chandrika Hastings. Patient will return home :  Follow up out pt  Family to transport  . - advance to low fiber diet this AM  - Meds to beds requested        Electronically signed by Enoc Bowling RN on 2/22/2022 at 8:48 AM         Enoc Bowling RN Case Manager  [facility]  [address] [place] [address].   [facility] [address]  [phone]  Fax [phone]

## 2022-02-22 NOTE — PLAN OF CARE
Problem: Pain:  Goal: Pain level will decrease  Description: Pain level will decrease  Outcome: Ongoing     Problem: Pain:  Goal: Control of acute pain  Description: Control of acute pain  Outcome: Ongoing     Patient with c/o pain to left lower quad of abdomen rating pain 8/10 on pain scale. Patient requested and received PRN dilaudid per orders at this time ( see eMAR).

## 2022-02-22 NOTE — PROGRESS NOTES
Discharge instructions reviewed with patient. Denies any questions or concerns. Educated on new medications Augmentin, including dosage, side effects and where to obtain. Reviewed medications to stop taking at this time as ordered per MD. insructed to follow up with Dr. Mason Mosquera office for an appt as specified on AVS provided to patient. Patient  Verbalized understanding and denies any questions or concerns.

## 2022-02-22 NOTE — DISCHARGE SUMMARY
Hospital Discharge Summary    Patient's PCP: Aaliyah Olivier MD  Admit Date: 2/19/2022   Discharge Date: 2/22/2022    Admitting Physician: Dr. Anna Forrester DO  Discharge Physician: Dr. Isaac Gonzalez MD   Consults: general surgery    HPI: 49 y.o. female with psoriatic arthritis recently started on methotrexate, also on plaquenil but not currently on prednisone, history of C. Diff with 3 occurrences since 2020 (at which time she had sigmoid abscess), who presented with LLQ pain which radiates to her back and stated pain feels like prior episodes of diverticulitis.         Brief hospital course:  Given the concern of the patients presentation and the concern of the possible multi-factorial etiology contributing to patients symptomatology. Patient was admitted and evaluated and found to have:       Discharge Diagnoses:     Acute Diverticulitis, recurrent. POA  Psoriatic arthritis on immune modifying therapy with plaquenil, methotrexate, not on prednisone currently (recently on a short low dose course)  Factor V Leiden with popliteal DVT on Eliquis  Chronic pain, narcotic dependent: POA  History of C. Diff         Diverticulitis with no signs of complication on CT imaging however it is her third episode since 2020. She also had diverticulitis in 2018. Most recent episode treated with cipro/flagyl.     Was started on Zosyn,: improved, tolerating diet: to transition to Augmentin and treat for  14 days     Had recent colonoscopy, does not need repeat     General Surgery consulted: given multiple episodes of diverticulitis, patient would benefit from elective resection as outpatient     Pain control: on narcotics at home     Holding methotrexate and Plaquenil : Recommend holding immunosuppressants until follow up with Dr. Kirsten Uribe.  Also advised to discuss with her rheumatologist          Physical Exam: BP (!) 93/59   Pulse 71   Temp 98.5 °F (36.9 °C)   Resp 18   Ht 5' 4\" (1.626 m)   Wt 142 lb 6.7 oz (64.6 kg)   SpO2 99% BMI 24.45 kg/m²     No results for input(s): POCGLU in the last 72 hours. General appearance: No apparent distress, appears stated age and cooperative. HEENT: Pupils equal, round, and reactive to light. Conjunctivae/corneas clear. Neck: Supple, with full range of motion. No jugular venous distention. Trachea midline. Respiratory:  Normal respiratory effort. Clear to auscultation, bilaterally without Rales/Wheezes/Rhonchi. Cardiovascular: Regular rate and rhythm with normal S1/S2 without murmurs, rubs or gallops. Abdomen: Soft, tender in the LLQ, non-distended with normal bowel sounds. Musculoskelatal: No clubbing, cyanosis or edema bilaterally. Skin: Skin color, texture, turgor normal.  No rashes or lesions. Neurologic:  grossly non-focal.  Psychiatric: Alert and oriented, thought content appropriate, normal insight        LABS:  Recent Labs     02/22/22  0449   WBC 4.7   HGB 10.1*         Recent Labs     02/22/22  0449      K 3.9      CO2 25   BUN 6*   CREATININE 0.7   GLUCOSE 99     No results for input(s): INR in the last 72 hours. Discharge Medications:  Current Discharge Medication List           Details   amoxicillin-clavulanate (AUGMENTIN) 875-125 MG per tablet Take 1 tablet by mouth 2 times daily for 12 days  Qty: 24 tablet, Refills: 0              Details   Lidocaine (ZTLIDO) 1.8 % PTCH Apply 1 patch topically daily  Qty: 90 patch, Refills: 0    Associated Diagnoses: Chronic pain syndrome; Spinal stenosis, lumbar region, without neurogenic claudication; Lumbar nerve root impingement; Cervical stenosis of spine; Degeneration of lumbar or lumbosacral intervertebral disc; Lumbosacral spondylosis without myelopathy; DDD (degenerative disc disease), cervical      oxyCODONE-acetaminophen (PERCOCET) 5-325 MG per tablet Take 1 tablet by mouth every 8 hours as needed for Pain for up to 30 days.  Take no more than 2-3 tabs orally prn  Qty: 90 tablet, Refills: 0    Comments: Reduce doses taken as pain becomes manageable  Associated Diagnoses: Chronic pain syndrome; Spinal stenosis, lumbar region, without neurogenic claudication; Lumbar nerve root impingement; Cervical stenosis of spine; Bilateral elbow joint pain; Degeneration of lumbar or lumbosacral intervertebral disc; Lumbosacral spondylosis without myelopathy; DDD (degenerative disc disease), cervical; Carpal tunnel syndrome, bilateral      folic acid (FOLVITE) 1 MG tablet Take 1 tablet by mouth daily  Qty: 90 tablet, Refills: 0      traZODone (DESYREL) 50 MG tablet TAKE 1 TABLET NIGHTLY AS NEEDED FOR SLEEP  Qty: 90 tablet, Refills: 1    Associated Diagnoses: Sleep disorder      vitamin B-12 (CYANOCOBALAMIN) 500 MCG tablet Take 500 mcg by mouth daily      docusate sodium (COLACE) 100 MG capsule Take 100 mg by mouth 2 times daily      Saccharomyces boulardii (FLORASTOR PO) Take by mouth daily      ferrous sulfate (IRON 325) 325 (65 Fe) MG tablet TAKE 1 TABLET EVERY OTHER DAY      Resveratrol 250 MG CAPS Take by mouth daily      TURMERIC PO Take by mouth      ELIQUIS 5 MG TABS tablet TAKE 1 TABLET BY MOUTH TWO TIMES A DAY   Qty: 60 tablet, Refills: 0    Associated Diagnoses: DVT of deep femoral vein, left (HCC)           Activity: activity as tolerated  Diet: regular diet  Wound Care: none needed    Disposition: home  Discharged Condition: Stable  Follow Up: Primary Care Physician in one week    Total time spent on discharge, finalizing medications, referrals and arranging outpatient follow up was more than 30 minutes    Thank you Dr. Tiffanie Decker MD for the opportunity to be involved in this patients care. If you have any questions or concerns please feel free to contact me at [phone].

## 2022-02-22 NOTE — CARE COORDINATION
Case Management Assessment            Discharge Note                    Date / Time of Note: 2/22/2022 1:47 PM                  Discharge Note Completed by: Ajit Melara RN    Patient Name: Edilia Lara   YOB: 1970  Diagnosis: Diverticulitis [K57.92]  Acute diverticulitis [K57.92]   Date / Time: 2/19/2022  1:43 PM    Current PCP: Chrissy Browne MD  Clinic patient: No    Hospitalization in the last 30 days: No    Advance Directives:  Code Status: Full Code  [place] DNR form completed and on chart: No    Financial:  Payor: Ann-Marie Come / Plan: Ann-Marie Come / Product Type: *No Product type* /      Pharmacy:    [id number] - [facility], [address] [place] [address] [address] [place] [address]  [address]  Phone: [phone] Fax: [phone] [facility]. [place] [address], [address] [facility] [address] [place] [phone] Dmitry Durant [phone]  [address] [place] [address]  Phone: [phone] Fax: [phone] [address] [phone] - [phone]  [address] [place] [address]  Phone: [phone] Fax: [phone]      Assistance purchasing medications?:    Assistance provided by Case Management: None at this time    Does patient want to participate in local refill/ meds to beds program?:      Meds To Beds General Rules:  1. Can ONLY be done Monday- Friday between 8:30am-5pm  2. Prescription(s) must be in pharmacy by 3pm to be filled same day  3. Copy of patient's insurance/ prescription drug card and patient face sheet must be sent along with the prescription(s)  4. Cost of Rx cannot be added to hospital bill. If financial assistance is needed, please contact unit  or ;  or  CANNOT provide pharmacy voucher for patients co-pays  5.  Patients can then  the prescription on their way out of the hospital at discharge, or pharmacy can deliver to the bedside if staff is available. (payment due at time of pick-up or delivery - cash, check, or card accepted)     Able to afford home medications/ co-pay costs: Yes    ADLS:  Current PT AM-PAC Score:   /24  Current OT AM-PAC Score:   /24      DISCHARGE Disposition: Home- No Services Needed    LOC at discharge: Not Applicable  CHEMA Completed: No    Notification completed in HENS/PAS?:  Not Applicable    IMM Completed:   Not Indicated    Transportation:  Transportation PLAN for discharge: family   Mode of Transport: Private Car  Reason for medical transport: Not Applicable  Name of [address] [place] [address],[address]: Not Applicable  Time of Transport: when family availalbe    Transport form completed: No    Home Care:  [address] ordered at discharge: No  [address]: Not Applicable  Orders faxed: No    Durable Medical Equipment:  DME Provider: NA  Equipment obtained during hospitalization: NA    Home Oxygen and Respiratory Equipment:  Oxygen needed at discharge?: No  [address]: Not Applicable  Portable tank available for discharge?: Not Indicated    Dialysis:  Dialysis patient: No    Dialysis Center:  Not Applicable    Hospice Services:  Location: Not Applicable  Agency: Not Applicable    Consents signed: Not Indicated    Referrals made at [facility] for outpatient continued care:  Not Applicable    Additional CM Notes:     CM confirmed  D/c home    Family to transport  Patient to follow up out pt as instructed :    Mar17 Follow Up Appointment 2:00 PM   Margarito Buxfer, [facility] Rx:  Sent  To  Her own pharmacy       these medications from [address] [place] [address] 3 - P [phone] - F [phone]  1 amoxicillin-clavulanate      The Plan for Transition of Care is related to the following treatment goals of Diverticulitis [K57.92]  Acute diverticulitis [K57.92]    The Patient and/or patient representative Pau Trujillo and her family were provided with a choice of provider and agrees with the discharge plan Yes    Freedom of choice list was provided with basic dialogue that supports the patient's individualized plan of care/goals and shares the quality data associated with the providers.  Yes    Care Transitions patient: No    Mik Manning RN  [facility]  Case Management Department  Ph: [phone]

## 2022-02-22 NOTE — PROGRESS NOTES
Hospitalist Progress Note      PCP: River Mitchell MD    Date of Admission: 2/19/2022    Chief Complaint: Abdominal pain    History Of Present Illness:       Arely Lraa is a 49 y.o. female with past medical history as below, including psoriatic arthritis recently started on methotrexate, also on plaquenil but not currently on prednisone, history of C. Diff with 3 occurrences since 2020 (at which time she had sigmoid abscess), presents with LLQ pain which radiates to her back and states pain feels like prior episodes of diverticulitis. Subjective:     Pain is better today. No new symptoms. Still abd pain, but improved  No further fevers since about 2 days (last 37.8 2/20)  No nausea/vomiting  Tolerating diet        Medications:  Reviewed    Infusion Medications    sodium chloride       Scheduled Medications    sodium chloride flush  5-40 mL IntraVENous 2 times per day    enoxaparin  40 mg SubCUTAneous Daily    piperacillin-tazobactam  3,375 mg IntraVENous Q8H    traZODone  50 mg Oral Nightly    docusate sodium  100 mg Oral Daily     PRN Meds: HYDROmorphone, sodium chloride flush, sodium chloride, ondansetron **OR** ondansetron, polyethylene glycol, acetaminophen **OR** acetaminophen      Intake/Output Summary (Last 24 hours) at 2/22/2022 1251  Last data filed at 2/22/2022 0958  Gross per 24 hour   Intake 680 ml   Output --   Net 680 ml       Exam:    BP (!) 93/59   Pulse 71   Temp 98.5 °F (36.9 °C)   Resp 18   Ht 5' 4\" (1.626 m)   Wt 142 lb 6.7 oz (64.6 kg)   SpO2 99%   BMI 24.45 kg/m²     General appearance: No apparent distress, appears stated age and cooperative. HEENT: Pupils equal, round, and reactive to light. Conjunctivae/corneas clear. Neck: Supple, with full range of motion. No jugular venous distention. Trachea midline. Respiratory:  Normal respiratory effort. Clear to auscultation, bilaterally without Rales/Wheezes/Rhonchi.   Cardiovascular: Regular rate and rhythm with normal S1/S2 without murmurs, rubs or gallops. Abdomen: Soft, tender in the LLQ, non-distended with normal bowel sounds. Musculoskelatal: No clubbing, cyanosis or edema bilaterally. Skin: Skin color, texture, turgor normal.  No rashes or lesions. Neurologic:  grossly non-focal.  Psychiatric: Alert and oriented, thought content appropriate, normal insight    Labs:   Recent Labs     02/20/22  0705 02/21/22  0626 02/22/22  0449   WBC 7.6 6.8 4.7   HGB 12.0 10.8* 10.1*   HCT 34.2* 31.3* 28.6*    182 171     Recent Labs     02/20/22  0705 02/21/22  0626 02/22/22  0449    136 138   K 3.9 4.2 3.9    100 103   CO2 24 26 25   BUN 11 13 6*   CREATININE 0.8 0.8 0.7   CALCIUM 9.2 9.0 8.8   PHOS  --  3.4 2.9     Recent Labs     02/19/22  1405   AST 14*   ALT <5*   BILITOT 0.5   ALKPHOS 113     No results for input(s): INR in the last 72 hours. No results for input(s): Meldon Boateng in the last 72 hours. Studies:  CT ABDOMEN PELVIS W IV CONTRAST Additional Contrast? Radiologist Recommendation   Final Result      Sigmoid diverticulosis is present. There is segmental mild wall thickening and pericolonic stranding seen involving the proximal sigmoid colon which is suspicious for superimposed acute uncomplicated diverticulitis. Otherwise no acute abnormality identified. Assessment/Plan:      Acute Diverticulitis, recurrent. POA  Psoriatic arthritis on immune modifying therapy with plaquenil, methotrexate, not on prednisone currently (recently on a short low dose course)  Factor V Leiden with popliteal DVT on Eliquis  Chronic pain, narcotic dependent: POA  History of C. Diff         Diverticulitis with no signs of complication on CT imaging however it is her third episode since 2020. She also had diverticulitis in 2018. Most recent episode treated with cipro/flagyl.     Was started on Zosyn,: improved, tolerating diet: to transition to Augmentin and treat for  14 days    Had recent colonoscopy, does not need repeat    General Surgery consulted: given multiple episodes of diverticulitis, patient would benefit from elective resection as outpatient    Pain control: on narcotics at home    Holding methotrexate and Plaquenil : Recommend holding immunosuppressants until follow up with Dr. Susanna Bazan. Also advised to discuss with her rheumatologist           DVT Prophylaxis: Lovenox  Diet: ADULT DIET; Regular; Low Fiber  Code Status: Full Code    PT/OT Eval Status:     Dispo - DC to home if tolerating diet advance.     Aisha Hanson MD

## 2022-02-22 NOTE — FLOWSHEET NOTE
02/22/22 1330   Encounter Summary   Services provided to: Patient   Referral/Consult From:   (Spiritual consut for advance directives. )   Support System Significant other;Children   Continue Visiting   (2/22/2022, EAR[place]. )   Complexity of Encounter Moderate   Length of Encounter 30 minutes   Routine   Type Initial   Assessment Calm; Approachable   Intervention Active listening;Nurtured hope   Outcome Expressed gratitude

## 2022-02-22 NOTE — FLOWSHEET NOTE
02/22/22 1330   Encounter Summary   Services provided to: Patient   Referral/Consult From:   (Spiritual consut for advance directives. )   Support System Spouse; Family members   Continue Visiting   (2/22/2022, EARLE. )   Complexity of Encounter Moderate   Length of Encounter 30 minutes   Routine   Type Initial   Assessment Calm; Approachable   Intervention Active listening;Nurtured hope   Outcome Expressed gratitude

## 2022-02-24 ENCOUNTER — TELEPHONE (OUTPATIENT)
Dept: PRIMARY CARE CLINIC | Age: 52
End: 2022-02-24

## 2022-02-24 NOTE — TELEPHONE ENCOUNTER
Don 45 Transitions Initial Follow Up Call    Outreach made within 2 business days of discharge: Yes    Patient: Arline Lara Patient : 1970   MRN: [id number]  Reason for Admission: There are no discharge diagnoses documented for the most recent discharge.   Discharge Date: 22       Spoke with: pt already scheduled for hosp follow up    Discharge department/facility: [facility]    Scheduled appointment with PCP within 7-14 days    Follow Up  Future Appointments   Date Time Provider Thiago Pascual   2022 11:30 AM MD CARMEN [facility] [place] - DYD   3/8/2022 11:15 AM Mario Alberto Elizabeth MD COLORECTAL S [facility]   3/17/2022  2:00 PM CHRISTA Wiggins - CNP R BANK PAIN [facility]   3/30/2022  3:20 PM Zeb Roman MD [address] [place], [place] [address], [address] [facility]       Caleb Barron MA

## 2022-02-28 ENCOUNTER — OFFICE VISIT (OUTPATIENT)
Dept: PRIMARY CARE CLINIC | Age: 52
End: 2022-02-28
Payer: COMMERCIAL

## 2022-02-28 VITALS
HEIGHT: 64 IN | TEMPERATURE: 97.4 F | SYSTOLIC BLOOD PRESSURE: 108 MMHG | HEART RATE: 60 BPM | BODY MASS INDEX: 23.97 KG/M2 | DIASTOLIC BLOOD PRESSURE: 68 MMHG | WEIGHT: 140.4 LBS | OXYGEN SATURATION: 99 %

## 2022-02-28 DIAGNOSIS — I95.89 CHRONIC LOW BLOOD PRESSURE: Primary | ICD-10-CM

## 2022-02-28 PROCEDURE — 99213 OFFICE O/P EST LOW 20 MIN: CPT | Performed by: FAMILY MEDICINE

## 2022-02-28 SDOH — HEALTH STABILITY: PHYSICAL HEALTH: ON AVERAGE, HOW MANY MINUTES DO YOU ENGAGE IN EXERCISE AT THIS LEVEL?: 0 MIN

## 2022-02-28 SDOH — ECONOMIC STABILITY: HOUSING INSECURITY
IN THE LAST 12 MONTHS, WAS THERE A TIME WHEN YOU DID NOT HAVE A STEADY PLACE TO SLEEP OR SLEPT IN A SHELTER (INCLUDING NOW)?: NO

## 2022-02-28 SDOH — ECONOMIC STABILITY: INCOME INSECURITY: IN THE LAST 12 MONTHS, WAS THERE A TIME WHEN YOU WERE NOT ABLE TO PAY THE MORTGAGE OR RENT ON TIME?: NO

## 2022-02-28 SDOH — ECONOMIC STABILITY: HOUSING INSECURITY: IN THE LAST 12 MONTHS, HOW MANY PLACES HAVE YOU LIVED?: 1

## 2022-02-28 SDOH — HEALTH STABILITY: PHYSICAL HEALTH: ON AVERAGE, HOW MANY DAYS PER WEEK DO YOU ENGAGE IN MODERATE TO STRENUOUS EXERCISE (LIKE A BRISK WALK)?: 0 DAYS

## 2022-02-28 ASSESSMENT — SOCIAL DETERMINANTS OF HEALTH (SDOH)
IN A TYPICAL WEEK, HOW MANY TIMES DO YOU TALK ON THE PHONE WITH FAMILY, FRIENDS, OR NEIGHBORS?: THREE TIMES A WEEK
HOW OFTEN DO YOU GET TOGETHER WITH FRIENDS OR RELATIVES?: ONCE A WEEK
HOW OFTEN DO YOU ATTEND CHURCH OR RELIGIOUS SERVICES?: NEVER
DO YOU BELONG TO ANY CLUBS OR ORGANIZATIONS SUCH AS CHURCH GROUPS UNIONS, FRATERNAL OR ATHLETIC GROUPS, OR SCHOOL GROUPS?: NO
HOW OFTEN DO YOU ATTENT MEETINGS OF THE CLUB OR ORGANIZATION YOU BELONG TO?: NEVER

## 2022-02-28 ASSESSMENT — PATIENT HEALTH QUESTIONNAIRE - PHQ9
1. LITTLE INTEREST OR PLEASURE IN DOING THINGS: NOT AT ALL
SUM OF ALL RESPONSES TO PHQ9 QUESTIONS 1 & 2: 0
1. LITTLE INTEREST OR PLEASURE IN DOING THINGS: 0
2. FEELING DOWN, DEPRESSED OR HOPELESS: 0
DEPRESSION UNABLE TO ASSESS: YES
SUM OF ALL RESPONSES TO PHQ QUESTIONS 1-9: 0
2. FEELING DOWN, DEPRESSED OR HOPELESS: NOT AT ALL
SUM OF ALL RESPONSES TO PHQ QUESTIONS 1-9: 0
SUM OF ALL RESPONSES TO PHQ9 QUESTIONS 1 & 2: 0

## 2022-02-28 ASSESSMENT — LIFESTYLE VARIABLES
HOW OFTEN DO YOU HAVE A DRINK CONTAINING ALCOHOL: 2-3 TIMES A WEEK
HOW MANY STANDARD DRINKS CONTAINING ALCOHOL DO YOU HAVE ON A TYPICAL DAY: 1 OR 2

## 2022-02-28 ASSESSMENT — ENCOUNTER SYMPTOMS: BACK PAIN: 1

## 2022-02-28 NOTE — PROGRESS NOTES
SUBJECTIVE:  Patient ID: Nidhi Shelby is a 49 y.o. female.   Chief Complaint:  Chief Complaint   Patient presents with    Follow-Up from [address] [place] [address] Diverticulitis 22       HPI   49year old Female  Hospitalized x 4 days [facility] Dx Diverticulitis  Surgical consult with Dr Lore Dong is setting her with Dietitian  Chronic Back pain & left leg  Pain specialist NP with [facility] Rx Percocet  Antibiotic mess up her guts    Past Medical History:   Diagnosis Date    Acne     Dr Nicole Renee Diverticulitis 2020    Factor 5 Leiden mutation, heterozygous ([facility] [address]     Medical history reviewed with no changes      Past Surgical History:   Procedure Laterality Date    CARPAL TUNNEL RELEASE Bilateral      SECTION  2001     x1    PAIN MANAGEMENT PROCEDURE Left 10/26/2020    LEFT L4 AND L5 TRANSFORAMINAL EPDIURAL STEROID INJECTION WITH FLUOROSCOPY (83545, 34934) performed by Yvonne Willoughby MD at [address] Left 2020    LEFT LUMBAR FOUR LUMBAR FIVE TRANSFORAMINAL  EPIDURAL STEROID INJECTION SITE CONFIRMED BY FLUOROSCOPY performed by Yvonne Willoughby MD at [address]     No Known Allergies    Family History   Problem Relation Age of Onset    No Known Problems Mother         Healthy 78year old    Heart Disease Father [de-identified]        +Stent    High Cholesterol Father     Other Brother         do not talk +drug        Patient Active Problem List   Diagnosis    Diverticulosis of intestine without bleeding    Degeneration of lumbar or lumbosacral intervertebral disc    Lumbosacral spondylosis without myelopathy    Spinal stenosis, lumbar region, without neurogenic claudication    Disc displacement, lumbar    Lumbar stenosis    Chronic pain syndrome    Lumbar nerve root impingement, L2    Primary insomnia    Drug induced constipation    Strain of neck, initial encounter    Acute deep vein thrombosis (DVT) of distal vein of lower extremity ([address]    Carpal tunnel syndrome, bilateral    Coagulation disorder (HCC)    Chronic left sacroiliac pain    Polyarthralgia    Antiphospholipid antibody positive    Encounter for therapeutic drug monitoring    Psoriatic arthritis ([facility] [address]    High risk medication use    Elevated alkaline phosphatase level    Dizziness    Diverticulitis    Acute diverticulitis    Anticoagulated    Factor 5 Leiden mutation, heterozygous (HCC)    Hx of methotrexate therapy     Current Outpatient Medications   Medication Sig Dispense Refill    amoxicillin-clavulanate (AUGMENTIN) 875-125 MG per tablet Take 1 tablet by mouth 2 times daily for 12 days 24 tablet 0    oxyCODONE-acetaminophen (PERCOCET) 5-325 MG per tablet Take 1 tablet by mouth every 8 hours as needed for Pain for up to 30 days. Take no more than 2-3 tabs orally prn 90 tablet 0    folic acid (FOLVITE) 1 MG tablet Take 1 tablet by mouth daily 90 tablet 0    traZODone (DESYREL) 50 MG tablet TAKE 1 TABLET NIGHTLY AS NEEDED FOR SLEEP 90 tablet 1    vitamin B-12 (CYANOCOBALAMIN) 500 MCG tablet Take 500 mcg by mouth daily      docusate sodium (COLACE) 100 MG capsule Take 100 mg by mouth 2 times daily      Saccharomyces boulardii (FLORASTOR PO) Take by mouth daily      ferrous sulfate (IRON 325) 325 (65 Fe) MG tablet TAKE 1 TABLET EVERY OTHER DAY      Resveratrol 250 MG CAPS Take by mouth daily      TURMERIC PO Take by mouth      ELIQUIS 5 MG TABS tablet TAKE 1 TABLET BY MOUTH TWO TIMES A DAY  60 tablet 0    Lidocaine (ZTLIDO) 1.8 % PTCH Apply 1 patch topically daily (Patient not taking: Reported on 2/28/2022) 90 patch 0     No current facility-administered medications for this visit.      Lab Results   Component Value Date    WBC 4.7 02/22/2022    HGB 10.1 (L) 02/22/2022    HCT 28.6 (L) 02/22/2022    MCV 94.4 02/22/2022     02/22/2022     Lab Results   Component Value Date    CHOL 195 11/17/2021    CHOL 241 (H) 11/16/2020    CHOL 204 (H) 10/11/2012     Lab Results Component Value Date    TRIG 97 11/17/2021    TRIG 71 11/16/2020    TRIG 93 10/11/2012     Lab Results   Component Value Date    HDL 54 11/17/2021    HDL 76 (H) 11/16/2020    HDL 85 (H) 10/11/2012     Lab Results   Component Value Date    LDLCALC 122 (H) 11/17/2021    LDLCALC 151 (H) 11/16/2020    LDLCALC 101 (H) 10/11/2012     Lab Results   Component Value Date    LABVLDL 19 11/17/2021    LABVLDL 14 11/16/2020    LABVLDL 19 10/11/2012     Lab Results   Component Value Date    CHOLHDLRATIO 4.1 11/02/2010       Chemistry        Component Value Date/Time     02/22/2022 0449    K 3.9 02/22/2022 0449    K 4.2 02/21/2022 0626     02/22/2022 0449    CO2 25 02/22/2022 0449    BUN 6 (L) 02/22/2022 0449    CREATININE 0.7 02/22/2022 0449        Component Value Date/Time    CALCIUM 8.8 02/22/2022 0449    ALKPHOS 113 02/19/2022 1405    AST 14 (L) 02/19/2022 1405    ALT <5 (L) 02/19/2022 1405    BILITOT 0.5 02/19/2022 1405        Lab Results   Component Value Date    TSH 1.97 11/17/2021     Lab Results   Component Value Date    LABA1C 5.1 11/17/2021     Lab Results   Component Value Date    EAG 99.7 11/17/2021         Review of Systems   Cardiovascular:        Episode low BP  Get Dizzy    Gastrointestinal:        Recent episode Diverticulitis   Musculoskeletal: Positive for back pain. Dr Jase Pryor   Chronic Pain Specialist   Left leg pain   Neurological: Positive for dizziness. Hematological:        Hematology Care  Hx DVT Left     Psychiatric/Behavioral:        Partner x last 4 years good support        OBJECTIVE:  /68 (Site: Right Upper Arm, Position: Sitting, Cuff Size: Medium Adult)   Pulse 60   Temp 97.4 °F (36.3 °C) (Infrared)   Ht 5' 4\" (1.626 m)   Wt 140 lb 6.4 oz (63.7 kg)   SpO2 99%   BMI 24.10 kg/m²   Physical Exam  HENT:      Head: Normocephalic. Cardiovascular:      Rate and Rhythm: Normal rate and regular rhythm. Heart sounds: Normal heart sounds.    Pulmonary:      Effort: Pulmonary effort is normal.      Breath sounds: Normal breath sounds. Neurological:      Mental Status: She is alert. ASSESSMENT/PLAN:      Diagnosis Orders   1.  Chronic low blood pressure  Karen Aaron MD, Cardiology, [facility]     Referral given  Pt will need cardiac clearance before surgery

## 2022-03-07 ENCOUNTER — OFFICE VISIT (OUTPATIENT)
Dept: CARDIOLOGY CLINIC | Age: 52
End: 2022-03-07
Payer: COMMERCIAL

## 2022-03-07 VITALS
DIASTOLIC BLOOD PRESSURE: 66 MMHG | HEART RATE: 72 BPM | BODY MASS INDEX: 23.22 KG/M2 | HEIGHT: 64 IN | WEIGHT: 136 LBS | SYSTOLIC BLOOD PRESSURE: 102 MMHG

## 2022-03-07 DIAGNOSIS — I95.9 HYPOTENSION, UNSPECIFIED HYPOTENSION TYPE: Primary | ICD-10-CM

## 2022-03-07 PROCEDURE — 99203 OFFICE O/P NEW LOW 30 MIN: CPT | Performed by: INTERNAL MEDICINE

## 2022-03-07 PROCEDURE — 93000 ELECTROCARDIOGRAM COMPLETE: CPT | Performed by: INTERNAL MEDICINE

## 2022-03-07 ASSESSMENT — ENCOUNTER SYMPTOMS
CHEST TIGHTNESS: 0
CHOKING: 0
COUGH: 0
SHORTNESS OF BREATH: 0

## 2022-03-07 NOTE — PROGRESS NOTES
Subjective:      Patient ID: Jeannette Rhodes is a 49 y.o. female. HPI Referred for hypotension. Has occurred over past 2 yrs. bp as low as   Hosp 2 wks ago with diverticular disease. Noted low bp so could not give pain meds. No real sx. Had maybe 2 episodes with nausea and fatigue. Normal activities. Foot surgery/hand surgery. Probably colon resection in near future. No syncope. Feels fine otherwise. No other comments on low bp. No chest pian. No sob.       Past Medical History:   Diagnosis Date    Acne     Dr Romy Prakash Diverticulitis 2020    Factor 5 Leiden mutation, heterozygous ([address]     Medical history reviewed with no changes      Past Surgical History:   Procedure Laterality Date    CARPAL TUNNEL RELEASE Bilateral      SECTION  2001     x1    PAIN MANAGEMENT PROCEDURE Left 10/26/2020    LEFT L4 AND L5 TRANSFORAMINAL EPDIURAL STEROID INJECTION WITH FLUOROSCOPY (29643, 69071) performed by Feliz Nicholson MD at [address] Left 2020    LEFT LUMBAR FOUR LUMBAR FIVE TRANSFORAMINAL  EPIDURAL STEROID INJECTION SITE CONFIRMED BY FLUOROSCOPY performed by Feliz Nicholson MD at [address] History     Socioeconomic History    Marital status:      Spouse name: Froilan Pagan    Number of children: 1    Years of education: college    Highest education level: Not on file   Occupational History     Comment: [employer]   Tobacco Use    Smoking status: Never Smoker    Smokeless tobacco: Never Used   Vaping Use    Vaping Use: Never used   Substance and Sexual Activity    Alcohol use: No    Drug use: No    Sexual activity: Yes     Partners: Male     Birth control/protection: Pill     Comment: D  son 23 y old    Other Topics Concern    Not on file   Social History Narrative    Divorce    BF    Son 21year old  @ [address] 2 step son 21 @ 25    All children @[employer]    No tobacco     Social Determinants of Health     Financial Resource Strain: Low Risk     Difficulty of Paying Living Expenses: Not hard at all   Food Insecurity: No Food Insecurity    Worried About Running Out of Food in the Last Year: Never true    Darryl of Food in the Last Year: Never true   Transportation Needs: No Transportation Needs    Lack of Transportation (Medical): No    Lack of Transportation (Non-Medical): No   Physical Activity: Inactive    Days of Exercise per Week: 0 days    Minutes of Exercise per Session: 0 min   Stress: No Stress Concern Present    Feeling of Stress : Not at all   Social Connections: Socially Isolated    Frequency of Communication with Friends and Family: Three times a week    Frequency of Social Gatherings with Friends and Family: Once a week    Attends Cheondoism Services: Never    Active Member of Clubs or Organizations: No    Attends Club or Organization Meetings: Never    Marital Status:    Intimate Partner Violence:     Fear of Current or Ex-Partner: Not on file    Emotionally Abused: Not on file    Physically Abused: Not on file    Sexually Abused: Not on file   Housing Stability: [address] Unable to Pay for Housing in the Last Year: No    Number of [place] in the Last Year: 1    Unstable Housing in the Last Year: No      FH reviewed    Vitals:    03/07/22 1319   BP: 102/66   Pulse: 72         Review of Systems   Constitutional: Negative for activity change, appetite change and fatigue. Respiratory: Negative for cough, choking, chest tightness and shortness of breath. Cardiovascular: Negative for chest pain, palpitations and leg swelling. Denies PND or orthopnea. No tachycardia or syncope. Neurological: Negative for dizziness, syncope and light-headedness. Psychiatric/Behavioral: Negative for agitation, behavioral problems and confusion. All other systems reviewed and are negative. Objective:   Physical Exam  Constitutional:       General: She is not in acute distress. Appearance: Normal appearance. She is well-developed. HENT:      Head: Normocephalic and atraumatic. Right Ear: External ear normal.      Left Ear: External ear normal.   Neck:      Vascular: No JVD. Cardiovascular:      Rate and Rhythm: Normal rate and regular rhythm. Heart sounds: Normal heart sounds. No murmur heard. No gallop. Pulmonary:      Effort: Pulmonary effort is normal. No respiratory distress. Breath sounds: Normal breath sounds. No wheezing or rales. Abdominal:      General: Bowel sounds are normal.      Palpations: Abdomen is soft. Tenderness: There is no abdominal tenderness. Musculoskeletal:         General: Normal range of motion. Cervical back: Normal range of motion. Skin:     General: Skin is warm and dry. Neurological:      General: No focal deficit present. Mental Status: She is alert and oriented to person, place, and time. Psychiatric:         Mood and Affect: Mood normal.         Behavior: Behavior normal.         Assessment:       Diagnosis Orders   1. Hypotension, unspecified hypotension type  EKG 12 lead   '      Plan:      EKG today shows NSR, Nl axis, Nl st,  Wnl. Low bps BP 93. She is very asymptomatic. Will have echo/annabel. If neg would avoid tx since asx. Follow up after.          Iam Man MD

## 2022-03-08 ENCOUNTER — OFFICE VISIT (OUTPATIENT)
Dept: SURGERY | Age: 52
End: 2022-03-08
Payer: COMMERCIAL

## 2022-03-08 ENCOUNTER — TELEPHONE (OUTPATIENT)
Dept: SURGERY | Age: 52
End: 2022-03-08

## 2022-03-08 ENCOUNTER — TELEPHONE (OUTPATIENT)
Dept: CARDIOLOGY CLINIC | Age: 52
End: 2022-03-08

## 2022-03-08 VITALS
SYSTOLIC BLOOD PRESSURE: 111 MMHG | WEIGHT: 136 LBS | BODY MASS INDEX: 23.22 KG/M2 | TEMPERATURE: 97.4 F | HEART RATE: 69 BPM | DIASTOLIC BLOOD PRESSURE: 71 MMHG | HEIGHT: 64 IN | OXYGEN SATURATION: 97 %

## 2022-03-08 DIAGNOSIS — K57.92 ACUTE DIVERTICULITIS: Primary | ICD-10-CM

## 2022-03-08 DIAGNOSIS — R76.0 ANTIPHOSPHOLIPID ANTIBODY POSITIVE: ICD-10-CM

## 2022-03-08 DIAGNOSIS — L40.50 PSORIATIC ARTHRITIS (HCC): ICD-10-CM

## 2022-03-08 DIAGNOSIS — G89.4 CHRONIC PAIN SYNDROME: ICD-10-CM

## 2022-03-08 DIAGNOSIS — Z79.01 ANTICOAGULATED: ICD-10-CM

## 2022-03-08 DIAGNOSIS — D68.51 FACTOR 5 LEIDEN MUTATION, HETEROZYGOUS (HCC): ICD-10-CM

## 2022-03-08 PROCEDURE — 99215 OFFICE O/P EST HI 40 MIN: CPT | Performed by: SURGERY

## 2022-03-08 RX ORDER — SODIUM CHLORIDE 0.9 % (FLUSH) 0.9 %
5-40 SYRINGE (ML) INJECTION EVERY 12 HOURS SCHEDULED
Status: CANCELLED | OUTPATIENT
Start: 2022-03-08

## 2022-03-08 RX ORDER — ACETAMINOPHEN 325 MG/1
1000 TABLET ORAL ONCE
Status: CANCELLED | OUTPATIENT
Start: 2022-03-08 | End: 2022-03-08

## 2022-03-08 RX ORDER — SODIUM CHLORIDE 9 MG/ML
25 INJECTION, SOLUTION INTRAVENOUS PRN
Status: CANCELLED | OUTPATIENT
Start: 2022-03-08

## 2022-03-08 RX ORDER — SODIUM CHLORIDE 0.9 % (FLUSH) 0.9 %
5-40 SYRINGE (ML) INJECTION PRN
Status: CANCELLED | OUTPATIENT
Start: 2022-03-08

## 2022-03-08 NOTE — PATIENT INSTRUCTIONS
Learning About Diverticulosis and Diverticulitis    What are diverticulosis and diverticulitis? In diverticulosis and diverticulitis, pouches called diverticula form in the wall of the large intestine, or colon. · In diverticulosis, the pouches do not cause any pain or other symptoms. · In diverticulitis, the pouches get inflamed or infected and cause symptoms. Doctors aren't sure what causes these pouches in the colon. But they think that a low-fiber diet may play a role. Without fiber to add bulk to the stool, the colon has to work harder than normal to push the stool forward. The pressure from this may cause pouches to form in weak spots along the colon. Some people with diverticulosis get diverticulitis. But experts don't know why this happens. What are the symptoms? · In diverticulosis, most people don't have symptoms. But pouches sometimes bleed. · In diverticulitis, symptoms may last from a few hours to a week or more. They include:  ¨ Belly pain. This is usually in the lower left side. It is sometimes worse when you move. This is the most common symptom. ¨ Fever and chills. ¨ Bloating and gas. ¨ Diarrhea or constipation. ¨ Nausea and sometimes vomiting. ¨ Not feeling like eating. How can you prevent these problems? You may be able to lower your chance of getting diverticulitis. You can do this by taking steps to prevent constipation. · Eat fruits, vegetables, beans, and whole grains every day. These foods are high in fiber. · Drink plenty of fluids (enough so that your urine is light yellow or clear like water). If you have kidney, heart, or liver disease and have to limit fluids, talk with your doctor before you increase the amount of fluids you drink. · Get at least 30 minutes of exercise on most days of the week. Walking is a good choice. You also may want to do other activities, such as running, swimming, cycling, or playing tennis or team sports.   · Take a fiber supplement, such as Citrucel or Metamucil, every day if needed. Read and follow all instructions on the label. · Schedule time each day for a bowel movement. Having a daily routine may help. Take your time and do not strain when having a bowel movement. · There is no need to avoid nuts, seeds, berries, and popcorn. How are these problems treated? · The best way to treat diverticulosis is to avoid constipation. (See the tips above.)  · Treatment of diverticulitis depends on the severity of symptoms, which can vary widely. Mild diverticulitis is usually treated with an oral antibiotics and clear liquid diet for a day or 2. As symptoms improve, patients can slowly advance to a regular diet. In moderate cases of diverticulitis, some patient require admission to the hospital for IV antibiotics and close monitoring. Some patients may require a drainage catheter if there is an internal abscess that needs draining. In rare cases of severe acute diverticulitis, some patients may require emergency surgery. In most cases, this will be a person's first attack of diverticulitis. Some patients may develop recurrent attacks of diverticulitis. Usually these attacks are of a similar severity level as previous attacks. Those who develop complications (see below) or have multiple attacks or increasing frequency of attacks may need to undergo surgical removal of that portion of the colon. A potential complication of repeated attacks of diverticulitis is something called a \"fistula\". A fistula is an abnormal connection between two organs that are not usually connected. Most commonly, a fistula may form between the inflamed colon and the bladder. This can result in symptoms of repeated urinary infections, lower abdominal pain, and passing air or even stool in your urine. This complications is treated with surgery to remove that portion of the colon and repair the bladder.  Less commonly, fistulas can form between the colon and the vagina, or the colon and the skin. Other complications include stricture or obstruction due to scarring and blockage. Next steps after being treated for diverticulitis:  It is very important that you obtain a colonoscopy after being diagnosed with diverticulitis. This is usually done 6-10 weeks after you recover, and before any planned surgery. In rare circumstances, cancer may be hidden by the diverticulitis, and only a colonoscopy will be able to differentiate the two. Surgery for diverticulitis:    Surgery can be done for patients with repeated attacks of diverticulitis, those with continued diverticulitis symptoms not responsive to antibiotics, and those who develop complications such as fistula or stricture. Those who have surgery for diverticulitis have a good success rate and are at very low risk of problems with diverticulitis in the future. Surgery can be done robotically (key-hole incisions), laparoscopically (also key-hole incisions), and open, depending on intra-operative findings and other technical factors. Typically only a segment of colon is resected (taken out) and usually the colon can be reconnected. Sometimes an ostomy (bag) needed. Ostomies may be temporary or permanent. Most patients spend anywhere from 3-7 days in the hospital, depending on various factors, such as age, health, and how the surgery is done. Surgery has risks, which can include (but are not limited to): bleeding, wound infections, hernia (especially with open surgery), damage to other structures, anastomotic leak (this happens if the colon connection does not heal properly), risks of anesthesia. These risks are increased in smokers, poorly controlled diabetics, those who take steroids, blood thinners, and those who are obese. Recovery after leaving the hospital can vary from person to person. Most people take about 2 weeks off from work after this operation.  You will be restricted on heavy lifting for about 4-6 weeks after the operation to help lower the risk of abnormal healing, which can lead to hernias. If you have any questions before your surgery, please call Dr Matthew Dark office at [phone].

## 2022-03-08 NOTE — LETTER
[facility] - Surgeons of [address] [place] [address] [phone]  f [phone]    Treasure Renae MD                        SURGERY ORDER   -- Time of order -- 3/8/22    10:51 AM    Facility:   [facility] # _________________                                                                                    Scheduled By:____________                  Surgery Date & Time: 3/25/2022 at 11:00am                          Pt arrival: 9:00am                                                                                      Patient Name:  Delbert Lara     :  1970     PCP:  Parker Mendoza MD      Home Ph:    [phone] (home) [phone] (work)                                                    PROCEDURE:  Robotic Sigmoid Colectomy 39221    DIAGNOSIS:  Diverticulitis K57.92    Anesthesia: _General  + exparel TAP block  Time Needed:  3 hours    Pt Position:  lithotomy    Ureteral Stents: no  Ostomy Marking: no          Admit _x__                  Pre-Op clearance to be done by: _PCP____    Cardiac Clearance Done by: ___cardio/heme_____    Medications to be stopped 5 days before surgery: __? lovenox bridge/eliquis situation_______                                                                                 Pfizer Covid Vaccine# 2021 and Booster 2022                                                                                                                   Treasure Renae MD  Insurance:                                ID #                                         #     (secondary ?)       Date called ______        [place] File to: _____ @ _____           Precert Needed?  Yes  /  No    PreAuth # & Details _______________________________________________________                        ______ Keon Johns 2 Verification _[phone]_      Post Op_________              ____Inst given _____ Orlázaro Coleman to Pt/Spouse                          COLON SURGERY CHECKLIST    -- Pre-op clearance: PCP  -- Pre-op clearance: see below  -- Anticoagulation/lovenox, ASA, plavix, etc - Cardiologist/heme - lovenox bridge/eliquis   -- Surgery order faxed, date/time obtained, placed on calendar  -- Prep and oral antibiotics (#2) ordered, information given to patient  -- Phone call day before procedure to confirm  -- Post op appt: 2 weeks  -- Other: plan surg later March, early April

## 2022-03-08 NOTE — TELEPHONE ENCOUNTER
Spoke to Dr. Antonio Cornelius office informing them that we a note stating the patient has cardiac clearance for surgery. Note can be faxed to Dr. Bren Ugarte office at [phone].

## 2022-03-08 NOTE — TELEPHONE ENCOUNTER
Katt Gregory from [facility] office states that pt is going to need a cc for her colon surgery on the 24th

## 2022-03-08 NOTE — PROGRESS NOTES
[facility] COLORECTAL SURGERY  [address]  Dept: [phone]  Dept Fax: [phone]  Loc: [phone]    Visit Date: 3/8/2022    Rashawn Lara is a 49 y.o. female who presents today for: New Patient (Diverticulitis)      HPI:       Andreina Jang is a 49 y.o. female who is well-known to me for recent hospitalization for acute diverticulitis. This was her fourth documented episode. She is here with her  to discuss surgical intervention. Since her hospitalization, she is overall improving. She is tolerating regular diet. Her bowels are still a little bit loose, but attributed to her antibiotics. She did remind me that her last colonoscopy was in , and only noted to have diverticulosis and internal hemorrhoids. Past Medical History:   Diagnosis Date    Acne     Dr Ziyad Acosta Diverticulitis 2020    Factor 5 Leiden mutation, heterozygous ([facility]     Medical history reviewed with no changes      Past Surgical History:   Procedure Laterality Date    CARPAL TUNNEL RELEASE Bilateral      SECTION  2001     x1    PAIN MANAGEMENT PROCEDURE Left 10/26/2020    LEFT L4 AND L5 TRANSFORAMINAL EPDIURAL STEROID INJECTION WITH FLUOROSCOPY (99951, 39074) performed by Javon Thomas MD at [address] [place] [address] Left 2020    LEFT LUMBAR FOUR LUMBAR FIVE TRANSFORAMINAL  EPIDURAL STEROID INJECTION SITE CONFIRMED BY FLUOROSCOPY performed by Javon Thomas MD at [place] [address]       Current Outpatient Medications:     oxyCODONE-acetaminophen (PERCOCET) 5-325 MG per tablet, Take 1 tablet by mouth every 8 hours as needed for Pain for up to 30 days.  Take no more than 2-3 tabs orally prn, Disp: 90 tablet, Rfl: 0    traZODone (DESYREL) 50 MG tablet, TAKE 1 TABLET NIGHTLY AS NEEDED FOR SLEEP, Disp: 90 tablet, Rfl: 1    vitamin B-12 (CYANOCOBALAMIN) 500 MCG tablet, Take 500 mcg by mouth daily, Disp: , Rfl:     docusate sodium (COLACE) 100 MG capsule, Take 100 mg by mouth 2 times daily, Disp: , Rfl:     Saccharomyces boulardii (FLORASTOR PO), Take by mouth daily, Disp: , Rfl:     ferrous sulfate (IRON 325) 325 (65 Fe) MG tablet, TAKE 1 TABLET EVERY OTHER DAY, Disp: , Rfl:     Resveratrol 250 MG CAPS, Take by mouth daily, Disp: , Rfl:     TURMERIC PO, Take by mouth, Disp: , Rfl:     ELIQUIS 5 MG TABS tablet, TAKE 1 TABLET BY MOUTH TWO TIMES A DAY , Disp: 60 tablet, Rfl: 0    Lidocaine (ZTLIDO) 1.8 % PTCH, Apply 1 patch topically daily (Patient not taking: Reported on 3/7/2022), Disp: 90 patch, Rfl: 0    folic acid (FOLVITE) 1 MG tablet, Take 1 tablet by mouth daily (Patient not taking: Reported on 3/7/2022), Disp: 90 tablet, Rfl: 0  No Known Allergies  Past Surgical History:   Procedure Laterality Date    CARPAL TUNNEL RELEASE Bilateral      SECTION  2001     x1    PAIN MANAGEMENT PROCEDURE Left 10/26/2020    LEFT L4 AND L5 TRANSFORAMINAL EPDIURAL STEROID INJECTION WITH FLUOROSCOPY (49662, 76114) performed by Arben Stockton MD at [address] Left 2020    LEFT LUMBAR FOUR LUMBAR FIVE TRANSFORAMINAL  EPIDURAL STEROID INJECTION SITE CONFIRMED BY FLUOROSCOPY performed by Arben Stockton MD at [place] [address]     Family History   Problem Relation Age of Onset    No Known Problems Mother         Healthy 78year old    Heart Disease Father [de-identified]        +Stent    High Cholesterol Father     Other Brother         do not talk +drug        Social History:   Social History     Tobacco Use    Smoking status: Never Smoker    Smokeless tobacco: Never Used   Substance Use Topics    Alcohol use: No      Tobacco cessation counseling provided as appropriate. REVIEW OF SYSTEMS:    Pertinent positives and negatives are mentioned in the HPI. Otherwise, all other systems were reviewed and negative.     Objective:     Physical Exam   /71   Pulse 69 Temp 97.4 °F (36.3 °C) (Infrared)   Ht 5' 4\" (1.626 m)   Wt 136 lb (61.7 kg)   SpO2 97%   BMI 23.34 kg/m²   Constitutional: Appears well-developed and well-nourished. Grooming appropriate. No gross deformities. Body mass index is 23.34 kg/m². Eyes: No scleral icterus. Conjunctiva/lids normal. Vision intact grossly. Pupils equal/symmetric, reactive bilaterally. ENT: External ears/nose without defect, scars, or masses. Hearing grossly intact. No facial deformity. Lips normal, normal dentition. Neck: No masses. Trachea midline. No crepitus. Thyroid not enlarged. Cardiovascular: Normal rate. No peripheral edema. Abdominal aorta normal size to palpation. Pulmonary/Chest: Effort normal. No respiratory distress. No wheezes. No use of accessory muscles. Musculoskeletal: Normal range of motion of head/neck, without deformity, pain, or crepitus, with normal strength and tone. Normal gait. Nails without clubbing or cyanosis. Neurological: Alert and oriented to person, place, and time. No gross deficits. Sensation intact. Skin: Skin is dry. No rashes noted. No pallor. No induration of nodules. Psychiatric: Normal mood and affect. Behavior normal. Oriented to person, place, and time. Judgment and insight reasonable. Abdominal/wound: Soft, nontender, nondistended, previous  scar noted    Labs reviewed: None     Imaging reviewed: CT scan from hospitalization reviewed    Coordination of care with cardiology, rheumatology, PCP    Assessment/Plan:       A/P:  Established problem(s): Recurrent acute diverticulitis in the setting of immunocompromise; factor V Leiden on anticoagulation; psoriatic arthritis  Additional workup/treatment planned: Robotic/laparoscopic sigmoid colectomy in about 1 month  Risk of complications/morbidity: High    I had a very long discussion with Doreen Vance and her  today in the office.   We discussed the recommendation to proceed with surgical intervention for robotic/laparoscopic All questions were answered to patient's satisfaction. They understand that even in technically successfully operations and even in healthy patients, complications can occur, including possibility of death. I provided and encouraged patients and family members to review AVS (after visit summary) information that I have provided, that contains even more information regarding surgical risks and complications. They were encouraged to reach out to my office if they have any questions before or after surgery. Patient understands higher risk of perioperative morbidity and mortality given: anticoagulation, F5L, immunocompromise, previous abd surgery      Continue with current prescription medications    I provided written information in the After Visit Summary AVS regarding: colectomy info    DISPOSITION:  Plan surgery late March or early April    My findings will be relayed to consulting practitioner or PCP via Epic note    Note completed using dictation software, please excuse any errors.     Electronically signed by Becca Blake MD on 3/8/2022 at 10:46 AM

## 2022-03-09 ENCOUNTER — TELEPHONE (OUTPATIENT)
Dept: SURGERY | Age: 52
End: 2022-03-09

## 2022-03-09 RX ORDER — METRONIDAZOLE 500 MG/1
TABLET ORAL
Qty: 3 TABLET | Refills: 0 | Status: SHIPPED | OUTPATIENT
Start: 2022-03-24 | End: 2022-03-21

## 2022-03-09 RX ORDER — NEOMYCIN SULFATE 500 MG/1
TABLET ORAL
Qty: 6 TABLET | Refills: 0 | Status: SHIPPED | OUTPATIENT
Start: 2022-03-24 | End: 2022-03-21

## 2022-03-09 NOTE — TELEPHONE ENCOUNTER
Patient has been scheduled for:    Procedure: Robotic Sigmoid Colectomy  Date: 3/25/2022  Time: 11am  Arrival: [address] Covid Vaccine #2 April 2021 and Booster January 2022    ASA?: Eliquis-Informed patient that she needs to stop Eliquis 3 days prior to surgery with approval from ordering provider. Fax received from Dr. Donya Son from oncology stating patient okay to stop Eliquis. Prep: Prep #2 instructions and clear liquid diet reviewed while the patient was in the office. Instructed to be NPO at midnight. Antibiotics ordered to [facility]. Pre-op: Instructed patient to get pre-op H&P from PCP and cardiac clearance from cardiologist. I called Dr. Alicja Daigle office to inform him that patient needs cardiac clearance. Post-op Appt: Wed. 4/6/2022 at 1:15pm in [facility] office    Patient advised they will need a  over age 25 who is a friend or family member. Orders routed to Aurea Perez of surgery scheduling. Instructions have been mailed/emailed to:  [email or web address]

## 2022-03-11 NOTE — TELEPHONE ENCOUNTER
Pt will need to complete Lexiscan If neg would avoid tx since asx.   Jackie Tavarez scheduled for 3/22/2022

## 2022-03-16 RX ORDER — BUPROPION HYDROCHLORIDE 300 MG/1
TABLET ORAL
COMMUNITY
Start: 2022-02-04

## 2022-03-16 NOTE — PROGRESS NOTES
3/16/22 @ 1445 Left blind call message on pt voice mail. I asked her to call back,so we can review her medical history.   Bhanu Quan

## 2022-03-16 NOTE — PROGRESS NOTES
Place patient label inside box (if no patient label, complete below)  Name:  :  MR#:   Faye Rogers / PROCEDURE  1. I (we),Arely Lara (Patient Name) authorize Mario Alberto Elizabeth MD (Provider / Shelia Gillespie) and/or such assistants as may be selected by him/her, to perform the following operation/procedure(s): ROBOTIC SIGMOID COLECTOMY       Note: If unable to obtain consent prior to an emergent procedure, document the emergent reason in the medical record. This procedure has been explained to my (our) satisfaction and included in the explanation was:  A) The intended benefit, nature, and extent of the procedure to be performed;  B) The significant risks involved and the probability of success;  C) Alternative procedures and methods of treatment;  D) The dangers and probable consequences of such alternatives (including no procedure or treatment); E) The expected consequences of the procedure on my future health;  F) Whether other qualified individuals would be performing important surgical tasks and/or whether  would be present to advise or support the procedure. I (we) understand that there are other risks of infection and other serious complications in the pre-operative/procedural and postoperative/procedural stages of my (our) care. I (we) have asked all of the questions which I (we) thought were important in deciding whether or not to undergo treatment or diagnosis. These questions have been answered to my (our) satisfaction. I (we) understand that no assurance can be given that the procedure will be a success, and no guarantee or warranty of success has been given to me (us). 2. It has been explained to me (us) that during the course of the operation/procedure, unforeseen conditions may be revealed that necessitate extension of the original procedure(s) or different procedure(s) than those set forth in Paragraph 1.  I (we) authorize and request that the above-named physician, his/her assistants or his/her designees, perform procedures as necessary and desirable if deemed to be in my (our) best interest.     Revised 8/2/2021                                                                          Page 1 of 2                   3. I acknowledge that health care personnel may be observing this procedure for the purpose of medical education or other specified purposes as may be necessary as requested and/or approved by my (our) physician. 4. I (we) consent to the disposal by the hospital Pathologist of the removed tissue, parts or organs in accordance with hospital policy. 5. I do ____ do not ____ consent to the use of a local infiltration pain blocking agent that will be used by my provider/surgical provider to help alleviate pain during my procedure. 6. I do ____ do not ____ consent to an emergent blood transfusion in the case of a life-threatening situation that requires blood components to be administered. This consent is valid for 24 hours from the beginning of the procedure. 7. This patient does ____ or does not ____ currently have a DNR status/order. If DNR order is in place, obtain Addendum to the Surgical Consent for ALL Patients with a DNR Order to address brett-operative status for limited intervention or DNR suspension.      8. I have read and fully understand the above Consent for Operation/Procedure and that all blanks were completed before I signed the consent.   _____________________________       _____________________      ____/____am/pm  Signature of Patient or legal representative      Printed Name / Relationship            Date / Time   ____________________________       _____________________      ____/____am/pm  Witness to Signature                                    Printed Name                    Date / Time     If patient is unable to sign or is a minor, complete the following)  Patient is a minor, ____ years of age, or unable to sign because:   ______________________________________________________________________________________________    Morena Carranzaer If a phone consent is obtained, consent will be documented by using two health care professionals, each affirming that the consenting party has no questions and gives consent for the procedure discussed with the physician/provider.   _____________________          ____________________       _____/_____am/pm   2nd witness to phone consent        Printed name           Date / Time    Informed Consent:  I have provided the explanation described above in section 1 to the patient and/or legal representative.  I have provided the patient and/or legal representative with an opportunity to ask any questions about the proposed operation/procedure.   ___________________________          ____________________         ____/____am/pm  Provider / Proceduralist                            Printed name            Date / Time  Revised 8/2/2021                                                                      Page 2 of 2

## 2022-03-16 NOTE — PROGRESS NOTES
PRE-OP INSTRUCTIONS FOR THE SURGICAL PATIENT YOU ARE UNABLE TO MAKE CONTACT FOR AN INTERVIEW:    All patients having surgery or anesthesia are required to be Covid tested OR to have been vaccinated at least 14 days prior to your procedure. It is very important to return our call to [phone] and notify the staff of your last vaccination date otherwise you will be required to complete Covid PCR test within the 5-6 days prior to surgery & quarantine. The results will need to be faxed to PreAdmission Testing at [phone]. 1. Follow all instructions provided to you from your surgeons office, including your ARRIVAL TIME. 2. Enter the MAIN entrance located on [address] and report to the desk. 3. Bring your insurance & photo ID with you. You may also be asked to pay a co-pay, as you may want to bring a check or credit card with you. 4. Leave all other valuables at home. 5. Arrange for someone to drive you home and be with you for the first 24 hours after discharge. 6. Bring your medication list with you day of surgery with doses and frequency listed (including over the counter medications)  7. You must contact your surgeon for Instructions regarding:              - ALL medication instructions, especially if taking blood thinners, aspirin, or diabetic medication.         -Bariatric patients call surgeon if on diabetic medications as some need to be stopped 1 week preop  - IF  there is a change in your physical condition such as a cold, fever, rash, cuts, sores or any other infection, especially near your surgical site. 8. A Pre-op History and Physical for surgery MUST be completed by your Physician or an Urgent Care within 30 days of your procedure date. Please bring a copy with you on the day of your procedure and along with any other testing performed. 9. DO NOT EAT ANYTHING eight hours prior to arrival for surgery.   May have up to 8 ounces of water 4 hours prior to arrival for surgery. NOTE: ALL Gastric, Bariatric and Bowel surgery patients MUST follow their surgeon's instructions. 10. No gum, candy, mints, or ice chips day of procedure. 11. Please refrain from drinking alcohol the day before or day of your procedure. 12. Please do not smoke the day of your procedure. 13. Dress in loose, comfortable clothing appropriate for redressing after your procedure. Do not wear jewelry (including body piercings), make-up, fingernail polish, lotion, powders or metal hairclips. 15. Contacts will need to be removed prior to surgery. You may want to bring your eye glasses to wear immediately before and after surgery. 14. Dentures will need to be removed before your procedure. 13. Bring cases for your glasses, contacts, dentures, or hearing aids to protect them while you are in surgery. 16. If you use a CPAP, please bring it with you on the day of your procedure. 17. Do not shave or wax for 72 hours prior to procedure near your operative site  18. FOR WOMAN OF CHILDBEARING AGE ONLY- please make sure we can collect a urine sample on arrival.     If you have further questions, you may contact your surgeon's office or us at [phone]     Left instructions on patient's voicemail.     Harsha De Paz RN.3/16/2022 .2:41 PM

## 2022-03-17 ENCOUNTER — OFFICE VISIT (OUTPATIENT)
Dept: PAIN MANAGEMENT | Age: 52
End: 2022-03-17
Payer: COMMERCIAL

## 2022-03-17 ENCOUNTER — TELEPHONE (OUTPATIENT)
Dept: PRIMARY CARE CLINIC | Age: 52
End: 2022-03-17

## 2022-03-17 VITALS
TEMPERATURE: 97.2 F | HEIGHT: 64 IN | HEART RATE: 73 BPM | WEIGHT: 135 LBS | SYSTOLIC BLOOD PRESSURE: 110 MMHG | OXYGEN SATURATION: 100 % | DIASTOLIC BLOOD PRESSURE: 70 MMHG | BODY MASS INDEX: 23.05 KG/M2

## 2022-03-17 DIAGNOSIS — M25.522 BILATERAL ELBOW JOINT PAIN: ICD-10-CM

## 2022-03-17 DIAGNOSIS — M54.16 LUMBAR NERVE ROOT IMPINGEMENT: ICD-10-CM

## 2022-03-17 DIAGNOSIS — G89.4 CHRONIC PAIN SYNDROME: ICD-10-CM

## 2022-03-17 DIAGNOSIS — M47.817 LUMBOSACRAL SPONDYLOSIS WITHOUT MYELOPATHY: ICD-10-CM

## 2022-03-17 DIAGNOSIS — M48.061 SPINAL STENOSIS, LUMBAR REGION, WITHOUT NEUROGENIC CLAUDICATION: ICD-10-CM

## 2022-03-17 DIAGNOSIS — G56.03 CARPAL TUNNEL SYNDROME, BILATERAL: ICD-10-CM

## 2022-03-17 DIAGNOSIS — M50.30 DDD (DEGENERATIVE DISC DISEASE), CERVICAL: ICD-10-CM

## 2022-03-17 DIAGNOSIS — M25.521 BILATERAL ELBOW JOINT PAIN: ICD-10-CM

## 2022-03-17 DIAGNOSIS — F51.01 PRIMARY INSOMNIA: ICD-10-CM

## 2022-03-17 DIAGNOSIS — M48.02 CERVICAL STENOSIS OF SPINE: ICD-10-CM

## 2022-03-17 DIAGNOSIS — M51.37 DEGENERATION OF LUMBAR OR LUMBOSACRAL INTERVERTEBRAL DISC: ICD-10-CM

## 2022-03-17 PROCEDURE — 99213 OFFICE O/P EST LOW 20 MIN: CPT | Performed by: NURSE PRACTITIONER

## 2022-03-17 RX ORDER — OXYCODONE HYDROCHLORIDE AND ACETAMINOPHEN 5; 325 MG/1; MG/1
1 TABLET ORAL EVERY 8 HOURS PRN
Qty: 24 TABLET | Refills: 0 | Status: ON HOLD | OUTPATIENT
Start: 2022-03-17 | End: 2022-03-27 | Stop reason: HOSPADM

## 2022-03-17 RX ORDER — LIDOCAINE 36 MG/1
1 PATCH TOPICAL DAILY
Qty: 90 PATCH | Refills: 0 | Status: SHIPPED | OUTPATIENT
Start: 2022-03-17 | End: 2022-03-21

## 2022-03-17 NOTE — TELEPHONE ENCOUNTER
Pt is scheduled to have colorectal surgery next Friday. She was told to call Dr. Alyssa Smallwood. The pt is still having intermittent swelling in the leg that she had a blood clot in. She feels like maybe her clot is not gone. Please call pt.

## 2022-03-17 NOTE — PROGRESS NOTES
Arline Ye Jane  1970  [id number]      HISTORY OF PRESENT ILLNESS: Ms. Peña Moore is a 49 y.o. female returns for a follow up visit for pain management  She has a diagnosis of   1. Chronic pain syndrome    2. Degeneration of lumbar or lumbosacral intervertebral disc    3. Lumbosacral spondylosis without myelopathy    4. Spinal stenosis, lumbar region, without neurogenic claudication    5. Lumbar nerve root impingement, L2    6. DDD (degenerative disc disease), cervical    7. Cervical stenosis of spine, mild    8. Bilateral elbow joint pain    9. Carpal tunnel syndrome, bilateral    10. Primary insomnia    . As per Information Obtained from the PADT (Patient Assessment and Documentation Tool)    She complains of pain in the lower back. Radiating down the left leg She rates the pain 8/10 and describes it as aching. Current treatment regimen has helped relieve about 40% of the pain. She denies any side effects from the current pain regimen. Patient reports that since the last follow up visit the physical functioning is worse, family/social relationships are unchanged, mood is unchanged sleep patterns are unchanged, and that the overall functioning is unchanged. Patient denies misusing/abusing her narcotic pain medications or using any illegal drugs. Upon obtaining medical history from Ms. Lara states that pain is manageable on current pain therapy. Takes pain medications as prescribed. Scheduled for sigmoid coloctomy with Dr. Shanna Mario secondary to diverticulitis. Also has swelling to the left lower extremity, being monitored by cardiology due to history of blood clots. Mood is stable without anxiety. Sleep is fair with an average of 5-6 hours. Denies to having issues of constipation. Tolerating activities/house chores with moderate tenderness to the lower back. ALLERGIES: Patients list of allergies were reviewed     MEDICATIONS: Ms. Peña Moore list of medications were reviewed. Her current medications are distention. PHYSICAL EXAM:   Nursing note and vitals reviewed. /70   Pulse 73   Temp 97.2 °F (36.2 °C)   Ht 5' 4\" (1.626 m)   Wt 135 lb (61.2 kg)   SpO2 100%   BMI 23.17 kg/m²   Constitutional: She appears well-developed and well-nourished. No acute distress. Skin: Skin is warm and dry, good turgor. No rash noted. She is not diaphoretic. Cardiovascular: Normal rate, regular rhythm, normal heart sounds, and does not have murmur. Pulmonary/Chest: Effort normal. No respiratory distress. She does not have wheezes in the lung fields. She has no rales. Neurological/Psychiatric:She is alert and oriented to person, place, and time. Coordination is  normal.  Her mood isAppropriate and affect is Neutral/Euthymic(normal) . Prescription pain medication monitoring:                  MEDD current = 22.5              ORT Score = 1 low risk              Other Risk factors - (mood) Stable              Date of Last Medication Agreement: 3/17/22              Date Naloxone prescribed: 3/17/22              UDT:                          Date of last UDT: 8/16/21                          Adverse report: No;               OARRS:                          Checked today: Yes                          Adverse report: No    IMPRESSION:   1. Chronic pain syndrome    2. Degeneration of lumbar or lumbosacral intervertebral disc    3. Lumbosacral spondylosis without myelopathy    4. Spinal stenosis, lumbar region, without neurogenic claudication    5. Lumbar nerve root impingement, L2    6. DDD (degenerative disc disease), cervical    7. Cervical stenosis of spine, mild    8. Bilateral elbow joint pain    9. Carpal tunnel syndrome, bilateral    10.  Primary insomnia        PLAN:  Informed verbal consent was obtained:  -Patient will be maintained on current pain therapy  -Corrine exercises, back stretches  -Advised patient to hold all narcotics prescribed by pain management post surgical procedure, and take only those prescribed postop. Do not take both at the same time. Patient verbalized understanding  -CBT techniques- relaxation therapies such as biofeedback, mindfulness based stress reduction, imagery, cognitive restructuring, problem solving discussed with patient  -Last UDS 8/16/21 Consistent  -Return in about 4 weeks (around 4/14/2022). Analgesic Plan:              Continue present regimen: Percocet 5-325 mg tabs tid prn              Adjust dose of present analgesic: No              Switch analgesics: No              Add/Adjust concomitant therapy: ZTlido    Current Outpatient Medications   Medication Sig Dispense Refill    naloxone (NARCAN) 4 MG/0.1ML LIQD nasal spray Use as directed 1 each 0    oxyCODONE-acetaminophen (PERCOCET) 5-325 MG per tablet Take 1 tablet by mouth every 8 hours as needed for Pain for up to 8 days. Take no more than 2-3 tabs orally prn 24 tablet 0    Lidocaine (ZTLIDO) 1.8 % PTCH Apply 1 patch topically daily 90 patch 0    buPROPion (WELLBUTRIN XL) 300 MG extended release tablet       [START ON 3/24/2022] metroNIDAZOLE (FLAGYL) 500 MG tablet Take one tablet by mouth 3 times on the day prior to surgery. Take one tablet at 1pm, 2pm and 9pm. (Patient not taking: Reported on 3/18/2022) 3 tablet 0    [START ON 3/24/2022] neomycin (MYCIFRADIN) 500 MG tablet Take two tablets 3 times the day before surgery.  Take two tablets at 1pm, two tablets at 2pm and two tablets at 9pm. (Patient not taking: Reported on 3/18/2022) 6 tablet 0    folic acid (FOLVITE) 1 MG tablet Take 1 tablet by mouth daily (Patient not taking: Reported on 3/18/2022) 90 tablet 0    traZODone (DESYREL) 50 MG tablet TAKE 1 TABLET NIGHTLY AS NEEDED FOR SLEEP 90 tablet 1    vitamin B-12 (CYANOCOBALAMIN) 500 MCG tablet Take 500 mcg by mouth daily      docusate sodium (COLACE) 100 MG capsule Take 100 mg by mouth 2 times daily      Saccharomyces boulardii (FLORASTOR PO) Take by mouth daily      ferrous sulfate (IRON 325) 325 (65 Fe) MG tablet TAKE 1 TABLET EVERY OTHER DAY      Resveratrol 250 MG CAPS Take by mouth daily      TURMERIC PO Take by mouth      ELIQUIS 5 MG TABS tablet TAKE 1 TABLET BY MOUTH TWO TIMES A DAY  60 tablet 0     No current facility-administered medications for this visit. I will continue her current medication regimen  which is part of the above treatment schedule. It has been helping with Ms. Lara's chronic  medical problems which for this visit include:   Diagnoses of Chronic pain syndrome, Degeneration of lumbar or lumbosacral intervertebral disc, Lumbosacral spondylosis without myelopathy, Spinal stenosis, lumbar region, without neurogenic claudication, Lumbar nerve root impingement, L2, DDD (degenerative disc disease), cervical, Cervical stenosis of spine, mild, Bilateral elbow joint pain, Carpal tunnel syndrome, bilateral, and Primary insomnia were pertinent to this visit. Risks and benefits of the medications and other alternative treatments  including no treatment were discussed with the patient. The common side effects of these medications were also explained to the patient. Informed verbal consent was obtained. Goals of current treatment regimen include improvement in pain, restoration of functioning- with focus on improvement in physical performance, general activity, work or disability,emotional distress, health care utilization and  decreased medication consumption. Will continue to monitor progress towards achieving/maintaining therapeutic goals with special emphasis on  1. Improvement in perceived interfernce  of pain with ADL's. Ability to do home exercises independently. Ability to do household chores indoor and/or outdoor work and social and leisure activities. Improve psychosocial and physical functioning. - she is showing progression towards this treatment goal with the current regimen.     She was advised against drinking alcohol with the narcotic pain medicines, advised against driving or handling machinery while adjusting the dose of medicines or if having cognitive  issues related to the current medications. Risk of overdose and death, if medicines not taken as prescribed, were also discussed. If the patient develops new symptoms or if the symptoms worsen, the patient should call the office. While transcribing every attempt was made to maintain the accuracy of the note in terms of it's contents,there may have been some errors made inadvertently. Thank you for allowing me to participate in the care of this patient. Washington Rich CNP.     Cc: Johanna Neri MD

## 2022-03-17 NOTE — PATIENT INSTRUCTIONS
Patient Education        Back Stretches: Exercises  Introduction  Here are some examples of exercises for stretching your back. Start each exercise slowly. Ease off the exercise if you start to have pain. Your doctor or physical therapist will tell you when you can start these exercises and which ones will work best for you. How to do the exercises  Overhead stretch    1. Stand comfortably with your feet shoulder-width apart. 2. Looking straight ahead, raise both arms over your head and reach toward the ceiling. Do not allow your head to tilt back. 3. Hold for 15 to 30 seconds, then lower your arms to your sides. 4. Repeat 2 to 4 times. Side stretch    1. Stand comfortably with your feet shoulder-width apart. 2. Raise one arm over your head, and then lean to the other side. 3. Slide your hand down your leg as you let the weight of your arm gently stretch your side muscles. Hold for 15 to 30 seconds. 4. Repeat 2 to 4 times on each side. Press-up    1. Lie on your stomach, supporting your body with your forearms. 2. Press your elbows down into the floor to raise your upper back. As you do this, relax your stomach muscles and allow your back to arch without using your back muscles. As your press up, do not let your hips or pelvis come off the floor. 3. Hold for 15 to 30 seconds, then relax. 4. Repeat 2 to 4 times. Relax and rest    1. Lie on your back with a rolled towel under your neck and a pillow under your knees. Extend your arms comfortably to your sides. 2. Relax and breathe normally. 3. Remain in this position for about 10 minutes. 4. If you can, do this 2 or 3 times each day. Follow-up care is a key part of your treatment and safety. Be sure to make and go to all appointments, and call your doctor if you are having problems. It's also a good idea to know your test results and keep a list of the medicines you take. Where can you learn more? Go to [email or web address] org and sign in to your [employer] account. Enter L978 in the [employer] box to learn more about \"Back Stretches: Exercises. \"     If you do not have an account, please click on the \"Sign Up Now\" link. Current as of: July 1, 2021               Content Version: 13.1  © 4891-1066 [employer]. Care instructions adapted under license by [facility] ([facility]). If you have questions about a medical condition or this instruction, always ask your healthcare professional. [address] any warranty or liability for your use of this information.

## 2022-03-18 ENCOUNTER — OFFICE VISIT (OUTPATIENT)
Dept: PRIMARY CARE CLINIC | Age: 52
End: 2022-03-18
Payer: COMMERCIAL

## 2022-03-18 ENCOUNTER — TELEPHONE (OUTPATIENT)
Dept: PRIMARY CARE CLINIC | Age: 52
End: 2022-03-18

## 2022-03-18 ENCOUNTER — HOSPITAL ENCOUNTER (OUTPATIENT)
Dept: VASCULAR LAB | Age: 52
Discharge: HOME | End: 2022-03-18
Payer: COMMERCIAL

## 2022-03-18 VITALS
DIASTOLIC BLOOD PRESSURE: 72 MMHG | SYSTOLIC BLOOD PRESSURE: 112 MMHG | HEIGHT: 64 IN | TEMPERATURE: 97.9 F | HEART RATE: 67 BPM | OXYGEN SATURATION: 99 % | BODY MASS INDEX: 23.12 KG/M2 | WEIGHT: 135.4 LBS

## 2022-03-18 DIAGNOSIS — I82.5Z2 CHRONIC DEEP VEIN THROMBOSIS (DVT) OF DISTAL VEIN OF LEFT LOWER EXTREMITY (HCC): ICD-10-CM

## 2022-03-18 DIAGNOSIS — I82.5Z2 CHRONIC DEEP VEIN THROMBOSIS (DVT) OF DISTAL VEIN OF LEFT LOWER EXTREMITY (HCC): Primary | ICD-10-CM

## 2022-03-18 LAB — D DIMER: <200 NG/ML DDU (ref 0–229)

## 2022-03-18 PROCEDURE — 93971 EXTREMITY STUDY: CPT

## 2022-03-18 PROCEDURE — 99214 OFFICE O/P EST MOD 30 MIN: CPT | Performed by: FAMILY MEDICINE

## 2022-03-18 ASSESSMENT — ENCOUNTER SYMPTOMS
EYES NEGATIVE: 1
RESPIRATORY NEGATIVE: 1
BACK PAIN: 1

## 2022-03-18 NOTE — TELEPHONE ENCOUNTER
[facility] called in regards to the stat imaging for pt. Showed no acute DVT but did show some chronic changes from the last DVT.

## 2022-03-18 NOTE — PROGRESS NOTES
SUBJECTIVE:  Patient ID: Eben Paget is a 49 y.o. female.   Chief Complaint:  Chief Complaint   Patient presents with    Leg Swelling     left knee down       HPI   49year old Female  Urgent visit  Hx DVT Left leg  Surgery for Diverticulosis is schedule soon  Currently on Rx Eliquis followed by Hematology   Nurse manager from [facility] told her to get evaluation for chronic left lower leg swelling  Left leg swelling x 2 years    Past Medical History:   Diagnosis Date    Acne     Dr Asiya Santizo Diverticulitis 2020    Factor 5 Leiden mutation, heterozygous ([address]     Hx of blood clots     IUD (intrauterine device) in place     Medical history reviewed with no changes     PONV (postoperative nausea and vomiting)      Past Surgical History:   Procedure Laterality Date    CARPAL TUNNEL RELEASE Bilateral      SECTION  2001     x1    PAIN MANAGEMENT PROCEDURE Left 10/26/2020    LEFT L4 AND L5 TRANSFORAMINAL EPDIURAL STEROID INJECTION WITH FLUOROSCOPY (30213, [phone]) performed by Arben Stockton MD at [address] Left 2020    LEFT LUMBAR FOUR LUMBAR FIVE TRANSFORAMINAL  EPIDURAL STEROID INJECTION SITE CONFIRMED BY FLUOROSCOPY performed by Arben Stockton MD at [address]     No Known Allergies    Family History   Problem Relation Age of Onset    No Known Problems Mother         Healthy 78year old    Heart Disease Father [de-identified]        +Stent    High Cholesterol Father     Other Brother         do not talk +drug      Social History     Social History Narrative    Divorce    BF    Son 21year old  @ [employer] & 2 step son 21 @ 25    All children @[employer]    No tobacco         Patient Active Problem List   Diagnosis    Diverticulosis of intestine without bleeding    Degeneration of lumbar or lumbosacral intervertebral disc    Lumbosacral spondylosis without myelopathy    Spinal stenosis, lumbar region, without neurogenic claudication    Disc displacement, lumbar    Lumbar stenosis    Chronic pain syndrome    Lumbar nerve root impingement, L2    Primary insomnia    Drug induced constipation    Strain of neck, initial encounter    Acute deep vein thrombosis (DVT) of distal vein of lower extremity (HCC)    Carpal tunnel syndrome, bilateral    Coagulation disorder (HCC)    Chronic left sacroiliac pain    Polyarthralgia    Antiphospholipid antibody positive    Encounter for therapeutic drug monitoring    Psoriatic arthritis ([facility]    High risk medication use    Elevated alkaline phosphatase level    Dizziness    Diverticulitis    Acute diverticulitis    Anticoagulated    Factor 5 Leiden mutation, heterozygous (HCC)    Hx of methotrexate therapy     Current Outpatient Medications   Medication Sig Dispense Refill    oxyCODONE-acetaminophen (PERCOCET) 5-325 MG per tablet Take 1 tablet by mouth every 8 hours as needed for Pain for up to 8 days. Take no more than 2-3 tabs orally prn 24 tablet 0    Lidocaine (ZTLIDO) 1.8 % PTCH Apply 1 patch topically daily 90 patch 0    buPROPion (WELLBUTRIN XL) 300 MG extended release tablet       traZODone (DESYREL) 50 MG tablet TAKE 1 TABLET NIGHTLY AS NEEDED FOR SLEEP 90 tablet 1    vitamin B-12 (CYANOCOBALAMIN) 500 MCG tablet Take 500 mcg by mouth daily      docusate sodium (COLACE) 100 MG capsule Take 100 mg by mouth 2 times daily      Saccharomyces boulardii (FLORASTOR PO) Take by mouth daily      ferrous sulfate (IRON 325) 325 (65 Fe) MG tablet TAKE 1 TABLET EVERY OTHER DAY      Resveratrol 250 MG CAPS Take by mouth daily      TURMERIC PO Take by mouth      ELIQUIS 5 MG TABS tablet TAKE 1 TABLET BY MOUTH TWO TIMES A DAY  60 tablet 0    [START ON 3/24/2022] metroNIDAZOLE (FLAGYL) 500 MG tablet Take one tablet by mouth 3 times on the day prior to surgery.  Take one tablet at 1pm, 2pm and 9pm. (Patient not taking: Reported on 3/18/2022) 3 tablet 0    [START ON 3/24/2022] neomycin (MYCIFRADIN) 500 MG tablet Take two tablets 3 times the day before surgery. Take two tablets at 1pm, two tablets at 2pm and two tablets at 9pm. (Patient not taking: Reported on 3/18/2022) 6 tablet 0    folic acid (FOLVITE) 1 MG tablet Take 1 tablet by mouth daily (Patient not taking: Reported on 3/18/2022) 90 tablet 0     No current facility-administered medications for this visit. Lab Results   Component Value Date    WBC 4.7 02/22/2022    HGB 10.1 (L) 02/22/2022    HCT 28.6 (L) 02/22/2022    MCV 94.4 02/22/2022     02/22/2022     Lab Results   Component Value Date    CHOL 195 11/17/2021    CHOL 241 (H) 11/16/2020    CHOL 204 (H) 10/11/2012     Lab Results   Component Value Date    TRIG 97 11/17/2021    TRIG 71 11/16/2020    TRIG 93 10/11/2012     Lab Results   Component Value Date    HDL 54 11/17/2021    HDL 76 (H) 11/16/2020    HDL 85 (H) 10/11/2012     Lab Results   Component Value Date    LDLCALC 122 (H) 11/17/2021    LDLCALC 151 (H) 11/16/2020    LDLCALC 101 (H) 10/11/2012     Lab Results   Component Value Date    LABVLDL 19 11/17/2021    LABVLDL 14 11/16/2020    LABVLDL 19 10/11/2012     Lab Results   Component Value Date    CHOLHDLRATIO 4.1 11/02/2010       Chemistry        Component Value Date/Time     02/22/2022 0449    K 3.9 02/22/2022 0449    K 4.2 02/21/2022 0626     02/22/2022 0449    CO2 25 02/22/2022 0449    BUN 6 (L) 02/22/2022 0449    CREATININE 0.7 02/22/2022 0449        Component Value Date/Time    CALCIUM 8.8 02/22/2022 0449    ALKPHOS 113 02/19/2022 1405    AST 14 (L) 02/19/2022 1405    ALT <5 (L) 02/19/2022 1405    BILITOT 0.5 02/19/2022 1405            Review of Systems   Constitutional: Negative. Eyes: Negative. Respiratory: Negative. Cardiovascular: Positive for leg swelling. Chronic Left leg swelling  Rx Eliquis   Gastrointestinal:        Surgery for colon due 3/25/2022  Dx Recurrent diverticulitis   Genitourinary: Negative for dysuria and frequency. Musculoskeletal: Positive for back pain. Chronic back pain  Pain Specialist   Neurological: Negative for dizziness and headaches. Hematological:        DVT left LE  Hematology visit Q 6 month       OBJECTIVE:  /72 (Site: Right Upper Arm, Position: Sitting, Cuff Size: Medium Adult)   Pulse 67   Temp 97.9 °F (36.6 °C) (Infrared)   Ht 5' 4\" (1.626 m)   Wt 135 lb 6.4 oz (61.4 kg)   SpO2 99%   BMI 23.24 kg/m²   Physical Exam  HENT:      Head: Normocephalic. Cardiovascular:      Rate and Rhythm: Normal rate and regular rhythm. Pulses: Normal pulses. Heart sounds: Normal heart sounds. Pulmonary:      Effort: Pulmonary effort is normal.      Breath sounds: Normal breath sounds. No wheezing or rhonchi. Musculoskeletal:      Left lower leg: Edema present. Neurological:      General: No focal deficit present. Mental Status: She is alert and oriented to person, place, and time. Psychiatric:         Behavior: Behavior normal.         Thought Content: Thought content normal.         Judgment: Judgment normal.         ASSESSMENT/PLAN:      Diagnosis Orders   1.  Chronic deep vein thrombosis (DVT) of distal vein of left lower extremity (HCC)  D-Dimer, Quantitative    VL Extremity Venous Left     Urgent visit  Stat Venous Doppler Left leg  Continue Rx Eliquis for now  Cardiology Clearance Dr Radha Vicente testing 3/18/2022  Hx DVT LLE current Rx Eliquis 2.5 mg BID  Rx Eliquis will be d/c 3 days prior to surgery   Advised to get Rx Lovenox injection to contact Hematology

## 2022-03-19 RX ORDER — NALOXONE HYDROCHLORIDE 4 MG/.1ML
SPRAY NASAL
Qty: 1 EACH | Refills: 0 | Status: SHIPPED | OUTPATIENT
Start: 2022-03-19 | End: 2022-03-21

## 2022-03-21 ENCOUNTER — TELEPHONE (OUTPATIENT)
Dept: PRIMARY CARE CLINIC | Age: 52
End: 2022-03-21

## 2022-03-21 ENCOUNTER — OFFICE VISIT (OUTPATIENT)
Dept: PRIMARY CARE CLINIC | Age: 52
End: 2022-03-21
Payer: COMMERCIAL

## 2022-03-21 VITALS
HEART RATE: 75 BPM | HEIGHT: 64 IN | OXYGEN SATURATION: 99 % | DIASTOLIC BLOOD PRESSURE: 72 MMHG | SYSTOLIC BLOOD PRESSURE: 112 MMHG | WEIGHT: 135.2 LBS | TEMPERATURE: 98 F | BODY MASS INDEX: 23.08 KG/M2

## 2022-03-21 DIAGNOSIS — Z01.818 PREOPERATIVE CLEARANCE: Primary | ICD-10-CM

## 2022-03-21 DIAGNOSIS — K57.32 DIVERTICULITIS OF LARGE INTESTINE WITHOUT BLEEDING, UNSPECIFIED COMPLICATION STATUS: ICD-10-CM

## 2022-03-21 DIAGNOSIS — D68.9 COAGULATION DISORDER (HCC): ICD-10-CM

## 2022-03-21 DIAGNOSIS — D68.51 FACTOR 5 LEIDEN MUTATION, HETEROZYGOUS (HCC): ICD-10-CM

## 2022-03-21 PROCEDURE — 99212 OFFICE O/P EST SF 10 MIN: CPT | Performed by: FAMILY MEDICINE

## 2022-03-21 ASSESSMENT — ENCOUNTER SYMPTOMS
COLOR CHANGE: 0
VOMITING: 0
DIARRHEA: 0
WHEEZING: 0
SINUS PAIN: 0
SINUS PRESSURE: 0
APNEA: 0
VOICE CHANGE: 0
ABDOMINAL DISTENTION: 0
EYE DISCHARGE: 0
RHINORRHEA: 0
CONSTIPATION: 0
CHEST TIGHTNESS: 0
ABDOMINAL PAIN: 1
SHORTNESS OF BREATH: 0
COUGH: 0
EYE ITCHING: 0
EYES NEGATIVE: 1
BACK PAIN: 1
RESPIRATORY NEGATIVE: 1
EYE PAIN: 0
TROUBLE SWALLOWING: 0
PHOTOPHOBIA: 0
NAUSEA: 0
BLOOD IN STOOL: 0
EYE REDNESS: 0
CHOKING: 0
SORE THROAT: 0

## 2022-03-21 NOTE — TELEPHONE ENCOUNTER
Sheryl Chatman from Dr. Johnny Vargas office called and wanted Dr. Ira Herbert to know the pt is to stop ELIQUIS 72 hours prior to surgery. No bridge with LOVONEX. Pt is cleared from hematology perspective. Dr. Johnny Vargas is [place] for the [place].  If any questions please call [phone] option 1 Pt has an appointment with Dr. Ira Herbert today 3/21/22 at 11:30am.

## 2022-03-21 NOTE — PROGRESS NOTES
SUBJECTIVE:  Patient ID: Cindy Fay is a 49 y.o. female.   Chief Complaint:  Chief Complaint   Patient presents with    Pre-op Exam     bowel resection [facility] 3/25/22 Vee Sr MD       HPI   49year old Female  Preoperative Clearance  Dx Recurrent diverticulitis  Hx Hospitalization twice    Past Medical History:   Diagnosis Date    Acne     Dr Mock General Diverticulitis 2020    Factor 5 Leiden mutation, heterozygous ([address]     Hx of blood clots     IUD (intrauterine device) in place     Medical history reviewed with no changes     PONV (postoperative nausea and vomiting)      Past Surgical History:   Procedure Laterality Date    CARPAL TUNNEL RELEASE Bilateral      SECTION  2001     x1    PAIN MANAGEMENT PROCEDURE Left 10/26/2020    LEFT L4 AND L5 TRANSFORAMINAL EPDIURAL STEROID INJECTION WITH FLUOROSCOPY (42784, [id number]) performed by Meaghan Adkins MD at [address] Left 2020    LEFT LUMBAR FOUR LUMBAR FIVE TRANSFORAMINAL  EPIDURAL STEROID INJECTION SITE CONFIRMED BY FLUOROSCOPY performed by Meaghan Adkins MD at [address]     No Known Allergies    Family History   Problem Relation Age of Onset    No Known Problems Mother         Healthy 78year old    Heart Disease Father [de-identified]        +Stent    High Cholesterol Father     Other Brother         do not talk +drug      Social History     Social History Narrative    Divorce    BF    Son 21year old  @ [address] [facility] [facility] [facility] & 2 step son 21 @ 25    All children @[employer]    No tobacco         Patient Active Problem List   Diagnosis    Diverticulosis of intestine without bleeding    Degeneration of lumbar or lumbosacral intervertebral disc    Lumbosacral spondylosis without myelopathy    Spinal stenosis, lumbar region, without neurogenic claudication    Disc displacement, lumbar    Lumbar stenosis    Chronic pain syndrome    Lumbar nerve root impingement, L2    Primary insomnia    Drug induced constipation    Strain of neck, initial encounter    Acute deep vein thrombosis (DVT) of distal vein of lower extremity (HCC)    Carpal tunnel syndrome, bilateral    Coagulation disorder (HCC)    Chronic left sacroiliac pain    Polyarthralgia    Antiphospholipid antibody positive    Encounter for therapeutic drug monitoring    Psoriatic arthritis ([facility]    High risk medication use    Elevated alkaline phosphatase level    Dizziness    Diverticulitis    Acute diverticulitis    Anticoagulated    Factor 5 Leiden mutation, heterozygous (HCC)    Hx of methotrexate therapy     Current Outpatient Medications   Medication Sig Dispense Refill    oxyCODONE-acetaminophen (PERCOCET) 5-325 MG per tablet Take 1 tablet by mouth every 8 hours as needed for Pain for up to 8 days. Take no more than 2-3 tabs orally prn 24 tablet 0    buPROPion (WELLBUTRIN XL) 300 MG extended release tablet       traZODone (DESYREL) 50 MG tablet TAKE 1 TABLET NIGHTLY AS NEEDED FOR SLEEP 90 tablet 1    vitamin B-12 (CYANOCOBALAMIN) 500 MCG tablet Take 500 mcg by mouth daily      docusate sodium (COLACE) 100 MG capsule Take 100 mg by mouth 2 times daily      Saccharomyces boulardii (FLORASTOR PO) Take by mouth daily      ferrous sulfate (IRON 325) 325 (65 Fe) MG tablet TAKE 1 TABLET EVERY OTHER DAY      Resveratrol 250 MG CAPS Take by mouth daily      TURMERIC PO Take by mouth      ELIQUIS 5 MG TABS tablet TAKE 1 TABLET BY MOUTH TWO TIMES A DAY  60 tablet 0     No current facility-administered medications for this visit.      Lab Results   Component Value Date    WBC 4.7 02/22/2022    HGB 10.1 (L) 02/22/2022    HCT 28.6 (L) 02/22/2022    MCV 94.4 02/22/2022     02/22/2022     Lab Results   Component Value Date    CHOL 195 11/17/2021    CHOL 241 (H) 11/16/2020    CHOL 204 (H) 10/11/2012     Lab Results   Component Value Date    TRIG 97 11/17/2021    TRIG 71 11/16/2020    TRIG 93 10/11/2012     Lab Results   Component Value Date    HDL 54 11/17/2021    HDL 76 (H) 11/16/2020    HDL 85 (H) 10/11/2012     Lab Results   Component Value Date    LDLCALC 122 (H) 11/17/2021    LDLCALC 151 (H) 11/16/2020    LDLCALC 101 (H) 10/11/2012     Lab Results   Component Value Date    LABVLDL 19 11/17/2021    LABVLDL 14 11/16/2020    LABVLDL 19 10/11/2012     Lab Results   Component Value Date    CHOLHDLRATIO 4.1 11/02/2010       Chemistry        Component Value Date/Time     02/22/2022 0449    K 3.9 02/22/2022 0449    K 4.2 02/21/2022 0626     02/22/2022 0449    CO2 25 02/22/2022 0449    BUN 6 (L) 02/22/2022 0449    CREATININE 0.7 02/22/2022 0449        Component Value Date/Time    CALCIUM 8.8 02/22/2022 0449    ALKPHOS 113 02/19/2022 1405    AST 14 (L) 02/19/2022 1405    ALT <5 (L) 02/19/2022 1405    BILITOT 0.5 02/19/2022 1405        Lab Results   Component Value Date    TSH 1.97 11/17/2021     Lab Results   Component Value Date    LABA1C 5.1 11/17/2021     Lab Results   Component Value Date    EAG 99.7 11/17/2021         Review of Systems   Constitutional: Negative for activity change, appetite change, chills, diaphoresis, fatigue, fever and unexpected weight change. Lot of pain    HENT: Negative. Negative for congestion, ear discharge, ear pain, hearing loss, nosebleeds, postnasal drip, rhinorrhea, sinus pressure, sinus pain, sore throat, tinnitus, trouble swallowing and voice change. Allergy  OTC prn   Eyes: Negative. Negative for photophobia, pain, discharge, redness, itching and visual disturbance. Respiratory: Negative. Negative for apnea, cough, choking, chest tightness, shortness of breath and wheezing. Cardiovascular: Negative for chest pain, palpitations and leg swelling. Recent DVT Left Lower leg chronic changes  Rx Eliquis   Gastrointestinal: Positive for abdominal pain. Negative for abdominal distention, blood in stool, constipation, diarrhea, nausea and vomiting.         Dx Diverticulosis   With acute diverticulitis  Surgery is planned by Dr Dedra Craig   Endocrine: Negative for cold intolerance, heat intolerance, polydipsia, polyphagia and polyuria. Genitourinary: Negative for dysuria, frequency and urgency. C section  Year 2001  Ovaries & uterus intact   Musculoskeletal: Positive for back pain. Negative for gait problem and neck pain. Chronic back pain  Pain Specialist  Dx Psoriatic Arthritis  Dr Jase Pryor   Skin: Negative for color change and rash. Allergic/Immunologic: Positive for environmental allergies. Negative for food allergies. Neurological: Negative for dizziness, tremors, light-headedness, numbness and headaches. Hematological:        Hematology Dr Andujar Hem   D/C Eliquis 3 days prior to surgery  No Bridge with [facility]   Psychiatric/Behavioral: Negative for agitation, behavioral problems, decreased concentration and sleep disturbance. The patient is not hyperactive. Rx Wellbutrin  Good support system life partner       OBJECTIVE:  /72 (Site: Right Upper Arm, Position: Sitting, Cuff Size: Medium Adult)   Pulse 75   Temp 98 °F (36.7 °C) (Infrared)   Ht 5' 4\" (1.626 m)   Wt 135 lb 3.2 oz (61.3 kg)   SpO2 99%   BMI 23.21 kg/m²   Physical Exam  Vitals reviewed. Constitutional:       General: She is not in acute distress. Appearance: She is well-developed. She is not diaphoretic. HENT:      Head: Normocephalic and atraumatic. Right Ear: External ear normal.      Left Ear: External ear normal.      Nose: Nose normal.      Mouth/Throat:      Pharynx: No oropharyngeal exudate. Eyes:      General:         Right eye: No discharge. Left eye: No discharge. Conjunctiva/sclera: Conjunctivae normal.      Pupils: Pupils are equal, round, and reactive to light. Neck:      Thyroid: No thyromegaly. Cardiovascular:      Rate and Rhythm: Normal rate and regular rhythm. Heart sounds: Normal heart sounds. No murmur heard.       Pulmonary:      Effort: Pulmonary effort is normal. No respiratory distress. Breath sounds: Normal breath sounds. No wheezing or rales. Chest:      Chest wall: No tenderness. Abdominal:      General: Bowel sounds are normal. There is no distension. Palpations: Abdomen is soft. There is no mass. Tenderness: There is no abdominal tenderness. There is no guarding or rebound. Musculoskeletal:         General: No tenderness or deformity. Cervical back: Normal range of motion and neck supple. Lymphadenopathy:      Cervical: No cervical adenopathy. Skin:     General: Skin is warm. Findings: No rash. Neurological:      General: No focal deficit present. Mental Status: She is oriented to person, place, and time. Motor: No abnormal muscle tone. Deep Tendon Reflexes: Reflexes are normal and symmetric. Reflexes normal.   Psychiatric:         Behavior: Behavior normal.         Thought Content: Thought content normal.         Judgment: Judgment normal.         ASSESSMENT/PLAN:      Diagnosis Orders   1. Preoperative clearance     2. Diverticulitis of large intestine without bleeding, unspecified complication status     3.  Factor 5 Leiden mutation, heterozygous ([facility]     4. Coagulation disorder ([facility]           OK for planned surgery Dr Jenni Cui was explained to patient by surgeon  Pt is aware of possible risk & complications  DVT prophylaxis as per surgeon  Hematology Dr Dennie Muzzy clear patient with instruction  D/C Rx Eliquis 72 H prior to surgery & No  Bridge with Rx Lovonex is required  Hematology phone # [phone]   ECHO is schedule 3/22/2022  Recent Venous doppler 3/18/2022 chronic changes Left Lower leg

## 2022-03-22 ENCOUNTER — HOSPITAL ENCOUNTER (OUTPATIENT)
Dept: NON INVASIVE DIAGNOSTICS | Age: 52
Discharge: HOME | DRG: 330 | End: 2022-03-22
Payer: COMMERCIAL

## 2022-03-22 DIAGNOSIS — I95.9 HYPOTENSION, UNSPECIFIED HYPOTENSION TYPE: ICD-10-CM

## 2022-03-22 LAB
LV EF: 58 %
LV EF: 68 %
LVEF MODALITY: NORMAL
LVEF MODALITY: NORMAL

## 2022-03-22 PROCEDURE — 6360000002 HC RX W HCPCS: Performed by: INTERNAL MEDICINE

## 2022-03-22 PROCEDURE — 93017 CV STRESS TEST TRACING ONLY: CPT

## 2022-03-22 PROCEDURE — A9502 TC99M TETROFOSMIN: HCPCS | Performed by: INTERNAL MEDICINE

## 2022-03-22 PROCEDURE — 93306 TTE W/DOPPLER COMPLETE: CPT

## 2022-03-22 PROCEDURE — 78452 HT MUSCLE IMAGE SPECT MULT: CPT

## 2022-03-22 PROCEDURE — 3430000000 HC RX DIAGNOSTIC RADIOPHARMACEUTICAL: Performed by: INTERNAL MEDICINE

## 2022-03-22 PROCEDURE — 2580000003 HC RX 258: Performed by: INTERNAL MEDICINE

## 2022-03-22 RX ORDER — SODIUM CHLORIDE 0.9 % (FLUSH) 0.9 %
10 SYRINGE (ML) INJECTION 2 TIMES DAILY
Status: DISCONTINUED | OUTPATIENT
Start: 2022-03-22 | End: 2022-03-23 | Stop reason: HOSPADM

## 2022-03-22 RX ADMIN — TETROFOSMIN 30 MILLICURIE: 1.38 INJECTION, POWDER, LYOPHILIZED, FOR SOLUTION INTRAVENOUS at 12:07

## 2022-03-22 RX ADMIN — REGADENOSON 0.4 MG: 0.08 INJECTION, SOLUTION INTRAVENOUS at 12:07

## 2022-03-22 RX ADMIN — Medication 10 ML: at 10:09

## 2022-03-22 RX ADMIN — Medication 10 ML: at 12:07

## 2022-03-22 RX ADMIN — TETROFOSMIN 10 MILLICURIE: 1.38 INJECTION, POWDER, LYOPHILIZED, FOR SOLUTION INTRAVENOUS at 10:10

## 2022-03-24 ENCOUNTER — TELEPHONE (OUTPATIENT)
Dept: SURGERY | Age: 52
End: 2022-03-24

## 2022-03-24 ENCOUNTER — ANESTHESIA EVENT (OUTPATIENT)
Dept: OPERATING ROOM | Age: 52
DRG: 330 | End: 2022-03-24
Payer: COMMERCIAL

## 2022-03-24 DIAGNOSIS — K57.92 ACUTE DIVERTICULITIS: Primary | ICD-10-CM

## 2022-03-24 RX ORDER — METRONIDAZOLE 500 MG/1
TABLET ORAL
Qty: 3 TABLET | Refills: 0 | Status: ON HOLD | OUTPATIENT
Start: 2022-03-24 | End: 2022-03-25

## 2022-03-24 RX ORDER — NEOMYCIN SULFATE 500 MG/1
TABLET ORAL
Qty: 6 TABLET | Refills: 0 | Status: SHIPPED | OUTPATIENT
Start: 2022-03-24 | End: 2022-03-24 | Stop reason: RX

## 2022-03-24 RX ORDER — NEOMYCIN SULFATE 500 MG/1
TABLET ORAL
Qty: 6 TABLET | Refills: 0 | Status: ON HOLD | OUTPATIENT
Start: 2022-03-24 | End: 2022-03-25

## 2022-03-24 NOTE — PROGRESS NOTES
Called Dr. Ellis Epps office for cardiac clearance, letter to be placed in [facility]

## 2022-03-24 NOTE — TELEPHONE ENCOUNTER
Patient called at 10:10 am. Pharmacy that prescription was sent to is out of Neomycin. Patient called another pharmacy and confirmed [address] is [address]  Please re-send to the following pharmacy:    [facility] at [address]   Fax: [phone]  Phone: [phone]    Contact the patient at [phone]

## 2022-03-24 NOTE — TELEPHONE ENCOUNTER
Spoke to [address] [place] [address] in [place] who informed me that they have Neomycin and will get it ready ASAP. Patient informed and will  medication.

## 2022-03-24 NOTE — TELEPHONE ENCOUNTER
It appears that another provider cancelled the patient's antibiotics order. New order sent to [facility] in [facility]. Patient instructed to call the office if she has any issues receiving medications.

## 2022-03-24 NOTE — TELEPHONE ENCOUNTER
Patient called at 8:45 am to state that she does not have Neomycin that she's been prescribed to take at 1:00 pm today for her surgery with Dr. Colby Ortiz tomorrow, 3/25/2021    Please [facility] [address] [facility] [address] [place] [address], [address]   [phone]    Please call at [phone]

## 2022-03-25 ENCOUNTER — HOSPITAL ENCOUNTER (INPATIENT)
Age: 52
LOS: 2 days | Discharge: HOME | DRG: 330 | End: 2022-03-27
Attending: SURGERY | Admitting: SURGERY
Payer: COMMERCIAL

## 2022-03-25 ENCOUNTER — ANESTHESIA (OUTPATIENT)
Dept: OPERATING ROOM | Age: 52
DRG: 330 | End: 2022-03-25
Payer: COMMERCIAL

## 2022-03-25 VITALS
RESPIRATION RATE: 13 BRPM | SYSTOLIC BLOOD PRESSURE: 92 MMHG | DIASTOLIC BLOOD PRESSURE: 54 MMHG | TEMPERATURE: 97 F | OXYGEN SATURATION: 100 %

## 2022-03-25 DIAGNOSIS — G89.18 POST-OP PAIN: Primary | ICD-10-CM

## 2022-03-25 DIAGNOSIS — K57.92 DIVERTICULITIS: ICD-10-CM

## 2022-03-25 PROBLEM — K57.32 SIGMOID DIVERTICULITIS: Status: ACTIVE | Noted: 2022-03-25

## 2022-03-25 LAB
ABO/RH: NORMAL
ANION GAP SERPL CALCULATED.3IONS-SCNC: 14 MMOL/L (ref 3–16)
ANTIBODY SCREEN: NORMAL
APTT: 29.3 SEC (ref 26.2–38.6)
BUN BLDV-MCNC: 13 MG/DL (ref 7–20)
CALCIUM SERPL-MCNC: 9.7 MG/DL (ref 8.3–10.6)
CHLORIDE BLD-SCNC: 104 MMOL/L (ref 99–110)
CO2: 22 MMOL/L (ref 21–32)
CREAT SERPL-MCNC: 0.8 MG/DL (ref 0.6–1.1)
GFR AFRICAN AMERICAN: >60
GFR NON-AFRICAN AMERICAN: >60
GLUCOSE BLD-MCNC: 80 MG/DL (ref 70–99)
HCT VFR BLD CALC: 36.7 % (ref 36–48)
HEMOGLOBIN: 12.5 G/DL (ref 12–16)
INR BLD: 1.03 (ref 0.88–1.12)
MCH RBC QN AUTO: 32 PG (ref 26–34)
MCHC RBC AUTO-ENTMCNC: 34.2 G/DL (ref 31–36)
MCV RBC AUTO: 93.6 FL (ref 80–100)
PDW BLD-RTO: 12.5 % (ref 12.4–15.4)
PLATELET # BLD: 193 K/UL (ref 135–450)
PMV BLD AUTO: 7.9 FL (ref 5–10.5)
POTASSIUM REFLEX MAGNESIUM: 3.7 MMOL/L (ref 3.5–5.1)
PREGNANCY, URINE: NEGATIVE
PROTHROMBIN TIME: 11.7 SEC (ref 9.9–12.7)
RBC # BLD: 3.92 M/UL (ref 4–5.2)
SODIUM BLD-SCNC: 140 MMOL/L (ref 136–145)
WBC # BLD: 3.1 K/UL (ref 4–11)

## 2022-03-25 PROCEDURE — 2500000003 HC RX 250 WO HCPCS: Performed by: SURGERY

## 2022-03-25 PROCEDURE — 7100000001 HC PACU RECOVERY - ADDTL 15 MIN: Performed by: SURGERY

## 2022-03-25 PROCEDURE — 0DUN47Z SUPPLEMENT SIGMOID COLON WITH AUTOLOGOUS TISSUE SUBSTITUTE, PERCUTANEOUS ENDOSCOPIC APPROACH: ICD-10-PCS | Performed by: SURGERY

## 2022-03-25 PROCEDURE — 85027 COMPLETE CBC AUTOMATED: CPT

## 2022-03-25 PROCEDURE — 44207 L COLECTOMY/COLOPROCTOSTOMY: CPT | Performed by: SURGERY

## 2022-03-25 PROCEDURE — S2900 ROBOTIC SURGICAL SYSTEM: HCPCS | Performed by: SURGERY

## 2022-03-25 PROCEDURE — 1200000000 HC SEMI PRIVATE

## 2022-03-25 PROCEDURE — A4217 STERILE WATER/SALINE, 500 ML: HCPCS | Performed by: SURGERY

## 2022-03-25 PROCEDURE — 3600000009 HC SURGERY ROBOT BASE: Performed by: SURGERY

## 2022-03-25 PROCEDURE — 0DTN4ZZ RESECTION OF SIGMOID COLON, PERCUTANEOUS ENDOSCOPIC APPROACH: ICD-10-PCS | Performed by: SURGERY

## 2022-03-25 PROCEDURE — 49905 OMENTAL FLAP INTRA-ABDOM: CPT | Performed by: SURGERY

## 2022-03-25 PROCEDURE — 6360000002 HC RX W HCPCS: Performed by: SURGERY

## 2022-03-25 PROCEDURE — 2580000003 HC RX 258: Performed by: SURGERY

## 2022-03-25 PROCEDURE — 88307 TISSUE EXAM BY PATHOLOGIST: CPT

## 2022-03-25 PROCEDURE — 6370000000 HC RX 637 (ALT 250 FOR IP): Performed by: SURGERY

## 2022-03-25 PROCEDURE — 85730 THROMBOPLASTIN TIME PARTIAL: CPT

## 2022-03-25 PROCEDURE — 2580000003 HC RX 258: Performed by: ANESTHESIOLOGY

## 2022-03-25 PROCEDURE — 2580000003 HC RX 258: Performed by: STUDENT IN AN ORGANIZED HEALTH CARE EDUCATION/TRAINING PROGRAM

## 2022-03-25 PROCEDURE — 44213 LAP MOBIL SPLENIC FL ADD-ON: CPT | Performed by: SURGERY

## 2022-03-25 PROCEDURE — 2580000003 HC RX 258: Performed by: NURSE ANESTHETIST, CERTIFIED REGISTERED

## 2022-03-25 PROCEDURE — 7100000000 HC PACU RECOVERY - FIRST 15 MIN: Performed by: SURGERY

## 2022-03-25 PROCEDURE — 64488 TAP BLOCK BI INJECTION: CPT | Performed by: NURSE ANESTHETIST, CERTIFIED REGISTERED

## 2022-03-25 PROCEDURE — 2720000010 HC SURG SUPPLY STERILE: Performed by: SURGERY

## 2022-03-25 PROCEDURE — 80048 BASIC METABOLIC PNL TOTAL CA: CPT

## 2022-03-25 PROCEDURE — 2500000003 HC RX 250 WO HCPCS: Performed by: ANESTHESIOLOGY

## 2022-03-25 PROCEDURE — 64488 TAP BLOCK BI INJECTION: CPT | Performed by: ANESTHESIOLOGY

## 2022-03-25 PROCEDURE — 84703 CHORIONIC GONADOTROPIN ASSAY: CPT

## 2022-03-25 PROCEDURE — 3E0T3BZ INTRODUCTION OF ANESTHETIC AGENT INTO PERIPHERAL NERVES AND PLEXI, PERCUTANEOUS APPROACH: ICD-10-PCS | Performed by: ANESTHESIOLOGY

## 2022-03-25 PROCEDURE — 3700000001 HC ADD 15 MINUTES (ANESTHESIA): Performed by: SURGERY

## 2022-03-25 PROCEDURE — 2500000003 HC RX 250 WO HCPCS: Performed by: NURSE ANESTHETIST, CERTIFIED REGISTERED

## 2022-03-25 PROCEDURE — 6360000002 HC RX W HCPCS: Performed by: NURSE ANESTHETIST, CERTIFIED REGISTERED

## 2022-03-25 PROCEDURE — 2709999900 HC NON-CHARGEABLE SUPPLY: Performed by: SURGERY

## 2022-03-25 PROCEDURE — 86900 BLOOD TYPING SEROLOGIC ABO: CPT

## 2022-03-25 PROCEDURE — 6370000000 HC RX 637 (ALT 250 FOR IP): Performed by: STUDENT IN AN ORGANIZED HEALTH CARE EDUCATION/TRAINING PROGRAM

## 2022-03-25 PROCEDURE — 3600000019 HC SURGERY ROBOT ADDTL 15MIN: Performed by: SURGERY

## 2022-03-25 PROCEDURE — 8E0W4CZ ROBOTIC ASSISTED PROCEDURE OF TRUNK REGION, PERCUTANEOUS ENDOSCOPIC APPROACH: ICD-10-PCS | Performed by: SURGERY

## 2022-03-25 PROCEDURE — 3700000000 HC ANESTHESIA ATTENDED CARE: Performed by: SURGERY

## 2022-03-25 PROCEDURE — 85610 PROTHROMBIN TIME: CPT

## 2022-03-25 PROCEDURE — 86901 BLOOD TYPING SEROLOGIC RH(D): CPT

## 2022-03-25 PROCEDURE — 6360000002 HC RX W HCPCS: Performed by: ANESTHESIOLOGY

## 2022-03-25 PROCEDURE — C1729 CATH, DRAINAGE: HCPCS | Performed by: SURGERY

## 2022-03-25 PROCEDURE — 86850 RBC ANTIBODY SCREEN: CPT

## 2022-03-25 PROCEDURE — 94150 VITAL CAPACITY TEST: CPT

## 2022-03-25 RX ORDER — SODIUM CHLORIDE 0.9 % (FLUSH) 0.9 %
5-40 SYRINGE (ML) INJECTION PRN
Status: DISCONTINUED | OUTPATIENT
Start: 2022-03-25 | End: 2022-03-27 | Stop reason: HOSPADM

## 2022-03-25 RX ORDER — SODIUM CHLORIDE 9 MG/ML
25 INJECTION, SOLUTION INTRAVENOUS PRN
Status: DISCONTINUED | OUTPATIENT
Start: 2022-03-25 | End: 2022-03-27 | Stop reason: HOSPADM

## 2022-03-25 RX ORDER — IBUPROFEN 200 MG
600 TABLET ORAL EVERY 6 HOURS
Status: DISCONTINUED | OUTPATIENT
Start: 2022-03-25 | End: 2022-03-27 | Stop reason: HOSPADM

## 2022-03-25 RX ORDER — LEVOFLOXACIN 5 MG/ML
500 INJECTION, SOLUTION INTRAVENOUS
Status: COMPLETED | OUTPATIENT
Start: 2022-03-25 | End: 2022-03-25

## 2022-03-25 RX ORDER — PHENYLEPHRINE HYDROCHLORIDE 10 MG/ML
INJECTION INTRAVENOUS PRN
Status: DISCONTINUED | OUTPATIENT
Start: 2022-03-25 | End: 2022-03-25 | Stop reason: SDUPTHER

## 2022-03-25 RX ORDER — MAGNESIUM HYDROXIDE 1200 MG/15ML
LIQUID ORAL CONTINUOUS PRN
Status: COMPLETED | OUTPATIENT
Start: 2022-03-25 | End: 2022-03-25

## 2022-03-25 RX ORDER — SODIUM CHLORIDE 0.9 % (FLUSH) 0.9 %
5-40 SYRINGE (ML) INJECTION PRN
Status: DISCONTINUED | OUTPATIENT
Start: 2022-03-25 | End: 2022-03-25 | Stop reason: HOSPADM

## 2022-03-25 RX ORDER — BUPIVACAINE HYDROCHLORIDE 5 MG/ML
INJECTION, SOLUTION EPIDURAL; INTRACAUDAL
Status: DISCONTINUED
Start: 2022-03-25 | End: 2022-03-25

## 2022-03-25 RX ORDER — SODIUM CHLORIDE 9 MG/ML
INJECTION, SOLUTION INTRAVENOUS CONTINUOUS PRN
Status: DISCONTINUED | OUTPATIENT
Start: 2022-03-25 | End: 2022-03-25 | Stop reason: SDUPTHER

## 2022-03-25 RX ORDER — HYDROMORPHONE HCL 110MG/55ML
PATIENT CONTROLLED ANALGESIA SYRINGE INTRAVENOUS PRN
Status: DISCONTINUED | OUTPATIENT
Start: 2022-03-25 | End: 2022-03-25 | Stop reason: SDUPTHER

## 2022-03-25 RX ORDER — ONDANSETRON 2 MG/ML
INJECTION INTRAMUSCULAR; INTRAVENOUS PRN
Status: DISCONTINUED | OUTPATIENT
Start: 2022-03-25 | End: 2022-03-25 | Stop reason: SDUPTHER

## 2022-03-25 RX ORDER — OXYCODONE HYDROCHLORIDE 5 MG/1
5 TABLET ORAL EVERY 4 HOURS PRN
Status: DISCONTINUED | OUTPATIENT
Start: 2022-03-25 | End: 2022-03-27 | Stop reason: HOSPADM

## 2022-03-25 RX ORDER — SODIUM CHLORIDE, SODIUM LACTATE, POTASSIUM CHLORIDE, CALCIUM CHLORIDE 600; 310; 30; 20 MG/100ML; MG/100ML; MG/100ML; MG/100ML
INJECTION, SOLUTION INTRAVENOUS CONTINUOUS
Status: DISCONTINUED | OUTPATIENT
Start: 2022-03-25 | End: 2022-03-25

## 2022-03-25 RX ORDER — ONDANSETRON 4 MG/1
4 TABLET, ORALLY DISINTEGRATING ORAL EVERY 8 HOURS PRN
Status: DISCONTINUED | OUTPATIENT
Start: 2022-03-25 | End: 2022-03-27 | Stop reason: HOSPADM

## 2022-03-25 RX ORDER — ACETAMINOPHEN 500 MG
1000 TABLET ORAL ONCE
Status: COMPLETED | OUTPATIENT
Start: 2022-03-25 | End: 2022-03-25

## 2022-03-25 RX ORDER — SODIUM CHLORIDE 9 MG/ML
25 INJECTION, SOLUTION INTRAVENOUS PRN
Status: DISCONTINUED | OUTPATIENT
Start: 2022-03-25 | End: 2022-03-25 | Stop reason: HOSPADM

## 2022-03-25 RX ORDER — SODIUM CHLORIDE 0.9 % (FLUSH) 0.9 %
5-40 SYRINGE (ML) INJECTION EVERY 12 HOURS SCHEDULED
Status: DISCONTINUED | OUTPATIENT
Start: 2022-03-25 | End: 2022-03-27 | Stop reason: HOSPADM

## 2022-03-25 RX ORDER — ACETAMINOPHEN 500 MG
1000 TABLET ORAL EVERY 6 HOURS
Status: DISCONTINUED | OUTPATIENT
Start: 2022-03-25 | End: 2022-03-27 | Stop reason: HOSPADM

## 2022-03-25 RX ORDER — ONDANSETRON 2 MG/ML
4 INJECTION INTRAMUSCULAR; INTRAVENOUS EVERY 6 HOURS PRN
Status: DISCONTINUED | OUTPATIENT
Start: 2022-03-25 | End: 2022-03-27 | Stop reason: HOSPADM

## 2022-03-25 RX ORDER — BUPROPION HYDROCHLORIDE 150 MG/1
300 TABLET ORAL DAILY
Status: DISCONTINUED | OUTPATIENT
Start: 2022-03-26 | End: 2022-03-27 | Stop reason: HOSPADM

## 2022-03-25 RX ORDER — OXYCODONE HYDROCHLORIDE 5 MG/1
10 TABLET ORAL EVERY 4 HOURS PRN
Status: DISCONTINUED | OUTPATIENT
Start: 2022-03-25 | End: 2022-03-27 | Stop reason: HOSPADM

## 2022-03-25 RX ORDER — GLYCOPYRROLATE 0.2 MG/ML
INJECTION INTRAMUSCULAR; INTRAVENOUS PRN
Status: DISCONTINUED | OUTPATIENT
Start: 2022-03-25 | End: 2022-03-25 | Stop reason: SDUPTHER

## 2022-03-25 RX ORDER — SODIUM CHLORIDE 0.9 % (FLUSH) 0.9 %
5-40 SYRINGE (ML) INJECTION EVERY 12 HOURS SCHEDULED
Status: DISCONTINUED | OUTPATIENT
Start: 2022-03-25 | End: 2022-03-25 | Stop reason: HOSPADM

## 2022-03-25 RX ORDER — ROCURONIUM BROMIDE 10 MG/ML
INJECTION, SOLUTION INTRAVENOUS PRN
Status: DISCONTINUED | OUTPATIENT
Start: 2022-03-25 | End: 2022-03-25 | Stop reason: SDUPTHER

## 2022-03-25 RX ORDER — MIDAZOLAM HYDROCHLORIDE 1 MG/ML
INJECTION INTRAMUSCULAR; INTRAVENOUS PRN
Status: DISCONTINUED | OUTPATIENT
Start: 2022-03-25 | End: 2022-03-25 | Stop reason: SDUPTHER

## 2022-03-25 RX ORDER — TRAZODONE HYDROCHLORIDE 50 MG/1
50 TABLET ORAL NIGHTLY PRN
Status: DISCONTINUED | OUTPATIENT
Start: 2022-03-25 | End: 2022-03-27 | Stop reason: HOSPADM

## 2022-03-25 RX ORDER — FENTANYL CITRATE 50 UG/ML
INJECTION, SOLUTION INTRAMUSCULAR; INTRAVENOUS PRN
Status: DISCONTINUED | OUTPATIENT
Start: 2022-03-25 | End: 2022-03-25 | Stop reason: SDUPTHER

## 2022-03-25 RX ORDER — PROPOFOL 10 MG/ML
INJECTION, EMULSION INTRAVENOUS PRN
Status: DISCONTINUED | OUTPATIENT
Start: 2022-03-25 | End: 2022-03-25 | Stop reason: SDUPTHER

## 2022-03-25 RX ORDER — DEXAMETHASONE SODIUM PHOSPHATE 4 MG/ML
INJECTION, SOLUTION INTRA-ARTICULAR; INTRALESIONAL; INTRAMUSCULAR; INTRAVENOUS; SOFT TISSUE PRN
Status: DISCONTINUED | OUTPATIENT
Start: 2022-03-25 | End: 2022-03-25 | Stop reason: SDUPTHER

## 2022-03-25 RX ORDER — MEPERIDINE HYDROCHLORIDE 25 MG/ML
12.5 INJECTION INTRAMUSCULAR; INTRAVENOUS; SUBCUTANEOUS EVERY 5 MIN PRN
Status: DISCONTINUED | OUTPATIENT
Start: 2022-03-25 | End: 2022-03-25 | Stop reason: HOSPADM

## 2022-03-25 RX ORDER — METHOCARBAMOL 750 MG/1
1500 TABLET, FILM COATED ORAL 4 TIMES DAILY
Status: DISCONTINUED | OUTPATIENT
Start: 2022-03-25 | End: 2022-03-27 | Stop reason: HOSPADM

## 2022-03-25 RX ORDER — LIDOCAINE HYDROCHLORIDE 20 MG/ML
INJECTION, SOLUTION INTRAVENOUS PRN
Status: DISCONTINUED | OUTPATIENT
Start: 2022-03-25 | End: 2022-03-25 | Stop reason: SDUPTHER

## 2022-03-25 RX ORDER — BUPIVACAINE HYDROCHLORIDE 2.5 MG/ML
INJECTION, SOLUTION EPIDURAL; INFILTRATION; INTRACAUDAL
Status: COMPLETED | OUTPATIENT
Start: 2022-03-25 | End: 2022-03-25

## 2022-03-25 RX ORDER — APREPITANT 40 MG/1
40 CAPSULE ORAL ONCE
Status: COMPLETED | OUTPATIENT
Start: 2022-03-25 | End: 2022-03-25

## 2022-03-25 RX ADMIN — SODIUM CHLORIDE, PRESERVATIVE FREE 10 ML: 5 INJECTION INTRAVENOUS at 20:39

## 2022-03-25 RX ADMIN — OXYCODONE 10 MG: 5 TABLET ORAL at 16:42

## 2022-03-25 RX ADMIN — SODIUM CHLORIDE, POTASSIUM CHLORIDE, SODIUM LACTATE AND CALCIUM CHLORIDE: 600; 310; 30; 20 INJECTION, SOLUTION INTRAVENOUS at 06:44

## 2022-03-25 RX ADMIN — HYDROMORPHONE HYDROCHLORIDE 0.5 MG: 2 INJECTION, SOLUTION INTRAMUSCULAR; INTRAVENOUS; SUBCUTANEOUS at 10:50

## 2022-03-25 RX ADMIN — PHENYLEPHRINE HYDROCHLORIDE 150 MCG: 10 INJECTION INTRAVENOUS at 08:47

## 2022-03-25 RX ADMIN — PHENYLEPHRINE HYDROCHLORIDE 100 MCG: 10 INJECTION INTRAVENOUS at 07:52

## 2022-03-25 RX ADMIN — BUPIVACAINE HYDROCHLORIDE 20 ML: 2.5 INJECTION, SOLUTION EPIDURAL; INFILTRATION; INTRACAUDAL; PERINEURAL at 11:07

## 2022-03-25 RX ADMIN — ROCURONIUM BROMIDE 10 MG: 10 INJECTION INTRAVENOUS at 09:36

## 2022-03-25 RX ADMIN — SODIUM CHLORIDE: 9 INJECTION, SOLUTION INTRAVENOUS at 07:40

## 2022-03-25 RX ADMIN — GLYCOPYRROLATE 0.2 MG: 0.2 INJECTION INTRAMUSCULAR; INTRAVENOUS at 07:45

## 2022-03-25 RX ADMIN — ROCURONIUM BROMIDE 40 MG: 10 INJECTION INTRAVENOUS at 09:08

## 2022-03-25 RX ADMIN — APREPITANT 40 MG: 40 CAPSULE ORAL at 07:27

## 2022-03-25 RX ADMIN — METHOCARBAMOL 1500 MG: 750 TABLET ORAL at 16:42

## 2022-03-25 RX ADMIN — FENTANYL CITRATE 50 MCG: 50 INJECTION, SOLUTION INTRAMUSCULAR; INTRAVENOUS at 07:35

## 2022-03-25 RX ADMIN — ROCURONIUM BROMIDE 70 MG: 10 INJECTION INTRAVENOUS at 07:35

## 2022-03-25 RX ADMIN — HYDROMORPHONE HYDROCHLORIDE 0.25 MG: 1 INJECTION, SOLUTION INTRAMUSCULAR; INTRAVENOUS; SUBCUTANEOUS at 12:31

## 2022-03-25 RX ADMIN — ACETAMINOPHEN 1000 MG: 500 TABLET ORAL at 20:38

## 2022-03-25 RX ADMIN — ACETAMINOPHEN 1000 MG: 500 TABLET ORAL at 14:04

## 2022-03-25 RX ADMIN — HYDROMORPHONE HYDROCHLORIDE 0.5 MG: 1 INJECTION, SOLUTION INTRAMUSCULAR; INTRAVENOUS; SUBCUTANEOUS at 12:09

## 2022-03-25 RX ADMIN — HYDROMORPHONE HYDROCHLORIDE 0.25 MG: 1 INJECTION, SOLUTION INTRAMUSCULAR; INTRAVENOUS; SUBCUTANEOUS at 12:22

## 2022-03-25 RX ADMIN — SUGAMMADEX 200 MG: 100 INJECTION, SOLUTION INTRAVENOUS at 10:50

## 2022-03-25 RX ADMIN — DEXAMETHASONE SODIUM PHOSPHATE 4 MG: 4 INJECTION, SOLUTION INTRAMUSCULAR; INTRAVENOUS at 07:39

## 2022-03-25 RX ADMIN — PHENYLEPHRINE HYDROCHLORIDE 150 MCG: 10 INJECTION INTRAVENOUS at 08:27

## 2022-03-25 RX ADMIN — PROPOFOL 140 MG: 10 INJECTION, EMULSION INTRAVENOUS at 07:35

## 2022-03-25 RX ADMIN — FENTANYL CITRATE 50 MCG: 50 INJECTION, SOLUTION INTRAMUSCULAR; INTRAVENOUS at 08:17

## 2022-03-25 RX ADMIN — HYDROMORPHONE HYDROCHLORIDE 0.5 MG: 1 INJECTION, SOLUTION INTRAMUSCULAR; INTRAVENOUS; SUBCUTANEOUS at 12:00

## 2022-03-25 RX ADMIN — PHENYLEPHRINE HYDROCHLORIDE 150 MCG: 10 INJECTION INTRAVENOUS at 10:26

## 2022-03-25 RX ADMIN — HYDROMORPHONE HYDROCHLORIDE 0.5 MG: 2 INJECTION, SOLUTION INTRAMUSCULAR; INTRAVENOUS; SUBCUTANEOUS at 10:43

## 2022-03-25 RX ADMIN — LEVOFLOXACIN 500 MG: 5 INJECTION, SOLUTION INTRAVENOUS at 07:39

## 2022-03-25 RX ADMIN — HYDROMORPHONE HYDROCHLORIDE 0.5 MG: 2 INJECTION, SOLUTION INTRAMUSCULAR; INTRAVENOUS; SUBCUTANEOUS at 10:55

## 2022-03-25 RX ADMIN — METHOCARBAMOL 1500 MG: 750 TABLET ORAL at 20:38

## 2022-03-25 RX ADMIN — PHENYLEPHRINE HYDROCHLORIDE 100 MCG: 10 INJECTION INTRAVENOUS at 10:43

## 2022-03-25 RX ADMIN — ONDANSETRON 4 MG: 2 INJECTION INTRAMUSCULAR; INTRAVENOUS at 07:39

## 2022-03-25 RX ADMIN — HYDROMORPHONE HYDROCHLORIDE 0.5 MG: 2 INJECTION, SOLUTION INTRAMUSCULAR; INTRAVENOUS; SUBCUTANEOUS at 09:22

## 2022-03-25 RX ADMIN — MIDAZOLAM HYDROCHLORIDE 2 MG: 2 INJECTION, SOLUTION INTRAMUSCULAR; INTRAVENOUS at 07:28

## 2022-03-25 RX ADMIN — PHENYLEPHRINE HYDROCHLORIDE 25 MCG/MIN: 10 INJECTION, SOLUTION INTRAMUSCULAR; INTRAVENOUS; SUBCUTANEOUS at 08:49

## 2022-03-25 RX ADMIN — LIDOCAINE HYDROCHLORIDE 100 MG: 20 INJECTION, SOLUTION INTRAVENOUS at 07:35

## 2022-03-25 RX ADMIN — ACETAMINOPHEN 1000 MG: 500 TABLET ORAL at 07:03

## 2022-03-25 RX ADMIN — IBUPROFEN 600 MG: 200 TABLET, FILM COATED ORAL at 20:38

## 2022-03-25 RX ADMIN — IBUPROFEN 600 MG: 200 TABLET, FILM COATED ORAL at 14:04

## 2022-03-25 RX ADMIN — METRONIDAZOLE 500 MG: 500 INJECTION, SOLUTION INTRAVENOUS at 07:50

## 2022-03-25 RX ADMIN — ROCURONIUM BROMIDE 30 MG: 10 INJECTION INTRAVENOUS at 10:10

## 2022-03-25 RX ADMIN — ROCURONIUM BROMIDE 30 MG: 10 INJECTION INTRAVENOUS at 08:00

## 2022-03-25 ASSESSMENT — PULMONARY FUNCTION TESTS
PIF_VALUE: 25
PIF_VALUE: 26
PIF_VALUE: 14
PIF_VALUE: 27
PIF_VALUE: 14
PIF_VALUE: 2
PIF_VALUE: 18
PIF_VALUE: 27
PIF_VALUE: 16
PIF_VALUE: 14
PIF_VALUE: 25
PIF_VALUE: 26
PIF_VALUE: 24
PIF_VALUE: 26
PIF_VALUE: 26
PIF_VALUE: 19
PIF_VALUE: 2
PIF_VALUE: 14
PIF_VALUE: 2
PIF_VALUE: 14
PIF_VALUE: 24
PIF_VALUE: 20
PIF_VALUE: 27
PIF_VALUE: 22
PIF_VALUE: 28
PIF_VALUE: 14
PIF_VALUE: 27
PIF_VALUE: 28
PIF_VALUE: 9
PIF_VALUE: 27
PIF_VALUE: 28
PIF_VALUE: 2
PIF_VALUE: 27
PIF_VALUE: 25
PIF_VALUE: 25
PIF_VALUE: 27
PIF_VALUE: 28
PIF_VALUE: 19
PIF_VALUE: 25
PIF_VALUE: 27
PIF_VALUE: 2
PIF_VALUE: 26
PIF_VALUE: 2
PIF_VALUE: 25
PIF_VALUE: 2
PIF_VALUE: 17
PIF_VALUE: 2
PIF_VALUE: 25
PIF_VALUE: 27
PIF_VALUE: 15
PIF_VALUE: 13
PIF_VALUE: 4
PIF_VALUE: 27
PIF_VALUE: 27
PIF_VALUE: 24
PIF_VALUE: 27
PIF_VALUE: 14
PIF_VALUE: 21
PIF_VALUE: 26
PIF_VALUE: 27
PIF_VALUE: 27
PIF_VALUE: 28
PIF_VALUE: 22
PIF_VALUE: 26
PIF_VALUE: 20
PIF_VALUE: 14
PIF_VALUE: 2
PIF_VALUE: 26
PIF_VALUE: 27
PIF_VALUE: 20
PIF_VALUE: 27
PIF_VALUE: 27
PIF_VALUE: 6
PIF_VALUE: 16
PIF_VALUE: 24
PIF_VALUE: 16
PIF_VALUE: 27
PIF_VALUE: 22
PIF_VALUE: 16
PIF_VALUE: 25
PIF_VALUE: 27
PIF_VALUE: 16
PIF_VALUE: 27
PIF_VALUE: 16
PIF_VALUE: 14
PIF_VALUE: 27
PIF_VALUE: 18
PIF_VALUE: 24
PIF_VALUE: 19
PIF_VALUE: 2
PIF_VALUE: 23
PIF_VALUE: 0
PIF_VALUE: 25
PIF_VALUE: 13
PIF_VALUE: 2
PIF_VALUE: 13
PIF_VALUE: 3
PIF_VALUE: 13
PIF_VALUE: 22
PIF_VALUE: 24
PIF_VALUE: 14
PIF_VALUE: 25
PIF_VALUE: 16
PIF_VALUE: 13
PIF_VALUE: 27
PIF_VALUE: 14
PIF_VALUE: 14
PIF_VALUE: 25
PIF_VALUE: 27
PIF_VALUE: 13
PIF_VALUE: 27
PIF_VALUE: 28
PIF_VALUE: 27
PIF_VALUE: 26
PIF_VALUE: 16
PIF_VALUE: 14
PIF_VALUE: 28
PIF_VALUE: 24
PIF_VALUE: 2
PIF_VALUE: 28
PIF_VALUE: 20
PIF_VALUE: 27
PIF_VALUE: 27
PIF_VALUE: 28
PIF_VALUE: 26
PIF_VALUE: 16
PIF_VALUE: 25
PIF_VALUE: 20
PIF_VALUE: 27
PIF_VALUE: 26
PIF_VALUE: 14
PIF_VALUE: 14
PIF_VALUE: 25
PIF_VALUE: 14
PIF_VALUE: 23
PIF_VALUE: 0
PIF_VALUE: 24
PIF_VALUE: 27
PIF_VALUE: 23
PIF_VALUE: 28
PIF_VALUE: 27
PIF_VALUE: 18
PIF_VALUE: 16
PIF_VALUE: 14
PIF_VALUE: 27
PIF_VALUE: 18
PIF_VALUE: 27
PIF_VALUE: 27
PIF_VALUE: 15
PIF_VALUE: 0
PIF_VALUE: 14
PIF_VALUE: 27
PIF_VALUE: 18
PIF_VALUE: 26
PIF_VALUE: 25
PIF_VALUE: 26
PIF_VALUE: 26
PIF_VALUE: 21
PIF_VALUE: 2
PIF_VALUE: 3
PIF_VALUE: 28
PIF_VALUE: 27
PIF_VALUE: 27
PIF_VALUE: 23
PIF_VALUE: 17
PIF_VALUE: 2
PIF_VALUE: 26
PIF_VALUE: 27
PIF_VALUE: 27
PIF_VALUE: 22
PIF_VALUE: 14
PIF_VALUE: 27
PIF_VALUE: 2
PIF_VALUE: 27
PIF_VALUE: 27
PIF_VALUE: 21
PIF_VALUE: 26
PIF_VALUE: 27
PIF_VALUE: 24
PIF_VALUE: 27
PIF_VALUE: 27
PIF_VALUE: 14
PIF_VALUE: 0
PIF_VALUE: 13
PIF_VALUE: 18
PIF_VALUE: 1
PIF_VALUE: 14
PIF_VALUE: 19
PIF_VALUE: 26
PIF_VALUE: 24
PIF_VALUE: 27
PIF_VALUE: 16
PIF_VALUE: 27
PIF_VALUE: 14
PIF_VALUE: 23
PIF_VALUE: 13
PIF_VALUE: 20
PIF_VALUE: 35
PIF_VALUE: 2
PIF_VALUE: 16
PIF_VALUE: 2
PIF_VALUE: 27
PIF_VALUE: 23
PIF_VALUE: 17
PIF_VALUE: 23
PIF_VALUE: 27
PIF_VALUE: 0
PIF_VALUE: 27
PIF_VALUE: 3
PIF_VALUE: 27
PIF_VALUE: 16
PIF_VALUE: 14
PIF_VALUE: 28
PIF_VALUE: 27
PIF_VALUE: 14
PIF_VALUE: 20
PIF_VALUE: 14

## 2022-03-25 ASSESSMENT — PAIN SCALES - GENERAL
PAINLEVEL_OUTOF10: 6
PAINLEVEL_OUTOF10: 3
PAINLEVEL_OUTOF10: 3
PAINLEVEL_OUTOF10: 0
PAINLEVEL_OUTOF10: 4
PAINLEVEL_OUTOF10: 0
PAINLEVEL_OUTOF10: 0
PAINLEVEL_OUTOF10: 3
PAINLEVEL_OUTOF10: 4
PAINLEVEL_OUTOF10: 7
PAINLEVEL_OUTOF10: 5
PAINLEVEL_OUTOF10: 9
PAINLEVEL_OUTOF10: 0
PAINLEVEL_OUTOF10: 7
PAINLEVEL_OUTOF10: 0

## 2022-03-25 ASSESSMENT — PAIN DESCRIPTION - PROGRESSION
CLINICAL_PROGRESSION: GRADUALLY WORSENING
CLINICAL_PROGRESSION: GRADUALLY WORSENING
CLINICAL_PROGRESSION: NOT CHANGED

## 2022-03-25 ASSESSMENT — PAIN DESCRIPTION - PAIN TYPE
TYPE: SURGICAL PAIN

## 2022-03-25 ASSESSMENT — PAIN DESCRIPTION - FREQUENCY
FREQUENCY: CONTINUOUS

## 2022-03-25 ASSESSMENT — PAIN DESCRIPTION - DESCRIPTORS
DESCRIPTORS: ACHING

## 2022-03-25 ASSESSMENT — PAIN DESCRIPTION - LOCATION
LOCATION: ABDOMEN

## 2022-03-25 ASSESSMENT — PAIN - FUNCTIONAL ASSESSMENT: PAIN_FUNCTIONAL_ASSESSMENT: 0-10

## 2022-03-25 ASSESSMENT — PAIN DESCRIPTION - ONSET
ONSET: ON-GOING

## 2022-03-25 ASSESSMENT — PAIN DESCRIPTION - ORIENTATION
ORIENTATION: ANTERIOR

## 2022-03-25 NOTE — H&P
Dre Ramon [facility]    [id number]    [facility] Same Day Surgery Update H & P  Department of General Surgery   Surgical Service   Pre-operative History and Physical  Last H & P within the last 30 days. DIAGNOSIS:   Diverticulitis [K57.92]  Sigmoid diverticulitis [K57.32]    Procedure(s):  ROBOTIC SIGMOID COLECTOMY    History obtained from: Patient interview and EHR      HISTORY OF PRESENT ILLNESS:   Patient is a 49 y.o. female with history of factor V Leiden, who was recently hospitalized for acute sigmoid diverticulitis in the setting of immunocompromise. This appears to be her fourth documented episode, and she presents today for the above procedure. Illness screening:  Patient denies recent fever, chills, worsening cough, or known sick exposure     Past Medical History:        Diagnosis Date    Acne     Dr Romy Prakash Diverticulitis 2020    Factor 5 Leiden mutation, heterozygous ([address]     Hx of blood clots     IUD (intrauterine device) in place     Medical history reviewed with no changes     PONV (postoperative nausea and vomiting)      Past Surgical History:        Procedure Laterality Date    CARPAL TUNNEL RELEASE Bilateral      SECTION  2001     x1    FINGER TRIGGER RELEASE Bilateral     PAIN MANAGEMENT PROCEDURE Left 10/26/2020    LEFT L4 AND L5 TRANSFORAMINAL EPDIURAL STEROID INJECTION WITH FLUOROSCOPY (28910, [id number]) performed by Feliz Nicholson MD at [address] Left 2020    LEFT LUMBAR FOUR LUMBAR FIVE TRANSFORAMINAL  EPIDURAL STEROID INJECTION SITE CONFIRMED BY FLUOROSCOPY performed by Feliz Nicholson MD at [address]       Medications Prior to Admission:      Prior to Admission medications    Medication Sig Start Date End Date Taking?  Authorizing Provider   traZODone (DESYREL) 50 MG tablet TAKE 1 TABLET NIGHTLY AS NEEDED FOR SLEEP 22  Yes Sarita Rich MD   vitamin B-12 (CYANOCOBALAMIN) 500 MCG tablet Take 500 mcg by mouth daily   Yes Historical Provider, MD   docusate sodium (COLACE) 100 MG capsule Take 100 mg by mouth 2 times daily   Yes Historical Provider, MD   Saccharomyces boulardii (FLORASTOR PO) Take by mouth daily   Yes Historical Provider, MD   ferrous sulfate (IRON 325) 325 (65 Fe) MG tablet TAKE 1 TABLET EVERY OTHER DAY 9/22/21  Yes Historical Provider, MD   Resveratrol 250 MG CAPS Take by mouth daily   Yes Historical Provider, MD   TURMERIC PO Take by mouth   Yes Historical Provider, MD   ELIQUIS 5 MG TABS tablet TAKE 1 TABLET BY MOUTH TWO TIMES A DAY  8/7/20  Yes Bibiana Gonzalez MD   metroNIDAZOLE (FLAGYL) 500 MG tablet Take one tablet by mouth 3 times on the day prior to surgery. Take one tablet at 1pm, 2pm and 9pm. 3/24/22   Chiquita Frankel, MD   neomycin (MYCIFRADIN) 500 MG tablet Take two tablets 3 times the day before surgery. Take two tablets at 1pm, two tablets at 2pm and two tablets at 9pm. 3/24/22   Chiquita Frankel, MD   oxyCODONE-acetaminophen (PERCOCET) 5-325 MG per tablet Take 1 tablet by mouth every 8 hours as needed for Pain for up to 8 days. Take no more than 2-3 tabs orally prn 3/17/22 3/25/22  Elisa Bernie, APRN - CNP   buPROPion (WELLBUTRIN XL) 300 MG extended release tablet  2/4/22   Historical Provider, MD         Allergies:  Patient has no known allergies. PHYSICAL EXAM:      BP 95/64   Pulse 58   Temp 98 °F (36.7 °C) (Temporal)   Resp 16   Ht 5' 4\" (1.626 m)   Wt 131 lb (59.4 kg)   SpO2 99%   BMI 22.49 kg/m²      Airway:  Airway patent with no audible stridor    Heart:  Bradycardic, with regular rhythm. No murmur noted    Lungs:  No increased work of breathing, good air exchange, clear to auscultation bilaterally, no crackles or wheezing    Abdomen:  Soft, non-distended, non-tender, no rebound tenderness or guarding, and no masses palpated    ASSESSMENT AND PLAN     Patient is a 49 y.o. female with above specified procedure planned.     1.  The patients history and physical was obtained and signed off by the pre-admission testing department. Patient seen and focused exam done today- no new changes since last physical exam on 3/21/2022.    2.  Access to ancillary services are available per request of the provider.     CHRISTA Melendez - CNP     3/25/2022

## 2022-03-25 NOTE — PROGRESS NOTES
Patient arrived to PACU post ROBOTIC SIGMOID COLECTOMY, SPLENIC FLEXURE, OMENTAL FLAP with Dr. Horacio Allred. VSS on arrival. CRNA gave report to PACU RN at bedside, stating BP support given during procedure. Surgical sites clean, dry and intact. Ice to surgical site. Patient shows no evidence of pain at this time. Will continue to monitor.

## 2022-03-25 NOTE — BRIEF OP NOTE
Brief Postoperative Note      Patient: Vane Lara  YOB: 1970  MRN: [id number]    Date of Procedure: 3/25/2022    Pre-Op Diagnosis: Diverticulitis [K57.92]    Post-Op Diagnosis: Same       Procedure(s):  ROBOTIC SIGMOID COLECTOMY, SPLENIC FLEXURE, OMENTAL FLAP    Surgeon(s):  Nanette Capone MD    Assistant:  Surgical Assistant: Daysi Duron  Resident: Evette Dyson MD    Anesthesia: General    Estimated Blood Loss (mL): less than 50     Complications: None    Specimens:   ID Type Source Tests Collected by Time Destination   A : A.  SIGMOID COLON Tissue Tissue SURGICAL PATHOLOGY Nanette Capone MD 3/25/2022 1011        Implants:  * No implants in log *      Drains:   NG/OG/NJ/NE Tube Orogastric 18 fr Center mouth (Active)       Urethral Catheter Non-latex (Active)       Findings: intraoperative flex sig with patent and hemostatic anastomosis; negative leak test    Electronically signed by Evette Dyson MD on 3/25/2022 at 11:03 AM
no

## 2022-03-25 NOTE — OP NOTE
[facility] [place] [address], [address]                                OPERATIVE REPORT    PATIENT NAME: Jemma Metz                      :        1970  MED REC NO:   [id number]                          ROOM:       8070  ACCOUNT NO:   [de-identified]                           ADMIT DATE: 2022  PROVIDER:     Jeffrey Buitrago MD    DATE OF PROCEDURE:  2022    SURGEON:  Chacho Rodriguez MD    ASSISTANT:  Angeline Carver, PGY-5, resident. PREOPERATIVE DIAGNOSIS:  Sigmoid diverticulitis. POSTOPERATIVE DIAGNOSIS:  Sigmoid diverticulitis. PROCEDURES:  1.  Robotic-assisted laparoscopic sigmoid colectomy with colorectal  anastomosis. 2.  Laparoscopic/robotic mobilization splenic flexure. 3.  Creation of greater omental pedicle vascularized flap. ANESTHESIA:  General endotracheal.    COMPLICATIONS:  None. SPECIMEN:  Sigmoid colectomy. ESTIMATED BLOOD LOSS:  75 mL. INDICATIONS:  The patient is a 73-year-old female. She has history of  Factor V Leiden and as well is immunocompromised due to her psoriatic  arthritis and use of methotrexate as well as Factor V Leiden on  anticoagulation. She had had four episodes of diverticulitis and came  to see me for outpatient referral.  I did recommend proceeding with  elective sigmoid colectomy. I discussed the risks of the surgery  including, but not limited to bleeding, infection, anastomotic leak,  hernia, need for additional operations, damage to intraabdominal  structures including, but not limited to bowel, bladder, ureters as well  as neurovascular structures. She understood the potential need for an  open operation, potential ostomy. She understood she is at high risk  for complications given her Eliquis use for her Factor V Leiden, history  of DVT as well as her recent use of methotrexate.   We did coordinate  with her hematologist/oncologist who did not recommend a Lovenox bridge,  but okayed her to stop her Eliquis three days before the surgery. She  was also cleared by her primary care physician. She agreed to proceed. PROCEDURE IN DETAIL:  The patient was brought to the operating theater,  placed supine on the operating table. General endotracheal intubation  was performed by Anesthesiology. The patient was placed in the  lithotomy position. Hutchison catheter was placed revealing clear yellow  urine. Her abdomen was prepped and draped in the usual sterile fashion  using chlorhexidine prep solution. Her rectum was washed out using  Betadine and saline solution. Timeout was performed, confirming the  patient's identity as well as the operative site. Antibiotics were  confirmed to be perfusing. All safety points were followed. SCDs were  on and functioning. Lovenox was confirmed given. I began by making an incision in the left upper quadrant midclavicular  line at Hicks's point. A 0-degree 5-mm laparoscope was used to enter  the abdominal cavity in a direct Optiview fashion. Abdomen was entered  without complication, insufflated to 15 mmHg. We then placed additional  ports including two 8-mm robotic ports and in the right lower quadrant  12-mm robotic stapler port. The original entry port was then upgraded  to an 8-mm robotic port and an assistant 5-mm port was placed in the  right upper quadrant. The patient was placed in head down and left side  up positioning. The Procura robot was then docked in the usual  fashion. I began by noting a fair amount of inflammation both in the  distal sigmoid colon as well as in the descending sigmoid junction and  few areas. It was fairly stuck to the sidewall of the abdomen. I  bluntly took this down as well as sharply in some areas using the hook  cautery.   Once I  this, I continued laterally to medially   the congenital attachments of the sigmoid colon and the  intersigmoid fossa, continued this perineal incision to the upper rectum  on the left side. I noted the ureter and the iliac well out of Harms  way and this was positively visualized. I then went in medial to  lateral fashion. The colon was then placed on stretch. I made an  incision underneath the LYDIA pedicle. I commenced a medial to lateral  dissection  the LYDIA pedicle through the total mesorectal  posterior plane. I took this dissection to the mid rectum and then  continued by meeting the previous plane on the both left and right  lateral sides. I then skeletonized the pedicle. I did chose to  transect on the upper rectum just distal to the rectosigmoid junction as  there was no evidence of disease or diverticulum. I circumferentially  cleaned around the colon in this area and then used the vessel sealer  device to take down the partial mesorectal of the rectosigmoid junction  and upper rectum and a robotic 60-mm blue staple load was used to  transect this. I then decided that the proximal transection site with  actually going to be at the descending sigmoid junction. So, I  skeletonized the LYDIA pedicle but maintained the left colic branch and  performed a high ligation of the LYDIA just after the takeoff of the left  colic which fortunately was a fairly high takeoff. The vessel sealer  was then used to transect this with good hemostasis noted. I continued  taking down the mesentery maintaining the left colic artery as best as  possible until I reached the area of clean healthy colon without any  inflammatory changes. I then used an additional robotic 60-mm blue  staple load to transect this area. I then continued mobilization as I  would need to mobilize further the descending colon as well as the  splenic flexure, noted to have a tension-free anastomosis. I continued  lateral to medial  off the Gerota's fascia, continued along  the splenic flexure, taking down the splenocolic and omentocolic  ligaments.   I transected part of the omentum as well including the left  omental artery, creating a greater omental pedicle vascularized flap  that would reach down the anastomosis for additional coverage given the  higher risk of anastomotic leak and other complications. I then decided  that we still need additional reach, I noted the IMV which had some of  the tension. I circumferentially dissected around the IMV and   the duodenum off the IMV until I was able to reach the pancreatic  branch. I then used a vessel sealer device to perform a high ligation  of the IMV. I took down the intervening mesentery as well and met all  the previous dissection planes together. At this point, we then  confirmed that we would have a completely tension-free anastomosis and  the descending colon came down to the rectal stump easily. We then used  ICG angiography with Firefly technology to make sure that there was good  blood flow and we did confirm good blood flow to both the rectal stump  as well as the proximal colon. At this point, we undocked the robot and  laparoscopically we grasped the specimen as well as the proximal colon. A small Pfannenstiel 1.5-inch skin incision was made and dissected  horizontally to the fascia, which was incised horizontally as well. Rectus muscle was then spread in the midline and then peritoneum was  then entered. A small wound protector was then placed and the specimen  was then extracted and sent off labeled as sigmoid colectomy. It was a  fairly long specimen given the long length of the inflammatory response  of the diverticulitis. We then brought the proximal colon through the wound protector site. I  used a Pursestring device just underneath the staple line. I then  excised the staple line and opened the EEA 29 power stapler. The anvil  was then placed in the colotomy was secured down.   I cleared all the  fibrofatty tissue from around the post, which was then placed back into  the abdominal cavity. The wound protector top was then placed and the  patient was placed back in head down left side up positioning. Small  bowel was then again brought out of the pelvis. I then performed the  flexible sigmoidoscopy of the rectal stump and I noted no evidence of  leak from the staple line whatsoever was seen in the pelvis. EEA  stapler was then advanced transanally and the spike was applied just  above the staple line and adjacent to it. The two ends of the  anastomosis were then brought together and the post was then secured  onto the spike. I ensured that there was no twisting in the mesentery  and tinea lined up perfectly. The staples were then fired creating a  stapled EEA 29 mm colorectal anastomosis. Stapler was then removed and  noted intact anastomotic rings x2. A flexible sigmoidoscopy was then  used for leak test.  I noted no leak whatsoever was seen in the pelvis. Anastomosis was widely patent, well perfused on both sides. I was very  happy with how this looked. The colon was desufflated, all the irrigant  fluid was then removed out of the pelvis. I ensured that there was no  twisting once again, as I scrubbed back in laparoscopically and that the  small bowel was well _____ from underneath the mobilized left colon. The previously created greater omental pedicle vascularized flap was  placed into the pelvis above the anastomosis. Small bowel was allowed  to lay on top of this. I then removed the 12-mm port and 0 Vicryl suture passer  laparoscopically was then used to close the fascia. We then removed the  wound protector and the Pfannenstiel was closed in three layers  including the peritoneal layer with a 0 Vicryl running suture. The  rectus muscle was reapproximated using 3-0 Vicryl figure-of-eight and  then the anterior fascia was closed using 0 PDS in a running fashion.   I  went back in laparoscopically and ensured that the fascial closures were  nice and tight. All laparoscopic ports were then removed under direct  visualization. No bleeding noted in the abdominal wall. The abdomen  was completely desufflated. All the incisions were irrigated and closed  using combination of 3-0 Vicryl and 4-0 Monocryl and skin glue. The patient tolerated the procedure well. She will undergo a TAP block  by Anesthesia before being brought to the PACU in stable condition. All  counts were correct x2 at the end of the procedure. The patient's   was updated on the operative findings.         Ashley Dalal MD    D: 03/25/2022 10:58:41       T: 03/25/2022 13:40:22     [email or web address]  Job#: [id number]     Doc#: [id number]    CC:

## 2022-03-25 NOTE — CARE COORDINATION
CM following, pt from home ind, plans to Return home no needs from CM anticipated. POD#0 pending return GI function, pain/nausea control.   Electronically signed by Benjie Pepe RN on 3/25/2022 at 4:44 PM   [phone]

## 2022-03-25 NOTE — ANESTHESIA PROCEDURE NOTES
Peripheral Block    Patient location during procedure: post-op  Start time: 3/25/2022 11:04 AM  End time: 3/25/2022 11:10 AM  Staffing  Performed: anesthesiologist and resident/CRNA   Anesthesiologist: Jak Lo DO  Resident/CRNA: CHRISTA Wilde CRNA  Preanesthetic Checklist  Completed: patient identified, IV checked, site marked, risks and benefits discussed, surgical consent, monitors and equipment checked, pre-op evaluation, timeout performed, anesthesia consent given, oxygen available and patient being monitored  Peripheral Block  Patient position: supine  Prep: ChloraPrep  Patient monitoring: cardiac monitor, continuous pulse ox, continuous capnometry, frequent blood pressure checks and IV access  Block type: TAP  Laterality: bilateral  Injection technique: single-shot  Guidance: ultrasound guided  Local infiltration: bupivacaine  Infiltration strength: 0.25 %  Dose: 60 mL  Provider prep: sterile gloves and mask  Local infiltration: bupivacaine  Needle  Needle type: combined needle/nerve stimulator   Needle gauge: 20 G  Needle length: 5 cm  Needle localization: ultrasound guidance and anatomical landmarks  Assessment  Injection assessment: negative aspiration for heme, no paresthesia on injection and local visualized surrounding nerve on ultrasound  Paresthesia pain: immediately resolved  Slow fractionated injection: yes  Hemodynamics: stable  Additional Notes  EBL 0.1 All ASA monitors in place, VSS. Bilateral TAP blocks placed. No complications.    Medications Administered  Bupivacaine (PF) (MARCAINE) 0.25 % injection, 20 mL  Reason for block: post-op pain management and at surgeon's request

## 2022-03-25 NOTE — PROGRESS NOTES
Patient admitted to  for POD 0 of colectomy performed by Dr. Ashley Fuentes. Patient's VSS and on room air. 6 lap sites clean, dry, and intact and closed with surgical glue. Patient has been up to the toilet with urge to urinate but has not urinated at this time. RN educated patient that she had a montgomery removed recently and because of anesthesia sometimes it takes a bit after surgery to urinate. Patient's pain is 4/10 to surgical sites and tolerable. Patient is tolerating fluids PO. Patient's significant other at bedside. Patient oriented to room. Bed alarm on.

## 2022-03-25 NOTE — ANESTHESIA PRE PROCEDURE
Department of Anesthesiology  Preprocedure Note       Name:  Cindy Fay   Age:  49 y.o.  :  1970                                          MRN:  [id number]         Date:  3/25/2022      Surgeon: Fabian Mcgowan):  Froilan Mcdonald MD    Procedure: Procedure(s):  ROBOTIC SIGMOID COLECTOMY    Medications prior to admission:   Prior to Admission medications    Medication Sig Start Date End Date Taking? Authorizing Provider   traZODone (DESYREL) 50 MG tablet TAKE 1 TABLET NIGHTLY AS NEEDED FOR SLEEP 22  Yes Jason Lam MD   vitamin B-12 (CYANOCOBALAMIN) 500 MCG tablet Take 500 mcg by mouth daily   Yes Historical Provider, MD   docusate sodium (COLACE) 100 MG capsule Take 100 mg by mouth 2 times daily   Yes Historical Provider, MD   Saccharomyces boulardii (FLORASTOR PO) Take by mouth daily   Yes Historical Provider, MD   ferrous sulfate (IRON 325) 325 (65 Fe) MG tablet TAKE 1 TABLET EVERY OTHER DAY 21  Yes Historical Provider, MD   Resveratrol 250 MG CAPS Take by mouth daily   Yes Historical Provider, MD   TURMERIC PO Take by mouth   Yes Historical Provider, MD   ELIQUIS 5 MG TABS tablet TAKE 1 TABLET BY MOUTH TWO TIMES A DAY  20  Yes Jason Lam MD   metroNIDAZOLE (FLAGYL) 500 MG tablet Take one tablet by mouth 3 times on the day prior to surgery. Take one tablet at 1pm, 2pm and 9pm. 3/24/22   Froilan Mcdonald MD   neomycin (MYCIFRADIN) 500 MG tablet Take two tablets 3 times the day before surgery. Take two tablets at 1pm, two tablets at 2pm and two tablets at 9pm. 3/24/22   Froilan Mcdonald MD   oxyCODONE-acetaminophen (PERCOCET) 5-325 MG per tablet Take 1 tablet by mouth every 8 hours as needed for Pain for up to 8 days.  Take no more than 2-3 tabs orally prn 3/17/22 3/25/22  CHRISTA Castillo - CNP   buPROPion (WELLBUTRIN XL) 300 MG extended release tablet  22   Historical Provider, MD       Current medications:    Current Facility-Administered Medications   Medication Dose Route Frequency Provider Last Rate Last Admin    lactated ringers infusion   IntraVENous Continuous Erleen Riser,  mL/hr at 03/25/22 0644 New Bag at 03/25/22 0644    0.9 % sodium chloride infusion  25 mL IntraVENous PRN Chidi Archer MD        acetaminophen (TYLENOL) tablet 1,000 mg  1,000 mg Oral Once Chidi Archer MD        metronidazole (FLAGYL) 500 mg in NaCl 100 mL IVPB premix  500 mg IntraVENous On Call to Via Cara Cheng MD        And    levoFLOXacin (LEVAQUIN) 500 MG/100ML infusion 500 mg  500 mg IntraVENous On Call to Via Cara Cheng MD        sodium chloride flush 0.9 % injection 5-40 mL  5-40 mL IntraVENous 2 times per day Chidi Archer MD        sodium chloride flush 0.9 % injection 5-40 mL  5-40 mL IntraVENous PRN Chidi Archer MD        sodium chloride flush 0.9 % injection 5-40 mL  5-40 mL IntraVENous 2 times per day Neli Roup, DO        sodium chloride flush 0.9 % injection 5-40 mL  5-40 mL IntraVENous PRN Neli Roup, DO        0.9 % sodium chloride infusion  25 mL IntraVENous PRN Neli Roup, DO        meperidine (DEMEROL) injection 12.5 mg  12.5 mg IntraVENous Q5 Min PRN Neli Roup, DO        prochlorperazine (COMPAZINE) 5 mg in sodium chloride (PF) 10 mL injection  5 mg IntraVENous Once PRN Neli Roup, DO        HYDROmorphone (DILAUDID) injection 0.25 mg  0.25 mg IntraVENous Q5 Min PRN Neli Roup, DO        HYDROmorphone (DILAUDID) injection 0.5 mg  0.5 mg IntraVENous Q5 Min PRN Neli Roup, DO           Allergies:  No Known Allergies    Problem List:    Patient Active Problem List   Diagnosis Code    Diverticulosis of intestine without bleeding K57.90    Degeneration of lumbar or lumbosacral intervertebral disc M51.37    Lumbosacral spondylosis without myelopathy M47.817    Spinal stenosis, lumbar region, without neurogenic claudication M48.061    Disc displacement, lumbar M51.26    Lumbar stenosis M48.061    Chronic pain syndrome G89.4    Lumbar nerve root impingement, L2 M54.16    Primary insomnia F51.01    Drug induced constipation K59.03    Strain of neck, initial encounter S16. 1XXA    Acute deep vein thrombosis (DVT) of distal vein of lower extremity ([facility]) I82.4Z9    Carpal tunnel syndrome, bilateral G56.03    Coagulation disorder ([facility]) D68.9    Chronic left sacroiliac pain M53.3, G89.29    Polyarthralgia M25.50    Antiphospholipid antibody positive R76.0    Encounter for therapeutic drug monitoring Z51.81    Psoriatic arthritis ([facility]) L40.50    High risk medication use Z79.899    Elevated alkaline phosphatase level R74.8    Dizziness R42    Diverticulitis K57.92    Acute diverticulitis K57.92    Anticoagulated Z79.01    Factor 5 Leiden mutation, heterozygous ([facility]) D68.51    Hx of methotrexate therapy Z92.21    Sigmoid diverticulitis K57.32       Past Medical History:        Diagnosis Date    Acne     Dr Joaquín Black Diverticulitis 2020    Factor 5 Leiden mutation, heterozygous ([facility]     Hx of blood clots     IUD (intrauterine device) in place     Medical history reviewed with no changes     PONV (postoperative nausea and vomiting)        Past Surgical History:        Procedure Laterality Date    CARPAL TUNNEL RELEASE Bilateral      SECTION  2001     x1    FINGER TRIGGER RELEASE Bilateral     PAIN MANAGEMENT PROCEDURE Left 10/26/2020    LEFT L4 AND L5 TRANSFORAMINAL EPDIURAL STEROID INJECTION WITH FLUOROSCOPY (52533, 60856) performed by Celestino Lassiter MD at [address] Left 2020    LEFT LUMBAR FOUR LUMBAR FIVE TRANSFORAMINAL  EPIDURAL STEROID INJECTION SITE CONFIRMED BY FLUOROSCOPY [facility]       Social History:    Social History     Tobacco Use    Smoking status: Never Smoker    Smokeless tobacco: Never Used   Substance Use Topics    Alcohol use:  Yes     Alcohol/week: 1.0 - 2.0 standard drink     Types: 1 - 2 Glasses of wine per week                                Counseling given: Not Answered      Vital Signs (Current):   Vitals:    03/16/22 1607 03/25/22 0617   BP:  95/64   Pulse:  58   Resp:  16   Temp:  98 °F (36.7 °C)   TempSrc:  Temporal   SpO2:  99%   Weight: 136 lb (61.7 kg) 131 lb (59.4 kg)   Height: 5' 4\" (1.626 m) 5' 4\" (1.626 m)                                              BP Readings from Last 3 Encounters:   03/25/22 95/64   03/21/22 112/72   03/18/22 112/72       NPO Status: Time of last liquid consumption: 2230                        Time of last solid consumption: 1730                        Date of last liquid consumption: 03/24/22                        Date of last solid food consumption: 03/23/22    BMI:   Wt Readings from Last 3 Encounters:   03/25/22 131 lb (59.4 kg)   03/21/22 135 lb 3.2 oz (61.3 kg)   03/18/22 135 lb 6.4 oz (61.4 kg)     Body mass index is 22.49 kg/m². CBC:   Lab Results   Component Value Date    WBC 4.7 02/22/2022    RBC 3.04 02/22/2022    HGB 10.1 02/22/2022    HCT 28.6 02/22/2022    MCV 94.4 02/22/2022    RDW 11.9 02/22/2022     02/22/2022       CMP:   Lab Results   Component Value Date     02/22/2022    K 3.9 02/22/2022    K 4.2 02/21/2022     02/22/2022    CO2 25 02/22/2022    BUN 6 02/22/2022    CREATININE 0.7 02/22/2022    GFRAA >60 02/22/2022    GFRAA >60 10/11/2012    AGRATIO 2.1 02/19/2022    LABGLOM >60 02/22/2022    LABGLOM >60 11/02/2010    GLUCOSE 99 02/22/2022    PROT 6.9 02/19/2022    PROT 6.5 10/11/2012    CALCIUM 8.8 02/22/2022    BILITOT 0.5 02/19/2022    ALKPHOS 113 02/19/2022    AST 14 02/19/2022    ALT <5 02/19/2022       POC Tests: No results for input(s): POCGLU, POCNA, POCK, POCCL, POCBUN, POCHEMO, POCHCT in the last 72 hours.     Coags: No results found for: PROTIME, INR, APTT    HCG (If Applicable):   Lab Results   Component Value Date    PREGTESTUR Negative 03/25/2022        ABGs: No results found for: PHART, PO2ART, BBW5KAA,

## 2022-03-25 NOTE — PROGRESS NOTES
PACU Transfer to Floor Note    Procedure(s):  ROBOTIC SIGMOID COLECTOMY, SPLENIC FLEXURE, OMENTAL FLAP    Current Allergies: Patient has no known allergies. Pt meets criteria as per Ashly Score and ASPAN Standards to transfer to next phase of care. No results for input(s): POCGLU in the last 72 hours. Vitals:    03/25/22 1245   BP: 103/67   Pulse: 75   Resp: 11   Temp: 97.3 °F (36.3 °C)   SpO2: 100%     Vitals within 20% of pt's admission vitals as per ASHLY SCORE    SpO2: 100 %    O2 Flow Rate (L/min): 3 L/min      Intake/Output Summary (Last 24 hours) at 3/25/2022 1254  Last data filed at 3/25/2022 1239  Gross per 24 hour   Intake 2358.42 ml   Output 355 ml   Net 2003.42 ml       Pain assessment:  present - adequately treated, PRN IV pain medication given per MAR. Pain Level: 3, patient states pain level is tolerable. Patient was assessed for alterations to skin integrity. There were not alterations observed. Is patient incontinent: no    PACU RN called and updated patient's . Patient has all belongings at transfer from PACU. Handoff report given at bedside with receiving nurse Tim Pollack.    Family updated and directed to pt room      3/25/2022 12:54 PM

## 2022-03-25 NOTE — ANESTHESIA POSTPROCEDURE EVALUATION
Department of Anesthesiology  Postprocedure Note    Patient: Jovani Theodore  MRN: [id number]  YOB: 1970  Date of evaluation: 3/25/2022  Time:  2:30 PM     Procedure Summary     Date: 03/25/22 Room / Location: [address] [place] [address]    Anesthesia Start: 9128 Anesthesia Stop: 5004    Procedure: ROBOTIC SIGMOID COLECTOMY, SPLENIC FLEXURE, OMENTAL FLAP (N/A ) Diagnosis:       Diverticulitis      (Diverticulitis [K57.92])    Surgeons: Berenice Ritter MD Responsible Provider: Tahir Schafer DO    Anesthesia Type: general ASA Status: 2          Anesthesia Type: general    Elham Phase I: Elham Score: 9    Elham Phase II:      Last vitals: Reviewed and per EMR flowsheets.        Anesthesia Post Evaluation    Patient location during evaluation: PACU  Patient participation: complete - patient participated  Level of consciousness: awake and alert  Airway patency: patent  Nausea & Vomiting: no nausea and no vomiting  Complications: no  Cardiovascular status: hemodynamically stable  Respiratory status: acceptable  Hydration status: euvolemic

## 2022-03-25 NOTE — PROGRESS NOTES
Department of Surgery    Post Op Note  Subjective:  No nausea. No emesis. Ate chicken without any adverse events. Pain tolerable. Most of her pain is in her back \"and the bed isn't helping\". Has chronic back pain. Was unable to void yet. Objective:    Anesthesia type: General      I/O    Intra op    Post op     Fluids    1200 958     EBL  minimal       Urine 90 250       Exam:   VITALS:  /71   Pulse 73   Temp 97.3 °F (36.3 °C) (Oral)   Resp 15   Ht 5' 4\" (1.626 m)   Wt 131 lb (59.4 kg)   SpO2 96%   BMI 22.49 kg/m²   24HR INTAKE/OUTPUT:    Intake/Output Summary (Last 24 hours) at 3/25/2022 1539  Last data filed at 3/25/2022 1239  Gross per 24 hour   Intake 2358.42 ml   Output 355 ml   Net 2003.42 ml     Post-op vital signs:  Stable     Exam:General appearance: alert, appears stated age and cooperative  Lungs: clear to auscultation bilaterally  Heart: regular rate and rhythm, S1, S2 normal, no murmur, click, rub or gallop  Abdomen: abd soft and non-distended. Appropriately tender.   Trocar sites well approx C/D/I  Low transverse incision well approx with surgical glue        Assessment and Plan  Pt is a 49year old female s/p Robotic assisted laparoscopic sigmoid colectomy with colorectal anastomosis, laparoscopic robotic mobilization splenic flexure and creation of greater omental pedicle vascularized flap for sigmoid diverticulitis POD #0    Pain management  FeNa:  Diet - general diet  :  Watch to void  Ambulation: OOB to chair  Respiratory:  IS at bedside, encourage hourly IS and deep breathing  Prophylaxis: AC boots,lovenox

## 2022-03-25 NOTE — PROGRESS NOTES
4 Eyes Admission Assessment     I agree as the admission nurse that 2 RN's have performed a thorough Head to Toe Skin Assessment on the patient. ALL assessment sites listed below have been assessed on admission. Areas assessed by both nurses:   [x]   Head, Face, and Ears   [x]   Shoulders, Back, and Chest  [x]   Arms, Elbows, and Hands   [x]   Coccyx, Sacrum, and Ischium  [x]   Legs, Feet, and Heels        Does the Patient have Skin Breakdown?   No         Jarred Prevention initiated:  NA   Wound Care Orders initiated:  NA      WOC nurse consulted for Pressure Injury (Stage 3,4, Unstageable, DTI, NWPT, and Complex wounds) or Jarred score 18 or lower:  NA      Nurse 1 eSignature: Electronically signed by Moni Arguelles RN on 3/25/22 at 1:04 PM EDT    **SHARE this note so that the co-signing nurse is able to place an eSignature**    Nurse 2 eSignature: Electronically signed by Emi Peres RN on 3/25/22 at 4:04 PM EDT

## 2022-03-26 LAB
ALBUMIN SERPL-MCNC: 3.7 G/DL (ref 3.4–5)
ANION GAP SERPL CALCULATED.3IONS-SCNC: 9 MMOL/L (ref 3–16)
BASOPHILS ABSOLUTE: 0 K/UL (ref 0–0.2)
BASOPHILS RELATIVE PERCENT: 0.1 %
BUN BLDV-MCNC: 12 MG/DL (ref 7–20)
CALCIUM SERPL-MCNC: 9 MG/DL (ref 8.3–10.6)
CHLORIDE BLD-SCNC: 106 MMOL/L (ref 99–110)
CO2: 24 MMOL/L (ref 21–32)
CREAT SERPL-MCNC: 0.7 MG/DL (ref 0.6–1.1)
EOSINOPHILS ABSOLUTE: 0 K/UL (ref 0–0.6)
EOSINOPHILS RELATIVE PERCENT: 0.1 %
GFR AFRICAN AMERICAN: >60
GFR NON-AFRICAN AMERICAN: >60
GLUCOSE BLD-MCNC: 93 MG/DL (ref 70–99)
HCT VFR BLD CALC: 31.5 % (ref 36–48)
HEMOGLOBIN: 10.6 G/DL (ref 12–16)
LYMPHOCYTES ABSOLUTE: 1.3 K/UL (ref 1–5.1)
LYMPHOCYTES RELATIVE PERCENT: 20.7 %
MAGNESIUM: 1.9 MG/DL (ref 1.8–2.4)
MCH RBC QN AUTO: 31.7 PG (ref 26–34)
MCHC RBC AUTO-ENTMCNC: 33.6 G/DL (ref 31–36)
MCV RBC AUTO: 94.3 FL (ref 80–100)
MONOCYTES ABSOLUTE: 0.5 K/UL (ref 0–1.3)
MONOCYTES RELATIVE PERCENT: 7.7 %
NEUTROPHILS ABSOLUTE: 4.6 K/UL (ref 1.7–7.7)
NEUTROPHILS RELATIVE PERCENT: 71.4 %
PDW BLD-RTO: 12.3 % (ref 12.4–15.4)
PHOSPHORUS: 2.9 MG/DL (ref 2.5–4.9)
PLATELET # BLD: 164 K/UL (ref 135–450)
PMV BLD AUTO: 8 FL (ref 5–10.5)
POTASSIUM SERPL-SCNC: 4 MMOL/L (ref 3.5–5.1)
RBC # BLD: 3.34 M/UL (ref 4–5.2)
SODIUM BLD-SCNC: 139 MMOL/L (ref 136–145)
WBC # BLD: 6.5 K/UL (ref 4–11)

## 2022-03-26 PROCEDURE — 6360000002 HC RX W HCPCS: Performed by: STUDENT IN AN ORGANIZED HEALTH CARE EDUCATION/TRAINING PROGRAM

## 2022-03-26 PROCEDURE — 1200000000 HC SEMI PRIVATE

## 2022-03-26 PROCEDURE — 85025 COMPLETE CBC W/AUTO DIFF WBC: CPT

## 2022-03-26 PROCEDURE — 80069 RENAL FUNCTION PANEL: CPT

## 2022-03-26 PROCEDURE — 83735 ASSAY OF MAGNESIUM: CPT

## 2022-03-26 PROCEDURE — 36415 COLL VENOUS BLD VENIPUNCTURE: CPT

## 2022-03-26 PROCEDURE — 2580000003 HC RX 258: Performed by: STUDENT IN AN ORGANIZED HEALTH CARE EDUCATION/TRAINING PROGRAM

## 2022-03-26 PROCEDURE — 6370000000 HC RX 637 (ALT 250 FOR IP): Performed by: STUDENT IN AN ORGANIZED HEALTH CARE EDUCATION/TRAINING PROGRAM

## 2022-03-26 RX ORDER — DOCUSATE SODIUM 100 MG/1
100 CAPSULE, LIQUID FILLED ORAL 2 TIMES DAILY
Status: DISCONTINUED | OUTPATIENT
Start: 2022-03-26 | End: 2022-03-27 | Stop reason: HOSPADM

## 2022-03-26 RX ORDER — MAGNESIUM SULFATE IN WATER 40 MG/ML
2000 INJECTION, SOLUTION INTRAVENOUS ONCE
Status: COMPLETED | OUTPATIENT
Start: 2022-03-26 | End: 2022-03-26

## 2022-03-26 RX ORDER — POLYETHYLENE GLYCOL 3350 17 G/17G
17 POWDER, FOR SOLUTION ORAL DAILY
Status: DISCONTINUED | OUTPATIENT
Start: 2022-03-26 | End: 2022-03-27 | Stop reason: HOSPADM

## 2022-03-26 RX ORDER — POTASSIUM CHLORIDE 20 MEQ/1
40 TABLET, EXTENDED RELEASE ORAL ONCE
Status: DISCONTINUED | OUTPATIENT
Start: 2022-03-26 | End: 2022-03-26

## 2022-03-26 RX ADMIN — ENOXAPARIN SODIUM 40 MG: 40 INJECTION SUBCUTANEOUS at 08:36

## 2022-03-26 RX ADMIN — ACETAMINOPHEN 1000 MG: 500 TABLET ORAL at 02:26

## 2022-03-26 RX ADMIN — OXYCODONE 10 MG: 5 TABLET ORAL at 00:54

## 2022-03-26 RX ADMIN — MAGNESIUM SULFATE HEPTAHYDRATE 2000 MG: 2 INJECTION, SOLUTION INTRAVENOUS at 16:28

## 2022-03-26 RX ADMIN — POLYETHYLENE GLYCOL 3350 17 G: 17 POWDER, FOR SOLUTION ORAL at 20:02

## 2022-03-26 RX ADMIN — OXYCODONE 10 MG: 5 TABLET ORAL at 20:43

## 2022-03-26 RX ADMIN — IBUPROFEN 600 MG: 200 TABLET, FILM COATED ORAL at 08:36

## 2022-03-26 RX ADMIN — DOCUSATE SODIUM 100 MG: 100 CAPSULE, LIQUID FILLED ORAL at 20:02

## 2022-03-26 RX ADMIN — SODIUM CHLORIDE, PRESERVATIVE FREE 10 ML: 5 INJECTION INTRAVENOUS at 20:03

## 2022-03-26 RX ADMIN — IBUPROFEN 600 MG: 200 TABLET, FILM COATED ORAL at 13:57

## 2022-03-26 RX ADMIN — IBUPROFEN 600 MG: 200 TABLET, FILM COATED ORAL at 20:03

## 2022-03-26 RX ADMIN — OXYCODONE 10 MG: 5 TABLET ORAL at 06:44

## 2022-03-26 RX ADMIN — METHOCARBAMOL 1500 MG: 750 TABLET ORAL at 12:31

## 2022-03-26 RX ADMIN — BUPROPION HYDROCHLORIDE 300 MG: 150 TABLET, FILM COATED, EXTENDED RELEASE ORAL at 08:36

## 2022-03-26 RX ADMIN — METHOCARBAMOL 1500 MG: 750 TABLET ORAL at 08:37

## 2022-03-26 RX ADMIN — IBUPROFEN 600 MG: 200 TABLET, FILM COATED ORAL at 02:26

## 2022-03-26 RX ADMIN — OXYCODONE 10 MG: 5 TABLET ORAL at 16:42

## 2022-03-26 RX ADMIN — SODIUM CHLORIDE, PRESERVATIVE FREE 10 ML: 5 INJECTION INTRAVENOUS at 08:37

## 2022-03-26 RX ADMIN — METHOCARBAMOL 1500 MG: 750 TABLET ORAL at 20:03

## 2022-03-26 RX ADMIN — ACETAMINOPHEN 1000 MG: 500 TABLET ORAL at 08:37

## 2022-03-26 RX ADMIN — ACETAMINOPHEN 1000 MG: 500 TABLET ORAL at 13:57

## 2022-03-26 RX ADMIN — METHOCARBAMOL 1500 MG: 750 TABLET ORAL at 16:28

## 2022-03-26 ASSESSMENT — PAIN DESCRIPTION - PROGRESSION
CLINICAL_PROGRESSION: GRADUALLY WORSENING

## 2022-03-26 ASSESSMENT — PAIN DESCRIPTION - ONSET
ONSET: PROGRESSIVE
ONSET: ON-GOING
ONSET: GRADUAL
ONSET: GRADUAL
ONSET: ON-GOING

## 2022-03-26 ASSESSMENT — PAIN DESCRIPTION - DESCRIPTORS
DESCRIPTORS: ACHING
DESCRIPTORS: ACHING;DISCOMFORT
DESCRIPTORS: ACHING;DISCOMFORT
DESCRIPTORS: ACHING
DESCRIPTORS: ACHING;DISCOMFORT

## 2022-03-26 ASSESSMENT — PAIN DESCRIPTION - PAIN TYPE
TYPE: SURGICAL PAIN

## 2022-03-26 ASSESSMENT — PAIN SCALES - GENERAL
PAINLEVEL_OUTOF10: 7
PAINLEVEL_OUTOF10: 4
PAINLEVEL_OUTOF10: 6
PAINLEVEL_OUTOF10: 7
PAINLEVEL_OUTOF10: 6
PAINLEVEL_OUTOF10: 7
PAINLEVEL_OUTOF10: 0
PAINLEVEL_OUTOF10: 4

## 2022-03-26 ASSESSMENT — PAIN DESCRIPTION - FREQUENCY
FREQUENCY: INTERMITTENT
FREQUENCY: CONTINUOUS
FREQUENCY: INTERMITTENT
FREQUENCY: CONTINUOUS
FREQUENCY: INTERMITTENT

## 2022-03-26 ASSESSMENT — PAIN DESCRIPTION - ORIENTATION
ORIENTATION: ANTERIOR;LOWER
ORIENTATION: ANTERIOR
ORIENTATION: LOWER
ORIENTATION: LOWER
ORIENTATION: ANTERIOR

## 2022-03-26 ASSESSMENT — PAIN DESCRIPTION - LOCATION
LOCATION: ABDOMEN

## 2022-03-26 NOTE — PROGRESS NOTES
Returned from [facility] looked fatigued & in pain. . Asked for pain medication, medicated with PRN oxycodone still hasn't had lunch. MAG infusing,  @ bedside.

## 2022-03-26 NOTE — PROGRESS NOTES
Nikolas Lara MD  [facility]) Physicians - Rheumatology    [x] [facility]:  [facility]  [address] [facility] [] [facility]:  [address], [address]   Office: [phone]  Fax: [phone]     RHEUMATOLOGY PROGRESS NOTE    ASSESSMENT/PLAN:  Humera Mendoza is a 49 y.o. female w/ seronegative inflammatory polyarthritis (PsA vs seronegative RA) and degenerative arthritis of the L-spine. Pt is s/p b/l 3rd trigger finger surgery on 9/24/2021 and b/l carpal tunnel surgery by by Dr. Dawna Corona in 10/2020.      PMHx pertinent for Hx of popliteal thrombosis in 3/2021 (follows w/ Hematology Dr. Milton Flynn, per pt on lifelong anticoagulation, found to have Factor V Leiden mutation and positive B2GP IgG) and Hx of diverticulitis w/ sigmoid abscess (hospitalized in 7/2020).    Current rheum meds:   mg daily: started in 12/2021, held on 2/18/22  OTC APAP  Lidocaine patches  Percocet PRN     Prior rheum meds:  Pred taper starting w/ 20 mg daily: took in 12/2021, provided significant pain relief  NSAIDs: told to avoid d/t chronic anti-coagulation and Hx of diverticulitis    Reviewed recent hospitalization records. 1. Psoriatic arthritis ([address]  Assessment & Plan:  - seronegative inflammatory polyarthritis involving the b/l wrists, MCP, PIP and DIP joints w/ significant response to low dose Pred taper. Hx of b/l 2-3rd dactylitis. MRI R hand from 12/2021 showed flexor tenosynovitis of the 3rd digit. Nuclear bone scan ordered by PCP on 12/6/21 showed uptake pattern suggestive of inflammatory arthropathy w/ superimposed degenerative arthropathy. Suspect she likely has early PsA vs seronegative RA, favor PsA given prior findings of dactylitis. - no significant synovitis appreciated on exam today. - resume  mg daily now. - hold off on resuming MTX given recent surgery and quiescent disease. She will take low dose Pred taper PRN for arthritis flares.  She will call our office if she develops frequent arthritis flares. - avoid NSAIDs d/t pt being on anticoagulation. - pt following w/ Pain Management. Orders:  -     hydroxychloroquine (PLAQUENIL) 200 MG tablet; Take 1.5 tablets by mouth daily, Disp-135 tablet, R-0Normal  -     predniSONE (DELTASONE) 10 MG tablet; Take 3 tablets by mouth daily for 4 days, THEN 2 tablets daily for 4 days, THEN 1 tablet daily for 4 days, THEN 0.5 tablets daily for 4 days. , Disp-26 tablet, R-1Normal  2. High risk medication use  Assessment & Plan:  - she had eye exam for HCQ toxicity monitoring in 2/2022. Return in about 3 months (around 6/30/2022) for lab result discussion and treatment plan, medication monitoring. The risks and benefits of my recommendations, as well as other treatment options, benefits and side effects were discussed with the patient today. Questions were answered. NOTE: This report is transcribed by using voice recognition software dragon. Every effort is made to ensure accuracy; however, inadvertent computerized  transcription errors may be present. SUBJECTIVE:  Past medical/surgical history, medications and allergies are reviewed and updated as appropriate. Interval Hx:     Pt was hospitalized 3/25- 3/27 w/ sigmoid diverticulitis. She underwent sigmoid colectomy w/ colorectal anastomosis on 3/25/22. Pt states she stopped HCQ on 2/18/22. She thinks she took MTX for 2-3 wks. So far she has not noticed any worsening of her joint Sx off immunosuppression. She reports persistent stiffness in her hands. Swelling has improved. No PsO. She will be vacationing in [facility] in the near future.     Rheumatologic ROS:  Constitutional: denies chronic fatigue, fever/chills, night sweats, unintentional weight loss  Integumentary: denies photosensitivity, rash, patchy alopecia, or Sx of Raynaud's phenomenon  Eyes: denies dry eyes, redness or pain, visual disturbance, or floaters  Nose: denies nasal ulcers or recurrent sinusitis  Oral cavity: denies dry mouth or oral ulcers  Cardiovascular: +pt reported remote Hx of cardiac arrhthymias and intermittent dizziness, denies CP, palpitations, Hx of pericardial effusion or pericarditis  Respiratory: denies SOB, cough, hemoptysis, or pleurisy  Gastrointestinal: denies heart burn, dysphagia or esophageal dysmotility, denies change in bowel habits or Sx of IBD  Hematologic/Lymphatic: +Hx of popliteal vein thrombosis in 3/2021 found to have Factor V Leiden mutation on life long anticoagulation, denies recurrent miscarriages, denies swollen LNs  Musculoskeletal:  refer to above HPI   Neurological: denies focal weakness, paresthesias/hyperesthesias or change in sensation, denies Hx of seizure, denies change in gait, balance, or memory    No Known Allergies    Past Medical History:        Diagnosis Date    Diverticulitis 2020    Factor 5 Leiden mutation, heterozygous ([address]     Freiberg's disease, right     foot    Hx of blood clots     PONV (postoperative nausea and vomiting)     [address] [address] [facility]        Past Surgical History:        Procedure Laterality Date    CARPAL TUNNEL RELEASE Bilateral      SECTION  2001     x1    FINGER TRIGGER RELEASE Bilateral     FOOT SURGERY Right     HEMICOLECTOMY N/A 3/25/2022    ROBOTIC SIGMOID COLECTOMY, SPLENIC FLEXURE, OMENTAL FLAP performed by Tanya Armando MD at [facility] Left 10/26/2020    LEFT L4 AND L5 TRANSFORAMINAL EPDIURAL STEROID INJECTION WITH FLUOROSCOPY (84783, 84042) performed by Ivan Mustafa MD at [address] [place] [address] Left 2020    LEFT LUMBAR FOUR LUMBAR FIVE TRANSFORAMINAL  EPIDURAL STEROID INJECTION SITE CONFIRMED BY FLUOROSCOPY performed by Ivan Mustafa MD at [address] [facility] OR       Medications:    Current Outpatient Medications   Medication Sig Dispense Refill    methocarbamol (ROBAXIN) 500 MG tablet Take 500 mg by mouth 4 times daily      hydroxychloroquine (PLAQUENIL) 200 MG tablet Take 1.5 tablets by mouth daily 135 tablet 0    predniSONE (DELTASONE) 10 MG tablet Take 3 tablets by mouth daily for 4 days, THEN 2 tablets daily for 4 days, THEN 1 tablet daily for 4 days, THEN 0.5 tablets daily for 4 days. 26 tablet 1    docusate sodium (COLACE) 100 MG capsule Take 1 capsule by mouth 2 times daily as needed for Constipation (Please take while taking narcotic pain medicine. If you develop loose or watery stools, then stop taking.) Please take while taking narcotic pain medicine. If you develop loose or watery stools, then stop taking. 14 capsule 0    buPROPion (WELLBUTRIN XL) 300 MG extended release tablet       traZODone (DESYREL) 50 MG tablet TAKE 1 TABLET NIGHTLY AS NEEDED FOR SLEEP 90 tablet 1    vitamin B-12 (CYANOCOBALAMIN) 500 MCG tablet Take 500 mcg by mouth daily      docusate sodium (COLACE) 100 MG capsule Take 100 mg by mouth 2 times daily      Saccharomyces boulardii (FLORASTOR PO) Take by mouth daily      ferrous sulfate (IRON 325) 325 (65 Fe) MG tablet TAKE 1 TABLET EVERY OTHER DAY      Resveratrol 250 MG CAPS Take by mouth daily      TURMERIC PO Take by mouth      ELIQUIS 5 MG TABS tablet TAKE 1 TABLET BY MOUTH TWO TIMES A DAY  60 tablet 0    oxyCODONE (ROXICODONE) 5 MG immediate release tablet Take 1 tablet by mouth every 6 hours as needed for Pain for up to 7 days. Intended supply: 7 days. Take lowest dose possible to manage pain (Patient not taking: Reported on 3/30/2022) 28 tablet 0     No current facility-administered medications for this visit.         OBJECTIVE:  Physical Exam:  /66   Pulse 88   Ht 5' 4\" (1.626 m)   Wt 134 lb (60.8 kg)   SpO2 98%   BMI 23.00 kg/m²     GEN: AAOx3, in NAD, well-appearing  HEAD: normocephalic, atraumatic  EYES: no injection or icterus  CVS: RRR  LUNGS: in no acute respiratory distress, CTAB  MSK:  Upper extremities:              Hands: no active synovitis, b/l MCP joints w/o swelling palmar aspect slightly TTP, b/l PIP and DIP joints w/o active synovitis NTTP, full fist formation w/ fair  strength              Wrist: no synovitis in the wrist joints b/l TTP, FROM              Elbow: no synovitis or bursitis, FROM  Lower extremities:              Knees: no warmth or effusion present, FROM              Ankles: no synovitis, FROM, Achilles tendons w/o swelling or warmth NTTP              Feet: no toe swelling or pain or warmth on palpation w/ FROM, negative MTP squeeze test  INTEGUMENT: no rash or psoriatic lesions, no petechiae, bruises, or palpable purpura, no patchy alopecia, no nail or periungual changes, no clubbing or digital ulcers    DATA:  Labs:   I personally reviewed interval labs and discussed w/ the pt in detail which showed:    Lab Results   Component Value Date    WBC 5.8 03/27/2022    HGB 10.5 (L) 03/27/2022    HCT 31.1 (L) 03/27/2022    MCV 93.7 03/27/2022     03/27/2022    GRAN 54 04/08/2020    LYMPHOPCT 31.0 03/27/2022    RBC 3.32 (L) 03/27/2022    MCH 31.8 03/27/2022    MCHC 33.9 03/27/2022    RDW 12.2 (L) 03/27/2022     Lab Results   Component Value Date     03/27/2022    K 3.8 03/27/2022     03/27/2022    CO2 25 03/27/2022    BUN 10 03/27/2022    CREATININE 0.7 03/27/2022    GLUCOSE 96 03/27/2022    CALCIUM 8.6 03/27/2022    PROT 6.9 02/19/2022    LABALBU 3.8 03/27/2022    BILITOT 0.5 02/19/2022    ALKPHOS 113 02/19/2022    AST 14 (L) 02/19/2022    ALT <5 (L) 02/19/2022    LABGLOM >60 03/27/2022    GFRAA >60 03/27/2022    AGRATIO 2.1 02/19/2022    GLOB 2.6 11/16/2020     Lab Results   Component Value Date    COLORU yellow 11/28/2016    CLARITYU clear 11/28/2016    GLUCOSEU neg 11/28/2016    BILIRUBINUR neg 11/28/2016    KETUA neg 11/28/2016    SPECGRAV 1.005 11/28/2016    BLOODU trace 11/28/2016    PHUR 5.5 11/28/2016    PROTEINU neg 11/28/2016    LEUKOCYTESUR neg 11/28/2016    LABMICR <1.20 11/17/2021     Lab Results   Component Value Date VITD25 33.1 11/16/2020     Lab Results   Component Value Date    C3 133.4 10/20/2021     Lab Results   Component Value Date    C4 19.8 10/20/2021     No results found for: ANTIDSDNAIGG     Lab Results   Component Value Date    LABURIC 3.2 10/20/2021     Lab Results   Component Value Date    CRP <3.0 02/01/2022    CRP <3.0 10/20/2021     Lab Results   Component Value Date    SEDRATE 9 10/20/2021     No results found for: CKTOTAL     Negative SHILOH (10/20/21)  Negative RF, CCP (10/20/21)  Negative HLA B27 (10/20/21)  Negative hepatitis B surface Ag and core total Ab, hepatitis C Ab (10/20/21)    Imaging:  I personally reviewed interval imaging and discussed w/ the pt in detail which included:    MRI C-spine (7/1/20): FINDINGS:   BONES/ALIGNMENT: There is normal alignment of the spine. The vertebral body heights are maintained. The bone marrow signal appears unremarkable.     SPINAL CORD: The visualized spinal cord has normal signal and morphology.  No evidence of mass or abnormal fluid collection within the spinal canal.     SOFT TISSUES: Paraspinal soft tissues are unremarkable.     C2-C3: Disc height and signal maintained.  No neural foraminal narrowing or spinal canal stenosis.     C3-C4: Disc height and signal maintained.  No neural foraminal narrowing or spinal canal stenosis.     C4-C5: Disc height and signal maintained.  No neural foraminal narrowing or spinal canal stenosis.     C5-C6: Mild disc height loss.  No disc signal abnormality.  No left neural foraminal narrowing.  Mild right neural foraminal narrowing secondary to uncovertebral hypertrophy.  Mild spinal canal stenosis secondary to disc bulge.     C6-C7: Disc height and signal maintained.  No neural foraminal narrowing or spinal canal stenosis.     C7-T1: Disc height and signal maintained.  No neural foraminal narrowing or spinal canal stenosis.   IMPRESSION:  1. Mild C5-6 degenerative disc height loss.    2. Mild C5-6 spinal canal stenosis secondary to disc bulge. 3. Mild narrowing of the right C6 neural foramen secondary to C5-6 uncovertebral hypertrophy.      MRI L-spine (10/8/20): FINDINGS:    Assume 5 lumbar vertebrae.  Lordosis is moderate.  Conus medullaris termination is normal.  Slight anterior disc displacements multilevel.     T11-12: Disc is dehydrated, slightly height reduced; shallow left lateral disc protrusion gently deforms left ventral dural sac.     T12-L1: Disc shows no posterior displacement.     L1-2: Disc shows no posterior displacement.     L2-3: Disc is dehydrated; trace disc bulge; right extraforaminal annular fissure; disc effaces ventral dural sac.  Mild facet hypertrophy, scant facet fluid.     L3-4: Disc is dehydrated; shallow disc bulge with annular fissure straightens ventral dural sac.  Scant facet fluid.     L4-5: Disc is dehydrated, slightly height reduced; 2mm retrolisthesis; disc bulge with annular fissure gently encroaches upon ventral dural sac, minimally greater on left; disc contacts foraminal L4 nerve roots.  Mild spinal stenosis.  Mild bilateral lateral recess stenosis.  Mild  biforaminal stenosis.     L5-S1: Disc is dehydrated, slightly height reduced; shallow central disc protrusion effaces ventral dural sac.  Disc contacts foraminal L5 nerve roots.  Mild biforaminal narrowing.   No focal bone lesion to suggest acute osseous stress injury or acute stress fracture.     CONCLUSION:   1. L4-5 mild spinal stenosis.  Trace retrolisthesis; disc bulge with annular fissure; disc contacting foraminal L4 nerve roots.  Mild biforaminal narrowing. 2. L5-S1 shallow central disc protrusion effacing ventral dural sac; disc contacts foraminal L5  nerve roots.  Mild biforaminal narrowing. 3. T11-12 shallow left lateral disc protrusion gently encroaching upon left ventral dural sac.      X-rays (10/20/21):  Left hand findings:     3 projections. Normal bone mineralization. No fracture or subluxation.  No evidence of bony erosion or ankylosis. Joint spaces are maintained. 1. No abnormality of left hand identified.     Right hand findings:     3 projections. Normal mineralization. No fracture or dislocation. No soft tissue abnormality. No evidence of erosion or ankylosis. Joint spaces are maintained.     IMPRESSION:   1. No abnormality of right hand identified.     Lumbar spine findings:     3 projections. There are 5 nonrib-bearing lumbar vertebral bodies. Normal vertebral body height and alignment. There is mild disc space narrowing at L4-L5. Mild L4-L5 facet arthrosis also demonstrated.     Impression:   1. Mild degenerative disc and facet disease in the lumbar spine and L4-L5.     SI joints findings:     3 projections of the sacroiliac joints. No evidence of SI joint fusion. No evidence of bony erosion or significant sclerosis. Evaluation of pelvic soft tissues demonstrates the presence of an intrauterine device.     IMPRESSION:   1. No abnormalities of the sacroiliac joints are detected.      MRI pelvis (2/11/21): Findings:  No acute fracture or contusion. No AVN of the femoral heads. Mild spurring of left acetabulum. Subtle spurring of right acetabulum. No hip joint effusion. No acute labral tear. No acute muscle or tendon strain. No trochanteric bursitis. Moderate tendinopathy of right hamstring tendon with meniscal tears. Mild tendinopathy of left hamstring tendon with no tear. Sciatic nerve normal in appearance and symmetrical.  Visualized SI joints and pubic symphysis are unremarkable. No lymphadenopathy in the visualized pelvis. An IUD. Conclusion:  1. Moderate tendinopathy of right hamstring tendon with meniscal tears at the origin. 2. Mild tendinopathy of the left hamstring tendon, no tear.     Nuclear bone scan (12/6/21): Findings:     There is relatively diffuse and fairly symmetric increased polyarticular uptake identified in the distal appendicular skeleton particularly elbows and wrists/hands, and ankles/feet. This is nonspecific but raises the possibility of inflammatory arthropathy. There is slightly greater asymmetrical uptake within the right knee, and the right greater than left forefeet, and these findings can also be seen with degenerative arthropathy. No other suspicious focal nonarticular osseous activity. IMPRESSION:  Increased bilateral periarticular activity identified in the distal appendicular skeleton, nonspecific but raises the possibility of inflammatory arthropathy, with possible superimposed degenerative arthropathy, as described.      MRI R hand (12/9/21): FINDINGS:   There is abnormal edema and enhancement identified within the flexor digitorum tendon sheath of the third digit acid courses volar to the third metacarpal head, proximal third phalanx and middle third phalanx. The findings are compatible with tenosynovitis. No discrete tendon tear is identified. No tendinopathy is identified.   No joint effusions are identified. No abnormal synovial thickening or enhancement is identified. No osseous erosion is identified. Marrow signal is normal.   IMPRESSION:  1. Findings consistent with tenosynovitis of the flexor digitorum tendon of the third digit. 2. No evidence of any synovitis or osseous erosion. Above results were discussed w/ the pt in detail during today's visit.

## 2022-03-26 NOTE — PROGRESS NOTES
General Surgery   Daily Progress Note  Patient: Delynn Schilder A Money      CC: Robotic sigmoid colectomy 2/2 diverticulitis    SUBJECTIVE:   Patient rested well overnight. Minimal incisional pain controlled with medications, denies nausea or vomiting. Denies passing gas or having BMs.     ROS:   A 14 point review of systems was conducted, significant findings as noted above. All other systems negative. OBJECTIVE:    PHYSICAL EXAM:    Vitals:    03/25/22 1641 03/25/22 1915 03/25/22 2300 03/26/22 0215   BP: 112/71 (!) 99/57 (!) 91/55 (!) 92/56   Pulse:  69 60 61   Resp:  18 16 18   Temp:  98.2 °F (36.8 °C) 98.2 °F (36.8 °C) 98.8 °F (37.1 °C)   TempSrc:  Oral Axillary Oral   SpO2:  98% 99% 98%   Weight:       Height:           Exam:  General appearance: alert, appears stated age and cooperative  Lungs: Normal effort, symmetric chest rise, on RA  Heart: regular rate and rhythm  Abdomen: abd soft and non-distended. Appropriately tender. Trocar sites well approx C/D/I  Low transverse incision well approx with surgical glue    LABS:   Recent Labs     03/25/22  0640   WBC 3.1*   HGB 12.5   HCT 36.7   MCV 93.6           Recent Labs     03/25/22  0640      K 3.7      CO2 22   BUN 13   CREATININE 0.8      No results for input(s): AST, ALT, ALB, BILIDIR, BILITOT, ALKPHOS in the last 72 hours. No results for input(s): LIPASE, AMYLASE in the last 72 hours. Recent Labs     03/25/22  0640   INR 1.03   APTT 29.3      No results for input(s): CKTOTAL, CKMB, CKMBINDEX, TROPONINI in the last 72 hours.       ASSESSMENT & PLAN:   Pt is a 49year old female s/p Robotic assisted laparoscopic sigmoid colectomy with colorectal anastomosis, laparoscopic robotic mobilization splenic flexure and creation of greater omental pedicle vascularized flap for sigmoid diverticulitis (03/25) POD #1    - Continue regular diet  - Okay to restart Eliquis 48 from surgery (tomorrow)  - Continue current pain regimen  - Encourage OOB and ambulation  - Anticipate discharge today vs. tomorrow    Negrito Tinoco DO MS  PGY1, General Surgery  03/26/22  6:48 AM  [phone]

## 2022-03-26 NOTE — PROGRESS NOTES
Patient A&Ox4. VSS. Patient reports pain of 7/10 during the shift and was given oxycodone with benefit. Pt ambulated hallways this shift with minimal assistance. No significant events overnight. All care needs addressed with no further needs at this time. Bed is locked and in lowest position with alarm on. Call light and bedside table within reach. Will continue to monitor per protocol.      Electronically signed by Ernie Michaels RN on 3/26/2022 at 6:07 AM

## 2022-03-26 NOTE — PROGRESS NOTES
Comprehensive Nutrition Assessment    RECOMMENDATIONS:  1. PO Diet: Regular diet  2. ONS: Add one Ensure QD    NUTRITION ASSESSMENT:   Nutritional summary & status: Positive screen for wt loss and poor po. Patient with 7% wt loss ~1 month per EMR. Pt is s/p Robotic assisted laparoscopic sigmoid colectomy with colorectal anastomosis, laparoscopic robotic mobilization splenic flexure and creation of greater omental pedicle vascularized flap for sigmoid diverticulitis (03/25) POD #1. Currently with regular diet ordered and tolerating % of two meals s/p procedure. Will contnue to monitor nutritional status and need for intervention. MALNUTRITION ASSESSMENT  Context of Malnutrition: Acute Illness   Malnutrition Status: Moderate malnutrition  Findings of the 6 clinical characteristics of malnutrition (Minimum of 2 out of 6 clinical characteristics is required to make the diagnosis of moderate or severe Protein Calorie Malnutrition based on AND/ASPEN Guidelines):  Energy Intake: Less than/equal to 75% of estimated energy requirements    Energy Intake Time: Greater than or equal to 7 days    Weight Loss %: 5% loss or greater    Weight loss Time: Greater than or equal to 1 month    Body Fat Loss: No significant loss    Body Fat Location: No Significant   Body Muscle Loss: No significant loss    Body Muscle Loss Location: No significant    Fluid Accumulation: No significant    Fluid Accumulation Location: No significant     Strength: Not Performed;  Not Measured     NUTRITION DIAGNOSIS   Increased nutrient needs related to catabolic illness as evidenced by other (comment) (s/p surgical procedure)    [address] and/or Nutrient Delivery:  Continue Current Diet,Start Oral Nutrition Supplement  Goals:  Patient will tolerate po diet and consume 50% or greater of all meals and supplements       Nutrition Monitoring and Evaluation:   Food/Nutrient Intake Outcomes:  Food and Nutrient Intake    OBJECTIVE DATA: Significant to nutrition assessment  · Nutrition-Focused Physical Findings:  No BM  · Labs: Reviewed;   · Meds: Reviewed;   · Wounds:    Surgical wound    CURRENT NUTRITION THERAPIES  ADULT DIET; Regular     ADULT DIET; Regular  PO Intake: % (x2 meals)   PO Supplement Intake: N/A  Additional Sources of Calories/IVF:  N/A    ANTHROPOMETRICS  Current Height: 5' 4\" (162.6 cm)  Current Weight: 131 lb (59.4 kg)    Admission weight: 136 lb (61.7 kg)  Ideal Body Weight (IBW): 120 lbs  (55 kg)    Weight Changes 7% loss ~1m      BMI: 22.5    Wt Readings from Last 50 Encounters:   03/25/22 131 lb (59.4 kg)   03/21/22 135 lb 3.2 oz (61.3 kg)   03/18/22 135 lb 6.4 oz (61.4 kg)   03/17/22 135 lb (61.2 kg)   03/08/22 136 lb (61.7 kg)   03/07/22 136 lb (61.7 kg)   02/28/22 140 lb 6.4 oz (63.7 kg)   02/19/22 142 lb 6.7 oz (64.6 kg)   02/01/22 143 lb (64.9 kg)   12/16/21 134 lb (60.8 kg)   12/01/21 135 lb (61.2 kg)   11/17/21 142 lb 12.8 oz (64.8 kg)   11/15/21 144 lb (65.3 kg)   10/20/21 140 lb (63.5 kg)   10/18/21 142 lb (64.4 kg)   10/01/21 143 lb 12.8 oz (65.2 kg)   09/13/21 144 lb (65.3 kg)   08/16/21 145 lb (65.8 kg)   08/16/21 141 lb (64 kg)   04/26/21 151 lb (68.5 kg)       COMPARATIVE STANDARDS  Energy (kcal):  3286-0214     Protein (g):  70-82 (1.2-1.4)       Fluid (ml/day):  1800 ml    The patient will still be monitored per nutrition standards of care. Consult dietitian if nutrition interventions essential to patient care is needed.      Liliya Sandoval, [address]:  [phone]  Office:  [phone]

## 2022-03-26 NOTE — PROGRESS NOTES
Ambulated with SBA around unit. Tolerated well. C/o abd fullness/tightness no BM yet and declined to eat lunch d/t this. Scant bloody drainage but no BM.  @ BS.

## 2022-03-27 VITALS
DIASTOLIC BLOOD PRESSURE: 72 MMHG | RESPIRATION RATE: 16 BRPM | HEIGHT: 64 IN | BODY MASS INDEX: 22.36 KG/M2 | SYSTOLIC BLOOD PRESSURE: 116 MMHG | OXYGEN SATURATION: 98 % | HEART RATE: 67 BPM | WEIGHT: 131 LBS | TEMPERATURE: 98.8 F

## 2022-03-27 LAB
ALBUMIN SERPL-MCNC: 3.8 G/DL (ref 3.4–5)
ANION GAP SERPL CALCULATED.3IONS-SCNC: 6 MMOL/L (ref 3–16)
BASOPHILS ABSOLUTE: 0 K/UL (ref 0–0.2)
BASOPHILS RELATIVE PERCENT: 0.3 %
BUN BLDV-MCNC: 10 MG/DL (ref 7–20)
CALCIUM SERPL-MCNC: 8.6 MG/DL (ref 8.3–10.6)
CHLORIDE BLD-SCNC: 110 MMOL/L (ref 99–110)
CO2: 25 MMOL/L (ref 21–32)
CREAT SERPL-MCNC: 0.7 MG/DL (ref 0.6–1.1)
EOSINOPHILS ABSOLUTE: 0 K/UL (ref 0–0.6)
EOSINOPHILS RELATIVE PERCENT: 0.6 %
GFR AFRICAN AMERICAN: >60
GFR NON-AFRICAN AMERICAN: >60
GLUCOSE BLD-MCNC: 96 MG/DL (ref 70–99)
HCT VFR BLD CALC: 31.1 % (ref 36–48)
HEMOGLOBIN: 10.5 G/DL (ref 12–16)
LYMPHOCYTES ABSOLUTE: 1.8 K/UL (ref 1–5.1)
LYMPHOCYTES RELATIVE PERCENT: 31 %
MAGNESIUM: 2 MG/DL (ref 1.8–2.4)
MCH RBC QN AUTO: 31.8 PG (ref 26–34)
MCHC RBC AUTO-ENTMCNC: 33.9 G/DL (ref 31–36)
MCV RBC AUTO: 93.7 FL (ref 80–100)
MONOCYTES ABSOLUTE: 0.3 K/UL (ref 0–1.3)
MONOCYTES RELATIVE PERCENT: 6 %
NEUTROPHILS ABSOLUTE: 3.6 K/UL (ref 1.7–7.7)
NEUTROPHILS RELATIVE PERCENT: 62.1 %
PDW BLD-RTO: 12.2 % (ref 12.4–15.4)
PHOSPHORUS: 2.7 MG/DL (ref 2.5–4.9)
PLATELET # BLD: 163 K/UL (ref 135–450)
PMV BLD AUTO: 7.8 FL (ref 5–10.5)
POTASSIUM SERPL-SCNC: 3.8 MMOL/L (ref 3.5–5.1)
RBC # BLD: 3.32 M/UL (ref 4–5.2)
SODIUM BLD-SCNC: 141 MMOL/L (ref 136–145)
WBC # BLD: 5.8 K/UL (ref 4–11)

## 2022-03-27 PROCEDURE — 36415 COLL VENOUS BLD VENIPUNCTURE: CPT

## 2022-03-27 PROCEDURE — 80069 RENAL FUNCTION PANEL: CPT

## 2022-03-27 PROCEDURE — 85025 COMPLETE CBC W/AUTO DIFF WBC: CPT

## 2022-03-27 PROCEDURE — 6370000000 HC RX 637 (ALT 250 FOR IP): Performed by: STUDENT IN AN ORGANIZED HEALTH CARE EDUCATION/TRAINING PROGRAM

## 2022-03-27 PROCEDURE — 83735 ASSAY OF MAGNESIUM: CPT

## 2022-03-27 PROCEDURE — 6360000002 HC RX W HCPCS: Performed by: STUDENT IN AN ORGANIZED HEALTH CARE EDUCATION/TRAINING PROGRAM

## 2022-03-27 PROCEDURE — 2580000003 HC RX 258: Performed by: STUDENT IN AN ORGANIZED HEALTH CARE EDUCATION/TRAINING PROGRAM

## 2022-03-27 RX ORDER — DOCUSATE SODIUM 100 MG/1
100 CAPSULE, LIQUID FILLED ORAL 2 TIMES DAILY PRN
Qty: 14 CAPSULE | Refills: 0 | Status: SHIPPED | OUTPATIENT
Start: 2022-03-27 | End: 2022-04-03

## 2022-03-27 RX ORDER — OXYCODONE HYDROCHLORIDE 5 MG/1
5 TABLET ORAL EVERY 6 HOURS PRN
Qty: 28 TABLET | Refills: 0 | Status: SHIPPED | OUTPATIENT
Start: 2022-03-27 | End: 2022-04-03

## 2022-03-27 RX ADMIN — OXYCODONE 10 MG: 5 TABLET ORAL at 01:29

## 2022-03-27 RX ADMIN — SODIUM CHLORIDE, PRESERVATIVE FREE 10 ML: 5 INJECTION INTRAVENOUS at 08:09

## 2022-03-27 RX ADMIN — METHOCARBAMOL 1500 MG: 750 TABLET ORAL at 08:09

## 2022-03-27 RX ADMIN — ENOXAPARIN SODIUM 40 MG: 40 INJECTION SUBCUTANEOUS at 08:09

## 2022-03-27 RX ADMIN — IBUPROFEN 600 MG: 200 TABLET, FILM COATED ORAL at 01:29

## 2022-03-27 RX ADMIN — OXYCODONE 10 MG: 5 TABLET ORAL at 06:02

## 2022-03-27 RX ADMIN — DIBASIC SODIUM PHOSPHATE, MONOBASIC POTASSIUM PHOSPHATE AND MONOBASIC SODIUM PHOSPHATE 2 TABLET: 852; 155; 130 TABLET ORAL at 08:08

## 2022-03-27 RX ADMIN — POLYETHYLENE GLYCOL 3350 17 G: 17 POWDER, FOR SOLUTION ORAL at 08:09

## 2022-03-27 RX ADMIN — ACETAMINOPHEN 1000 MG: 500 TABLET ORAL at 08:09

## 2022-03-27 RX ADMIN — ACETAMINOPHEN 1000 MG: 500 TABLET ORAL at 01:29

## 2022-03-27 RX ADMIN — DOCUSATE SODIUM 100 MG: 100 CAPSULE, LIQUID FILLED ORAL at 08:09

## 2022-03-27 RX ADMIN — IBUPROFEN 600 MG: 200 TABLET, FILM COATED ORAL at 08:09

## 2022-03-27 RX ADMIN — BUPROPION HYDROCHLORIDE 300 MG: 150 TABLET, FILM COATED, EXTENDED RELEASE ORAL at 08:09

## 2022-03-27 ASSESSMENT — PAIN DESCRIPTION - DESCRIPTORS
DESCRIPTORS: ACHING
DESCRIPTORS: ACHING

## 2022-03-27 ASSESSMENT — PAIN DESCRIPTION - PROGRESSION
CLINICAL_PROGRESSION: GRADUALLY WORSENING
CLINICAL_PROGRESSION: GRADUALLY WORSENING

## 2022-03-27 ASSESSMENT — PAIN DESCRIPTION - ONSET
ONSET: ON-GOING
ONSET: ON-GOING

## 2022-03-27 ASSESSMENT — PAIN DESCRIPTION - ORIENTATION
ORIENTATION: LOWER
ORIENTATION: LOWER

## 2022-03-27 ASSESSMENT — PAIN DESCRIPTION - LOCATION
LOCATION: ABDOMEN
LOCATION: ABDOMEN

## 2022-03-27 ASSESSMENT — PAIN SCALES - GENERAL
PAINLEVEL_OUTOF10: 0
PAINLEVEL_OUTOF10: 7
PAINLEVEL_OUTOF10: 7

## 2022-03-27 ASSESSMENT — PAIN DESCRIPTION - PAIN TYPE
TYPE: SURGICAL PAIN
TYPE: SURGICAL PAIN

## 2022-03-27 ASSESSMENT — PAIN DESCRIPTION - FREQUENCY
FREQUENCY: CONTINUOUS
FREQUENCY: CONTINUOUS

## 2022-03-27 NOTE — PROGRESS NOTES
General Surgery   Daily Progress Note  Patient: Homero Lara      CC: Robotic sigmoid colectomy 2/2 diverticulitis    SUBJECTIVE:   Patient rested well overnight. Still having some incisional pain. Difficulty sleeping overnight. Had a medium sized BM, tolerating a diet with no nausea or vomiting. ROS:   A 14 point review of systems was conducted, significant findings as noted above. All other systems negative. OBJECTIVE:    PHYSICAL EXAM:    Vitals:    03/26/22 1522 03/26/22 1942 03/26/22 2248 03/27/22 0347   BP: (!) 95/58 119/68 100/64 105/64   Pulse: 64 67 67 72   Resp: 16 16 16 16   Temp: 98.3 °F (36.8 °C) 98.5 °F (36.9 °C) 98.3 °F (36.8 °C) 98.5 °F (36.9 °C)   TempSrc: Oral Oral Oral Oral   SpO2: 100% 100% 94% 95%   Weight:       Height:           Exam:  General appearance: alert, appears stated age and cooperative  Lungs: Normal effort, symmetric chest rise, on RA  Heart: regular rate and rhythm  Abdomen: abd soft and non-distended. Appropriately tender. Trocar sites well approx C/D/I  Low transverse incision well approx with surgical glue    LABS:   Recent Labs     03/26/22  0627 03/27/22  0527   WBC 6.5 5.8   HGB 10.6* 10.5*   HCT 31.5* 31.1*   MCV 94.3 93.7    163        Recent Labs     03/26/22  0627 03/27/22  0527    141   K 4.0 3.8    110   CO2 24 25   PHOS 2.9 2.7   BUN 12 10   CREATININE 0.7 0.7      No results for input(s): AST, ALT, ALB, BILIDIR, BILITOT, ALKPHOS in the last 72 hours. No results for input(s): LIPASE, AMYLASE in the last 72 hours. Recent Labs     03/25/22  0640   INR 1.03   APTT 29.3      No results for input(s): CKTOTAL, CKMB, CKMBINDEX, TROPONINI in the last 72 hours.       ASSESSMENT & PLAN:   Pt is a 49year old female s/p Robotic assisted laparoscopic sigmoid colectomy with colorectal anastomosis, laparoscopic robotic mobilization splenic flexure and creation of greater omental pedicle vascularized flap for sigmoid diverticulitis (03/25) POD

## 2022-03-27 NOTE — PROGRESS NOTES
Pt alert and oriented. VSS. Pt reporting surgical pain that is being controlled with scheduled and PRN pain medication, see MAR. Pt reported feeling bloated, yet to have a BM. Surgical residents notified and stool softeners ordered and given, see MAR. Pt voiding freely. Pt denies nausea. Pt sleeping for intervals. Pt has call light within reach, bed in lowest position with wheels locked, 2/4 side rails up, and pt is up ad rebeka with steady gait. Will continue to monitor.

## 2022-03-28 ENCOUNTER — OFFICE VISIT (OUTPATIENT)
Dept: CARDIOLOGY CLINIC | Age: 52
End: 2022-03-28
Payer: COMMERCIAL

## 2022-03-28 ENCOUNTER — CARE COORDINATION (OUTPATIENT)
Dept: CASE MANAGEMENT | Age: 52
End: 2022-03-28

## 2022-03-28 VITALS
HEART RATE: 68 BPM | DIASTOLIC BLOOD PRESSURE: 70 MMHG | WEIGHT: 136 LBS | BODY MASS INDEX: 23.34 KG/M2 | SYSTOLIC BLOOD PRESSURE: 110 MMHG

## 2022-03-28 DIAGNOSIS — I95.9 HYPOTENSION, UNSPECIFIED HYPOTENSION TYPE: Primary | ICD-10-CM

## 2022-03-28 PROCEDURE — 99213 OFFICE O/P EST LOW 20 MIN: CPT | Performed by: INTERNAL MEDICINE

## 2022-03-28 ASSESSMENT — ENCOUNTER SYMPTOMS
COUGH: 0
SHORTNESS OF BREATH: 0
CHOKING: 0
CHEST TIGHTNESS: 0

## 2022-03-28 NOTE — PROGRESS NOTES
Subjective:      Patient ID: Anupam Elliott is a 49 y.o. female. HPI Follow up for hypotension. No real sx. Tolerated surgery. BP same. No syncope. Feels fine otherwise. No other comments on low bp. No chest pian. No sob. Past Medical History:   Diagnosis Date    Diverticulitis 2020    Factor 5 Leiden mutation, heterozygous [facility]     Freiberg's disease, right     foot    Hx of blood clots     PONV (postoperative nausea and vomiting)     Psoriatic arthritis ([facility]      Past Surgical History:   Procedure Laterality Date    CARPAL TUNNEL RELEASE Bilateral      SECTION  2001     x1    FINGER TRIGGER RELEASE Bilateral     FOOT SURGERY Right     HEMICOLECTOMY N/A 3/25/2022    ROBOTIC SIGMOID COLECTOMY, SPLENIC FLEXURE, OMENTAL FLAP performed by Megan R[facility] [address] [facility] Left 10/26/2020    LEFT L4 AND L5 TRANSFORAMINAL EPDIURAL STEROID INJECTION WITH FLUOROSCOPY (31711, 06258) performed by Mitra Ngo MD at [address] Left 2020    LEFT LUMBAR FOUR LUMBAR FIVE TRANSFORAMINAL  EPIDURAL STEROID INJECTION SITE CONFIRMED BY FLUOROSCOPY performed by Mitra Ngo MD at [address] History     Socioeconomic History    Marital status:      Spouse name: Nolberto Wilson    Number of children: 1    Years of education: college    Highest education level: Not on file   Occupational History     Comment: [employer]   Tobacco Use    Smoking status: Never Smoker    Smokeless tobacco: Never Used   Vaping Use    Vaping Use: Never used   Substance and Sexual Activity    Alcohol use:  Yes     Alcohol/week: 1.0 - 2.0 standard drink     Types: 1 - 2 Glasses of wine per week    Drug use: No    Sexual activity: Yes     Partners: Male     Birth control/protection: Pill     Comment: D  son 23 y old    Other Topics Concern    Not on file   Social History Narrative    Divorce    BF    Son 21year old  @ are negative. Objective:   Physical Exam  Constitutional:       General: She is not in acute distress. Appearance: Normal appearance. She is well-developed. HENT:      Head: Normocephalic and atraumatic. Right Ear: External ear normal.      Left Ear: External ear normal.   Neck:      Vascular: No JVD. Cardiovascular:      Rate and Rhythm: Normal rate and regular rhythm. Heart sounds: Normal heart sounds. No murmur heard. No gallop. Pulmonary:      Effort: Pulmonary effort is normal. No respiratory distress. Breath sounds: Normal breath sounds. No wheezing or rales. Abdominal:      General: Bowel sounds are normal.      Palpations: Abdomen is soft. Tenderness: There is no abdominal tenderness. Musculoskeletal:         General: Normal range of motion. Cervical back: Normal range of motion. Skin:     General: Skin is warm and dry. Neurological:      General: No focal deficit present. Mental Status: She is alert and oriented to person, place, and time. Psychiatric:         Mood and Affect: Mood normal.         Behavior: Behavior normal.         Assessment:       Diagnosis Orders   1. Hypotension, unspecified hypotension type             Plan:   BP likely is normal for her. She is very asymptomatic. Reviewed previous records and testing including echo/myoview 3/21. No changes. Will have follow up prn.          Jennifer Osorio MD

## 2022-03-28 NOTE — CARE COORDINATION
Don 45 Transitions Initial Follow Up Call    Call within 2 business days of discharge: Yes    Patient:  Moira Mejia  Patient :  1970  MRN:  [id number]   Reason for Admission:  COVID 19 MONITORING   Discharge Date:  3/27/22  RARS: 10    CTC attempt to reach Pt regarding recent hospital discharge. CTC left voice recording with call back number requesting a call back. Follow up appointments:    Future Appointments   Date Time Provider Thiago Pascual   3/28/2022  3:15 PM MD CARMEN Contreras CARDIO [facility]   3/30/2022  3:20 PM MD CARMEN Gracia RHEUM [facility]   2022  1:15 PM Jose Rosales MD COLORECTAL S [facility]   2022 11:00 AM CHRISTA Smith - CNP R BANK PAIN [facility]       Brittaney Guerrero.  Sri Espinoza BSN, RN  Care Transition Coordinator  Contact Number:  [phone]

## 2022-03-29 ENCOUNTER — CARE COORDINATION (OUTPATIENT)
Dept: CASE MANAGEMENT | Age: 52
End: 2022-03-29

## 2022-03-29 DIAGNOSIS — F51.01 PRIMARY INSOMNIA: Primary | ICD-10-CM

## 2022-03-29 NOTE — PROGRESS NOTES
Physician Progress Note      Juan Toussaint  [facility] #:                  [id number]  :                       1970  ADMIT DATE:       3/25/2022 5:43 AM  DISCH DATE:        3/27/2022 11:05 AM  RESPONDING  PROVIDER #:        Cori Garcia MD          QUERY TEXT:    Pt admitted 3/25- 3/27 with sigmoid diverticulitis. Noted documentation of   Moderate malnutrition on 3/26 by ordered Dietary consultant. If possible,   please document in discharge summary:    The medical record reflects the following:  Risk Factors:  sigmoid diverticulitis  Clinical Indicators: Per RD & ASPEN guidelines: Energy Intake: Less than/equal   to 75% of estimated energy requirements x 7 days, Weight Loss %: 5% loss or   greater x 1 month (7% wt loss   1 month), Increased nutrient needs related to catabolic illness as evidenced   by s/p surgical procedure. Treatment: Regular diet, I&O monitoring, RD monitoring, ONS  Options provided:  -- Moderate malnutrition confirmed  -- Moderate malnutrition ruled out  -- Other - I will add my own diagnosis  -- Disagree - Not applicable / Not valid  -- Disagree - Clinically unable to determine / Unknown  -- Refer to Clinical Documentation Reviewer    PROVIDER RESPONSE TEXT:    The diagnosis of Moderate malnutrition was ruled out. Query created by:  Popeye Rubalcava on 3/29/2022 8:56 AM      Electronically signed by:  Cori Garcia MD 3/29/2022 10:40 AM

## 2022-03-29 NOTE — CARE COORDINATION
Don 45 Transitions Initial Follow Up Call    Call within 2 business days of discharge: Yes    Patient: Bria Lara Patient : 1970   MRN: [id number]   Reason for Admission: diverticulitis   Discharge Date: 3/27/22 RARS: Readmission Risk Score: 10.2 ( )      Last Discharge [facility]       Complaint Diagnosis Description Type Department Provider    3/25/22  Post-op pain . .. Admission (Discharged) PKANWAL Argueta MD           Spoke with: [address]: [facility]      Non-face-to-face services provided:  Obtained and reviewed discharge summary and/or continuity of care documents  Education of patient/family/caregiver/guardian to support self-management-   Assessment and support for treatment adherence and medication management-      Care Transitions 24 Hour Call    Do you have any ongoing symptoms?: Yes  Patient-reported symptoms: Other (Comment: abdominal soreness)  Interventions for patient-reported symptoms: Other  Do you have a copy of your discharge instructions?: Yes  Do you have all of your prescriptions and are they filled?: Yes  Have you been contacted by a [address]?: No  Have you scheduled your follow up appointment?: Yes  Were you discharged with any Home Care or Post Acute Services: No  Do you feel like you have everything you need to keep you well at home?: Yes  Care Transitions Interventions       Was this an external facility discharge? NA  Challenges to be reviewed by the provider   Additional needs identified to be addressed with provider:   NO             Method of communication with provider :   phone    Advance Care Planning:   Does patient have an Advance Directive:   patient declined education    Was this a readmission? no    Care Transition Nurse (CTN) contacted the patient by telephone to perform post hospital discharge assessment. Verified name with patient as identifier.  Provided introduction to self, and explanation of the CTN role.     CTN reviewed discharge instructions, medical action plan and red flags with patient who verbalized understanding. Patient given an opportunity to ask questions and does not have any further questions or concerns at this time. Were discharge instructions available to patient? yes     Reviewed appropriate site of care based on symptoms and resources available to patient including:      PCP   Specialist   After hour contact number   Urgent [facility]   When to call 911     CTN reviewed discharge instructions, medical action plan and red flags with patient and discussed any barriers to care and/or understanding of plan of care after discharge. Discussed appropriate site of care based on symptoms and resources available to patient. The patient agrees to contact the PCP office for questions related to their healthcare. Medication reconciliation was performed with Patient, who verbalizes understanding of administration of home medications. Advised obtaining a 90-day supply of all daily and as-needed medications. Covid Risk Education    Patient has following risk factors of:    No known risk factors    Education provided regarding infection prevention, and signs and symptoms of COVID-19 and when to seek medical attention with patient who verbalized understanding. Discussed exposure protocols and quarantine From CDC: Are you at higher risk for severe illness?   and given an opportunity for questions and concerns. The patient agrees to contact the COVID-19 hotline [phone] or PCP office for questions related to COVID-19. For more information on steps you can take to protect yourself, see CDC's How to Protect Yourself     Discussed follow-up appointments. If no appointment was previously scheduled, appointment scheduling offered:  yes, Pt declined assistance and will schedule at convenience with PCP     Is follow up appointment scheduled within 7 days of discharge?  Yes - cardiology      Non-[facility] follow up appointment(s):  NA    Plan for f/u call in 7-10 days based on severity of symptoms and risk factors. CTN provided contact information for future needs. SUMMARY  CTC spoke to the Pt who denies fever, chills, nausea, vomiting, chest pain, and SOB. Pt states she has some abdominal soreness but she is \"doing good\". Pt denies issues with fluid intake, appetite, urination, and LBM was today. CTC and Pt reviewed meds and CTC completed the 1111f and Pt declined assistance with scheduling HFU with PCP. HFU with surgeon on 4/6. Pt will take all meds as prescribed and schedule / keep doctor's appt. CTC provided education on s/s that require medical attention and when to seek medical attention. CTC advised Pt of use urgent care or physician's 24 hr access line if assistance is needed after hours or on the weekend. Pt denies any needs or concerns and is agreeable with additional calls.     Follow Up  Future Appointments   Date Time Provider Thiago Pascual   3/30/2022  3:20 PM Zeb Roman MD CARMEN RHEUM [facility]   4/6/2022  1:15 PM Mario Alberto Elizabeth MD COLORECTAL S [facility]   4/18/2022 11:00 AM CHRISTA Wiggins - CNP R BANK PAIN [facility]       Preeti Thorne RN

## 2022-03-30 ENCOUNTER — OFFICE VISIT (OUTPATIENT)
Dept: RHEUMATOLOGY | Age: 52
End: 2022-03-30
Payer: COMMERCIAL

## 2022-03-30 VITALS
BODY MASS INDEX: 22.88 KG/M2 | OXYGEN SATURATION: 98 % | WEIGHT: 134 LBS | HEART RATE: 88 BPM | SYSTOLIC BLOOD PRESSURE: 110 MMHG | DIASTOLIC BLOOD PRESSURE: 66 MMHG | HEIGHT: 64 IN

## 2022-03-30 DIAGNOSIS — L40.50 PSORIATIC ARTHRITIS (HCC): Primary | ICD-10-CM

## 2022-03-30 DIAGNOSIS — Z79.899 HIGH RISK MEDICATION USE: ICD-10-CM

## 2022-03-30 PROCEDURE — 99214 OFFICE O/P EST MOD 30 MIN: CPT | Performed by: INTERNAL MEDICINE

## 2022-03-30 RX ORDER — PREDNISONE 10 MG/1
TABLET ORAL
Qty: 26 TABLET | Refills: 1 | Status: SHIPPED | OUTPATIENT
Start: 2022-03-30 | End: 2022-07-29

## 2022-03-30 RX ORDER — METHOCARBAMOL 500 MG/1
500 TABLET, FILM COATED ORAL 4 TIMES DAILY
COMMUNITY
End: 2022-08-23 | Stop reason: ALTCHOICE

## 2022-03-30 RX ORDER — HYDROXYCHLOROQUINE SULFATE 200 MG/1
300 TABLET, FILM COATED ORAL DAILY
Qty: 135 TABLET | Refills: 0 | Status: SHIPPED | OUTPATIENT
Start: 2022-03-30 | End: 2022-05-31 | Stop reason: SDUPTHER

## 2022-03-30 NOTE — ASSESSMENT & PLAN NOTE
- seronegative inflammatory polyarthritis involving the b/l wrists, MCP, PIP and DIP joints w/ significant response to low dose Pred taper. Hx of b/l 2-3rd dactylitis. MRI R hand from 12/2021 showed flexor tenosynovitis of the 3rd digit. Nuclear bone scan ordered by PCP on 12/6/21 showed uptake pattern suggestive of inflammatory arthropathy w/ superimposed degenerative arthropathy. Suspect she likely has early PsA vs seronegative RA, favor PsA given prior findings of dactylitis. - no significant synovitis appreciated on exam today. - resume  mg daily now. - hold off on resuming MTX given recent surgery and quiescent disease. She will take low dose Pred taper PRN for arthritis flares. She will call our office if she develops frequent arthritis flares. - avoid NSAIDs d/t pt being on anticoagulation. - pt following w/ Pain Management.

## 2022-04-05 ENCOUNTER — CARE COORDINATION (OUTPATIENT)
Dept: CASE MANAGEMENT | Age: 52
End: 2022-04-05

## 2022-04-05 NOTE — CARE COORDINATION
Don 45 Transitions Follow Up Call    2022    Patient: Emilie Lara  Patient : 1970   MRN: [id number]   Reason for Admission:  Jayda Adeline / COLECTOMY   Discharge Date: 3/27/22 RARS: Readmission Risk Score: 10.2 ( )         Spoke with: [address] Transitions Subsequent and Final Call    Subsequent and Final Calls  Do you have any ongoing symptoms?: Yes  Patient-reported symptoms: Pain  Interventions for patient-reported symptoms: Other  Have your medications changed?: No  Do you have any questions related to your medications?: No  Do you currently have any active services?: No  Do you have any needs or concerns that I can assist you with?: No  Identified Barriers: None  Care Transitions Interventions  Other Interventions:         Needs to be reviewed by the provider   Additional needs identified to be addressed with provider:  NO             SUMMARY  CTN spoke to the Pt who states she is doing good,  Pt reports some mild pain to surgical site to abdomen that she rates 3/5. Pt denies issues with fluid intake, appetite, urination, and LBM was today. Pt confirms they have all meds and taking as prescribed and has f/u with surgeon tomorrow. Pt states she forgot to call PCP and declined assistance and states she will call. Appts completed:      PCP:  Will schedule   Specialty:  Cardiology seen - Dr Melonie Fam    Labs / testing:  NA  Home care:  NA    From CDC: Are you at higher risk for severe illness?  Wash your hands often.  Avoid close contact (6 feet, which is about two arm lengths) with people who are sick.  Put distance between yourself and other people if COVID-19 is spreading in your community.  Clean and disinfect frequently touched surfaces.  Avoid all cruise travel and non-essential air travel.  Call your healthcare professional if you have concerns about COVID-19 and your underlying condition or if you are sick.     Pt will take all meds as prescribed and schedule / keep doctor's appt. [facility] provided education on s/s that require medical attention and when to seek medical attention. [facility] advised Pt of use urgent care or physician's 24 hr access line if assistance is needed after hours or on the weekend. Pt denies any needs or concerns and is agreeable with additional calls.     Follow Up  Future Appointments   Date Time Provider Thiago Pascual   4/6/2022  1:15 PM Beth Pack MD COLORECTAL S [facility]   4/18/2022 11:00 AM CHRISTA Yan - CNP R BANK PAIN [facility]   5/31/2022  2:40 PM MD CARMEN Mack Counts include 234 beds at the [facility]       Michael Padron RN

## 2022-04-06 ENCOUNTER — OFFICE VISIT (OUTPATIENT)
Dept: SURGERY | Age: 52
End: 2022-04-06

## 2022-04-06 VITALS
TEMPERATURE: 97.3 F | WEIGHT: 134 LBS | BODY MASS INDEX: 22.88 KG/M2 | DIASTOLIC BLOOD PRESSURE: 61 MMHG | SYSTOLIC BLOOD PRESSURE: 102 MMHG | OXYGEN SATURATION: 98 % | HEIGHT: 64 IN | HEART RATE: 75 BPM

## 2022-04-06 DIAGNOSIS — K57.92 ACUTE DIVERTICULITIS: Primary | ICD-10-CM

## 2022-04-06 DIAGNOSIS — D68.51 FACTOR 5 LEIDEN MUTATION, HETEROZYGOUS (HCC): ICD-10-CM

## 2022-04-06 PROCEDURE — 99024 POSTOP FOLLOW-UP VISIT: CPT | Performed by: SURGERY

## 2022-04-06 RX ORDER — METHOCARBAMOL 500 MG/1
500 TABLET, FILM COATED ORAL 4 TIMES DAILY
Qty: 40 TABLET | Refills: 0 | Status: SHIPPED | OUTPATIENT
Start: 2022-04-06 | End: 2022-04-16

## 2022-04-06 NOTE — PROGRESS NOTES
[facility] COLORECTAL SURGERY  [address]  Dept: [phone]  Dept Fax: [phone]  Loc: [phone]    Visit Date: 4/6/2022    Casa Lara is a 49 y.o. female who presents today for: No chief complaint on file. Subjective:     Maco Lara is a 49 y.o. female here for postoperative visit after robotic sigmoid colectomy for recurrent acute diverticulitis about 2 weeks ago. Overall doing very well. Back to regular diet and regular activities. Is back on her anticoagulation, but not back on her methotrexate yet. Patient's problem list, medications, past medical, surgical, family, and social histories were reviewed and updated in the chart as indicated today. Objective:     /61   Pulse 75   Temp 97.3 °F (36.3 °C) (Infrared)   Ht 5' 4\" (1.626 m)   Wt 134 lb (60.8 kg)   SpO2 98%   BMI 23.00 kg/m²     Abdominal/wound: Wounds healing great    Assessment/Plan:       ASSESSMENT/PLAN:    Overall doing well status post robotic sigmoid colectomy for recurrent diverticulitis. I did request that she hold off on methotrexate for least another 3 to 4 weeks if possible. Overall doing well. Discussed restrictions. Answered several questions for her significant other regarding screening colonoscopies and the pathophysiology and etiology of diverticulosis and diverticulitis. Back to [facility] for routine scopes. DISPOSITION:  F/u PRN    Note completed using dictation software, please excuse any errors. Referring/primary care physician updated through [facility] note if PCP was listed.     Electronically signed by Allison Lora MD on 4/6/2022 at 1:47 PM

## 2022-04-06 NOTE — ADDENDUM NOTE
Addendum  created 04/06/22 1034 by aTna Rodriguez DO    Clinical Note Signed, Diagnosis association updated, Intraprocedure Blocks edited

## 2022-04-11 ENCOUNTER — TELEPHONE (OUTPATIENT)
Dept: SURGERY | Age: 52
End: 2022-04-11

## 2022-04-11 NOTE — TELEPHONE ENCOUNTER
Spoke with patient advised to keep wound clean and covered. Call if it gets worse. Patient agreeable.

## 2022-04-11 NOTE — TELEPHONE ENCOUNTER
Patient called in to speak with clinical staff regarding the wound site. Last week everything was fine, this week she has concerns about infection and is wondering if antibiotics are needed. Patient would prefer not to come in if possible. She will send a photo via [address]     Please contact at [phone]

## 2022-04-12 ENCOUNTER — CARE COORDINATION (OUTPATIENT)
Dept: CASE MANAGEMENT | Age: 52
End: 2022-04-12

## 2022-04-12 NOTE — CARE COORDINATION
Don 45 Transitions Initial Follow Up Call    22    Patient:  Marko Mayen  Patient :  1970  MRN:  [id number]   Reason for Admission:   Diverticulitis / colectomy   Discharge Date:  3/27/22  RARS: 10    CTC attempt to reach Pt regarding recent hospital discharge. CTC left voice recording with call back number requesting a call back. Follow up appointments:    Future Appointments   Date Time Provider Thiago Pascual   2022 11:00 AM CHRISTA Bennett - CNP R BANK PAIN [facility]   2022  2:40 PM Nadia Simon MD [facility]  JEN Ludwig, RN  Care Transition Coordinator  Contact Number:  [phone]

## 2022-04-18 ENCOUNTER — OFFICE VISIT (OUTPATIENT)
Dept: PAIN MANAGEMENT | Age: 52
End: 2022-04-18
Payer: COMMERCIAL

## 2022-04-18 VITALS
TEMPERATURE: 97 F | SYSTOLIC BLOOD PRESSURE: 110 MMHG | WEIGHT: 133 LBS | HEART RATE: 77 BPM | BODY MASS INDEX: 22.71 KG/M2 | HEIGHT: 64 IN | OXYGEN SATURATION: 98 % | DIASTOLIC BLOOD PRESSURE: 70 MMHG

## 2022-04-18 DIAGNOSIS — G56.03 CARPAL TUNNEL SYNDROME, BILATERAL: ICD-10-CM

## 2022-04-18 DIAGNOSIS — M48.02 CERVICAL STENOSIS OF SPINE: ICD-10-CM

## 2022-04-18 DIAGNOSIS — M51.37 DEGENERATION OF LUMBAR OR LUMBOSACRAL INTERVERTEBRAL DISC: ICD-10-CM

## 2022-04-18 DIAGNOSIS — M48.061 SPINAL STENOSIS, LUMBAR REGION, WITHOUT NEUROGENIC CLAUDICATION: ICD-10-CM

## 2022-04-18 DIAGNOSIS — M25.50 POLYARTHRALGIA: ICD-10-CM

## 2022-04-18 DIAGNOSIS — M25.522 BILATERAL ELBOW JOINT PAIN: ICD-10-CM

## 2022-04-18 DIAGNOSIS — M47.817 LUMBOSACRAL SPONDYLOSIS WITHOUT MYELOPATHY: ICD-10-CM

## 2022-04-18 DIAGNOSIS — M25.521 BILATERAL ELBOW JOINT PAIN: ICD-10-CM

## 2022-04-18 DIAGNOSIS — M50.30 DDD (DEGENERATIVE DISC DISEASE), CERVICAL: ICD-10-CM

## 2022-04-18 DIAGNOSIS — F51.01 PRIMARY INSOMNIA: ICD-10-CM

## 2022-04-18 DIAGNOSIS — M54.16 LUMBAR NERVE ROOT IMPINGEMENT: ICD-10-CM

## 2022-04-18 DIAGNOSIS — G89.4 CHRONIC PAIN SYNDROME: ICD-10-CM

## 2022-04-18 PROCEDURE — 99213 OFFICE O/P EST LOW 20 MIN: CPT | Performed by: NURSE PRACTITIONER

## 2022-04-18 RX ORDER — LIDOCAINE 36 MG/1
1 PATCH TOPICAL DAILY
Qty: 90 PATCH | Refills: 0 | Status: SHIPPED | OUTPATIENT
Start: 2022-04-18 | End: 2022-05-16 | Stop reason: SDUPTHER

## 2022-04-18 RX ORDER — OXYCODONE HYDROCHLORIDE AND ACETAMINOPHEN 5; 325 MG/1; MG/1
1 TABLET ORAL EVERY 8 HOURS PRN
Qty: 90 TABLET | Refills: 0 | Status: SHIPPED | OUTPATIENT
Start: 2022-04-18 | End: 2022-05-16 | Stop reason: SDUPTHER

## 2022-04-18 NOTE — PROGRESS NOTES
John Lara  1970  [id number]      HISTORY OF PRESENT ILLNESS: Ms. Thai Hammer is a 49 y.o. female returns for a follow up visit for pain management  She has a diagnosis of   1. Chronic pain syndrome    2. Degeneration of lumbar or lumbosacral intervertebral disc    3. Spinal stenosis, lumbar region, without neurogenic claudication    4. Lumbosacral spondylosis without myelopathy    5. Lumbar nerve root impingement, L2    6. DDD (degenerative disc disease), cervical    7. Cervical stenosis of spine, mild    8. Polyarthralgia    9. Carpal tunnel syndrome, bilateral    10. Bilateral elbow joint pain    11. Primary insomnia    . As per Information Obtained from the PADT (Patient Assessment and Documentation Tool)    She complains of pain in the lower back radiating down the left leg She rates the pain 8/10 and describes it as aching. Current treatment regimen has helped relieve about 50% of the pain. She denies any side effects from the current pain regimen. Patient reports that since the last follow up visit the physical functioning is unchanged, family/social relationships are unchanged, mood is unchanged sleep patterns are unchanged, and that the overall functioning is unchanged. Patient denies misusing/abusing her narcotic pain medications or using any illegal drugs. Upon obtaining medical history from Ms. Lara states that pain is manageable on current pain therapy. Takes pain medications as prescribed. She had sigmoid colectomy with Dr. Mallory Marie last month, she was prescribed Oxycodone 5 mg tabs x7 days. Mood is stable without anxiety. Sleep is fair with an average of 5-6 hours. Denies to having issues of constipation. Tolerating activities/house chores with moderate tenderness to the lower back. ALLERGIES: Patients list of allergies were reviewed     MEDICATIONS: Ms. Thai Hammer list of medications were reviewed. Her current medications are   Outpatient Medications Prior to Visit   Medication Sig affect is Neutral/Euthymic(normal) . Prescription pain medication monitoring:                  MEDD current = 30              ORT Score = 0 low risk              Other Risk factors - (mood) Stable              Date of Last Medication Agreement: 3/17/22              Date Naloxone prescribed: 4/21/22              UDT:                          Date of last UDT: 8/16/21                          Adverse report: No;               OARRS:                          Checked today: Yes                          Adverse report: No    IMPRESSION:   1. Chronic pain syndrome    2. Degeneration of lumbar or lumbosacral intervertebral disc    3. Spinal stenosis, lumbar region, without neurogenic claudication    4. Lumbosacral spondylosis without myelopathy    5. Lumbar nerve root impingement, L2    6. DDD (degenerative disc disease), cervical    7. Cervical stenosis of spine, mild    8. Polyarthralgia    9. Carpal tunnel syndrome, bilateral    10. Bilateral elbow joint pain    11. Primary insomnia        PLAN:  Informed verbal consent was obtained:  -Patient will be maintained on current pain therapy  -Corrine exercises recommended  -CBT techniques- relaxation therapies such as biofeedback, mindfulness based stress reduction, imagery, cognitive restructuring, problem solving discussed with patient  -Last UDS 5/17/21 Consistent  -Return in about 4 weeks (around 5/16/2022). -OAR[place] record was obtained and reviewed  for the last one year and no indicators of drug misuse  were found. Any other controlled substance prescriptions  seen on the record have been accounted for, I am aware of the patient receiving these medications. Bolivar Wagner OARRS record will be rechecked as part of office protocol.      Analgesic Plan:              Continue present regimen: Percocet 5 mg tabs tid prn              Adjust dose of present analgesic: No              Switch analgesics: No              Add/Adjust concomitant therapy: ZTlido, Narcan    Current Outpatient Medications   Medication Sig Dispense Refill    naloxone (NARCAN) 4 MG/0.1ML LIQD nasal spray Use as directed 1 each 0    oxyCODONE-acetaminophen (PERCOCET) 5-325 MG per tablet Take 1 tablet by mouth every 8 hours as needed for Pain for up to 30 days. Take no more than 2-3 tabs orally prn 90 tablet 0    Lidocaine (ZTLIDO) 1.8 % PTCH Apply 1 patch topically daily 90 patch 0    methocarbamol (ROBAXIN) 500 MG tablet Take 500 mg by mouth 4 times daily      hydroxychloroquine (PLAQUENIL) 200 MG tablet Take 1.5 tablets by mouth daily 135 tablet 0    buPROPion (WELLBUTRIN XL) 300 MG extended release tablet       traZODone (DESYREL) 50 MG tablet TAKE 1 TABLET NIGHTLY AS NEEDED FOR SLEEP 90 tablet 1    vitamin B-12 (CYANOCOBALAMIN) 500 MCG tablet Take 500 mcg by mouth daily      docusate sodium (COLACE) 100 MG capsule Take 100 mg by mouth 2 times daily      Saccharomyces boulardii (FLORASTOR PO) Take by mouth daily      ferrous sulfate (IRON 325) 325 (65 Fe) MG tablet TAKE 1 TABLET EVERY OTHER DAY      Resveratrol 250 MG CAPS Take by mouth daily      TURMERIC PO Take by mouth      ELIQUIS 5 MG TABS tablet TAKE 1 TABLET BY MOUTH TWO TIMES A DAY  60 tablet 0     No current facility-administered medications for this visit. I will continue her current medication regimen  which is part of the above treatment schedule. It has been helping with Ms. Lara's chronic  medical problems which for this visit include:   Diagnoses of Chronic pain syndrome, Degeneration of lumbar or lumbosacral intervertebral disc, Spinal stenosis, lumbar region, without neurogenic claudication, Lumbosacral spondylosis without myelopathy, Lumbar nerve root impingement, L2, DDD (degenerative disc disease), cervical, Cervical stenosis of spine, mild, Polyarthralgia, Carpal tunnel syndrome, bilateral, Bilateral elbow joint pain, and Primary insomnia were pertinent to this visit.    Risks and benefits of the medications and other alternative treatments  including no treatment were discussed with the patient. The common side effects of these medications were also explained to the patient. Informed verbal consent was obtained. Goals of current treatment regimen include improvement in pain, restoration of functioning- with focus on improvement in physical performance, general activity, work or disability,emotional distress, health care utilization and  decreased medication consumption. Will continue to monitor progress towards achieving/maintaining therapeutic goals with special emphasis on  1. Improvement in perceived interfernce  of pain with ADL's. Ability to do home exercises independently. Ability to do household chores indoor and/or outdoor work and social and leisure activities. Improve psychosocial and physical functioning. - she is showing progression towards this treatment goal with the current regimen. She was advised against drinking alcohol with the narcotic pain medicines, advised against driving or handling machinery while adjusting the dose of medicines or if having cognitive  issues related to the current medications. Risk of overdose and death, if medicines not taken as prescribed, were also discussed. If the patient develops new symptoms or if the symptoms worsen, the patient should call the office. While transcribing every attempt was made to maintain the accuracy of the note in terms of it's contents,there may have been some errors made inadvertently. Thank you for allowing me to participate in the care of this patient. Neil Leal CNP.     Cc: Yaw Evans MD

## 2022-04-18 NOTE — PATIENT INSTRUCTIONS
Patient Education        Back Stretches: Exercises  Introduction  Here are some examples of exercises for stretching your back. Start eachexercise slowly. Ease off the exercise if you start to have pain. Your doctor or physical therapist will tell you when you can start theseexercises and which ones will work best for you. How to do the exercises  Overhead stretch    1. Stand comfortably with your feet shoulder-width apart. 2. Looking straight ahead, raise both arms over your head and reach toward the ceiling. Do not allow your head to tilt back. 3. Hold for 15 to 30 seconds, then lower your arms to your sides. 4. Repeat 2 to 4 times. Side stretch    1. Stand comfortably with your feet shoulder-width apart. 2. Raise one arm over your head, and then lean to the other side. 3. Slide your hand down your leg as you let the weight of your arm gently stretch your side muscles. Hold for 15 to 30 seconds. 4. Repeat 2 to 4 times on each side. Press-up    1. Lie on your stomach, supporting your body with your forearms. 2. Press your elbows down into the floor to raise your upper back. As you do this, relax your stomach muscles and allow your back to arch without using your back muscles. As your press up, do not let your hips or pelvis come off the floor. 3. Hold for 15 to 30 seconds, then relax. 4. Repeat 2 to 4 times. Relax and rest    1. Lie on your back with a rolled towel under your neck and a pillow under your knees. Extend your arms comfortably to your sides. 2. Relax and breathe normally. 3. Remain in this position for about 10 minutes. 4. If you can, do this 2 or 3 times each day. Follow-up care is a key part of your treatment and safety. Be sure to make and go to all appointments, and call your doctor if you are having problems. It's also a good idea to know your test results and keep alist of the medicines you take. Where can you learn more? Go to [email or web address]://[email or web address].[email or web address] org and sign in to your [employer] account. Enter O755 in the Ar[place] Reed box to learn more about \"Back Stretches: Exercises. \"     If you do not have an account, please click on the \"Sign Up Now\" link. Current as of: July 1, 2021               Content Version: 13.2  © 9813-2138 [employer]wise, [employer]orporated. Care instructions adapted under license by [facility] ([facility]). If you have questions about a medical condition or this instruction, always ask your healthcare professional. [address] any warranty or liability for your use of this information.

## 2022-04-19 ENCOUNTER — CARE COORDINATION (OUTPATIENT)
Dept: CASE MANAGEMENT | Age: 52
End: 2022-04-19

## 2022-04-19 NOTE — CARE COORDINATION
[facility] Transitions Initial Follow Up Call    22    Patient:  Lina Rasmussen  Patient :  1970  MRN:  [id number]   Reason for Admission:  Diverticulitis   Discharge Date:  3/27/22  RARS: 10      CTC attempt to reach Pt regarding recent hospital discharge. CTC left voice recording with call back number requesting a call back. CT episode closed. Pt seen by surgeon on . No HHC. Follow up appointments:    Future Appointments   Date Time Provider Thiago Pascual   2022 10:00 AM Jacob Amaya, APRN - CNP [facility]   2022  2:40 PM Jayna Cummings MD [facility]       ESTEE EnnisN, RN  Care Transition Coordinator  Contact Number:  [phone]

## 2022-04-20 NOTE — TELEPHONE ENCOUNTER
L/m for approval to hold eliquis for 3 days prior to [facility] on 10/26/20. Patient aware of hold date.
None known
I will SWITCH the dose or number of times a day I take the medications listed below when I get home from the hospital:  None

## 2022-04-21 RX ORDER — NALOXONE HYDROCHLORIDE 4 MG/.1ML
SPRAY NASAL
Qty: 1 EACH | Refills: 0 | Status: SHIPPED | OUTPATIENT
Start: 2022-04-21 | End: 2022-08-23 | Stop reason: ALTCHOICE

## 2022-05-16 ENCOUNTER — OFFICE VISIT (OUTPATIENT)
Dept: PAIN MANAGEMENT | Age: 52
End: 2022-05-16
Payer: COMMERCIAL

## 2022-05-16 VITALS
DIASTOLIC BLOOD PRESSURE: 70 MMHG | OXYGEN SATURATION: 100 % | HEIGHT: 64 IN | WEIGHT: 134 LBS | BODY MASS INDEX: 22.88 KG/M2 | HEART RATE: 41 BPM | TEMPERATURE: 97.2 F | SYSTOLIC BLOOD PRESSURE: 101 MMHG

## 2022-05-16 DIAGNOSIS — G89.4 CHRONIC PAIN SYNDROME: ICD-10-CM

## 2022-05-16 DIAGNOSIS — M50.30 DDD (DEGENERATIVE DISC DISEASE), CERVICAL: ICD-10-CM

## 2022-05-16 DIAGNOSIS — M51.37 DEGENERATION OF LUMBAR OR LUMBOSACRAL INTERVERTEBRAL DISC: ICD-10-CM

## 2022-05-16 DIAGNOSIS — S43.82XA: ICD-10-CM

## 2022-05-16 DIAGNOSIS — M48.061 SPINAL STENOSIS, LUMBAR REGION, WITHOUT NEUROGENIC CLAUDICATION: ICD-10-CM

## 2022-05-16 DIAGNOSIS — M25.521 BILATERAL ELBOW JOINT PAIN: ICD-10-CM

## 2022-05-16 DIAGNOSIS — M25.522 BILATERAL ELBOW JOINT PAIN: ICD-10-CM

## 2022-05-16 DIAGNOSIS — F51.01 PRIMARY INSOMNIA: ICD-10-CM

## 2022-05-16 DIAGNOSIS — M54.16 LUMBAR NERVE ROOT IMPINGEMENT: ICD-10-CM

## 2022-05-16 DIAGNOSIS — G56.03 CARPAL TUNNEL SYNDROME, BILATERAL: ICD-10-CM

## 2022-05-16 DIAGNOSIS — M48.02 CERVICAL STENOSIS OF SPINE: ICD-10-CM

## 2022-05-16 DIAGNOSIS — M47.817 LUMBOSACRAL SPONDYLOSIS WITHOUT MYELOPATHY: ICD-10-CM

## 2022-05-16 PROCEDURE — 99214 OFFICE O/P EST MOD 30 MIN: CPT | Performed by: NURSE PRACTITIONER

## 2022-05-16 PROCEDURE — 20553 NJX 1/MLT TRIGGER POINTS 3/>: CPT | Performed by: NURSE PRACTITIONER

## 2022-05-16 RX ORDER — OXYCODONE HYDROCHLORIDE AND ACETAMINOPHEN 5; 325 MG/1; MG/1
1 TABLET ORAL EVERY 8 HOURS PRN
Qty: 90 TABLET | Refills: 0 | Status: SHIPPED | OUTPATIENT
Start: 2022-05-16 | End: 2022-06-01 | Stop reason: SDUPTHER

## 2022-05-16 RX ORDER — TRIAMCINOLONE ACETONIDE 40 MG/ML
40 INJECTION, SUSPENSION INTRA-ARTICULAR; INTRAMUSCULAR ONCE
Status: COMPLETED | OUTPATIENT
Start: 2022-05-16 | End: 2022-05-16

## 2022-05-16 RX ORDER — LIDOCAINE 36 MG/1
1 PATCH TOPICAL DAILY
Qty: 90 PATCH | Refills: 0 | Status: SHIPPED | OUTPATIENT
Start: 2022-05-16 | End: 2022-06-13 | Stop reason: SDUPTHER

## 2022-05-16 RX ADMIN — TRIAMCINOLONE ACETONIDE 40 MG: 40 INJECTION, SUSPENSION INTRA-ARTICULAR; INTRAMUSCULAR at 11:52

## 2022-05-16 NOTE — PATIENT INSTRUCTIONS
Patient Education        Back Stretches: Exercises  Introduction  Here are some examples of exercises for stretching your back. Start eachexercise slowly. Ease off the exercise if you start to have pain. Your doctor or physical therapist will tell you when you can start theseexercises and which ones will work best for you. How to do the exercises  Overhead stretch    1. Stand comfortably with your feet shoulder-width apart. 2. Looking straight ahead, raise both arms over your head and reach toward the ceiling. Do not allow your head to tilt back. 3. Hold for 15 to 30 seconds, then lower your arms to your sides. 4. Repeat 2 to 4 times. Side stretch    1. Stand comfortably with your feet shoulder-width apart. 2. Raise one arm over your head, and then lean to the other side. 3. Slide your hand down your leg as you let the weight of your arm gently stretch your side muscles. Hold for 15 to 30 seconds. 4. Repeat 2 to 4 times on each side. Press-up    1. Lie on your stomach, supporting your body with your forearms. 2. Press your elbows down into the floor to raise your upper back. As you do this, relax your stomach muscles and allow your back to arch without using your back muscles. As your press up, do not let your hips or pelvis come off the floor. 3. Hold for 15 to 30 seconds, then relax. 4. Repeat 2 to 4 times. Relax and rest    1. Lie on your back with a rolled towel under your neck and a pillow under your knees. Extend your arms comfortably to your sides. 2. Relax and breathe normally. 3. Remain in this position for about 10 minutes. 4. If you can, do this 2 or 3 times each day. Follow-up care is a key part of your treatment and safety. Be sure to make and go to all appointments, and call your doctor if you are having problems. It's also a good idea to know your test results and keep alist of the medicines you take. Where can you learn more? Go to https://gagan.[email or web address] org and

## 2022-05-16 NOTE — PROGRESS NOTES
Elia Lara  1970  [id number]      HISTORY OF PRESENT ILLNESS: Ms. Erik Doyle is a 49 y.o. female returns for a follow up visit for pain management  She has a diagnosis of   1. Chronic pain syndrome    2. Left scapulocostal sprain, initial encounter    3. DDD (degenerative disc disease), cervical    4. Cervical stenosis of spine, mild    5. Degeneration of lumbar or lumbosacral intervertebral disc    6. Lumbosacral spondylosis without myelopathy    7. Spinal stenosis, lumbar region, without neurogenic claudication    8. Lumbar nerve root impingement, L2    9. Carpal tunnel syndrome, bilateral    10. Bilateral elbow joint pain    11. Primary insomnia    . As per Information Obtained from the PADT (Patient Assessment and Documentation Tool)    She complains of pain in the left shoulder radiating down the left arm, lower back radiating down the left leg She rates the pain 8/10 and describes it as aching. Current treatment regimen has helped relieve about 50% of the pain. She denies any side effects from the current pain regimen. Patient reports that since the last follow up visit the physical functioning is worse, family/social relationships are unchanged, mood is unchanged sleep patterns are worse, and that the overall functioning is unchanged. Patient denies misusing/abusing her narcotic pain medications or using any illegal drugs. Upon obtaining medical history from Ms. Lara states that pain is manageable on current pain therapy. Reports having pain in the left shoulder pain radiating to the left arm. Believes this has been from carrying luggage 2 weeks ago when travelling. Pain medications adequately manages her pain, takes them as prescribed. Mood is stable without anxiety. Sleep is fair with an average of 5-6 hours. Denies to having issues of constipation. Tolerating activities/house chores with moderate tenderness to the lower back, left scapula.     ALLERGIES: Patients list of allergies were reviewed     MEDICATIONS: Ms. Aurelio Olszewski list of medications were reviewed. Her current medications are   Outpatient Medications Prior to Visit   Medication Sig Dispense Refill    hydroxychloroquine (PLAQUENIL) 200 MG tablet Take 1.5 tablets by mouth daily 135 tablet 0    buPROPion (WELLBUTRIN XL) 300 MG extended release tablet       traZODone (DESYREL) 50 MG tablet TAKE 1 TABLET NIGHTLY AS NEEDED FOR SLEEP 90 tablet 1    vitamin B-12 (CYANOCOBALAMIN) 500 MCG tablet Take 500 mcg by mouth daily      ferrous sulfate (IRON 325) 325 (65 Fe) MG tablet TAKE 1 TABLET EVERY OTHER DAY      Resveratrol 250 MG CAPS Take by mouth daily      TURMERIC PO Take by mouth      ELIQUIS 5 MG TABS tablet TAKE 1 TABLET BY MOUTH TWO TIMES A DAY  60 tablet 0    naloxone (NARCAN) 4 MG/0.1ML LIQD nasal spray Use as directed 1 each 0    oxyCODONE-acetaminophen (PERCOCET) 5-325 MG per tablet Take 1 tablet by mouth every 8 hours as needed for Pain for up to 30 days. Take no more than 2-3 tabs orally prn 90 tablet 0    Lidocaine (ZTLIDO) 1.8 % PTCH Apply 1 patch topically daily 90 patch 0    methocarbamol (ROBAXIN) 500 MG tablet Take 500 mg by mouth 4 times daily      docusate sodium (COLACE) 100 MG capsule Take 100 mg by mouth 2 times daily      Saccharomyces boulardii (FLORASTOR PO) Take by mouth daily       No facility-administered medications prior to visit. SOCIAL/FAMILY/PAST MEDICAL HISTORY: Ms. Aurelio Olszewski social, family and past medical history was reviewed. REVIEW OF SYSTEMS:    Respiratory: Negative for apnea, chest tightness and shortness of breath or change in baseline breathing. Gastrointestinal: Negative for nausea, vomiting, abdominal pain, diarrhea, constipation, blood in stool and abdominal distention. PHYSICAL EXAM:   Nursing note and vitals reviewed.  /70   Pulse (!) 41   Temp 97.2 °F (36.2 °C)   Ht 5' 4\" (1.626 m)   Wt 134 lb (60.8 kg)   SpO2 100%   BMI 23.00 kg/m²   Constitutional: She appears well-developed and well-nourished. No acute distress. Skin: Skin is warm and dry, good turgor. No rash noted. She is not diaphoretic. Cardiovascular: Normal rate, regular rhythm, normal heart sounds, and does not have murmur. Pulmonary/Chest: Effort normal. No respiratory distress. She does not have wheezes in the lung fields. She has no rales. Neurological/Psychiatric:She is alert and oriented to person, place, and time. Coordination is  Normal. Tenderness noted with palpation of the left, slight tenderness to the cervical spine region. Her mood isAppropriate and affect is Neutral/Euthymic(normal) . Prescription pain medication monitoring:                  MEDD current = 30              ORT Score = 0 low risk              Other Risk factors - (mood) Sable              Date of Last Medication Agreement: 3/17/22              Date Naloxone prescribed: 4/21/22              UDT:                          Date of last UDT: 8/16/21                          Adverse report: No;               OARRS:                          Checked today: Yes                          Adverse report: No    IMPRESSION:   1. Chronic pain syndrome    2. Left scapulocostal sprain, initial encounter    3. DDD (degenerative disc disease), cervical    4. Cervical stenosis of spine, mild    5. Degeneration of lumbar or lumbosacral intervertebral disc    6. Lumbosacral spondylosis without myelopathy    7. Spinal stenosis, lumbar region, without neurogenic claudication    8. Lumbar nerve root impingement, L2    9. Carpal tunnel syndrome, bilateral    10. Bilateral elbow joint pain    11. Primary insomnia        PLAN:  Informed verbal consent was obtained:  -Patient will be maintained on current pain therapy  -Cervical stretches recommended  -Educations provided on TPI of the cervical spine, side effects reviewed including a temporary elevation in blood sugars, advised to monitor closely.  She verbalized understanding, and voiced interest  -Xray of the left scapula  -CBT techniques- relaxation therapies such as biofeedback, mindfulness based stress reduction, imagery, cognitive restructuring, problem solving discussed with patient  -Advised patient to avoid lifting material over 40 pounds. Maintain proper body lifting and bending techniques to avoid straining her back. Verbalized understanding  -Last UDS 8/16/21 Consistent  -Return in about 4 weeks (around 6/13/2022). -Informed verbal consent was obtained from the patient. Risks and benefits of the procedure were explained. Complications of the procedure and side effects of kenalog/ lidocaine were discussed with patient. Using 0.25% marcaine and 1cc of kenalog, the the tender trigger point areas in the  Paraspinal, upper trapezius and semispinalis capitis muscles were injected under aseptic condition. Mobilization attempted by stretching. Patient tolerated procedure well. Adv to apply ice today. Analgesic Plan:              Continue present regimen: Percocet 5-325 mg tabs tid prn              Adjust dose of present analgesic: No              Switch analgesics: No              Add/Adjust concomitant therapy: ZTlido,     Current Outpatient Medications   Medication Sig Dispense Refill    oxyCODONE-acetaminophen (PERCOCET) 5-325 MG per tablet Take 1 tablet by mouth every 8 hours as needed for Pain for up to 30 days.  Take no more than 2-3 tabs orally prn 90 tablet 0    Lidocaine (ZTLIDO) 1.8 % PTCH Apply 1 patch topically daily 90 patch 0    hydroxychloroquine (PLAQUENIL) 200 MG tablet Take 1.5 tablets by mouth daily 135 tablet 0    buPROPion (WELLBUTRIN XL) 300 MG extended release tablet       traZODone (DESYREL) 50 MG tablet TAKE 1 TABLET NIGHTLY AS NEEDED FOR SLEEP 90 tablet 1    vitamin B-12 (CYANOCOBALAMIN) 500 MCG tablet Take 500 mcg by mouth daily      ferrous sulfate (IRON 325) 325 (65 Fe) MG tablet TAKE 1 TABLET EVERY OTHER DAY      Resveratrol 250 MG CAPS Take by mouth daily  TURMERIC PO Take by mouth      ELIQUIS 5 MG TABS tablet TAKE 1 TABLET BY MOUTH TWO TIMES A DAY  60 tablet 0    naloxone (NARCAN) 4 MG/0.1ML LIQD nasal spray Use as directed 1 each 0    methocarbamol (ROBAXIN) 500 MG tablet Take 500 mg by mouth 4 times daily      docusate sodium (COLACE) 100 MG capsule Take 100 mg by mouth 2 times daily      Saccharomyces boulardii (FLORASTOR PO) Take by mouth daily       No current facility-administered medications for this visit. I will continue her current medication regimen  which is part of the above treatment schedule. It has been helping with Ms. Lara's chronic  medical problems which for this visit include:   Diagnoses of Chronic pain syndrome, Left scapulocostal sprain, initial encounter, DDD (degenerative disc disease), cervical, Cervical stenosis of spine, mild, Degeneration of lumbar or lumbosacral intervertebral disc, Lumbosacral spondylosis without myelopathy, Spinal stenosis, lumbar region, without neurogenic claudication, Lumbar nerve root impingement, L2, Carpal tunnel syndrome, bilateral, Bilateral elbow joint pain, and Primary insomnia were pertinent to this visit. Risks and benefits of the medications and other alternative treatments  including no treatment were discussed with the patient. The common side effects of these medications were also explained to the patient. Informed verbal consent was obtained. Goals of current treatment regimen include improvement in pain, restoration of functioning- with focus on improvement in physical performance, general activity, work or disability,emotional distress, health care utilization and  decreased medication consumption. Will continue to monitor progress towards achieving/maintaining therapeutic goals with special emphasis on  1. Improvement in perceived interfernce  of pain with ADL's. Ability to do home exercises independently.  Ability to do household chores indoor and/or outdoor work and social and leisure activities. Improve psychosocial and physical functioning. - she is showing progression towards this treatment goal with the current regimen. She was advised against drinking alcohol with the narcotic pain medicines, advised against driving or handling machinery while adjusting the dose of medicines or if having cognitive  issues related to the current medications. Risk of overdose and death, if medicines not taken as prescribed, were also discussed. If the patient develops new symptoms or if the symptoms worsen, the patient should call the office. While transcribing every attempt was made to maintain the accuracy of the note in terms of it's contents,there may have been some errors made inadvertently. Thank you for allowing me to participate in the care of this patient. Arpita Beltran CNP.     Cc: Claudis Mcburney, MD

## 2022-05-21 NOTE — DISCHARGE SUMMARY
Physician Discharge Summary     Patient ID:  Regla Mouray  [id number]  49 y.o.  1970    Admit date: 3/25/2022    Discharge date and time: 3/27/2022 11:05 AM     Admitting Physician: Yenifer Story MD     Discharge Physician: Yenifer Story MD     Admission Diagnoses: Diverticulitis [K57.92]  Sigmoid diverticulitis [K57.32]    Discharge Diagnoses: Diverticulitis [K57.92]  Sigmoid diverticulitis [K57.32]    PMH:  has a past medical history of Diverticulitis, Factor 5 Leiden mutation, heterozygous ([facility] [address] Freiberg's disease, right, Hx of blood clots, PONV (postoperative nausea and vomiting), and Psoriatic arthritis ([facility] [address] PSH:  has a past surgical history that includes  section (); Carpal tunnel release (Bilateral); Pain management procedure (Left, 10/26/2020); Pain management procedure (Left, 2020); Finger trigger release (Bilateral); Foot surgery (Right); and hemicolectomy (N/A, 3/25/2022). Allergies: Patient has no known allergies. Admission Condition: good    Discharged Condition: good    Indication for Admission: Diverticulitis [K57.92]  Sigmoid diverticulitis [facility] Course: This is a 49 y.o. F who presented to [facility] for planned procedure and was admitted to hospital for post-op care. Patient underwent: Robotic-assisted laparoscopic sigmoid colectomy with colorectal anastomosis (3/25). Post op: No nausea or vomiting. Pain was tolerable. Patient started on general diet and IV fluids discontinued. POD 1- Minimal incisional pain. No flatus or bowel movements. OK to restart Eliquis after 48 hours from surgery. POD 2- No acute issues. Patient had a BM previous night.      Patient discharged home in stable condition    Discharge Physical Exam:  Vitals:     22 1522 22 1942 22 2248 22 0347   BP: (!) 95/58 119/68 100/64 105/64   Pulse: 64 67 67 72   Resp: 16 16 16 16   Temp: 98.3 °F (36.8 °C) 98.5 °F (36.9 °C) 98.3 °F (36.8 °C) 98.5 °F (36.9 °C) TempSrc: Oral Oral Oral Oral   SpO2: 100% 100% 94% 95%   Weight:           Height:                    Exam:  General appearance: alert, appears stated age and cooperative  Lungs: Normal effort, symmetric chest rise, on RA  Heart: regular rate and rhythm  Abdomen: abd soft and non-distended.  Appropriately tender.  Trocar sites well approx C/D/I  Low transverse incision well approx with surgical glue    Consults:   N/A    Significant Diagnostic Studies:   N/A    Treatments/Procedures: surgery: Robotic-assisted laparoscopic sigmoid colectomy with colorectal anastomosis (3/25)      Disposition: home    Patient Instructions:   See discharge instruction form.     Signed:  Yvrose Costello DO, PGY-1  5/21/2022  4:17 PM  [phone]

## 2022-05-27 NOTE — PROGRESS NOTES
Bobbi Tabares MD  [facility]) Physicians - Rheumatology    [x] [facility]:  [facility] [address] [facility] [] [facility]:  [address], [address]   Office: [phone]  Fax: [phone]     RHEUMATOLOGY PROGRESS NOTE    ASSESSMENT/PLAN:  Satya Piedra is a 49 y.o. female w/ seronegative inflammatory polyarthritis (PsA vs seronegative RA) and degenerative arthritis of the L-spine. Pt is s/p b/l 3rd trigger finger surgery on 9/24/2021 and b/l carpal tunnel surgery by by Dr. Lorri Haddad in 10/2020.      PMHx pertinent for Hx of popliteal thrombosis in 3/2021 (follows w/ Hematology Dr. Rajni Peter, per pt on lifelong anticoagulation, found to have Factor V Leiden mutation and positive B2GP IgG) and Hx of diverticulitis w/ sigmoid abscess (hospitalized in 7/2020).    Current rheum meds:   mg daily: started in 12/2021, held on 2/18/22  OTC APAP  Lidocaine patches  Percocet PRN     Prior rheum meds:  Pred taper starting w/ 20 mg daily: took in 12/2021, provided significant pain relief  NSAIDs: told to avoid d/t chronic anti-coagulation and Hx of diverticulitis    Reviewed recent hospitalization records. 1. Psoriatic arthritis ([address]  Assessment & Plan:  - seronegative inflammatory polyarthritis involving the b/l wrists, MCP, PIP and DIP joints w/ significant response to low dose Pred taper. Hx of b/l 2-3rd dactylitis. MRI R hand from 12/2021 showed flexor tenosynovitis of the 3rd digit. Nuclear bone scan ordered by PCP on 12/6/21 showed uptake pattern suggestive of inflammatory arthropathy w/ superimposed degenerative arthropathy. Suspect she likely has early PsA vs seronegative RA, favor PsA given prior findings of dactylitis. - no significant synovitis appreciated on exam today. Inflammatory arthritis appears to remain well controlled on HCQ. So far she has not developed PsO on HCQ. Discussed her slight weight loss d/t recent diverticulitis.  Instructed pt to reduce HCQ dose to 200 mg daily if she loses more than 5 lb prior to her next visit. - pt following w/ Pain Management. Orders:  -     hydroxychloroquine (PLAQUENIL) 200 mg tablet; Take 1.5 tablets by mouth daily, Disp-135 tablet, R-0Normal  2. Neck pain on right side  Assessment & Plan:  - discussed prior MRI C-spine from 7/1/20 showing C5-6 disc bulge and spinal canal stenosis. Will defer repeat imaging to Pain Management provider. I reassured the pt that I do not think this is caused by her inflammatory arthritis. 3. High risk medication use  Assessment & Plan:  - she had eye exam for HCQ toxicity monitoring in 2/2022. Return in about 3 months for lab result discussion and treatment plan, medication monitoring. The risks and benefits of my recommendations, as well as other treatment options, benefits and side effects were discussed with the patient today. Questions were answered. NOTE: This report is transcribed by using voice recognition software dragon. Every effort is made to ensure accuracy; however, inadvertent computerized  transcription errors may be present. SUBJECTIVE:  Past medical/surgical history, medications and allergies are reviewed and updated as appropriate. Interval Hx:   Pt reports well controlled peripheral arthralgias on  mg daily. She denies any significant joint pain or joint swelling in her hand or wrist joints. She recently developed worsening pain in her L shoulder/scapular region that radiates down her L arm. She attributes this to carrying her luggage on vacation. She is seeing a Pain Management nurse practioner who has ordered a X-ray of the L scapula. She is wondering if her pain could be caused by neck arthritis. No PsO.     Rheumatologic ROS:  Constitutional: denies chronic fatigue, fever/chills, night sweats, unintentional weight loss  Integumentary: denies photosensitivity, rash, patchy alopecia, or Sx of Raynaud's phenomenon  Eyes: denies dry eyes, redness or pain, visual disturbance, or floaters  Nose: denies nasal ulcers or recurrent sinusitis  Oral cavity: denies dry mouth or oral ulcers  Cardiovascular: +pt reported remote Hx of cardiac arrhthymias and intermittent dizziness, denies CP, palpitations, Hx of pericardial effusion or pericarditis  Respiratory: denies SOB, cough, hemoptysis, or pleurisy  Gastrointestinal: denies heart burn, dysphagia or esophageal dysmotility, denies change in bowel habits or Sx of IBD  Hematologic/Lymphatic: +Hx of popliteal vein thrombosis in 3/2021 found to have Factor V Leiden mutation on life long anticoagulation, denies recurrent miscarriages, denies swollen LNs  Musculoskeletal:  refer to above HPI   Neurological: denies focal weakness, paresthesias/hyperesthesias or change in sensation, denies Hx of seizure, denies change in gait, balance, or memory    No Known Allergies    Past Medical History:        Diagnosis Date    Diverticulitis 2020    Factor 5 Leiden mutation, heterozygous ([facility] [address]     Freiberg's disease, right     foot    Hx of blood clots     PONV (postoperative nausea and vomiting)     Psoriatic arthritis ([facility] [address]        Past Surgical History:        Procedure Laterality Date    CARPAL TUNNEL RELEASE Bilateral      SECTION  2001     x1    FINGER TRIGGER RELEASE Bilateral     FOOT SURGERY Right     HEMICOLECTOMY N/A 3/25/2022    ROBOTIC SIGMOID COLECTOMY, SPLENIC FLEXURE, OMENTAL FLAP performed by Chandrika Mata MD at [address] [facility] Left 10/26/2020    LEFT L4 AND L5 TRANSFORAMINAL EPDIURAL STEROID INJECTION WITH FLUOROSCOPY ([id number]) [facility]lier, [facility] 2020    LEFT LUMBAR FOUR LUMBAR FIVE TRANSFORAMINAL  EPIDURAL STEROID INJECTION SITE CONFIRMED BY FLUOROSCOPY performed by Leon Soulier, MD at [address] [facility] OR       Medications:    Current Outpatient Medications   Medication Sig Dispense Refill    hydroxychloroquine (PLAQUENIL) 200 mg tablet Take 1.5 tablets by mouth daily 135 tablet 0    oxyCODONE-acetaminophen (PERCOCET) 5-325 MG per tablet Take 1 tablet by mouth every 8 hours as needed for Pain for up to 30 days. Take no more than 2-3 tabs orally prn 90 tablet 0    Lidocaine (ZTLIDO) 1.8 % PTCH Apply 1 patch topically daily 90 patch 0    buPROPion (WELLBUTRIN XL) 300 MG extended release tablet       traZODone (DESYREL) 50 MG tablet TAKE 1 TABLET NIGHTLY AS NEEDED FOR SLEEP 90 tablet 1    vitamin B-12 (CYANOCOBALAMIN) 500 MCG tablet Take 500 mcg by mouth daily      ferrous sulfate (IRON 325) 325 (65 Fe) MG tablet TAKE 1 TABLET EVERY OTHER DAY      Resveratrol 250 MG CAPS Take by mouth daily      TURMERIC PO Take by mouth      ELIQUIS 5 MG TABS tablet TAKE 1 TABLET BY MOUTH TWO TIMES A DAY  60 tablet 0    naloxone (NARCAN) 4 MG/0.1ML LIQD nasal spray Use as directed 1 each 0    methocarbamol (ROBAXIN) 500 MG tablet Take 500 mg by mouth 4 times daily      docusate sodium (COLACE) 100 MG capsule Take 100 mg by mouth 2 times daily      Saccharomyces boulardii (FLORASTOR PO) Take by mouth daily       No current facility-administered medications for this visit.         OBJECTIVE:  Physical Exam:  /76   Pulse 68   Ht 5' 4\" (1.626 m)   Wt 128 lb (58.1 kg)   SpO2 99%   BMI 21.97 kg/m²     GEN: AAOx3, in NAD, well-appearing  HEAD: normocephalic, atraumatic  EYES: no injection or icterus  CVS: RRR  LUNGS: in no acute respiratory distress, CTAB  MSK:  Upper extremities:              Hands: no active synovitis or dactylitis, b/l MCP, PIP and DIP joints w/o swelling NTTP,  full fist formation w/ fair  strength              Wrist: no synovitis in the wrist joints b/l TTP, FROM              Elbow: no synovitis or bursitis, FROM  Lower extremities:              Knees: no warmth or effusion present, FROM              Ankles: no synovitis, FROM, Achilles tendons w/o swelling or warmth NTTP              Feet: no toe swelling or pain or warmth on palpation w/ FROM, negative MTP squeeze test  INTEGUMENT: no rash or psoriatic lesions, no petechiae, bruises, or palpable purpura, no patchy alopecia, no nail or periungual changes, no clubbing or digital ulcers    DATA:  Labs:   I personally reviewed interval labs and discussed w/ the pt in detail which showed:    Lab Results   Component Value Date    WBC 5.8 03/27/2022    HGB 10.5 (L) 03/27/2022    HCT 31.1 (L) 03/27/2022    MCV 93.7 03/27/2022     03/27/2022    GRAN 54 04/08/2020    LYMPHOPCT 31.0 03/27/2022    RBC 3.32 (L) 03/27/2022    MCH 31.8 03/27/2022    MCHC 33.9 03/27/2022    RDW 12.2 (L) 03/27/2022     Lab Results   Component Value Date     03/27/2022    K 3.8 03/27/2022     03/27/2022    CO2 25 03/27/2022    BUN 10 03/27/2022    CREATININE 0.7 03/27/2022    GLUCOSE 96 03/27/2022    CALCIUM 8.6 03/27/2022    PROT 6.9 02/19/2022    LABALBU 3.8 03/27/2022    BILITOT 0.5 02/19/2022    ALKPHOS 113 02/19/2022    AST 14 (L) 02/19/2022    ALT <5 (L) 02/19/2022    LABGLOM >60 03/27/2022    GFRAA >60 03/27/2022    AGRATIO 2.1 02/19/2022    GLOB 2.6 11/16/2020     Lab Results   Component Value Date    COLORU yellow 11/28/2016    CLARITYU clear 11/28/2016    GLUCOSEU neg 11/28/2016    BILIRUBINUR neg 11/28/2016    KETUA neg 11/28/2016    SPECGRAV 1.005 11/28/2016    BLOODU trace 11/28/2016    PHUR 5.5 11/28/2016    PROTEINU neg 11/28/2016    LEUKOCYTESUR neg 11/28/2016    LABMICR <1.20 11/17/2021     Lab Results   Component Value Date    VITD25 33.1 11/16/2020     Lab Results   Component Value Date    C3 133.4 10/20/2021     Lab Results   Component Value Date    C4 19.8 10/20/2021     No results found for: ANTIDSDNAIGG     Lab Results   Component Value Date    LABURIC 3.2 10/20/2021     Lab Results   Component Value Date    CRP <3.0 02/01/2022    CRP <3.0 10/20/2021     Lab Results   Component Value Date    SEDRATE 9 10/20/2021     No results found for: CKTOTAL     Negative SHILOH (10/20/21)  Negative RF, CCP (10/20/21)  Negative HLA B27 (10/20/21)  Negative hepatitis B surface Ag and core total Ab, hepatitis C Ab (10/20/21)    Imaging:  I personally reviewed interval imaging and discussed w/ the pt in detail which included:    MRI C-spine (7/1/20): FINDINGS:   BONES/ALIGNMENT: There is normal alignment of the spine. The vertebral body heights are maintained. The bone marrow signal appears unremarkable.     SPINAL CORD: The visualized spinal cord has normal signal and morphology.  No evidence of mass or abnormal fluid collection within the spinal canal.     SOFT TISSUES: Paraspinal soft tissues are unremarkable.     C2-C3: Disc height and signal maintained.  No neural foraminal narrowing or spinal canal stenosis.     C3-C4: Disc height and signal maintained.  No neural foraminal narrowing or spinal canal stenosis.     C4-C5: Disc height and signal maintained.  No neural foraminal narrowing or spinal canal stenosis.     C5-C6: Mild disc height loss.  No disc signal abnormality.  No left neural foraminal narrowing.  Mild right neural foraminal narrowing secondary to uncovertebral hypertrophy.  Mild spinal canal stenosis secondary to disc bulge.     C6-C7: Disc height and signal maintained.  No neural foraminal narrowing or spinal canal stenosis.     C7-T1: Disc height and signal maintained.  No neural foraminal narrowing or spinal canal stenosis.   IMPRESSION:  1. Mild C5-6 degenerative disc height loss. 2. Mild C5-6 spinal canal stenosis secondary to disc bulge. 3. Mild narrowing of the right C6 neural foramen secondary to C5-6 uncovertebral hypertrophy.      MRI L-spine (10/8/20):   FINDINGS:    Assume 5 lumbar vertebrae.  Lordosis is moderate.  Conus medullaris termination is normal.  Slight anterior disc displacements multilevel.     T11-12: Disc is dehydrated, slightly height reduced; shallow left lateral disc protrusion gently deforms left ventral dural sac.     T12-L1: Disc shows no posterior displacement.     L1-2: Disc shows no posterior displacement.     L2-3: Disc is dehydrated; trace disc bulge; right extraforaminal annular fissure; disc effaces ventral dural sac.  Mild facet hypertrophy, scant facet fluid.     L3-4: Disc is dehydrated; shallow disc bulge with annular fissure straightens ventral dural sac.  Scant facet fluid.     L4-5: Disc is dehydrated, slightly height reduced; 2mm retrolisthesis; disc bulge with annular fissure gently encroaches upon ventral dural sac, minimally greater on left; disc contacts foraminal L4 nerve roots.  Mild spinal stenosis.  Mild bilateral lateral recess stenosis.  Mild  biforaminal stenosis.     L5-S1: Disc is dehydrated, slightly height reduced; shallow central disc protrusion effaces ventral dural sac.  Disc contacts foraminal L5 nerve roots.  Mild biforaminal narrowing.   No focal bone lesion to suggest acute osseous stress injury or acute stress fracture.     CONCLUSION:   1. L4-5 mild spinal stenosis.  Trace retrolisthesis; disc bulge with annular fissure; disc contacting foraminal L4 nerve roots.  Mild biforaminal narrowing. 2. L5-S1 shallow central disc protrusion effacing ventral dural sac; disc contacts foraminal L5  nerve roots.  Mild biforaminal narrowing. 3. T11-12 shallow left lateral disc protrusion gently encroaching upon left ventral dural sac.      X-rays (10/20/21):  Left hand findings:     3 projections. Normal bone mineralization. No fracture or subluxation. No evidence of bony erosion or ankylosis. Joint spaces are maintained. 1. No abnormality of left hand identified.     Right hand findings:     3 projections. Normal mineralization. No fracture or dislocation. No soft tissue abnormality. No evidence of erosion or ankylosis. Joint spaces are maintained.     IMPRESSION:   1. No abnormality of right hand identified.     Lumbar spine findings:     3 projections.    There are 5 nonrib-bearing lumbar vertebral bodies. Normal vertebral body height and alignment. There is mild disc space narrowing at L4-L5. Mild L4-L5 facet arthrosis also demonstrated.     Impression:   1. Mild degenerative disc and facet disease in the lumbar spine and L4-L5.     SI joints findings:     3 projections of the sacroiliac joints. No evidence of SI joint fusion. No evidence of bony erosion or significant sclerosis. Evaluation of pelvic soft tissues demonstrates the presence of an intrauterine device.     IMPRESSION:   1. No abnormalities of the sacroiliac joints are detected.      MRI pelvis (2/11/21): Findings:  No acute fracture or contusion. No AVN of the femoral heads. Mild spurring of left acetabulum. Subtle spurring of right acetabulum. No hip joint effusion. No acute labral tear. No acute muscle or tendon strain. No trochanteric bursitis. Moderate tendinopathy of right hamstring tendon with meniscal tears. Mild tendinopathy of left hamstring tendon with no tear. Sciatic nerve normal in appearance and symmetrical.  Visualized SI joints and pubic symphysis are unremarkable. No lymphadenopathy in the visualized pelvis. An IUD. Conclusion:  1. Moderate tendinopathy of right hamstring tendon with meniscal tears at the origin. 2. Mild tendinopathy of the left hamstring tendon, no tear.     Nuclear bone scan (12/6/21): Findings:     There is relatively diffuse and fairly symmetric increased polyarticular uptake identified in the distal appendicular skeleton particularly elbows and wrists/hands, and ankles/feet. This is nonspecific but raises the possibility of inflammatory arthropathy. There is slightly greater asymmetrical uptake within the right knee, and the right greater than left forefeet, and these findings can also be seen with degenerative arthropathy. No other suspicious focal nonarticular osseous activity.    IMPRESSION:  Increased bilateral periarticular activity identified in the distal appendicular skeleton, nonspecific but raises the possibility of inflammatory arthropathy, with possible superimposed degenerative arthropathy, as described.      MRI R hand (12/9/21): FINDINGS:   There is abnormal edema and enhancement identified within the flexor digitorum tendon sheath of the third digit acid courses volar to the third metacarpal head, proximal third phalanx and middle third phalanx. The findings are compatible with tenosynovitis. No discrete tendon tear is identified. No tendinopathy is identified.   No joint effusions are identified. No abnormal synovial thickening or enhancement is identified. No osseous erosion is identified. Marrow signal is normal.   IMPRESSION:  1. Findings consistent with tenosynovitis of the flexor digitorum tendon of the third digit. 2. No evidence of any synovitis or osseous erosion. Above results were discussed w/ the pt in detail during today's visit.

## 2022-05-31 ENCOUNTER — OFFICE VISIT (OUTPATIENT)
Dept: RHEUMATOLOGY | Age: 52
End: 2022-05-31
Payer: COMMERCIAL

## 2022-05-31 VITALS
HEIGHT: 64 IN | HEART RATE: 68 BPM | BODY MASS INDEX: 21.85 KG/M2 | OXYGEN SATURATION: 99 % | SYSTOLIC BLOOD PRESSURE: 120 MMHG | DIASTOLIC BLOOD PRESSURE: 76 MMHG | WEIGHT: 128 LBS

## 2022-05-31 DIAGNOSIS — L40.50 PSORIATIC ARTHRITIS (HCC): Primary | ICD-10-CM

## 2022-05-31 DIAGNOSIS — Z79.899 HIGH RISK MEDICATION USE: ICD-10-CM

## 2022-05-31 DIAGNOSIS — M54.2 NECK PAIN ON RIGHT SIDE: ICD-10-CM

## 2022-05-31 PROCEDURE — 99214 OFFICE O/P EST MOD 30 MIN: CPT | Performed by: INTERNAL MEDICINE

## 2022-05-31 RX ORDER — HYDROXYCHLOROQUINE SULFATE 200 MG/1
300 TABLET, FILM COATED ORAL DAILY
Qty: 135 TABLET | Refills: 0 | Status: SHIPPED | OUTPATIENT
Start: 2022-05-31 | End: 2022-08-23 | Stop reason: SDUPTHER

## 2022-05-31 NOTE — ASSESSMENT & PLAN NOTE
- seronegative inflammatory polyarthritis involving the b/l wrists, MCP, PIP and DIP joints w/ significant response to low dose Pred taper. Hx of b/l 2-3rd dactylitis. MRI R hand from 12/2021 showed flexor tenosynovitis of the 3rd digit. Nuclear bone scan ordered by PCP on 12/6/21 showed uptake pattern suggestive of inflammatory arthropathy w/ superimposed degenerative arthropathy. Suspect she likely has early PsA vs seronegative RA, favor PsA given prior findings of dactylitis. - no significant synovitis appreciated on exam today. Inflammatory arthritis appears to remain well controlled on HCQ. So far she has not developed PsO on HCQ. Discussed her slight weight loss d/t recent diverticulitis. Instructed pt to reduce HCQ dose to 200 mg daily if she loses more than 5 lb prior to her next visit. - pt following w/ Pain Management.

## 2022-05-31 NOTE — ASSESSMENT & PLAN NOTE
- discussed prior MRI C-spine from 7/1/20 showing C5-6 disc bulge and spinal canal stenosis. Will defer repeat imaging to Pain Management provider. I reassured the pt that I do not think this is caused by her inflammatory arthritis.

## 2022-05-31 NOTE — TELEPHONE ENCOUNTER
Patient called and stated she needs a refill on her oxycodone. Patient is requesting a callback.      Please advise

## 2022-06-01 DIAGNOSIS — M25.522 BILATERAL ELBOW JOINT PAIN: ICD-10-CM

## 2022-06-01 DIAGNOSIS — M47.817 LUMBOSACRAL SPONDYLOSIS WITHOUT MYELOPATHY: ICD-10-CM

## 2022-06-01 DIAGNOSIS — M25.521 BILATERAL ELBOW JOINT PAIN: ICD-10-CM

## 2022-06-01 DIAGNOSIS — M48.02 CERVICAL STENOSIS OF SPINE: ICD-10-CM

## 2022-06-01 DIAGNOSIS — M51.37 DEGENERATION OF LUMBAR OR LUMBOSACRAL INTERVERTEBRAL DISC: ICD-10-CM

## 2022-06-01 DIAGNOSIS — M48.061 SPINAL STENOSIS, LUMBAR REGION, WITHOUT NEUROGENIC CLAUDICATION: ICD-10-CM

## 2022-06-01 DIAGNOSIS — M54.16 LUMBAR NERVE ROOT IMPINGEMENT: ICD-10-CM

## 2022-06-01 DIAGNOSIS — G89.4 CHRONIC PAIN SYNDROME: ICD-10-CM

## 2022-06-01 DIAGNOSIS — G56.03 CARPAL TUNNEL SYNDROME, BILATERAL: ICD-10-CM

## 2022-06-01 DIAGNOSIS — M50.30 DDD (DEGENERATIVE DISC DISEASE), CERVICAL: ICD-10-CM

## 2022-06-01 RX ORDER — OXYCODONE HYDROCHLORIDE AND ACETAMINOPHEN 5; 325 MG/1; MG/1
1 TABLET ORAL EVERY 8 HOURS PRN
Qty: 39 TABLET | Refills: 0 | Status: SHIPPED | OUTPATIENT
Start: 2022-06-01 | End: 2022-06-13 | Stop reason: SDUPTHER

## 2022-06-13 ENCOUNTER — OFFICE VISIT (OUTPATIENT)
Dept: PAIN MANAGEMENT | Age: 52
End: 2022-06-13
Payer: COMMERCIAL

## 2022-06-13 VITALS
HEIGHT: 64 IN | WEIGHT: 127 LBS | HEART RATE: 81 BPM | BODY MASS INDEX: 21.68 KG/M2 | SYSTOLIC BLOOD PRESSURE: 120 MMHG | DIASTOLIC BLOOD PRESSURE: 80 MMHG | TEMPERATURE: 97 F | OXYGEN SATURATION: 98 %

## 2022-06-13 DIAGNOSIS — G89.4 CHRONIC PAIN SYNDROME: ICD-10-CM

## 2022-06-13 DIAGNOSIS — M25.521 BILATERAL ELBOW JOINT PAIN: ICD-10-CM

## 2022-06-13 DIAGNOSIS — G56.03 CARPAL TUNNEL SYNDROME, BILATERAL: ICD-10-CM

## 2022-06-13 DIAGNOSIS — M25.522 BILATERAL ELBOW JOINT PAIN: ICD-10-CM

## 2022-06-13 DIAGNOSIS — M47.817 LUMBOSACRAL SPONDYLOSIS WITHOUT MYELOPATHY: ICD-10-CM

## 2022-06-13 DIAGNOSIS — M51.37 DEGENERATION OF LUMBAR OR LUMBOSACRAL INTERVERTEBRAL DISC: ICD-10-CM

## 2022-06-13 DIAGNOSIS — M48.061 SPINAL STENOSIS, LUMBAR REGION, WITHOUT NEUROGENIC CLAUDICATION: ICD-10-CM

## 2022-06-13 DIAGNOSIS — M54.16 LUMBAR NERVE ROOT IMPINGEMENT: ICD-10-CM

## 2022-06-13 DIAGNOSIS — M48.02 CERVICAL STENOSIS OF SPINE: ICD-10-CM

## 2022-06-13 DIAGNOSIS — M50.30 DDD (DEGENERATIVE DISC DISEASE), CERVICAL: ICD-10-CM

## 2022-06-13 PROCEDURE — 99213 OFFICE O/P EST LOW 20 MIN: CPT | Performed by: NURSE PRACTITIONER

## 2022-06-13 RX ORDER — OXYCODONE HYDROCHLORIDE AND ACETAMINOPHEN 5; 325 MG/1; MG/1
1 TABLET ORAL EVERY 8 HOURS PRN
Qty: 90 TABLET | Refills: 0 | Status: SHIPPED | OUTPATIENT
Start: 2022-06-13 | End: 2022-07-11 | Stop reason: SDUPTHER

## 2022-06-13 RX ORDER — LIDOCAINE 36 MG/1
1 PATCH TOPICAL DAILY
Qty: 90 PATCH | Refills: 0 | Status: SHIPPED | OUTPATIENT
Start: 2022-06-13 | End: 2022-07-11 | Stop reason: SDUPTHER

## 2022-06-13 NOTE — PATIENT INSTRUCTIONS
Patient Education        Back Stretches: Exercises  Introduction  Here are some examples of exercises for stretching your back. Start eachexercise slowly. Ease off the exercise if you start to have pain. Your doctor or physical therapist will tell you when you can start theseexercises and which ones will work best for you. How to do the exercises  Overhead stretch    1. Stand comfortably with your feet shoulder-width apart. 2. Looking straight ahead, raise both arms over your head and reach toward the ceiling. Do not allow your head to tilt back. 3. Hold for 15 to 30 seconds, then lower your arms to your sides. 4. Repeat 2 to 4 times. Side stretch    1. Stand comfortably with your feet shoulder-width apart. 2. Raise one arm over your head, and then lean to the other side. 3. Slide your hand down your leg as you let the weight of your arm gently stretch your side muscles. Hold for 15 to 30 seconds. 4. Repeat 2 to 4 times on each side. Press-up    1. Lie on your stomach, supporting your body with your forearms. 2. Press your elbows down into the floor to raise your upper back. As you do this, relax your stomach muscles and allow your back to arch without using your back muscles. As your press up, do not let your hips or pelvis come off the floor. 3. Hold for 15 to 30 seconds, then relax. 4. Repeat 2 to 4 times. Relax and rest    1. Lie on your back with a rolled towel under your neck and a pillow under your knees. Extend your arms comfortably to your sides. 2. Relax and breathe normally. 3. Remain in this position for about 10 minutes. 4. If you can, do this 2 or 3 times each day. Follow-up care is a key part of your treatment and safety. Be sure to make and go to all appointments, and call your doctor if you are having problems. It's also a good idea to know your test results and keep alist of the medicines you take. Where can you learn more? Go to https://gagan.[employer]. org and sign in to your [employer] account. Enter D500 in the [facility] box to learn more about \"Back Stretches: Exercises. \"     If you do not have an account, please click on the \"Sign Up Now\" link. Current as of: July 1, 2021               Content Version: 13.2  © 5772-8872 [employer]. Care instructions adapted under license by [facility] ([facility]). If you have questions about a medical condition or this instruction, always ask your healthcare professional. [address] any warranty or liability for your use of this information.

## 2022-06-13 NOTE — PROGRESS NOTES
Elia Lara  1970  [id number]      HISTORY OF PRESENT ILLNESS: Ms. Erik Doyle is a 49 y.o. female returns for a follow up visit for pain management  She has a diagnosis of   1. Chronic pain syndrome    2. Degeneration of lumbar or lumbosacral intervertebral disc    3. Lumbosacral spondylosis without myelopathy    4. Spinal stenosis, lumbar region, without neurogenic claudication    5. Lumbar nerve root impingement, L2    6. DDD (degenerative disc disease), cervical    7. Cervical stenosis of spine, mild    8. Carpal tunnel syndrome, bilateral    9. Bilateral elbow joint pain      As per Information Obtained from the PADT (Patient Assessment and Documentation Tool)    She complains of pain in the lower back radiating down both legs She rates the pain 7/10 and describes it as aching. Current treatment regimen has helped relieve about 60% of the pain. She denies any side effects from the current pain regimen. Patient reports that since the last follow up visit the physical functioning is unchanged, family/social relationships are unchanged, mood is unchanged sleep patterns are unchanged, and that the overall functioning is unchanged. Patient denies misusing/abusing her narcotic pain medications or using any illegal drugs. Upon obtaining medical history from Ms. Lara states that pain is manageable on current pain therapy. Takes the pain medications as prescribed. Maintains f/u with rheumatology for inflammatory arthritis. Mood/anxiety is stable. Sleep is fair with an average of 5-6 hours. Denies to having issues of constipation. Tolerating activities/house chores with moderate tenderness to the lower back, cervical spine. ALLERGIES: Patients list of allergies were reviewed     MEDICATIONS: Ms. Erik Doyle list of medications were reviewed. Her current medications are   Outpatient Medications Prior to Visit   Medication Sig Dispense Refill    hydroxychloroquine (PLAQUENIL) 200 mg tablet Take 1.5 tablets by mouth daily 135 tablet 0    buPROPion (WELLBUTRIN XL) 300 MG extended release tablet       traZODone (DESYREL) 50 MG tablet TAKE 1 TABLET NIGHTLY AS NEEDED FOR SLEEP 90 tablet 1    vitamin B-12 (CYANOCOBALAMIN) 500 MCG tablet Take 500 mcg by mouth daily      ferrous sulfate (IRON 325) 325 (65 Fe) MG tablet TAKE 1 TABLET EVERY OTHER DAY      Resveratrol 250 MG CAPS Take by mouth daily      TURMERIC PO Take by mouth      ELIQUIS 5 MG TABS tablet TAKE 1 TABLET BY MOUTH TWO TIMES A DAY  60 tablet 0    oxyCODONE-acetaminophen (PERCOCET) 5-325 MG per tablet Take 1 tablet by mouth every 8 hours as needed for Pain for up to 13 days. Take no more than 2-3 tabs orally prn 39 tablet 0    Lidocaine (ZTLIDO) 1.8 % PTCH Apply 1 patch topically daily 90 patch 0    naloxone (NARCAN) 4 MG/0.1ML LIQD nasal spray Use as directed 1 each 0    methocarbamol (ROBAXIN) 500 MG tablet Take 500 mg by mouth 4 times daily      docusate sodium (COLACE) 100 MG capsule Take 100 mg by mouth 2 times daily      Saccharomyces boulardii (FLORASTOR PO) Take by mouth daily       No facility-administered medications prior to visit. SOCIAL/FAMILY/PAST MEDICAL HISTORY: Ms. Jeffrey Puri social, family and past medical history was reviewed. REVIEW OF SYSTEMS:    Respiratory: Negative for apnea, chest tightness and shortness of breath or change in baseline breathing. Gastrointestinal: Negative for nausea, vomiting, abdominal pain, diarrhea, constipation, blood in stool and abdominal distention. PHYSICAL EXAM:   Nursing note and vitals reviewed. /80   Pulse 81   Temp 97 °F (36.1 °C)   Ht 5' 4\" (1.626 m)   Wt 127 lb (57.6 kg)   SpO2 98%   BMI 21.80 kg/m²   Constitutional: She appears well-developed and well-nourished. No acute distress. Skin: Skin is warm and dry, good turgor. No rash noted. She is not diaphoretic. Cardiovascular: Normal rate, regular rhythm, normal heart sounds, and does not have murmur. Pulmonary/Chest: Effort normal. No respiratory distress. She does not have wheezes in the lung fields. She has no rales. Neurological/Psychiatric:She is alert and oriented to person, place, and time. Coordination is  normal.  Her mood isAppropriate and affect is Neutral/Euthymic(normal) . Prescription pain medication monitoring:                  MEDD current = 22.50              ORT Score = 0 low risk              Other Risk factors - (mood) Stable              Date of Last Medication Agreement: 3/17/22              Date Naloxone prescribed: 4/21/22              UDT:                          Date of last UDT: 8/16/21                          Adverse report: No;               OARRS:                          Checked today: Yes                          Adverse report: No    IMPRESSION:   1. Chronic pain syndrome    2. Degeneration of lumbar or lumbosacral intervertebral disc    3. Lumbosacral spondylosis without myelopathy    4. Spinal stenosis, lumbar region, without neurogenic claudication    5. Lumbar nerve root impingement, L2    6. DDD (degenerative disc disease), cervical    7. Cervical stenosis of spine, mild    8. Carpal tunnel syndrome, bilateral    9. Bilateral elbow joint pain        PLAN:  Informed verbal consent was obtained:  - Patient's opioid therapy will be maintained at current dose  -Home exercises/Marley exercises recommended  -CBT techniques- relaxation therapies such as biofeedback, mindfulness based stress reduction, imagery, cognitive restructuring, problem solving discussed with patient   -She was advised weight reduction, diet changes- 800-1200 negar diet, diet diary, exercising, nutritional  consult increased physical activity as tolerated   -Last UDS 8/16/21 consistent  -Return in about 4 weeks (around 7/11/2022).      Analgesic Plan:              Continue present regimen: Percocet 5-325 mg tabs tid prn              Adjust dose of present analgesic: No              Switch analgesics: No              Add/Adjust concomitant therapy: ZTlido    Current Outpatient Medications   Medication Sig Dispense Refill    oxyCODONE-acetaminophen (PERCOCET) 5-325 MG per tablet Take 1 tablet by mouth every 8 hours as needed for Pain for up to 30 days. Take no more than 2-3 tabs orally prn 90 tablet 0    Lidocaine (ZTLIDO) 1.8 % PTCH Apply 1 patch topically daily 90 patch 0    hydroxychloroquine (PLAQUENIL) 200 mg tablet Take 1.5 tablets by mouth daily 135 tablet 0    buPROPion (WELLBUTRIN XL) 300 MG extended release tablet       traZODone (DESYREL) 50 MG tablet TAKE 1 TABLET NIGHTLY AS NEEDED FOR SLEEP 90 tablet 1    vitamin B-12 (CYANOCOBALAMIN) 500 MCG tablet Take 500 mcg by mouth daily      ferrous sulfate (IRON 325) 325 (65 Fe) MG tablet TAKE 1 TABLET EVERY OTHER DAY      Resveratrol 250 MG CAPS Take by mouth daily      TURMERIC PO Take by mouth      ELIQUIS 5 MG TABS tablet TAKE 1 TABLET BY MOUTH TWO TIMES A DAY  60 tablet 0    naloxone (NARCAN) 4 MG/0.1ML LIQD nasal spray Use as directed 1 each 0    methocarbamol (ROBAXIN) 500 MG tablet Take 500 mg by mouth 4 times daily      docusate sodium (COLACE) 100 MG capsule Take 100 mg by mouth 2 times daily      Saccharomyces boulardii (FLORASTOR PO) Take by mouth daily       No current facility-administered medications for this visit. I will continue her current medication regimen  which is part of the above treatment schedule. It has been helping with Ms. Lara's chronic  medical problems which for this visit include:   Diagnoses of Chronic pain syndrome, Degeneration of lumbar or lumbosacral intervertebral disc, Lumbosacral spondylosis without myelopathy, Spinal stenosis, lumbar region, without neurogenic claudication, Lumbar nerve root impingement, L2, DDD (degenerative disc disease), cervical, Cervical stenosis of spine, mild, Carpal tunnel syndrome, bilateral, and Bilateral elbow joint pain were pertinent to this visit.    Risks and benefits of the medications and other alternative treatments  including no treatment were discussed with the patient. The common side effects of these medications were also explained to the patient. Informed verbal consent was obtained. Goals of current treatment regimen include improvement in pain, restoration of functioning- with focus on improvement in physical performance, general activity, work or disability,emotional distress, health care utilization and  decreased medication consumption. Will continue to monitor progress towards achieving/maintaining therapeutic goals with special emphasis on  1. Improvement in perceived interfernce  of pain with ADL's. Ability to do home exercises independently. Ability to do household chores indoor and/or outdoor work and social and leisure activities. Improve psychosocial and physical functioning. - she is showing progression towards this treatment goal with the current regimen. She was advised against drinking alcohol with the narcotic pain medicines, advised against driving or handling machinery while adjusting the dose of medicines or if having cognitive  issues related to the current medications. Risk of overdose and death, if medicines not taken as prescribed, were also discussed. If the patient develops new symptoms or if the symptoms worsen, the patient should call the office. While transcribing every attempt was made to maintain the accuracy of the note in terms of it's contents,there may have been some errors made inadvertently. Thank you for allowing me to participate in the care of this patient. Kenyon Kiran CNP.     Cc: Cristobal Rich MD

## 2022-07-11 ENCOUNTER — OFFICE VISIT (OUTPATIENT)
Dept: PAIN MANAGEMENT | Age: 52
End: 2022-07-11
Payer: COMMERCIAL

## 2022-07-11 VITALS
WEIGHT: 129 LBS | OXYGEN SATURATION: 99 % | HEIGHT: 64 IN | SYSTOLIC BLOOD PRESSURE: 120 MMHG | TEMPERATURE: 97.3 F | DIASTOLIC BLOOD PRESSURE: 80 MMHG | HEART RATE: 82 BPM | BODY MASS INDEX: 22.02 KG/M2

## 2022-07-11 DIAGNOSIS — M25.522 BILATERAL ELBOW JOINT PAIN: ICD-10-CM

## 2022-07-11 DIAGNOSIS — M48.02 CERVICAL STENOSIS OF SPINE: ICD-10-CM

## 2022-07-11 DIAGNOSIS — M51.37 DEGENERATION OF LUMBAR OR LUMBOSACRAL INTERVERTEBRAL DISC: ICD-10-CM

## 2022-07-11 DIAGNOSIS — M54.16 LUMBAR NERVE ROOT IMPINGEMENT: ICD-10-CM

## 2022-07-11 DIAGNOSIS — F51.01 PRIMARY INSOMNIA: ICD-10-CM

## 2022-07-11 DIAGNOSIS — M48.061 SPINAL STENOSIS, LUMBAR REGION, WITHOUT NEUROGENIC CLAUDICATION: ICD-10-CM

## 2022-07-11 DIAGNOSIS — G56.03 CARPAL TUNNEL SYNDROME, BILATERAL: ICD-10-CM

## 2022-07-11 DIAGNOSIS — M47.817 LUMBOSACRAL SPONDYLOSIS WITHOUT MYELOPATHY: ICD-10-CM

## 2022-07-11 DIAGNOSIS — G89.4 CHRONIC PAIN SYNDROME: ICD-10-CM

## 2022-07-11 DIAGNOSIS — M25.521 BILATERAL ELBOW JOINT PAIN: ICD-10-CM

## 2022-07-11 DIAGNOSIS — M50.30 DDD (DEGENERATIVE DISC DISEASE), CERVICAL: ICD-10-CM

## 2022-07-11 PROCEDURE — 99213 OFFICE O/P EST LOW 20 MIN: CPT | Performed by: NURSE PRACTITIONER

## 2022-07-11 RX ORDER — LIDOCAINE 36 MG/1
1 PATCH TOPICAL DAILY
Qty: 90 PATCH | Refills: 0 | Status: SHIPPED | OUTPATIENT
Start: 2022-07-11 | End: 2022-08-08 | Stop reason: SDUPTHER

## 2022-07-11 RX ORDER — OXYCODONE HYDROCHLORIDE AND ACETAMINOPHEN 5; 325 MG/1; MG/1
1 TABLET ORAL EVERY 8 HOURS PRN
Qty: 90 TABLET | Refills: 0 | Status: SHIPPED | OUTPATIENT
Start: 2022-07-11 | End: 2022-08-08 | Stop reason: SDUPTHER

## 2022-07-11 NOTE — PROGRESS NOTES
Larisatorri Tex Lara  1970  [id number]      HISTORY OF PRESENT ILLNESS: Ms. Alex Villeda is a 49 y.o. female returns for a follow up visit for pain management  She has a diagnosis of   1. Chronic pain syndrome    2. Degeneration of lumbar or lumbosacral intervertebral disc    3. Lumbosacral spondylosis without myelopathy    4. Spinal stenosis, lumbar region, without neurogenic claudication    5. Lumbar nerve root impingement, L2    6. DDD (degenerative disc disease), cervical    7. Cervical stenosis of spine, mild    8. Carpal tunnel syndrome, bilateral    9. Bilateral elbow joint pain    10. Primary insomnia      As per Information Obtained from the PADT (Patient Assessment and Documentation Tool)    She complains of pain in the both elbows, lower back radiating down both legs She rates the pain 8/10 and describes it as aching. Current treatment regimen has helped relieve about 50% of the pain. She denies any side effects from the current pain regimen. Patient reports that since the last follow up visit the physical functioning is unchanged, family/social relationships are unchanged, mood is unchanged sleep patterns are worse, and that the overall functioning is unchanged. Patient denies misusing/abusing her narcotic pain medications or using any illegal drugs. Upon obtaining medical history from Ms. Lara states that pain is manageable on current pain therapy. Takes the pain medications as prescribed. Mood/anxiety is stable. Sleep is fair with an average of 5-6 hours. Denies to having issues of constipation. Tolerating activities/house chores with moderate tenderness to the lower back. ALLERGIES: Patients list of allergies were reviewed     MEDICATIONS: Ms. Alex Villeda list of medications were reviewed. Her current medications are   Outpatient Medications Prior to Visit   Medication Sig Dispense Refill    hydroxychloroquine (PLAQUENIL) 200 mg tablet Take 1.5 tablets by mouth daily 135 tablet 0    buPROPion (WELLBUTRIN XL) 300 MG extended release tablet       traZODone (DESYREL) 50 MG tablet TAKE 1 TABLET NIGHTLY AS NEEDED FOR SLEEP 90 tablet 1    vitamin B-12 (CYANOCOBALAMIN) 500 MCG tablet Take 500 mcg by mouth daily      ferrous sulfate (IRON 325) 325 (65 Fe) MG tablet TAKE 1 TABLET EVERY OTHER DAY      Resveratrol 250 MG CAPS Take by mouth daily      TURMERIC PO Take by mouth      ELIQUIS 5 MG TABS tablet TAKE 1 TABLET BY MOUTH TWO TIMES A DAY  60 tablet 0    oxyCODONE-acetaminophen (PERCOCET) 5-325 MG per tablet Take 1 tablet by mouth every 8 hours as needed for Pain for up to 30 days. Take no more than 2-3 tabs orally prn 90 tablet 0    Lidocaine (ZTLIDO) 1.8 % PTCH Apply 1 patch topically daily 90 patch 0    naloxone (NARCAN) 4 MG/0.1ML LIQD nasal spray Use as directed 1 each 0    methocarbamol (ROBAXIN) 500 MG tablet Take 500 mg by mouth 4 times daily      docusate sodium (COLACE) 100 MG capsule Take 100 mg by mouth 2 times daily      Saccharomyces boulardii (FLORASTOR PO) Take by mouth daily       No facility-administered medications prior to visit. SOCIAL/FAMILY/PAST MEDICAL HISTORY: Ms. Bruna Forbes social, family and past medical history was reviewed. REVIEW OF SYSTEMS:    Respiratory: Negative for apnea, chest tightness and shortness of breath or change in baseline breathing. Gastrointestinal: Negative for nausea, vomiting, abdominal pain, diarrhea, constipation, blood in stool and abdominal distention. PHYSICAL EXAM:   Nursing note and vitals reviewed. /80   Pulse 82   Temp 97.3 °F (36.3 °C)   Ht 5' 4\" (1.626 m)   Wt 129 lb (58.5 kg)   SpO2 99%   BMI 22.14 kg/m²   Constitutional: She appears well-developed and well-nourished. No acute distress. Skin: Skin is warm and dry, good turgor. No rash noted. She is not diaphoretic. Cardiovascular: Normal rate, regular rhythm, normal heart sounds, and does not have murmur.      Pulmonary/Chest: Effort normal. No respiratory distress. She does not have wheezes in the lung fields. She has no rales. Neurological/Psychiatric:She is alert and oriented to person, place, and time. Coordination is  normal.  Her mood isAppropriate and affect is Neutral/Euthymic(normal) . Prescription pain medication monitoring:                  MEDD current = 22.50              ORT Score = 0 low risk              Other Risk factors - (mood) Stable              Date of Last Medication Agreement: 3/17/22              Date Naloxone prescribed: 4/21/22              UDT:                          Date of last UDT: 8/16/21                          Adverse report: No;               OARRS:                          Checked today: Yes                          Adverse report: No    IMPRESSION:   1. Chronic pain syndrome    2. Degeneration of lumbar or lumbosacral intervertebral disc    3. Lumbosacral spondylosis without myelopathy    4. Spinal stenosis, lumbar region, without neurogenic claudication    5. Lumbar nerve root impingement, L2    6. DDD (degenerative disc disease), cervical    7. Cervical stenosis of spine, mild    8. Carpal tunnel syndrome, bilateral    9. Bilateral elbow joint pain    10. Primary insomnia        PLAN:  Informed verbal consent was obtained:  - Patient's opioid therapy will be maintained at current dose  -Home exercises/Marley exercises recommended  -CBT techniques- relaxation therapies such as biofeedback, mindfulness based stress reduction, imagery, cognitive restructuring, problem solving discussed with patient   -She was advised weight reduction, diet changes- 800-1200 negar diet, diet diary, exercising, nutritional  consult increased physical activity as tolerated   -Last UDS 8/16/21 consistent  -Return in about 4 weeks (around 8/8/2022). -OARRS record was obtained and reviewed  for the last one year and no indicators of drug misuse  were found.  Any other controlled substance prescriptions  seen on the record have been accounted for, I am aware of the patient receiving these medications. Venkata Lopez OARRS record will be rechecked as part of office protocol. Analgesic Plan:              Continue present regimen: Percocet 5-325 mg tabs q8h prn              Adjust dose of present analgesic: No              Switch analgesics: No              Add/Adjust concomitant therapy: ZTlido, Narcan    Current Outpatient Medications   Medication Sig Dispense Refill    Lidocaine (ZTLIDO) 1.8 % PTCH Apply 1 patch topically daily 90 patch 0    oxyCODONE-acetaminophen (PERCOCET) 5-325 MG per tablet Take 1 tablet by mouth every 8 hours as needed for Pain for up to 30 days. Take no more than 2-3 tabs orally prn 90 tablet 0    hydroxychloroquine (PLAQUENIL) 200 mg tablet Take 1.5 tablets by mouth daily 135 tablet 0    buPROPion (WELLBUTRIN XL) 300 MG extended release tablet       traZODone (DESYREL) 50 MG tablet TAKE 1 TABLET NIGHTLY AS NEEDED FOR SLEEP 90 tablet 1    vitamin B-12 (CYANOCOBALAMIN) 500 MCG tablet Take 500 mcg by mouth daily      ferrous sulfate (IRON 325) 325 (65 Fe) MG tablet TAKE 1 TABLET EVERY OTHER DAY      Resveratrol 250 MG CAPS Take by mouth daily      TURMERIC PO Take by mouth      ELIQUIS 5 MG TABS tablet TAKE 1 TABLET BY MOUTH TWO TIMES A DAY  60 tablet 0    naloxone (NARCAN) 4 MG/0.1ML LIQD nasal spray Use as directed 1 each 0    methocarbamol (ROBAXIN) 500 MG tablet Take 500 mg by mouth 4 times daily      docusate sodium (COLACE) 100 MG capsule Take 100 mg by mouth 2 times daily      Saccharomyces boulardii (FLORASTOR PO) Take by mouth daily       No current facility-administered medications for this visit. I will continue her current medication regimen  which is part of the above treatment schedule. It has been helping with Ms. Lara's chronic  medical problems which for this visit include:   Diagnoses of Chronic pain syndrome, Degeneration of lumbar or lumbosacral intervertebral disc, Lumbosacral spondylosis without myelopathy, Spinal stenosis, lumbar region, without neurogenic claudication, Lumbar nerve root impingement, L2, DDD (degenerative disc disease), cervical, Cervical stenosis of spine, mild, Carpal tunnel syndrome, bilateral, Bilateral elbow joint pain, and Primary insomnia were pertinent to this visit. Risks and benefits of the medications and other alternative treatments  including no treatment were discussed with the patient. The common side effects of these medications were also explained to the patient. Informed verbal consent was obtained. Goals of current treatment regimen include improvement in pain, restoration of functioning- with focus on improvement in physical performance, general activity, work or disability,emotional distress, health care utilization and  decreased medication consumption. Will continue to monitor progress towards achieving/maintaining therapeutic goals with special emphasis on  1. Improvement in perceived interfernce  of pain with ADL's. Ability to do home exercises independently. Ability to do household chores indoor and/or outdoor work and social and leisure activities. Improve psychosocial and physical functioning. - she is showing progression towards this treatment goal with the current regimen. She was advised against drinking alcohol with the narcotic pain medicines, advised against driving or handling machinery while adjusting the dose of medicines or if having cognitive  issues related to the current medications. Risk of overdose and death, if medicines not taken as prescribed, were also discussed. If the patient develops new symptoms or if the symptoms worsen, the patient should call the office. While transcribing every attempt was made to maintain the accuracy of the note in terms of it's contents,there may have been some errors made inadvertently. Thank you for allowing me to participate in the care of this patient. Robby Kennedy CNP.     Cc: Ulysses Brown MD

## 2022-07-11 NOTE — PATIENT INSTRUCTIONS
Patient Education        Back Stretches: Exercises  Introduction  Here are some examples of exercises for stretching your back. Start eachexercise slowly. Ease off the exercise if you start to have pain. Your doctor or physical therapist will tell you when you can start theseexercises and which ones will work best for you. How to do the exercises  Overhead stretch    1. Stand comfortably with your feet shoulder-width apart. 2. Looking straight ahead, raise both arms over your head and reach toward the ceiling. Do not allow your head to tilt back. 3. Hold for 15 to 30 seconds, then lower your arms to your sides. 4. Repeat 2 to 4 times. Side stretch    1. Stand comfortably with your feet shoulder-width apart. 2. Raise one arm over your head, and then lean to the other side. 3. Slide your hand down your leg as you let the weight of your arm gently stretch your side muscles. Hold for 15 to 30 seconds. 4. Repeat 2 to 4 times on each side. Press-up    1. Lie on your stomach, supporting your body with your forearms. 2. Press your elbows down into the floor to raise your upper back. As you do this, relax your stomach muscles and allow your back to arch without using your back muscles. As your press up, do not let your hips or pelvis come off the floor. 3. Hold for 15 to 30 seconds, then relax. 4. Repeat 2 to 4 times. Relax and rest    1. Lie on your back with a rolled towel under your neck and a pillow under your knees. Extend your arms comfortably to your sides. 2. Relax and breathe normally. 3. Remain in this position for about 10 minutes. 4. If you can, do this 2 or 3 times each day. Follow-up care is a key part of your treatment and safety. Be sure to make and go to all appointments, and call your doctor if you are having problems. It's also a good idea to know your test results and keep alist of the medicines you take. Where can you learn more? Go to [email or web address] and sign in to your [employer] account. Enter S591 in the [employer] box to learn more about \"Back Stretches: Exercises. \"     If you do not have an account, please click on the \"Sign Up Now\" link. Current as of: March 9, 2022               Content Version: 13.3  © 3841-8831 [employer]. Care instructions adapted under license by [facility]. If you have questions about a medical condition or this instruction, always ask your healthcare professional. [address] any warranty or liability for your use of this information.

## 2022-07-26 DIAGNOSIS — G47.9 SLEEP DISORDER: ICD-10-CM

## 2022-07-26 NOTE — TELEPHONE ENCOUNTER
Medication:   Requested Prescriptions     Pending Prescriptions Disp Refills    traZODone (DESYREL) 50 MG tablet [Pharmacy Med Name: TRAZODONE HCL TABS 50MG] 90 tablet 3     Sig: TAKE 1 TABLET NIGHTLY AS NEEDED FOR SLEEP     Last Filled: 1.28.22    Last appt: 3/21/2022   Next appt: Visit date not found    Last OARRS:   RX Monitoring 7/11/2022   Attestation -   Periodic Controlled Substance Monitoring No signs of potential drug abuse or diversion identified.

## 2022-07-27 DIAGNOSIS — L40.50 PSORIATIC ARTHRITIS (HCC): ICD-10-CM

## 2022-07-27 RX ORDER — TRAZODONE HYDROCHLORIDE 50 MG/1
TABLET ORAL
Qty: 90 TABLET | Refills: 1 | Status: SHIPPED | OUTPATIENT
Start: 2022-07-27

## 2022-07-29 RX ORDER — PREDNISONE 10 MG/1
TABLET ORAL
Qty: 26 TABLET | Refills: 21 | Status: SHIPPED | OUTPATIENT
Start: 2022-07-29

## 2022-08-08 ENCOUNTER — OFFICE VISIT (OUTPATIENT)
Dept: PAIN MANAGEMENT | Age: 52
End: 2022-08-08
Payer: COMMERCIAL

## 2022-08-08 VITALS
TEMPERATURE: 98.1 F | HEIGHT: 64 IN | HEART RATE: 71 BPM | DIASTOLIC BLOOD PRESSURE: 69 MMHG | OXYGEN SATURATION: 99 % | SYSTOLIC BLOOD PRESSURE: 101 MMHG | BODY MASS INDEX: 22.02 KG/M2 | WEIGHT: 129 LBS

## 2022-08-08 DIAGNOSIS — M25.521 BILATERAL ELBOW JOINT PAIN: ICD-10-CM

## 2022-08-08 DIAGNOSIS — M51.37 DEGENERATION OF LUMBAR OR LUMBOSACRAL INTERVERTEBRAL DISC: ICD-10-CM

## 2022-08-08 DIAGNOSIS — G89.4 CHRONIC PAIN SYNDROME: ICD-10-CM

## 2022-08-08 DIAGNOSIS — F51.01 PRIMARY INSOMNIA: ICD-10-CM

## 2022-08-08 DIAGNOSIS — K59.03 DRUG INDUCED CONSTIPATION: ICD-10-CM

## 2022-08-08 DIAGNOSIS — M48.061 SPINAL STENOSIS, LUMBAR REGION, WITHOUT NEUROGENIC CLAUDICATION: ICD-10-CM

## 2022-08-08 DIAGNOSIS — M47.817 LUMBOSACRAL SPONDYLOSIS WITHOUT MYELOPATHY: ICD-10-CM

## 2022-08-08 DIAGNOSIS — M50.30 DDD (DEGENERATIVE DISC DISEASE), CERVICAL: ICD-10-CM

## 2022-08-08 DIAGNOSIS — M48.02 CERVICAL STENOSIS OF SPINE: ICD-10-CM

## 2022-08-08 DIAGNOSIS — M54.16 LUMBAR NERVE ROOT IMPINGEMENT: ICD-10-CM

## 2022-08-08 DIAGNOSIS — G56.03 CARPAL TUNNEL SYNDROME, BILATERAL: ICD-10-CM

## 2022-08-08 DIAGNOSIS — M25.522 BILATERAL ELBOW JOINT PAIN: ICD-10-CM

## 2022-08-08 PROCEDURE — 99213 OFFICE O/P EST LOW 20 MIN: CPT | Performed by: NURSE PRACTITIONER

## 2022-08-08 RX ORDER — OXYCODONE HYDROCHLORIDE AND ACETAMINOPHEN 5; 325 MG/1; MG/1
1 TABLET ORAL EVERY 8 HOURS PRN
Qty: 90 TABLET | Refills: 0 | Status: SHIPPED | OUTPATIENT
Start: 2022-08-08 | End: 2022-09-08 | Stop reason: SDUPTHER

## 2022-08-08 RX ORDER — LIDOCAINE 36 MG/1
1 PATCH TOPICAL DAILY
Qty: 90 PATCH | Refills: 0 | Status: SHIPPED | OUTPATIENT
Start: 2022-08-08 | End: 2022-09-08 | Stop reason: SDUPTHER

## 2022-08-08 NOTE — PROGRESS NOTES
Shiloh Lara  1970  [id number]      HISTORY OF PRESENT ILLNESS: Ms. David Henry is a 49 y.o. female returns for a follow up visit for pain management  She has a diagnosis of   1. Chronic pain syndrome    2. DDD (degenerative disc disease), cervical    3. Cervical stenosis of spine, mild    4. Degeneration of lumbar or lumbosacral intervertebral disc    5. Spinal stenosis, lumbar region, without neurogenic claudication    6. Lumbosacral spondylosis without myelopathy    7. Lumbar nerve root impingement, L2    8. Carpal tunnel syndrome, bilateral    9. Bilateral elbow joint pain    10. Primary insomnia    11. Drug induced constipation      As per Information Obtained from the PADT (Patient Assessment and Documentation Tool)    She complains of pain in the left ankle(s): entire area, left shoulder(s): entire area, left upper arm(s): entire area, bilateral upper back, and both upper leg(s): upper She rates the pain 7/10 and describes it as aching. Current treatment regimen has helped relieve about 50% of the pain. She denies any side effects from the current pain regimen. Patient reports that since the last follow up visit the physical functioning is unchanged, family/social relationships are unchanged, mood is unchanged sleep patterns are unchanged, and that the overall functioning is unchanged. Patient denies misusing/abusing her narcotic pain medications or using any illegal drugs. Upon obtaining medical history from Ms. Lara states that pain is manageable on current pain therapy. Takes the pain medications as prescribed. Mood/anxiety is stable. Sleep is fair with an average of 5-6 hours. Denies to having issues of constipation. Tolerating activities/house chores with moderate tenderness to the lower back. ALLERGIES: Patients list of allergies were reviewed     MEDICATIONS: Ms. David Henry list of medications were reviewed. Her current medications are   Outpatient Medications Prior to Visit   Medication Sig Dispense Refill    predniSONE (DELTASONE) 10 MG tablet TAKE AS INSTRUCTED BY YOUR PRESCRIBER 26 tablet 21    traZODone (DESYREL) 50 MG tablet TAKE 1 TABLET NIGHTLY AS NEEDED FOR SLEEP 90 tablet 1    hydroxychloroquine (PLAQUENIL) 200 mg tablet Take 1.5 tablets by mouth daily 135 tablet 0    buPROPion (WELLBUTRIN XL) 300 MG extended release tablet       vitamin B-12 (CYANOCOBALAMIN) 500 MCG tablet Take 500 mcg by mouth daily      ferrous sulfate (IRON 325) 325 (65 Fe) MG tablet TAKE 1 TABLET EVERY OTHER DAY      Resveratrol 250 MG CAPS Take by mouth daily      TURMERIC PO Take by mouth      ELIQUIS 5 MG TABS tablet TAKE 1 TABLET BY MOUTH TWO TIMES A DAY  60 tablet 0    Lidocaine (ZTLIDO) 1.8 % PTCH Apply 1 patch topically daily 90 patch 0    oxyCODONE-acetaminophen (PERCOCET) 5-325 MG per tablet Take 1 tablet by mouth every 8 hours as needed for Pain for up to 30 days. Take no more than 2-3 tabs orally prn 90 tablet 0    naloxone (NARCAN) 4 MG/0.1ML LIQD nasal spray Use as directed 1 each 0    methocarbamol (ROBAXIN) 500 MG tablet Take 500 mg by mouth 4 times daily      docusate sodium (COLACE) 100 MG capsule Take 100 mg by mouth 2 times daily      Saccharomyces boulardii (FLORASTOR PO) Take by mouth daily       No facility-administered medications prior to visit. SOCIAL/FAMILY/PAST MEDICAL HISTORY: Ms. James Carrasco social, family and past medical history was reviewed. REVIEW OF SYSTEMS:    Respiratory: Negative for apnea, chest tightness and shortness of breath or change in baseline breathing. Gastrointestinal: Negative for nausea, vomiting, abdominal pain, diarrhea, constipation, blood in stool and abdominal distention. PHYSICAL EXAM:   Nursing note and vitals reviewed. /69   Pulse 71   Temp 98.1 °F (36.7 °C)   Ht 5' 4\" (1.626 m)   Wt 129 lb (58.5 kg)   SpO2 99%   BMI 22.14 kg/m²   Constitutional: She appears well-developed and well-nourished. No acute distress.    Skin: Skin is warm and dry, good turgor. No rash noted. She is not diaphoretic. Cardiovascular: Normal rate, regular rhythm, normal heart sounds, and does not have murmur. Pulmonary/Chest: Effort normal. No respiratory distress. She does not have wheezes in the lung fields. She has no rales. Neurological/Psychiatric:She is alert and oriented to person, place, and time. Coordination is  normal.  Her mood isAppropriate and affect is Neutral/Euthymic(normal) . Prescription pain medication monitoring:                  MEDD current = 22.50              ORT Score = 0 low risk              Other Risk factors - (mood) Stable              Date of Last Medication Agreement: 3/18/22              Date Naloxone prescribed: 4/21/22              UDT:                          Date of last UDT: 8/16/21                          Adverse report: No;               OARRS:                          Checked today: Yes                          Adverse report: No    IMPRESSION:   1. Chronic pain syndrome    2. DDD (degenerative disc disease), cervical    3. Cervical stenosis of spine, mild    4. Degeneration of lumbar or lumbosacral intervertebral disc    5. Spinal stenosis, lumbar region, without neurogenic claudication    6. Lumbosacral spondylosis without myelopathy    7. Lumbar nerve root impingement, L2    8. Carpal tunnel syndrome, bilateral    9. Bilateral elbow joint pain    10. Primary insomnia    11. Drug induced constipation        PLAN:  Informed verbal consent was obtained:  - Patient's opioid therapy will be maintained at current dose  -Home exercises/Marley exercises recommended  -CBT techniques- relaxation therapies such as biofeedback, mindfulness based stress reduction, imagery, cognitive restructuring, problem solving discussed with patient   -Last UDS 8/16/21 consistent  -Return in about 4 weeks (around 9/5/2022).      Analgesic Plan:              Continue present regimen: Percocet 5-325 mg tabs q8h prn              Adjust dose of present analgesic: No              Switch analgesics: No              Add/Adjust concomitant therapy: ZTlido, Narcan    Current Outpatient Medications   Medication Sig Dispense Refill    Lidocaine (ZTLIDO) 1.8 % PTCH Apply 1 patch topically daily 90 patch 0    oxyCODONE-acetaminophen (PERCOCET) 5-325 MG per tablet Take 1 tablet by mouth every 8 hours as needed for Pain for up to 30 days. Take no more than 2-3 tabs orally prn 90 tablet 0    predniSONE (DELTASONE) 10 MG tablet TAKE AS INSTRUCTED BY YOUR PRESCRIBER 26 tablet 21    traZODone (DESYREL) 50 MG tablet TAKE 1 TABLET NIGHTLY AS NEEDED FOR SLEEP 90 tablet 1    hydroxychloroquine (PLAQUENIL) 200 mg tablet Take 1.5 tablets by mouth daily 135 tablet 0    buPROPion (WELLBUTRIN XL) 300 MG extended release tablet       vitamin B-12 (CYANOCOBALAMIN) 500 MCG tablet Take 500 mcg by mouth daily      ferrous sulfate (IRON 325) 325 (65 Fe) MG tablet TAKE 1 TABLET EVERY OTHER DAY      Resveratrol 250 MG CAPS Take by mouth daily      TURMERIC PO Take by mouth      ELIQUIS 5 MG TABS tablet TAKE 1 TABLET BY MOUTH TWO TIMES A DAY  60 tablet 0    naloxone (NARCAN) 4 MG/0.1ML LIQD nasal spray Use as directed 1 each 0    methocarbamol (ROBAXIN) 500 MG tablet Take 500 mg by mouth 4 times daily      docusate sodium (COLACE) 100 MG capsule Take 100 mg by mouth 2 times daily      Saccharomyces boulardii (FLORASTOR PO) Take by mouth daily       No current facility-administered medications for this visit. I will continue her current medication regimen  which is part of the above treatment schedule. It has been helping with Ms. Lara's chronic  medical problems which for this visit include:   Diagnoses of Chronic pain syndrome, DDD (degenerative disc disease), cervical, Cervical stenosis of spine, mild, Degeneration of lumbar or lumbosacral intervertebral disc, Spinal stenosis, lumbar region, without neurogenic claudication, Lumbosacral spondylosis without myelopathy, Lumbar nerve root impingement, L2, Carpal tunnel syndrome, bilateral, Bilateral elbow joint pain, Primary insomnia, and Drug induced constipation were pertinent to this visit. Risks and benefits of the medications and other alternative treatments  including no treatment were discussed with the patient. The common side effects of these medications were also explained to the patient. Informed verbal consent was obtained. Goals of current treatment regimen include improvement in pain, restoration of functioning- with focus on improvement in physical performance, general activity, work or disability,emotional distress, health care utilization and  decreased medication consumption. Will continue to monitor progress towards achieving/maintaining therapeutic goals with special emphasis on  1. Improvement in perceived interfernce  of pain with ADL's. Ability to do home exercises independently. Ability to do household chores indoor and/or outdoor work and social and leisure activities. Improve psychosocial and physical functioning. - she is showing progression towards this treatment goal with the current regimen. She was advised against drinking alcohol with the narcotic pain medicines, advised against driving or handling machinery while adjusting the dose of medicines or if having cognitive  issues related to the current medications. Risk of overdose and death, if medicines not taken as prescribed, were also discussed. If the patient develops new symptoms or if the symptoms worsen, the patient should call the office. While transcribing every attempt was made to maintain the accuracy of the note in terms of it's contents,there may have been some errors made inadvertently. Thank you for allowing me to participate in the care of this patient. Suhas Sanchez CNP.     Cc: Javy Norwood MD

## 2022-08-17 ENCOUNTER — HOSPITAL ENCOUNTER (OUTPATIENT)
Dept: MAMMOGRAPHY | Age: 52
Discharge: HOME | End: 2022-08-17

## 2022-08-17 VITALS — BODY MASS INDEX: 22.2 KG/M2 | HEIGHT: 64 IN | WEIGHT: 130 LBS

## 2022-08-17 DIAGNOSIS — Z12.31 VISIT FOR SCREENING MAMMOGRAM: ICD-10-CM

## 2022-08-17 PROCEDURE — 77067 SCR MAMMO BI INCL CAD: CPT

## 2022-08-20 NOTE — PROGRESS NOTES
Chucky Hernandez MD  [facility]) Physicians - Rheumatology    [x] [facility]:  [address] [facility] [] [facility]:  [address], [address]   Office: [phone]  Fax: [phone]     RHEUMATOLOGY PROGRESS NOTE    ASSESSMENT/PLAN:  Cathie Singleton is a 49 y.o. female w/ seronegative inflammatory polyarthritis (PsA vs seronegative RA) and degenerative arthritis of the L-spine. Pt is s/p b/l 3rd trigger finger surgery on 9/24/2021 and b/l carpal tunnel surgery by by Dr. Venkata Loyd in 10/2020. PMHx pertinent for Hx of popliteal thrombosis in 3/2021 (follows w/ Hematology Dr. Jeanette Khan, per pt on lifelong anticoagulation, found to have Factor V Leiden mutation and positive B2GP IgG) and Hx of diverticulitis w/ sigmoid abscess (hospitalized in 7/2020). Current rheum meds:   mg daily: started in 12/2021, held on 2/18/22  OTC APAP  Lidocaine patches  Percocet PRN     Prior rheum meds:  Pred taper starting w/ 20 mg daily: took in 12/2021, provided significant pain relief  NSAIDs: told to avoid d/t chronic anti-coagulation and Hx of diverticulitis    Reviewed recent hospitalization records. 1. Psoriatic arthritis ([address]  Assessment & Plan:  - seronegative inflammatory polyarthritis involving the b/l wrists, MCP, PIP and DIP joints w/ significant response to low dose Pred taper. Hx of b/l 2-3rd dactylitis. MRI R hand from 12/2021 showed flexor tenosynovitis of the 3rd digit. Nuclear bone scan ordered by PCP on 12/6/21 showed uptake pattern suggestive of inflammatory arthropathy w/ superimposed degenerative arthropathy. Suspect she likely has early PsA vs seronegative RA, favor PsA given prior findings of dactylitis. - she had mild synovitis on exam today, discussed adding MTX SQ given her fairly recent Hx of diverticulitis with complication. However pt declined, she will increase her HCQ dose to 200 mg alternating w/ 400 mg QOD based on her current dose. She will call us if she wishes to try MTX.  - pt following w/ Pain Management. Orders:  -     hydroxychloroquine (PLAQUENIL) 200 MG tablet; Take 1.5 tablets by mouth daily, Disp-135 tablet, R-0Normal  2. High risk medication use  Assessment & Plan:  - she had eye exam for HCQ toxicity monitoring in 2/2022. Return in about 3 months for lab result discussion and treatment plan, medication monitoring. The risks and benefits of my recommendations, as well as other treatment options, benefits and side effects were discussed with the patient today. Questions were answered. NOTE: This report is transcribed by using voice recognition software dragon. Every effort is made to ensure accuracy; however, inadvertent computerized  transcription errors may be present. SUBJECTIVE:  Past medical/surgical history, medications and allergies are reviewed and updated as appropriate. Interval Hx:   Pt reports increased stiffness and arthralgias in her hands which she attributes to her recent move. She has been taking  mg daily. She denies any other arthritis flares prior to her recent move. No PsO.     Rheumatologic ROS:  Constitutional: denies chronic fatigue, fever/chills, night sweats, unintentional weight loss  Integumentary: denies photosensitivity, rash, patchy alopecia, or Sx of Raynaud's phenomenon  Eyes: denies dry eyes, redness or pain, visual disturbance, or floaters  Nose: denies nasal ulcers or recurrent sinusitis  Oral cavity: denies dry mouth or oral ulcers  Cardiovascular: +pt reported remote Hx of cardiac arrhthymias and intermittent dizziness, denies CP, palpitations, Hx of pericardial effusion or pericarditis  Respiratory: denies SOB, cough, hemoptysis, or pleurisy  Gastrointestinal: denies heart burn, dysphagia or esophageal dysmotility, denies change in bowel habits or Sx of IBD  Hematologic/Lymphatic: +Hx of popliteal vein thrombosis in 3/2021 found to have Factor V Leiden mutation on life long anticoagulation, denies recurrent miscarriages, denies swollen LNs  Musculoskeletal:  refer to above HPI   Neurological: denies focal weakness, paresthesias/hyperesthesias or change in sensation, denies Hx of seizure, denies change in gait, balance, or memory    No Known Allergies    Past Medical History:        Diagnosis Date    Diverticulitis 2020    Factor 5 Leiden mutation, heterozygous ([address]     Freiberg's disease, right     foot    Hx of blood clots     PONV (postoperative nausea and vomiting)     Psoriatic arthritis ([address]        Past Surgical History:        Procedure Laterality Date    CARPAL TUNNEL RELEASE Bilateral      SECTION  2001     x1    FINGER TRIGGER RELEASE Bilateral     FOOT SURGERY Right     HEMICOLECTOMY N/A 3/25/2022    ROBOTIC SIGMOID COLECTOMY, SPLENIC FLEXURE, OMENTAL FLAP performed by Angela Ruvalcaba MD at [address] 10/26/2020    LEFT L4 AND L5 TRANSFORAMINAL EPDIURAL STEROID INJECTION WITH FLUOROSCOPY ([id number] [id number]) performed by Bienvenido Shirley MD at [address] [facility] Left 2020    LEFT LUMBAR FOUR LUMBAR FIVE TRANSFORAMINAL  EPIDURAL STEROID INJECTION SITE CONFIRMED BY FLUOROSCOPY performed by Bienvenido Shirley MD at [address] [facility] OR       Medications:    Current Outpatient Medications   Medication Sig Dispense Refill    hydroxychloroquine (PLAQUENIL) 200 MG tablet Take 1.5 tablets by mouth daily 135 tablet 0    Lidocaine (ZTLIDO) 1.8 % PTCH Apply 1 patch topically daily 90 patch 0    oxyCODONE-acetaminophen (PERCOCET) 5-325 MG per tablet Take 1 tablet by mouth every 8 hours as needed for Pain for up to 30 days.  Take no more than 2-3 tabs orally prn 90 tablet 0    predniSONE (DELTASONE) 10 MG tablet TAKE AS INSTRUCTED BY YOUR PRESCRIBER 26 tablet 21    traZODone (DESYREL) 50 MG tablet TAKE 1 TABLET NIGHTLY AS NEEDED FOR SLEEP 90 tablet 1    buPROPion (WELLBUTRIN XL) 300 MG extended release tablet       vitamin B-12 (CYANOCOBALAMIN) 500 MCG tablet Take 500 mcg by mouth daily      Resveratrol 250 MG CAPS Take by mouth daily      ELIQUIS 5 MG TABS tablet TAKE 1 TABLET BY MOUTH TWO TIMES A DAY  60 tablet 0    ferrous sulfate (IRON 325) 325 (65 Fe) MG tablet TAKE 1 TABLET EVERY OTHER DAY (Patient not taking: Reported on 8/23/2022)      TURMERIC PO Take by mouth (Patient not taking: Reported on 8/23/2022)       No current facility-administered medications for this visit. OBJECTIVE:  Physical Exam:  /82   Pulse 63   Resp 16   Ht 5' 4\" (1.626 m)   Wt 130 lb (59 kg)   LMP  (LMP Unknown)   SpO2 98%   BMI 22.31 kg/m²     GEN: AAOx3, in NAD, well-appearing  HEAD: normocephalic, atraumatic  EYES: no injection or icterus  CVS: RRR  LUNGS: in no acute respiratory distress, CTAB  MSK:  Upper extremities:              Hands: no active synovitis or dactylitis, b/l MCP w/o swelling NTTP, b/l PIP and DIP joints w/ mild synovitis slightly TTP, full fist formation w/ fair  strength              Wrist: no synovitis in the wrist joints b/l TTP, FROM              Elbow: no synovitis or bursitis, FROM  Lower extremities:              Knees: no warmth or effusion present, FROM              Ankles: no synovitis, FROM, Achilles tendons w/o swelling or warmth NTTP              Feet: no toe swelling or pain or warmth on palpation w/ FROM, negative MTP squeeze test  INTEGUMENT: no rash or psoriatic lesions, no petechiae, bruises, or palpable purpura, no patchy alopecia, no nail or periungual changes, no clubbing or digital ulcers    DATA:  Labs:   I personally reviewed interval labs and discussed w/ the pt in detail which showed:    Lab Results   Component Value Date    WBC 5.8 03/27/2022    HGB 10.5 (L) 03/27/2022    HCT 31.1 (L) 03/27/2022    MCV 93.7 03/27/2022     03/27/2022    GRAN 54 04/08/2020    LYMPHOPCT 31.0 03/27/2022    RBC 3.32 (L) 03/27/2022    MCH 31.8 03/27/2022    MCHC 33.9 03/27/2022    RDW 12.2 (L) 03/27/2022 Lab Results   Component Value Date     03/27/2022    K 3.8 03/27/2022     03/27/2022    CO2 25 03/27/2022    BUN 10 03/27/2022    CREATININE 0.7 03/27/2022    GLUCOSE 96 03/27/2022    CALCIUM 8.6 03/27/2022    PROT 6.9 02/19/2022    LABALBU 3.8 03/27/2022    BILITOT 0.5 02/19/2022    ALKPHOS 113 02/19/2022    AST 14 (L) 02/19/2022    ALT <5 (L) 02/19/2022    LABGLOM >60 03/27/2022    GFRAA >60 03/27/2022    AGRATIO 2.1 02/19/2022    GLOB 2.6 11/16/2020     Lab Results   Component Value Date    COLORU yellow 11/28/2016    CLARITYU clear 11/28/2016    GLUCOSEU neg 11/28/2016    BILIRUBINUR neg 11/28/2016    KETUA neg 11/28/2016    SPECGRAV 1.005 11/28/2016    BLOODU trace 11/28/2016    PHUR 5.5 11/28/2016    PROTEINU neg 11/28/2016    LEUKOCYTESUR neg 11/28/2016    LABMICR <1.20 11/17/2021     Lab Results   Component Value Date    VITD25 33.1 11/16/2020     Lab Results   Component Value Date    C3 133.4 10/20/2021     Lab Results   Component Value Date    C4 19.8 10/20/2021     No results found for: ANTIDSDNAIGG     Lab Results   Component Value Date    LABURIC 3.2 10/20/2021     Lab Results   Component Value Date    CRP <3.0 02/01/2022    CRP <3.0 10/20/2021     Lab Results   Component Value Date    SEDRATE 9 10/20/2021     No results found for: CKTOTAL     Negative SHILOH (10/20/21)  Negative RF, CCP (10/20/21)  Negative HLA B27 (10/20/21)  Negative hepatitis B surface Ag and core total Ab, hepatitis C Ab (10/20/21)    Imaging:  I personally reviewed interval imaging and discussed w/ the pt in detail which included:    MRI C-spine (7/1/20): FINDINGS:   BONES/ALIGNMENT: There is normal alignment of the spine. The vertebral body heights are maintained. The bone marrow signal appears unremarkable. SPINAL CORD: The visualized spinal cord has normal signal and morphology.   No evidence of mass or abnormal fluid collection within the spinal canal.     SOFT TISSUES: Paraspinal soft tissues are unremarkable. C2-C3: Disc height and signal maintained. No neural foraminal narrowing or spinal canal stenosis. C3-C4: Disc height and signal maintained. No neural foraminal narrowing or spinal canal stenosis. C4-C5: Disc height and signal maintained. No neural foraminal narrowing or spinal canal stenosis. C5-C6: Mild disc height loss. No disc signal abnormality. No left neural foraminal narrowing. Mild right neural foraminal narrowing secondary to uncovertebral hypertrophy. Mild spinal canal stenosis secondary to disc bulge. C6-C7: Disc height and signal maintained. No neural foraminal narrowing or spinal canal stenosis. C7-T1: Disc height and signal maintained. No neural foraminal narrowing or spinal canal stenosis. IMPRESSION:  1. Mild C5-6 degenerative disc height loss. 2. Mild C5-6 spinal canal stenosis secondary to disc bulge. 3. Mild narrowing of the right C6 neural foramen secondary to C5-6 uncovertebral hypertrophy. MRI L-spine (10/8/20): FINDINGS:    Assume 5 lumbar vertebrae. Lordosis is moderate. Conus medullaris termination is normal.  Slight anterior disc displacements multilevel. T11-12: Disc is dehydrated, slightly height reduced; shallow left lateral disc protrusion gently deforms left ventral dural sac. T12-L1: Disc shows no posterior displacement. L1-2: Disc shows no posterior displacement. L2-3: Disc is dehydrated; trace disc bulge; right extraforaminal annular fissure; disc effaces ventral dural sac. Mild facet hypertrophy, scant facet fluid. L3-4: Disc is dehydrated; shallow disc bulge with annular fissure straightens ventral dural sac. Scant facet fluid. L4-5: Disc is dehydrated, slightly height reduced; 2mm retrolisthesis; disc bulge with annular fissure gently encroaches upon ventral dural sac, minimally greater on left; disc contacts foraminal L4 nerve roots. Mild spinal stenosis.   Mild bilateral lateral recess stenosis. Mild  biforaminal stenosis. L5-S1: Disc is dehydrated, slightly height reduced; shallow central disc protrusion effaces ventral dural sac. Disc contacts foraminal L5 nerve roots. Mild biforaminal narrowing. No focal bone lesion to suggest acute osseous stress injury or acute stress fracture. CONCLUSION:   1. L4-5 mild spinal stenosis. Trace retrolisthesis; disc bulge with annular fissure; disc contacting foraminal L4 nerve roots. Mild biforaminal narrowing. 2. L5-S1 shallow central disc protrusion effacing ventral dural sac; disc contacts foraminal L5  nerve roots. Mild biforaminal narrowing. 3. T11-12 shallow left lateral disc protrusion gently encroaching upon left ventral dural sac. X-rays (10/20/21):  Left hand findings:     3 projections. Normal bone mineralization. No fracture or subluxation. No evidence of bony erosion or ankylosis. Joint spaces are maintained. 1. No abnormality of left hand identified. Right hand findings:     3 projections. Normal mineralization. No fracture or dislocation. No soft tissue abnormality. No evidence of erosion or ankylosis. Joint spaces are maintained. IMPRESSION:   1. No abnormality of right hand identified. Lumbar spine findings:     3 projections. There are 5 nonrib-bearing lumbar vertebral bodies. Normal vertebral body height and alignment. There is mild disc space narrowing at L4-L5. Mild L4-L5 facet arthrosis also demonstrated. Impression:   1. Mild degenerative disc and facet disease in the lumbar spine and L4-L5. SI joints findings:     3 projections of the sacroiliac joints. No evidence of SI joint fusion. No evidence of bony erosion or significant sclerosis. Evaluation of pelvic soft tissues demonstrates the presence of an intrauterine device. IMPRESSION:   1. No abnormalities of the sacroiliac joints are detected. MRI pelvis (2/11/21): Findings:  No acute fracture or contusion.  No AVN of the femoral heads. Mild spurring of left acetabulum. Subtle spurring of right acetabulum. No hip joint effusion. No acute labral tear. No acute muscle or tendon strain. No trochanteric bursitis. Moderate tendinopathy of right hamstring tendon with meniscal tears. Mild tendinopathy of left hamstring tendon with no tear. Sciatic nerve normal in appearance and symmetrical.  Visualized SI joints and pubic symphysis are unremarkable. No lymphadenopathy in the visualized pelvis. An IUD. Conclusion:  1. Moderate tendinopathy of right hamstring tendon with meniscal tears at the origin. 2. Mild tendinopathy of the left hamstring tendon, no tear. Nuclear bone scan (12/6/21): Findings: There is relatively diffuse and fairly symmetric increased polyarticular uptake identified in the distal appendicular skeleton particularly elbows and wrists/hands, and ankles/feet. This is nonspecific but raises the possibility of inflammatory arthropathy. There is slightly greater asymmetrical uptake within the right knee, and the right greater than left forefeet, and these findings can also be seen with degenerative arthropathy. No other suspicious focal nonarticular osseous activity. IMPRESSION:  Increased bilateral periarticular activity identified in the distal appendicular skeleton, nonspecific but raises the possibility of inflammatory arthropathy, with possible superimposed degenerative arthropathy, as described. MRI R hand (12/9/21): FINDINGS:   There is abnormal edema and enhancement identified within the flexor digitorum tendon sheath of the third digit acid courses volar to the third metacarpal head, proximal third phalanx and middle third phalanx. The findings are compatible with tenosynovitis. No discrete tendon tear is identified. No tendinopathy is identified. No joint effusions are identified. No abnormal synovial thickening or enhancement is identified. No osseous erosion is identified.  Marrow signal is normal.   IMPRESSION:  1. Findings consistent with tenosynovitis of the flexor digitorum tendon of the third digit. 2. No evidence of any synovitis or osseous erosion. Above results were discussed w/ the pt in detail during today's visit.

## 2022-08-23 ENCOUNTER — OFFICE VISIT (OUTPATIENT)
Dept: RHEUMATOLOGY | Age: 52
End: 2022-08-23
Payer: COMMERCIAL

## 2022-08-23 VITALS
BODY MASS INDEX: 22.2 KG/M2 | RESPIRATION RATE: 16 BRPM | HEART RATE: 63 BPM | SYSTOLIC BLOOD PRESSURE: 110 MMHG | HEIGHT: 64 IN | DIASTOLIC BLOOD PRESSURE: 82 MMHG | WEIGHT: 130 LBS | OXYGEN SATURATION: 98 %

## 2022-08-23 DIAGNOSIS — L40.50 PSORIATIC ARTHRITIS (HCC): Primary | ICD-10-CM

## 2022-08-23 DIAGNOSIS — Z79.899 HIGH RISK MEDICATION USE: ICD-10-CM

## 2022-08-23 PROCEDURE — 99214 OFFICE O/P EST MOD 30 MIN: CPT | Performed by: INTERNAL MEDICINE

## 2022-08-23 RX ORDER — HYDROXYCHLOROQUINE SULFATE 200 MG/1
300 TABLET, FILM COATED ORAL DAILY
Qty: 135 TABLET | Refills: 0 | Status: SHIPPED | OUTPATIENT
Start: 2022-08-23 | End: 2022-11-21

## 2022-08-23 NOTE — ASSESSMENT & PLAN NOTE
- seronegative inflammatory polyarthritis involving the b/l wrists, MCP, PIP and DIP joints w/ significant response to low dose Pred taper. Hx of b/l 2-3rd dactylitis. MRI R hand from 12/2021 showed flexor tenosynovitis of the 3rd digit. Nuclear bone scan ordered by PCP on 12/6/21 showed uptake pattern suggestive of inflammatory arthropathy w/ superimposed degenerative arthropathy. Suspect she likely has early PsA vs seronegative RA, favor PsA given prior findings of dactylitis. - she had mild synovitis on exam today, discussed adding MTX SQ given her fairly recent Hx of diverticulitis with complication. However pt declined, she will increase her HCQ dose to 200 mg alternating w/ 400 mg QOD based on her current dose. She will call us if she wishes to try MTX.  - pt following w/ Pain Management.

## 2022-08-26 ENCOUNTER — HOSPITAL ENCOUNTER (OUTPATIENT)
Dept: GENERAL RADIOLOGY | Age: 52
Discharge: HOME | End: 2022-08-26
Payer: COMMERCIAL

## 2022-08-26 DIAGNOSIS — S43.82XA: ICD-10-CM

## 2022-08-26 DIAGNOSIS — G89.4 CHRONIC PAIN SYNDROME: ICD-10-CM

## 2022-08-26 PROCEDURE — 73010 X-RAY EXAM OF SHOULDER BLADE: CPT

## 2022-09-08 ENCOUNTER — OFFICE VISIT (OUTPATIENT)
Dept: PAIN MANAGEMENT | Age: 52
End: 2022-09-08
Payer: COMMERCIAL

## 2022-09-08 VITALS
WEIGHT: 133 LBS | HEIGHT: 64 IN | OXYGEN SATURATION: 99 % | DIASTOLIC BLOOD PRESSURE: 81 MMHG | BODY MASS INDEX: 22.71 KG/M2 | SYSTOLIC BLOOD PRESSURE: 119 MMHG | HEART RATE: 65 BPM | TEMPERATURE: 97.5 F

## 2022-09-08 DIAGNOSIS — F51.01 PRIMARY INSOMNIA: ICD-10-CM

## 2022-09-08 DIAGNOSIS — G89.4 CHRONIC PAIN SYNDROME: ICD-10-CM

## 2022-09-08 DIAGNOSIS — G56.03 CARPAL TUNNEL SYNDROME, BILATERAL: ICD-10-CM

## 2022-09-08 DIAGNOSIS — M25.521 BILATERAL ELBOW JOINT PAIN: ICD-10-CM

## 2022-09-08 DIAGNOSIS — M25.522 BILATERAL ELBOW JOINT PAIN: ICD-10-CM

## 2022-09-08 DIAGNOSIS — M51.37 DEGENERATION OF LUMBAR OR LUMBOSACRAL INTERVERTEBRAL DISC: ICD-10-CM

## 2022-09-08 DIAGNOSIS — M50.30 DDD (DEGENERATIVE DISC DISEASE), CERVICAL: ICD-10-CM

## 2022-09-08 DIAGNOSIS — M48.02 CERVICAL STENOSIS OF SPINE: ICD-10-CM

## 2022-09-08 DIAGNOSIS — M48.061 SPINAL STENOSIS, LUMBAR REGION, WITHOUT NEUROGENIC CLAUDICATION: ICD-10-CM

## 2022-09-08 DIAGNOSIS — M54.16 LUMBAR NERVE ROOT IMPINGEMENT: ICD-10-CM

## 2022-09-08 DIAGNOSIS — M47.817 LUMBOSACRAL SPONDYLOSIS WITHOUT MYELOPATHY: ICD-10-CM

## 2022-09-08 PROCEDURE — 99213 OFFICE O/P EST LOW 20 MIN: CPT | Performed by: NURSE PRACTITIONER

## 2022-09-08 RX ORDER — OXYCODONE HYDROCHLORIDE AND ACETAMINOPHEN 5; 325 MG/1; MG/1
1 TABLET ORAL EVERY 8 HOURS PRN
Qty: 90 TABLET | Refills: 0 | Status: SHIPPED | OUTPATIENT
Start: 2022-09-08 | End: 2022-10-06 | Stop reason: SDUPTHER

## 2022-09-08 RX ORDER — LIDOCAINE 36 MG/1
1 PATCH TOPICAL DAILY
Qty: 90 PATCH | Refills: 0 | Status: SHIPPED | OUTPATIENT
Start: 2022-09-08 | End: 2022-10-06 | Stop reason: SDUPTHER

## 2022-09-08 NOTE — PROGRESS NOTES
Beverly Lara  1970  [id number]      HISTORY OF PRESENT ILLNESS: Ms. Staci Upton is a 49 y.o. female returns for a follow up visit for pain management  She has a diagnosis of   1. Chronic pain syndrome    2. DDD (degenerative disc disease), cervical    3. Cervical stenosis of spine, mild    4. Degeneration of lumbar or lumbosacral intervertebral disc    5. Spinal stenosis, lumbar region, without neurogenic claudication    6. Lumbar nerve root impingement, L2    7. Lumbosacral spondylosis without myelopathy    8. Carpal tunnel syndrome, bilateral    9. Bilateral elbow joint pain    10. Primary insomnia      As per Information Obtained from the PADT (Patient Assessment and Documentation Tool)    She complains of pain in the both leg(s): entire area, bilateral lower back, and left upper back She rates the pain 8/10 and describes it as aching. Current treatment regimen has helped relieve about 60% of the pain. She denies any side effects from the current pain regimen. Patient reports that since the last follow up visit the physical functioning is unchanged, family/social relationships are unchanged, mood is unchanged sleep patterns are unchanged, and that the overall functioning is unchanged. Patient denies misusing/abusing her narcotic pain medications or using any illegal drugs. Upon obtaining medical history from Ms. Lara states that pain is manageable on current pain therapy. Takes the pain medications as prescribed. Cervical tenderness, unsure whether this is due to working a job that requires typing. Completed Xray of the left scapular. Mood/anxiety is stable. Sleep is fair with an average of 5-6 hours. Denies to having issues of constipation. Tolerating activities/house chores with moderate tenderness to the lower back, neck. ALLERGIES: Patients list of allergies were reviewed     MEDICATIONS: Ms. Staci Upton list of medications were reviewed. Her current medications are   Outpatient Medications Prior to Visit   Medication Sig Dispense Refill    hydroxychloroquine (PLAQUENIL) 200 MG tablet Take 1.5 tablets by mouth daily 135 tablet 0    predniSONE (DELTASONE) 10 MG tablet TAKE AS INSTRUCTED BY YOUR PRESCRIBER 26 tablet 21    traZODone (DESYREL) 50 MG tablet TAKE 1 TABLET NIGHTLY AS NEEDED FOR SLEEP 90 tablet 1    buPROPion (WELLBUTRIN XL) 300 MG extended release tablet       vitamin B-12 (CYANOCOBALAMIN) 500 MCG tablet Take 500 mcg by mouth daily      ferrous sulfate (IRON 325) 325 (65 Fe) MG tablet       Resveratrol 250 MG CAPS Take by mouth daily      TURMERIC PO Take by mouth      ELIQUIS 5 MG TABS tablet TAKE 1 TABLET BY MOUTH TWO TIMES A DAY  60 tablet 0    Lidocaine (ZTLIDO) 1.8 % PTCH Apply 1 patch topically daily 90 patch 0     No facility-administered medications prior to visit. SOCIAL/FAMILY/PAST MEDICAL HISTORY: Ms. Alex Villeda social, family and past medical history was reviewed. REVIEW OF SYSTEMS:    Respiratory: Negative for apnea, chest tightness and shortness of breath or change in baseline breathing. Gastrointestinal: Negative for nausea, vomiting, abdominal pain, diarrhea, constipation, blood in stool and abdominal distention. PHYSICAL EXAM:   Nursing note and vitals reviewed. /81   Pulse 65   Temp 97.5 °F (36.4 °C)   Ht 5' 4\" (1.626 m)   Wt 133 lb (60.3 kg)   LMP  (LMP Unknown)   SpO2 99%   BMI 22.83 kg/m²   Constitutional: She appears well-developed and well-nourished. No acute distress. Skin: Skin is warm and dry, good turgor. No rash noted. She is not diaphoretic. Cardiovascular: Normal rate, regular rhythm, normal heart sounds, and does not have murmur. Pulmonary/Chest: Effort normal. No respiratory distress. She does not have wheezes in the lung fields. She has no rales. Neurological/Psychiatric:She is alert and oriented to person, place, and time. Coordination is  normal. +Cervical pain  Her mood isAppropriate and affect is Neutral/Euthymic(normal) . Xray of the Left Scapula 5/15/22:  Osseous structures intact. No malalignment. Prescription pain medication monitoring:                  MEDD current = 22.50              ORT Score = 0 low risk              Other Risk factors - (mood) Stable              Date of Last Medication Agreement: 3/18/22              Date Naloxone prescribed: 4/21/22              UDT:                          Date of last UDT: 8/16/21                          Adverse report: No;               OARRS:                          Checked today: Yes                          Adverse report: No    IMPRESSION:   1. Chronic pain syndrome    2. DDD (degenerative disc disease), cervical    3. Cervical stenosis of spine, mild    4. Degeneration of lumbar or lumbosacral intervertebral disc    5. Spinal stenosis, lumbar region, without neurogenic claudication    6. Lumbar nerve root impingement, L2    7. Lumbosacral spondylosis without myelopathy    8. Carpal tunnel syndrome, bilateral    9. Bilateral elbow joint pain    10. Primary insomnia        PLAN:  Informed verbal consent was obtained:  -Patient's opioid therapy will be maintained at current dose  -Home exercises/Marley exercises recommended  -CBT techniques- relaxation therapies such as biofeedback, mindfulness based stress reduction, imagery, cognitive restructuring, problem solving discussed with patient   -Last UDS 8/16/21 consistent  -Return in about 4 weeks (around 10/6/2022). Analgesic Plan:              Continue present regimen: Percocet 5-325 mg tabs q8h prn              Adjust dose of present analgesic: No              Switch analgesics: No              Add/Adjust concomitant therapy: ZTlido, Narcan    Current Outpatient Medications   Medication Sig Dispense Refill    Lidocaine (ZTLIDO) 1.8 % PTCH Apply 1 patch topically daily 90 patch 0    oxyCODONE-acetaminophen (PERCOCET) 5-325 MG per tablet Take 1 tablet by mouth every 8 hours as needed for Pain for up to 30 days.  Take no more than 2-3 tabs orally prn 90 tablet 0    hydroxychloroquine (PLAQUENIL) 200 MG tablet Take 1.5 tablets by mouth daily 135 tablet 0    predniSONE (DELTASONE) 10 MG tablet TAKE AS INSTRUCTED BY YOUR PRESCRIBER 26 tablet 21    traZODone (DESYREL) 50 MG tablet TAKE 1 TABLET NIGHTLY AS NEEDED FOR SLEEP 90 tablet 1    buPROPion (WELLBUTRIN XL) 300 MG extended release tablet       vitamin B-12 (CYANOCOBALAMIN) 500 MCG tablet Take 500 mcg by mouth daily      ferrous sulfate (IRON 325) 325 (65 Fe) MG tablet       Resveratrol 250 MG CAPS Take by mouth daily      TURMERIC PO Take by mouth      ELIQUIS 5 MG TABS tablet TAKE 1 TABLET BY MOUTH TWO TIMES A DAY  60 tablet 0     No current facility-administered medications for this visit. I will continue her current medication regimen  which is part of the above treatment schedule. It has been helping with Ms. Lara's chronic  medical problems which for this visit include:   Diagnoses of Chronic pain syndrome, DDD (degenerative disc disease), cervical, Cervical stenosis of spine, mild, Degeneration of lumbar or lumbosacral intervertebral disc, Spinal stenosis, lumbar region, without neurogenic claudication, Lumbar nerve root impingement, L2, Lumbosacral spondylosis without myelopathy, Carpal tunnel syndrome, bilateral, Bilateral elbow joint pain, and Primary insomnia were pertinent to this visit. Risks and benefits of the medications and other alternative treatments  including no treatment were discussed with the patient. The common side effects of these medications were also explained to the patient. Informed verbal consent was obtained. Goals of current treatment regimen include improvement in pain, restoration of functioning- with focus on improvement in physical performance, general activity, work or disability,emotional distress, health care utilization and  decreased medication consumption.  Will continue to monitor progress towards achieving/maintaining therapeutic goals with special emphasis on  1. Improvement in perceived interfernce  of pain with ADL's. Ability to do home exercises independently. Ability to do household chores indoor and/or outdoor work and social and leisure activities. Improve psychosocial and physical functioning. - she is showing progression towards this treatment goal with the current regimen. She was advised against drinking alcohol with the narcotic pain medicines, advised against driving or handling machinery while adjusting the dose of medicines or if having cognitive  issues related to the current medications. Risk of overdose and death, if medicines not taken as prescribed, were also discussed. If the patient develops new symptoms or if the symptoms worsen, the patient should call the office. While transcribing every attempt was made to maintain the accuracy of the note in terms of it's contents,there may have been some errors made inadvertently. Thank you for allowing me to participate in the care of this patient. Yang Campos CNP.     Cc: Jo Ann Santiago MD

## 2022-10-06 ENCOUNTER — OFFICE VISIT (OUTPATIENT)
Dept: PAIN MANAGEMENT | Age: 52
End: 2022-10-06
Payer: COMMERCIAL

## 2022-10-06 VITALS
WEIGHT: 135 LBS | HEART RATE: 58 BPM | SYSTOLIC BLOOD PRESSURE: 118 MMHG | HEIGHT: 64 IN | DIASTOLIC BLOOD PRESSURE: 77 MMHG | TEMPERATURE: 96.8 F | BODY MASS INDEX: 23.05 KG/M2 | OXYGEN SATURATION: 99 %

## 2022-10-06 DIAGNOSIS — M54.16 LUMBAR NERVE ROOT IMPINGEMENT: ICD-10-CM

## 2022-10-06 DIAGNOSIS — G56.03 CARPAL TUNNEL SYNDROME, BILATERAL: ICD-10-CM

## 2022-10-06 DIAGNOSIS — M47.817 LUMBOSACRAL SPONDYLOSIS WITHOUT MYELOPATHY: ICD-10-CM

## 2022-10-06 DIAGNOSIS — M48.061 SPINAL STENOSIS, LUMBAR REGION, WITHOUT NEUROGENIC CLAUDICATION: ICD-10-CM

## 2022-10-06 DIAGNOSIS — M25.522 BILATERAL ELBOW JOINT PAIN: ICD-10-CM

## 2022-10-06 DIAGNOSIS — M50.30 DDD (DEGENERATIVE DISC DISEASE), CERVICAL: ICD-10-CM

## 2022-10-06 DIAGNOSIS — M25.521 BILATERAL ELBOW JOINT PAIN: ICD-10-CM

## 2022-10-06 DIAGNOSIS — M48.02 CERVICAL STENOSIS OF SPINE: ICD-10-CM

## 2022-10-06 DIAGNOSIS — M25.50 POLYARTHRALGIA: ICD-10-CM

## 2022-10-06 DIAGNOSIS — G89.4 CHRONIC PAIN SYNDROME: ICD-10-CM

## 2022-10-06 DIAGNOSIS — M51.37 DEGENERATION OF LUMBAR OR LUMBOSACRAL INTERVERTEBRAL DISC: ICD-10-CM

## 2022-10-06 DIAGNOSIS — K59.03 DRUG INDUCED CONSTIPATION: ICD-10-CM

## 2022-10-06 PROCEDURE — 99213 OFFICE O/P EST LOW 20 MIN: CPT | Performed by: NURSE PRACTITIONER

## 2022-10-06 RX ORDER — OXYCODONE HYDROCHLORIDE AND ACETAMINOPHEN 5; 325 MG/1; MG/1
1 TABLET ORAL EVERY 8 HOURS PRN
Qty: 90 TABLET | Refills: 0 | Status: SHIPPED | OUTPATIENT
Start: 2022-10-06 | End: 2022-11-03 | Stop reason: SDUPTHER

## 2022-10-06 RX ORDER — LIDOCAINE 36 MG/1
1 PATCH TOPICAL DAILY
Qty: 90 PATCH | Refills: 0 | Status: SHIPPED | OUTPATIENT
Start: 2022-10-06 | End: 2022-11-03 | Stop reason: SDUPTHER

## 2022-10-06 NOTE — PROGRESS NOTES
Masha Lara  1970  [id number]      HISTORY OF PRESENT ILLNESS: Ms. Donna Gan is a 49 y.o. female returns for a follow up visit for pain management  She has a diagnosis of   1. Chronic pain syndrome    2. Degeneration of lumbar or lumbosacral intervertebral disc    3. Spinal stenosis, lumbar region, without neurogenic claudication    4. Lumbosacral spondylosis without myelopathy    5. Lumbar nerve root impingement, L2    6. DDD (degenerative disc disease), cervical    7. Polyarthralgia    8. Cervical stenosis of spine, mild    9. Carpal tunnel syndrome, bilateral    10. Bilateral elbow joint pain    11. Drug induced constipation      As per Information Obtained from the PADT (Patient Assessment and Documentation Tool)    She complains of pain in the both leg(s): entire area and bilateral lower back She rates the pain 8/10 and describes it as aching. Current treatment regimen has helped relieve about 50% of the pain. She denies any side effects from the current pain regimen. Patient reports that since the last follow up visit the physical functioning is unchanged, family/social relationships are unchanged, mood is unchanged sleep patterns are unchanged, and that the overall functioning is unchanged. Patient denies misusing/abusing her narcotic pain medications or using any illegal drugs. Upon obtaining medical history from Ms. Lara states that pain is manageable on current pain therapy. Takes the pain medications as prescribed. Mood/anxiety is stable. Sleep is fair with an average of 5-6 hours. Denies to having issues of constipation. Tolerating activities/house chores with moderate tenderness to the lower back. ALLERGIES: Patients list of allergies were reviewed     MEDICATIONS: Ms. Donna Gan list of medications were reviewed. Her current medications are   Outpatient Medications Prior to Visit   Medication Sig Dispense Refill    hydroxychloroquine (PLAQUENIL) 200 MG tablet Take 1.5 tablets by mouth daily 135 tablet 0    predniSONE (DELTASONE) 10 MG tablet TAKE AS INSTRUCTED BY YOUR PRESCRIBER 26 tablet 21    traZODone (DESYREL) 50 MG tablet TAKE 1 TABLET NIGHTLY AS NEEDED FOR SLEEP 90 tablet 1    buPROPion (WELLBUTRIN XL) 300 MG extended release tablet       vitamin B-12 (CYANOCOBALAMIN) 500 MCG tablet Take 500 mcg by mouth daily      ferrous sulfate (IRON 325) 325 (65 Fe) MG tablet       Resveratrol 250 MG CAPS Take by mouth daily      TURMERIC PO Take by mouth      ELIQUIS 5 MG TABS tablet TAKE 1 TABLET BY MOUTH TWO TIMES A DAY  60 tablet 0    Lidocaine (ZTLIDO) 1.8 % PTCH Apply 1 patch topically daily 90 patch 0    oxyCODONE-acetaminophen (PERCOCET) 5-325 MG per tablet Take 1 tablet by mouth every 8 hours as needed for Pain for up to 30 days. Take no more than 2-3 tabs orally prn 90 tablet 0     No facility-administered medications prior to visit. SOCIAL/FAMILY/PAST MEDICAL HISTORY: Ms. Jack Perez social, family and past medical history was reviewed. REVIEW OF SYSTEMS:    Respiratory: Negative for apnea, chest tightness and shortness of breath or change in baseline breathing. Gastrointestinal: Negative for nausea, vomiting, abdominal pain, diarrhea, constipation, blood in stool and abdominal distention. PHYSICAL EXAM:   Nursing note and vitals reviewed. /77   Pulse 58   Temp 96.8 °F (36 °C)   Ht 5' 4\" (1.626 m)   Wt 135 lb (61.2 kg)   LMP  (LMP Unknown)   SpO2 99%   BMI 23.17 kg/m²   Constitutional: She appears well-developed and well-nourished. No acute distress. Skin: Skin is warm and dry, good turgor. No rash noted. She is not diaphoretic. Cardiovascular: Normal rate, regular rhythm, normal heart sounds, and does not have murmur. Pulmonary/Chest: Effort normal. No respiratory distress. She does not have wheezes in the lung fields. She has no rales. Neurological/Psychiatric:She is alert and oriented to person, place, and time.  Coordination is  normal.  Her mood isAppropriate and affect is Neutral/Euthymic(normal) . Prescription pain medication monitoring:                  MEDD current = 22.50              ORT Score = 0 low risk              Other Risk factors - (mood) Stable              Date of Last Medication Agreement: 3/18/22              Date Naloxone prescribed: 4/21/22              UDT:                          Date of last UDT: 8/116/21                          Adverse report: No              OARRS:                          Checked today: Yes                          Adverse report: No      IMPRESSION:   1. Chronic pain syndrome    2. Degeneration of lumbar or lumbosacral intervertebral disc    3. Spinal stenosis, lumbar region, without neurogenic claudication    4. Lumbosacral spondylosis without myelopathy    5. Lumbar nerve root impingement, L2    6. DDD (degenerative disc disease), cervical    7. Polyarthralgia    8. Cervical stenosis of spine, mild    9. Carpal tunnel syndrome, bilateral    10. Bilateral elbow joint pain    11. Drug induced constipation        PLAN:  Informed verbal consent was obtained:  - Patient's opioid therapy will be maintained at current dose  -Home exercises/Marley exercises recommended  -CBT techniques- relaxation therapies such as biofeedback, mindfulness based stress reduction, imagery, cognitive restructuring, problem solving discussed with patient   -She was advised weight reduction, diet changes- 800-1200 negar diet, diet diary, exercising, nutritional  consult increased physical activity as tolerated   -Last UDS 8/16/21 consistent of metabolites, recommended patient take them as prescribed  -Return in about 4 weeks (around 11/3/2022).      Analgesic Plan:              Continue present regimen: Percocet 5-325 mg tabs q8h prn              Adjust dose of present analgesic: No              Switch analgesics: No              Add/Adjust concomitant therapy: ZTlido, Narcan    I will continue her current medication regimen  which is part of the above treatment schedule. It has been helping with Ms. Lara's chronic  medical problems which for this visit include:   Diagnoses of Chronic pain syndrome, Degeneration of lumbar or lumbosacral intervertebral disc, Spinal stenosis, lumbar region, without neurogenic claudication, Lumbosacral spondylosis without myelopathy, Lumbar nerve root impingement, L2, DDD (degenerative disc disease), cervical, Polyarthralgia, Cervical stenosis of spine, mild, Carpal tunnel syndrome, bilateral, Bilateral elbow joint pain, and Drug induced constipation were pertinent to this visit. Risks and benefits of the medications and other alternative treatments  including no treatment were discussed with the patient. The common side effects of these medications were also explained to the patient. Informed verbal consent was obtained. Goals of current treatment regimen include improvement in pain, restoration of functioning- with focus on improvement in physical performance, general activity, work or disability,emotional distress, health care utilization and  decreased medication consumption. Will continue to monitor progress towards achieving/maintaining therapeutic goals with special emphasis on  1. Improvement in perceived interfernce  of pain with ADL's. Ability to do home exercises independently. Ability to do household chores indoor and/or outdoor work and social and leisure activities. Improve psychosocial and physical functioning. - she is showing progression towards this treatment goal with the current regimen. She was advised against drinking alcohol with the narcotic pain medicines, advised against driving or handling machinery while adjusting the dose of medicines or if having cognitive  issues related to the current medications. Risk of overdose and death, if medicines not taken as prescribed, were also discussed. If the patient develops new symptoms or if the symptoms worsen, the patient should call the office.     While transcribing every attempt was made to maintain the accuracy of the note in terms of it's contents,there may have been some errors made inadvertently. Thank you for allowing me to participate in the care of this patient. Gamal Cheung CNP.     Cc: Makeda Clements MD

## 2022-11-03 ENCOUNTER — OFFICE VISIT (OUTPATIENT)
Dept: PAIN MANAGEMENT | Age: 52
End: 2022-11-03

## 2022-11-03 VITALS
WEIGHT: 135 LBS | BODY MASS INDEX: 23.17 KG/M2 | OXYGEN SATURATION: 97 % | HEART RATE: 70 BPM | DIASTOLIC BLOOD PRESSURE: 76 MMHG | SYSTOLIC BLOOD PRESSURE: 129 MMHG

## 2022-11-03 DIAGNOSIS — M51.26 DISC DISPLACEMENT, LUMBAR: ICD-10-CM

## 2022-11-03 DIAGNOSIS — M25.522 BILATERAL ELBOW JOINT PAIN: ICD-10-CM

## 2022-11-03 DIAGNOSIS — M51.37 DEGENERATION OF LUMBAR OR LUMBOSACRAL INTERVERTEBRAL DISC: ICD-10-CM

## 2022-11-03 DIAGNOSIS — G56.03 CARPAL TUNNEL SYNDROME, BILATERAL: ICD-10-CM

## 2022-11-03 DIAGNOSIS — G89.4 CHRONIC PAIN SYNDROME: ICD-10-CM

## 2022-11-03 DIAGNOSIS — M48.02 CERVICAL STENOSIS OF SPINE: ICD-10-CM

## 2022-11-03 DIAGNOSIS — M25.521 BILATERAL ELBOW JOINT PAIN: ICD-10-CM

## 2022-11-03 DIAGNOSIS — M47.817 LUMBOSACRAL SPONDYLOSIS WITHOUT MYELOPATHY: ICD-10-CM

## 2022-11-03 DIAGNOSIS — M54.16 LUMBAR NERVE ROOT IMPINGEMENT: ICD-10-CM

## 2022-11-03 DIAGNOSIS — M50.30 DDD (DEGENERATIVE DISC DISEASE), CERVICAL: ICD-10-CM

## 2022-11-03 DIAGNOSIS — M48.061 SPINAL STENOSIS, LUMBAR REGION, WITHOUT NEUROGENIC CLAUDICATION: ICD-10-CM

## 2022-11-03 RX ORDER — OXYCODONE HYDROCHLORIDE AND ACETAMINOPHEN 5; 325 MG/1; MG/1
1 TABLET ORAL EVERY 8 HOURS PRN
Qty: 90 TABLET | Refills: 0 | Status: SHIPPED | OUTPATIENT
Start: 2022-11-03 | End: 2022-12-01 | Stop reason: SDUPTHER

## 2022-11-03 RX ORDER — SPIRONOLACTONE 50 MG/1
TABLET, FILM COATED ORAL
COMMUNITY
Start: 2022-10-13

## 2022-11-03 RX ORDER — LIDOCAINE 36 MG/1
1 PATCH TOPICAL DAILY
Qty: 90 PATCH | Refills: 0 | Status: SHIPPED | OUTPATIENT
Start: 2022-11-03 | End: 2022-12-01 | Stop reason: SDUPTHER

## 2022-11-03 NOTE — PROGRESS NOTES
hydroxychloroquine (PLAQUENIL) 200 MG tablet Take 1.5 tablets by mouth daily 135 tablet 0    predniSONE (DELTASONE) 10 MG tablet TAKE AS INSTRUCTED BY YOUR PRESCRIBER 26 tablet 21    traZODone (DESYREL) 50 MG tablet TAKE 1 TABLET NIGHTLY AS NEEDED FOR SLEEP 90 tablet 1    buPROPion (WELLBUTRIN XL) 300 MG extended release tablet       vitamin B-12 (CYANOCOBALAMIN) 500 MCG tablet Take 500 mcg by mouth daily      Resveratrol 250 MG CAPS Take by mouth daily      ELIQUIS 5 MG TABS tablet TAKE 1 TABLET BY MOUTH TWO TIMES A DAY  60 tablet 0    spironolactone (ALDACTONE) 50 MG tablet TAKE 1 TABLET BY MOUTH ONE TIME A DAY, INCREASE TO 1 TABLET TWO TIMES A DAY IF TOLERATING MEDICATION      Lidocaine (ZTLIDO) 1.8 % PTCH Apply 1 patch topically daily 90 patch 0    oxyCODONE-acetaminophen (PERCOCET) 5-325 MG per tablet Take 1 tablet by mouth every 8 hours as needed for Pain for up to 30 days. Take no more than 2-3 tabs orally prn 90 tablet 0    ferrous sulfate (IRON 325) 325 (65 Fe) MG tablet       TURMERIC PO Take by mouth       No facility-administered medications prior to visit. SOCIAL/FAMILY/PAST MEDICAL HISTORY: Ms. Rani Carey social, family and past medical history was reviewed. REVIEW OF SYSTEMS:    Respiratory: Negative for apnea, chest tightness and shortness of breath or change in baseline breathing. Gastrointestinal: Negative for nausea, vomiting, abdominal pain, diarrhea, constipation, blood in stool and abdominal distention. PHYSICAL EXAM:   Nursing note and vitals reviewed. /76 (Site: Left Upper Arm, Position: Sitting)   Pulse 70   Wt 135 lb (61.2 kg)   LMP  (LMP Unknown)   SpO2 97%   BMI 23.17 kg/m²   Constitutional: She appears well-developed and well-nourished. No acute distress. Skin: Skin is warm and dry, good turgor. No rash noted. She is not diaphoretic. Cardiovascular: Normal rate, regular rhythm, normal heart sounds, and does not have murmur.      Pulmonary/Chest: Effort normal. No respiratory distress. She does not have wheezes in the lung fields. She has no rales. Neurological/Psychiatric:She is alert and oriented to person, place, and time. Coordination is  normal. +Lumbar spine pain with palpation, 30 degree flexion. Negative for swelling/obvious deformity  Her mood isAppropriate and affect is Neutral/Euthymic(normal) . Prescription pain medication monitoring:                  MEDD current = 22.50              ORT Score = 0 low risk              Other Risk factors - (mood) Stable              Date of Last Medication Agreement: 3/18/22              Date Naloxone prescribed: 4/21/22              UDT:                          Date of last UDT: 8/8/22                          Adverse report: No;               OARRS:                          Checked today: Yes                          Adverse report: No    IMPRESSION:   1. Chronic pain syndrome    2. Degeneration of lumbar or lumbosacral intervertebral disc    3. Lumbosacral spondylosis without myelopathy    4. Disc displacement, lumbar    5. Lumbar nerve root impingement, L2    6. Spinal stenosis, lumbar region, without neurogenic claudication    7. DDD (degenerative disc disease), cervical    8. Cervical stenosis of spine, mild    9. Carpal tunnel syndrome, bilateral    10. Bilateral elbow joint pain        PLAN:  Informed verbal consent was obtained:  -Patient's opioid therapy will be maintained at current dose  -Home exercises/Marley exercises recommended  -Educations provided on TPI of the Lumbar spine, side effects reviewed including a temporary elevation in blood sugars, advised to monitor closely. She verbalized understanding, and voiced interest   -CBT techniques- relaxation therapies such as biofeedback, mindfulness based stress reduction, imagery, cognitive restructuring, problem solving discussed with patient    -Last UDS 8/8/22 consistent  -Return in about 4 weeks (around 12/1/2022).    -Informed verbal consent was obtained from the patient. Risks and benefits of the procedure were explained. Complications of the procedure and side effects of kenalog/ lidocaine were discussed with patient. Using 0.25% marcaine and 1cc of kenalog, the the tender trigger point areas in the  bilateral paraspinal lumbar muscles -erector spinae and longgissmus muscles were injected under aseptic condition. Mobilization attempted by stretching. Patient tolerated procedure well. Adv to apply ice today. Analgesic Plan:              Continue present regimen: Percocet 5-325 mg tabs q8h prn              Adjust dose of present analgesic: No              Switch analgesics: No              Add/Adjust concomitant therapy: ZTlido, Narcan    Current Outpatient Medications   Medication Sig Dispense Refill    Lidocaine (ZTLIDO) 1.8 % PTCH Apply 1 patch topically daily 90 patch 0    oxyCODONE-acetaminophen (PERCOCET) 5-325 MG per tablet Take 1 tablet by mouth every 8 hours as needed for Pain for up to 30 days. Take no more than 2-3 tabs orally prn 90 tablet 0    hydroxychloroquine (PLAQUENIL) 200 MG tablet Take 1.5 tablets by mouth daily 135 tablet 0    predniSONE (DELTASONE) 10 MG tablet TAKE AS INSTRUCTED BY YOUR PRESCRIBER 26 tablet 21    traZODone (DESYREL) 50 MG tablet TAKE 1 TABLET NIGHTLY AS NEEDED FOR SLEEP 90 tablet 1    buPROPion (WELLBUTRIN XL) 300 MG extended release tablet       vitamin B-12 (CYANOCOBALAMIN) 500 MCG tablet Take 500 mcg by mouth daily      Resveratrol 250 MG CAPS Take by mouth daily      ELIQUIS 5 MG TABS tablet TAKE 1 TABLET BY MOUTH TWO TIMES A DAY  60 tablet 0    spironolactone (ALDACTONE) 50 MG tablet TAKE 1 TABLET BY MOUTH ONE TIME A DAY, INCREASE TO 1 TABLET TWO TIMES A DAY IF TOLERATING MEDICATION      ferrous sulfate (IRON 325) 325 (65 Fe) MG tablet       TURMERIC PO Take by mouth       No current facility-administered medications for this visit.      I will continue her current medication regimen  which is part of the above treatment schedule. It has been helping with Ms. Lara's chronic  medical problems which for this visit include:   Diagnoses of Chronic pain syndrome, Degeneration of lumbar or lumbosacral intervertebral disc, Lumbosacral spondylosis without myelopathy, Disc displacement, lumbar, Lumbar nerve root impingement, L2, Spinal stenosis, lumbar region, without neurogenic claudication, DDD (degenerative disc disease), cervical, Cervical stenosis of spine, mild, Carpal tunnel syndrome, bilateral, and Bilateral elbow joint pain were pertinent to this visit. Risks and benefits of the medications and other alternative treatments  including no treatment were discussed with the patient. The common side effects of these medications were also explained to the patient. Informed verbal consent was obtained. Goals of current treatment regimen include improvement in pain, restoration of functioning- with focus on improvement in physical performance, general activity, work or disability,emotional distress, health care utilization and  decreased medication consumption. Will continue to monitor progress towards achieving/maintaining therapeutic goals with special emphasis on  1. Improvement in perceived interfernce  of pain with ADL's. Ability to do home exercises independently. Ability to do household chores indoor and/or outdoor work and social and leisure activities. Improve psychosocial and physical functioning. - she is not showing any significant progress/or showing regression  towards this goal and reassessment and adjustment of goals/treatment have been made. She was advised against drinking alcohol with the narcotic pain medicines, advised against driving or handling machinery while adjusting the dose of medicines or if having cognitive  issues related to the current medications. Risk of overdose and death, if medicines not taken as prescribed, were also discussed.  If the patient develops new symptoms or if the symptoms worsen, the patient should call the office. While transcribing every attempt was made to maintain the accuracy of the note in terms of it's contents,there may have been some errors made inadvertently. Thank you for allowing me to participate in the care of this patient. Gian Allred CNP.     Cc: Larissa Wright MD

## 2022-11-18 DIAGNOSIS — M51.37 DEGENERATION OF LUMBAR OR LUMBOSACRAL INTERVERTEBRAL DISC: Primary | ICD-10-CM

## 2022-11-18 DIAGNOSIS — M51.26 DISC DISPLACEMENT, LUMBAR: ICD-10-CM

## 2022-11-18 DIAGNOSIS — M47.817 LUMBOSACRAL SPONDYLOSIS WITHOUT MYELOPATHY: ICD-10-CM

## 2022-11-18 RX ORDER — TRIAMCINOLONE ACETONIDE 40 MG/ML
40 INJECTION, SUSPENSION INTRA-ARTICULAR; INTRAMUSCULAR ONCE
Status: COMPLETED | OUTPATIENT
Start: 2022-11-18 | End: 2022-12-01

## 2022-11-19 NOTE — PROGRESS NOTES
dose Prednisone taper for arthritis flares. - she will try Diclofenac gel. Discussed heat. - pt following w/ Pain Management. Orders:  -     C-Reactive Protein; Future  -     hydroxychloroquine (PLAQUENIL) 200 MG tablet; Take 1.5 tablets by mouth daily, Disp-135 tablet, R-1Normal  -     diclofenac sodium (VOLTAREN) 1 % GEL; Apply 4 g topically 4 times daily, Topical, 4 TIMES DAILY Starting Wed 11/23/2022, Until Fri 12/23/2022, For 30 days, Disp-150 g, R-2, Normal  -     predniSONE (DELTASONE) 5 MG tablet; Take 3 tablets by mouth daily for 7 days, THEN 2 tablets daily for 7 days, THEN 1 tablet daily for 4 days. , Disp-39 tablet, R-2Normal  2. Long-term use of hydroxychloroquine  Assessment & Plan:  - she had eye exam for HCQ toxicity monitoring in 2/2022. Orders:  -     CBC with Auto Differential; Future     Return in about 3 months for lab result discussion and treatment plan, medication monitoring. The risks and benefits of my recommendations, as well as other treatment options, benefits and side effects were discussed with the patient today. Questions were answered. NOTE: This report is transcribed by using voice recognition software dragon. Every effort is made to ensure accuracy; however, inadvertent computerized  transcription errors may be present. SUBJECTIVE:  Past medical/surgical history, medications and allergies are reviewed and updated as appropriate. Interval Hx:   Pt reports stiffness and trigger finger mainly affecting her L hand in the morning. She reports overall improved joint Sx since starting HCQ. She has been taking 200 mg alternating w/ 400 mg QOD. No PsO.     Rheumatologic ROS:  Constitutional: denies chronic fatigue, fever/chills, night sweats, unintentional weight loss  Integumentary: denies photosensitivity, rash, patchy alopecia, or Sx of Raynaud's phenomenon  Eyes: denies dry eyes, redness or pain, visual disturbance, or floaters  Nose: denies nasal ulcers or recurrent sinusitis  Oral cavity: denies dry mouth or oral ulcers  Cardiovascular: +pt reported remote Hx of cardiac arrhthymias and intermittent dizziness, denies CP, palpitations, Hx of pericardial effusion or pericarditis  Respiratory: denies SOB, cough, hemoptysis, or pleurisy  Gastrointestinal: denies heart burn, dysphagia or esophageal dysmotility, denies change in bowel habits or Sx of IBD  Hematologic/Lymphatic: +Hx of popliteal vein thrombosis in 3/2021 found to have Factor V Leiden mutation on life long anticoagulation, denies recurrent miscarriages, denies swollen LNs  Musculoskeletal:  refer to above HPI   Neurological: denies focal weakness, paresthesias/hyperesthesias or change in sensation, denies Hx of seizure, denies change in gait, balance, or memory    No Known Allergies    Past Medical History:        Diagnosis Date    Diverticulitis 2020    Factor 5 Leiden mutation, heterozygous ([place])     Freiberg's disease, right     foot    Hx of blood clots     PONV (postoperative nausea and vomiting)     Psoriatic arthritis ([place])        Past Surgical History:        Procedure Laterality Date    CARPAL TUNNEL RELEASE Bilateral      SECTION  2001     x1    FINGER TRIGGER RELEASE Bilateral     FOOT SURGERY Right     HEMICOLECTOMY N/A 3/25/2022    ROBOTIC SIGMOID COLECTOMY, SPLENIC FLEXURE, OMENTAL FLAP performed by Emmy Marquez MD at [facility] Left 10/26/2020    LEFT L4 AND L5 TRANSFORAMINAL EPDIURAL STEROID INJECTION WITH FLUOROSCOPY (06097, 22450) performed by Ivan Baires MD at [address] [facility] Left 2020    LEFT LUMBAR FOUR LUMBAR FIVE TRANSFORAMINAL  EPIDURAL STEROID INJECTION SITE CONFIRMED BY FLUOROSCOPY performed by Ivan Baires MD at [address] [facility] OR       Medications:    Current Outpatient Medications   Medication Sig Dispense Refill    tiZANidine (ZANAFLEX) 4 MG tablet TAKE 1 TABLET BY MOUTH AT BEDTIME AS NEEDED      hydroxychloroquine (PLAQUENIL) 200 MG tablet Take 1.5 tablets by mouth daily 135 tablet 1    diclofenac sodium (VOLTAREN) 1 % GEL Apply 4 g topically 4 times daily 150 g 2    predniSONE (DELTASONE) 5 MG tablet Take 3 tablets by mouth daily for 7 days, THEN 2 tablets daily for 7 days, THEN 1 tablet daily for 4 days. 39 tablet 2    spironolactone (ALDACTONE) 50 MG tablet TAKE 1 TABLET BY MOUTH ONE TIME A DAY, INCREASE TO 1 TABLET TWO TIMES A DAY IF TOLERATING MEDICATION      Lidocaine (ZTLIDO) 1.8 % PTCH Apply 1 patch topically daily 90 patch 0    oxyCODONE-acetaminophen (PERCOCET) 5-325 MG per tablet Take 1 tablet by mouth every 8 hours as needed for Pain for up to 30 days.  Take no more than 2-3 tabs orally prn 90 tablet 0    traZODone (DESYREL) 50 MG tablet TAKE 1 TABLET NIGHTLY AS NEEDED FOR SLEEP 90 tablet 1    buPROPion (WELLBUTRIN XL) 300 MG extended release tablet       vitamin B-12 (CYANOCOBALAMIN) 500 MCG tablet Take 500 mcg by mouth daily      Resveratrol 250 MG CAPS Take by mouth daily      ELIQUIS 5 MG TABS tablet TAKE 1 TABLET BY MOUTH TWO TIMES A DAY  60 tablet 0     Current Facility-Administered Medications   Medication Dose Route Frequency Provider Last Rate Last Admin    triamcinolone acetonide (KENALOG-40) injection 40 mg  40 mg IntraMUSCular Once Tabitha Archer MA            OBJECTIVE:  Physical Exam:  /70   Pulse 60   Wt 135 lb (61.2 kg)   LMP  (LMP Unknown)   BMI 23.17 kg/m²     GEN: AAOx3, in NAD, well-appearing  HEAD: normocephalic, atraumatic  EYES: no injection or icterus  CVS: RRR  LUNGS: in no acute respiratory distress, CTAB  MSK:  Upper extremities:              Hands: no joint deformities, no tenosynovitis or dactylitis, b/l MCP joints w/o synovitis NTTP, b/l PIP joints w/ mild bony enlargement L PIP joints boggy some slightly TTP, b/l DIP joints slightly boggy and slightly TTP, full fist formation w/ good  strength              Wrist: no synovitis in the wrist joints b/l TTP, FROM Elbow: no synovitis or bursitis, FROM  Lower extremities:              Knees: no warmth or effusion present, FROM              Ankles: no synovitis, FROM, Achilles tendons w/o swelling or warmth NTTP              Feet: no toe swelling or pain or warmth on palpation w/ FROM, negative MTP squeeze test  INTEGUMENT: no rash or psoriatic lesions, no petechiae, bruises, or palpable purpura, no patchy alopecia, no nail or periungual changes, no clubbing or digital ulcers    DATA:  Labs:   I personally reviewed interval labs and discussed w/ the pt in detail which showed:    Lab Results   Component Value Date    WBC 5.8 03/27/2022    HGB 10.5 (L) 03/27/2022    HCT 31.1 (L) 03/27/2022    MCV 93.7 03/27/2022     03/27/2022    GRAN 54 04/08/2020    LYMPHOPCT 31.0 03/27/2022    RBC 3.32 (L) 03/27/2022    MCH 31.8 03/27/2022    MCHC 33.9 03/27/2022    RDW 12.2 (L) 03/27/2022     Lab Results   Component Value Date     03/27/2022    K 3.8 03/27/2022     03/27/2022    CO2 25 03/27/2022    BUN 10 03/27/2022    CREATININE 0.7 03/27/2022    GLUCOSE 96 03/27/2022    CALCIUM 8.6 03/27/2022    PROT 6.9 02/19/2022    LABALBU 3.8 03/27/2022    BILITOT 0.5 02/19/2022    ALKPHOS 113 02/19/2022    AST 14 (L) 02/19/2022    ALT <5 (L) 02/19/2022    LABGLOM >60 03/27/2022    GFRAA >60 03/27/2022    AGRATIO 2.1 02/19/2022    GLOB 2.6 11/16/2020     Lab Results   Component Value Date    COLORU yellow 11/28/2016    CLARITYU clear 11/28/2016    GLUCOSEU neg 11/28/2016    BILIRUBINUR neg 11/28/2016    KETUA neg 11/28/2016    SPECGRAV 1.005 11/28/2016    BLOODU trace 11/28/2016    PHUR 5.5 11/28/2016    PROTEINU neg 11/28/2016    LEUKOCYTESUR neg 11/28/2016    LABMICR <1.20 11/17/2021     Lab Results   Component Value Date    VITD25 33.1 11/16/2020     Lab Results   Component Value Date    C3 133.4 10/20/2021     Lab Results   Component Value Date    C4 19.8 10/20/2021     No results found for: ANTIDSDNAIGG     Lab Results Component Value Date    LABURIC 3.2 10/20/2021     Lab Results   Component Value Date    CRP <3.0 02/01/2022    CRP <3.0 10/20/2021     Lab Results   Component Value Date    SEDRATE 9 10/20/2021     No results found for: CKTOTAL     Negative SHILOH (10/20/21)  Negative RF, CCP (10/20/21)  Negative HLA B27 (10/20/21)  Negative hepatitis B surface Ag and core total Ab, hepatitis C Ab (10/20/21)    Imaging:  I personally reviewed interval imaging and discussed w/ the pt in detail which included:    MRI C-spine (7/1/20): FINDINGS:   BONES/ALIGNMENT: There is normal alignment of the spine. The vertebral body heights are maintained. The bone marrow signal appears unremarkable. SPINAL CORD: The visualized spinal cord has normal signal and morphology. No evidence of mass or abnormal fluid collection within the spinal canal.     SOFT TISSUES: Paraspinal soft tissues are unremarkable. C2-C3: Disc height and signal maintained. No neural foraminal narrowing or spinal canal stenosis. C3-C4: Disc height and signal maintained. No neural foraminal narrowing or spinal canal stenosis. C4-C5: Disc height and signal maintained. No neural foraminal narrowing or spinal canal stenosis. C5-C6: Mild disc height loss. No disc signal abnormality. No left neural foraminal narrowing. Mild right neural foraminal narrowing secondary to uncovertebral hypertrophy. Mild spinal canal stenosis secondary to disc bulge. C6-C7: Disc height and signal maintained. No neural foraminal narrowing or spinal canal stenosis. C7-T1: Disc height and signal maintained. No neural foraminal narrowing or spinal canal stenosis. IMPRESSION:  1. Mild C5-6 degenerative disc height loss. 2. Mild C5-6 spinal canal stenosis secondary to disc bulge. 3. Mild narrowing of the right C6 neural foramen secondary to C5-6 uncovertebral hypertrophy. MRI L-spine (10/8/20): FINDINGS:    Assume 5 lumbar vertebrae. Lordosis is moderate. Conus medullaris termination is normal.  Slight anterior disc displacements multilevel. T11-12: Disc is dehydrated, slightly height reduced; shallow left lateral disc protrusion gently deforms left ventral dural sac. T12-L1: Disc shows no posterior displacement. L1-2: Disc shows no posterior displacement. L2-3: Disc is dehydrated; trace disc bulge; right extraforaminal annular fissure; disc effaces ventral dural sac. Mild facet hypertrophy, scant facet fluid. L3-4: Disc is dehydrated; shallow disc bulge with annular fissure straightens ventral dural sac. Scant facet fluid. L4-5: Disc is dehydrated, slightly height reduced; 2mm retrolisthesis; disc bulge with annular fissure gently encroaches upon ventral dural sac, minimally greater on left; disc contacts foraminal L4 nerve roots. Mild spinal stenosis. Mild bilateral lateral recess stenosis. Mild  biforaminal stenosis. L5-S1: Disc is dehydrated, slightly height reduced; shallow central disc protrusion effaces ventral dural sac. Disc contacts foraminal L5 nerve roots. Mild biforaminal narrowing. No focal bone lesion to suggest acute osseous stress injury or acute stress fracture. CONCLUSION:   1. L4-5 mild spinal stenosis. Trace retrolisthesis; disc bulge with annular fissure; disc contacting foraminal L4 nerve roots. Mild biforaminal narrowing. 2. L5-S1 shallow central disc protrusion effacing ventral dural sac; disc contacts foraminal L5  nerve roots. Mild biforaminal narrowing. 3. T11-12 shallow left lateral disc protrusion gently encroaching upon left ventral dural sac. X-rays (10/20/21):  Left hand findings:     3 projections. Normal bone mineralization. No fracture or subluxation. No evidence of bony erosion or ankylosis. Joint spaces are maintained. 1. No abnormality of left hand identified. Right hand findings:     3 projections. Normal mineralization. No fracture or dislocation.  No soft tissue abnormality. No evidence of erosion or ankylosis. Joint spaces are maintained. IMPRESSION:   1. No abnormality of right hand identified. Lumbar spine findings:     3 projections. There are 5 nonrib-bearing lumbar vertebral bodies. Normal vertebral body height and alignment. There is mild disc space narrowing at L4-L5. Mild L4-L5 facet arthrosis also demonstrated. Impression:   1. Mild degenerative disc and facet disease in the lumbar spine and L4-L5. SI joints findings:     3 projections of the sacroiliac joints. No evidence of SI joint fusion. No evidence of bony erosion or significant sclerosis. Evaluation of pelvic soft tissues demonstrates the presence of an intrauterine device. IMPRESSION:   1. No abnormalities of the sacroiliac joints are detected. MRI pelvis (2/11/21): Findings:  No acute fracture or contusion. No AVN of the femoral heads. Mild spurring of left acetabulum. Subtle spurring of right acetabulum. No hip joint effusion. No acute labral tear. No acute muscle or tendon strain. No trochanteric bursitis. Moderate tendinopathy of right hamstring tendon with meniscal tears. Mild tendinopathy of left hamstring tendon with no tear. Sciatic nerve normal in appearance and symmetrical.  Visualized SI joints and pubic symphysis are unremarkable. No lymphadenopathy in the visualized pelvis. An IUD. Conclusion:  1. Moderate tendinopathy of right hamstring tendon with meniscal tears at the origin. 2. Mild tendinopathy of the left hamstring tendon, no tear. Nuclear bone scan (12/6/21): Findings: There is relatively diffuse and fairly symmetric increased polyarticular uptake identified in the distal appendicular skeleton particularly elbows and wrists/hands, and ankles/feet. This is nonspecific but raises the possibility of inflammatory arthropathy.  There is slightly greater asymmetrical uptake within the right knee, and the right greater than left forefeet, and these findings can also be seen with degenerative arthropathy. No other suspicious focal nonarticular osseous activity. IMPRESSION:  Increased bilateral periarticular activity identified in the distal appendicular skeleton, nonspecific but raises the possibility of inflammatory arthropathy, with possible superimposed degenerative arthropathy, as described. MRI R hand (12/9/21): FINDINGS:   There is abnormal edema and enhancement identified within the flexor digitorum tendon sheath of the third digit acid courses volar to the third metacarpal head, proximal third phalanx and middle third phalanx. The findings are compatible with tenosynovitis. No discrete tendon tear is identified. No tendinopathy is identified. No joint effusions are identified. No abnormal synovial thickening or enhancement is identified. No osseous erosion is identified. Marrow signal is normal.   IMPRESSION:  1. Findings consistent with tenosynovitis of the flexor digitorum tendon of the third digit. 2. No evidence of any synovitis or osseous erosion. Above results were discussed w/ the pt in detail during today's visit.

## 2022-11-23 ENCOUNTER — OFFICE VISIT (OUTPATIENT)
Dept: RHEUMATOLOGY | Age: 52
End: 2022-11-23
Payer: COMMERCIAL

## 2022-11-23 VITALS
HEART RATE: 60 BPM | BODY MASS INDEX: 23.17 KG/M2 | DIASTOLIC BLOOD PRESSURE: 70 MMHG | WEIGHT: 135 LBS | SYSTOLIC BLOOD PRESSURE: 110 MMHG

## 2022-11-23 DIAGNOSIS — Z79.899 LONG-TERM USE OF HYDROXYCHLOROQUINE: ICD-10-CM

## 2022-11-23 DIAGNOSIS — M13.80 SERONEGATIVE INFLAMMATORY ARTHRITIS: ICD-10-CM

## 2022-11-23 DIAGNOSIS — M13.80 SERONEGATIVE INFLAMMATORY ARTHRITIS: Primary | ICD-10-CM

## 2022-11-23 PROBLEM — M25.50 POLYARTHRALGIA: Status: RESOLVED | Noted: 2021-10-20 | Resolved: 2022-11-23

## 2022-11-23 LAB
BASOPHILS ABSOLUTE: 0 K/UL (ref 0–0.2)
BASOPHILS RELATIVE PERCENT: 0.4 %
C-REACTIVE PROTEIN: <3 MG/L (ref 0–5.1)
EOSINOPHILS ABSOLUTE: 0 K/UL (ref 0–0.6)
EOSINOPHILS RELATIVE PERCENT: 0.4 %
HCT VFR BLD CALC: 40.2 % (ref 36–48)
HEMOGLOBIN: 13.4 G/DL (ref 12–16)
LYMPHOCYTES ABSOLUTE: 1.3 K/UL (ref 1–5.1)
LYMPHOCYTES RELATIVE PERCENT: 28.1 %
MCH RBC QN AUTO: 32.1 PG (ref 26–34)
MCHC RBC AUTO-ENTMCNC: 33.3 G/DL (ref 31–36)
MCV RBC AUTO: 96.2 FL (ref 80–100)
MONOCYTES ABSOLUTE: 0.3 K/UL (ref 0–1.3)
MONOCYTES RELATIVE PERCENT: 6.5 %
NEUTROPHILS ABSOLUTE: 3 K/UL (ref 1.7–7.7)
NEUTROPHILS RELATIVE PERCENT: 64.6 %
PDW BLD-RTO: 12.8 % (ref 12.4–15.4)
PLATELET # BLD: 215 K/UL (ref 135–450)
PMV BLD AUTO: 8.4 FL (ref 5–10.5)
RBC # BLD: 4.18 M/UL (ref 4–5.2)
WBC # BLD: 4.6 K/UL (ref 4–11)

## 2022-11-23 PROCEDURE — 99214 OFFICE O/P EST MOD 30 MIN: CPT | Performed by: INTERNAL MEDICINE

## 2022-11-23 RX ORDER — FOLIC ACID 1 MG/1
TABLET ORAL DAILY
COMMUNITY
End: 2022-11-23

## 2022-11-23 RX ORDER — HYDROXYCHLOROQUINE SULFATE 200 MG/1
300 TABLET, FILM COATED ORAL DAILY
Qty: 135 TABLET | Refills: 1 | Status: SHIPPED | OUTPATIENT
Start: 2022-11-23 | End: 2023-02-21

## 2022-11-23 RX ORDER — TIZANIDINE 4 MG/1
TABLET ORAL
COMMUNITY
Start: 2022-10-19

## 2022-11-23 RX ORDER — PREDNISONE 1 MG/1
TABLET ORAL
Qty: 39 TABLET | Refills: 2 | Status: SHIPPED | OUTPATIENT
Start: 2022-11-23 | End: 2022-12-11

## 2022-11-23 NOTE — ASSESSMENT & PLAN NOTE
- seronegative inflammatory polyarthritis involving the b/l wrists, MCP, PIP and DIP joints w/ significant response to low dose Pred taper. Hx of b/l 2-3rd dactylitis. MRI R hand from 12/2021 showed flexor tenosynovitis of the 3rd digit. Nuclear bone scan ordered by PCP on 12/6/21 showed uptake pattern suggestive of inflammatory arthropathy w/ superimposed degenerative arthropathy. Suspect she likely has early PsA vs seronegative RA, favor PsA given prior findings of dactylitis. - Sx well controlled on HCQ, she had mild bogginess in her DIP joints on exam today. We discussed adding MTX if she develops worsening Sx. She will cont current dose of  mg alternating w/ 400 mg QOD. - prescribed low dose Prednisone taper for arthritis flares. - she will try Diclofenac gel. Discussed heat. - pt following w/ Pain Management.

## 2022-11-28 NOTE — PROGRESS NOTES
pain, restoration of functioning- with focus on improvement in physical performance, general activity, work or disability,emotional distress, health care utilization and  decreased medication consumption. Will continue to monitor progress towards achieving/maintaining therapeutic goals with special emphasis on  1. Improvement in perceived interfernce  of pain with ADL's. Ability to do home exercises independently. Ability to do household chores indoor and/or outdoor work and social and leisure activities. Improve psychosocial and physical functioning. - she is showing progression towards this treatment goal with the current regimen. 2. Ability to focus/concentrate at work and perform the duties required of her at work  Sit through a workday without lower extremity symptoms. Stand 30-60 minutes without lower extremity symptoms. Ability to lift up to 10-20 lbs. Ability to go up and down stairs. Sit 30-60 minutes  Without having to stand up frequently. - she is maintaining/progressing towards her work related goals with the current regimen. She was advised against drinking alcohol with the narcotic pain medicines, advised against driving or handling machinery while adjusting the dose of medicines or if having cognitive  issues related to the current medications. Risk of overdose and death, if medicines not taken as prescribed, were also discussed. If the patient develops new symptoms or if the symptoms worsen, the patient should call the office. While transcribing every attempt was made to maintain the accuracy of the note in terms of it's contents,there may have been some errors made inadvertently. Thank you for allowing me to participate in the care of this patient.     Carine Jo CNP    Cc: Susie Torre MD [employer]

## 2022-12-01 ENCOUNTER — OFFICE VISIT (OUTPATIENT)
Dept: PAIN MANAGEMENT | Age: 52
End: 2022-12-01

## 2022-12-01 VITALS
OXYGEN SATURATION: 97 % | DIASTOLIC BLOOD PRESSURE: 67 MMHG | SYSTOLIC BLOOD PRESSURE: 110 MMHG | TEMPERATURE: 97.5 F | HEART RATE: 68 BPM | BODY MASS INDEX: 23.17 KG/M2 | HEIGHT: 64 IN

## 2022-12-01 DIAGNOSIS — M48.061 SPINAL STENOSIS, LUMBAR REGION, WITHOUT NEUROGENIC CLAUDICATION: ICD-10-CM

## 2022-12-01 DIAGNOSIS — K59.03 DRUG INDUCED CONSTIPATION: ICD-10-CM

## 2022-12-01 DIAGNOSIS — M47.817 LUMBOSACRAL SPONDYLOSIS WITHOUT MYELOPATHY: ICD-10-CM

## 2022-12-01 DIAGNOSIS — M48.02 CERVICAL STENOSIS OF SPINE: ICD-10-CM

## 2022-12-01 DIAGNOSIS — M50.30 DDD (DEGENERATIVE DISC DISEASE), CERVICAL: ICD-10-CM

## 2022-12-01 DIAGNOSIS — M25.521 BILATERAL ELBOW JOINT PAIN: ICD-10-CM

## 2022-12-01 DIAGNOSIS — M51.37 DEGENERATION OF LUMBAR OR LUMBOSACRAL INTERVERTEBRAL DISC: ICD-10-CM

## 2022-12-01 DIAGNOSIS — M54.16 LUMBAR NERVE ROOT IMPINGEMENT: ICD-10-CM

## 2022-12-01 DIAGNOSIS — M25.522 BILATERAL ELBOW JOINT PAIN: ICD-10-CM

## 2022-12-01 DIAGNOSIS — G89.4 CHRONIC PAIN SYNDROME: ICD-10-CM

## 2022-12-01 DIAGNOSIS — G56.03 CARPAL TUNNEL SYNDROME, BILATERAL: ICD-10-CM

## 2022-12-01 RX ORDER — OXYCODONE HYDROCHLORIDE AND ACETAMINOPHEN 5; 325 MG/1; MG/1
1 TABLET ORAL EVERY 8 HOURS PRN
Qty: 90 TABLET | Refills: 0 | Status: SHIPPED | OUTPATIENT
Start: 2022-12-01 | End: 2022-12-31

## 2022-12-01 RX ORDER — LIDOCAINE 36 MG/1
1 PATCH TOPICAL DAILY
Qty: 90 PATCH | Refills: 0 | Status: SHIPPED | OUTPATIENT
Start: 2022-12-01 | End: 2023-03-01

## 2022-12-01 RX ADMIN — TRIAMCINOLONE ACETONIDE 40 MG: 40 INJECTION, SUSPENSION INTRA-ARTICULAR; INTRAMUSCULAR at 17:00

## 2022-12-01 NOTE — PROGRESS NOTES
Marisol Lara  1970  [id number]      HISTORY OF PRESENT ILLNESS: Ms. Jared Yoon is a 47 y.o. female returns for a follow up visit for pain management  She has a diagnosis of   1. Chronic pain syndrome    2. Degeneration of lumbar or lumbosacral intervertebral disc    3. Lumbosacral spondylosis without myelopathy    4. Lumbar nerve root impingement, L2    5. Spinal stenosis, lumbar region, without neurogenic claudication    6. DDD (degenerative disc disease), cervical    7. Cervical stenosis of spine, mild    8. Carpal tunnel syndrome, bilateral    9. Bilateral elbow joint pain    10. Drug induced constipation      As per Information Obtained from the PADT (Patient Assessment and Documentation Tool)    She complains of pain in the left leg(s): entire area and mid lower back She rates the pain 3/10 and describes it as aching. Current treatment regimen has helped relieve about 50% of the pain. She denies any side effects from the current pain regimen. Patient reports that since the last follow up visit the physical functioning is unchanged, family/social relationships are unchanged, mood is unchanged sleep patterns are unchanged, and that the overall functioning is unchanged. Patient denies misusing/abusing her narcotic pain medications or using any illegal drugs. Upon obtaining medical history from Ms. Lara states that pain is manageable on current pain therapy. Takes the pain medications as prescribed. Mood/anxiety is stable. Sleep is fair with an average of 5-6 hours. Denies to having issues of constipation. Tolerating activities/house chores with moderate tenderness to the lower back. ALLERGIES: Patients list of allergies were reviewed     MEDICATIONS: Ms. Jared Yoon list of medications were reviewed. Her current medications are   Outpatient Medications Prior to Visit   Medication Sig Dispense Refill    tiZANidine (ZANAFLEX) 4 MG tablet TAKE 1 TABLET BY MOUTH AT BEDTIME AS NEEDED      hydroxychloroquine (PLAQUENIL) 200 MG tablet Take 1.5 tablets by mouth daily 135 tablet 1    diclofenac sodium (VOLTAREN) 1 % GEL Apply 4 g topically 4 times daily 150 g 2    predniSONE (DELTASONE) 5 MG tablet Take 3 tablets by mouth daily for 7 days, THEN 2 tablets daily for 7 days, THEN 1 tablet daily for 4 days. 39 tablet 2    spironolactone (ALDACTONE) 50 MG tablet TAKE 1 TABLET BY MOUTH ONE TIME A DAY, INCREASE TO 1 TABLET TWO TIMES A DAY IF TOLERATING MEDICATION      traZODone (DESYREL) 50 MG tablet TAKE 1 TABLET NIGHTLY AS NEEDED FOR SLEEP 90 tablet 1    buPROPion (WELLBUTRIN XL) 300 MG extended release tablet       vitamin B-12 (CYANOCOBALAMIN) 500 MCG tablet Take 500 mcg by mouth daily      Resveratrol 250 MG CAPS Take by mouth daily      ELIQUIS 5 MG TABS tablet TAKE 1 TABLET BY MOUTH TWO TIMES A DAY  60 tablet 0    Lidocaine (ZTLIDO) 1.8 % PTCH Apply 1 patch topically daily 90 patch 0    oxyCODONE-acetaminophen (PERCOCET) 5-325 MG per tablet Take 1 tablet by mouth every 8 hours as needed for Pain for up to 30 days. Take no more than 2-3 tabs orally prn 90 tablet 0    triamcinolone acetonide (KENALOG-40) injection 40 mg        No facility-administered medications prior to visit. SOCIAL/FAMILY/PAST MEDICAL HISTORY: Ms. Vernon Lugo social, family and past medical history was reviewed. REVIEW OF SYSTEMS:    Respiratory: Negative for apnea, chest tightness and shortness of breath or change in baseline breathing. Gastrointestinal: Negative for nausea, vomiting, abdominal pain, diarrhea, constipation, blood in stool and abdominal distention. PHYSICAL EXAM:   Nursing note and vitals reviewed. /67   Pulse 68   Temp 97.5 °F (36.4 °C)   Ht 5' 4\" (1.626 m)   LMP  (LMP Unknown)   SpO2 97%   BMI 23.17 kg/m²   Constitutional: She appears well-developed and well-nourished. No acute distress. Skin: Skin is warm and dry, good turgor. No rash noted. She is not diaphoretic.   Cardiovascular: Normal rate, regular rhythm, normal heart sounds, and does not have murmur. Pulmonary/Chest: Effort normal. No respiratory distress. She does not have wheezes in the lung fields. She has no rales. Neurological/Psychiatric:She is alert and oriented to person, place, and time. Coordination is  normal.  Her mood isAppropriate and affect is Neutral/Euthymic(normal) . Prescription pain medication monitoring:                  MEDD current = 22.50              ORT Score = 0 low risk              Other Risk factors - (mood) Stable              Date of Last Medication Agreement: 3/18/22              Date Naloxone prescribed: 4/21/22              UDT:                          Date of last UDT: 8/8/22                          Adverse report: No              OARRS:                          Checked today: Yes                          Adverse report: No    IMPRESSION:   1. Chronic pain syndrome    2. Degeneration of lumbar or lumbosacral intervertebral disc    3. Lumbosacral spondylosis without myelopathy    4. Lumbar nerve root impingement, L2    5. Spinal stenosis, lumbar region, without neurogenic claudication    6. DDD (degenerative disc disease), cervical    7. Cervical stenosis of spine, mild    8. Carpal tunnel syndrome, bilateral    9. Bilateral elbow joint pain    10. Drug induced constipation      PLAN:  Informed verbal consent was obtained:  -Patient's opioid therapy will be maintained at current dose  -Home exercises/Marley exercises recommended  -CBT techniques- relaxation therapies such as biofeedback, mindfulness based stress reduction, imagery, cognitive restructuring, problem solving discussed with patient   -Last UDS 8/8/22 consistent  -Return in about 4 weeks (around 12/29/2022).      Analgesic Plan:              Continue present regimen: Percocet 5-325 mg tabs prn              Adjust dose of present analgesic: No              Switch analgesics: No              Add/Adjust concomitant therapy: ZTlido, Narcan    I will continue her current medication regimen  which is part of the above treatment schedule. It has been helping with Ms. Lara's chronic  medical problems which for this visit include:   Diagnoses of Chronic pain syndrome, Degeneration of lumbar or lumbosacral intervertebral disc, Lumbosacral spondylosis without myelopathy, Lumbar nerve root impingement, L2, Spinal stenosis, lumbar region, without neurogenic claudication, DDD (degenerative disc disease), cervical, Cervical stenosis of spine, mild, Carpal tunnel syndrome, bilateral, Bilateral elbow joint pain, and Drug induced constipation were pertinent to this visit. Risks and benefits of the medications and other alternative treatments  including no treatment were discussed with the patient. The common side effects of these medications were also explained to the patient. Informed verbal consent was obtained. Goals of current treatment regimen include improvement in pain, restoration of functioning- with focus on improvement in physical performance, general activity, work or disability,emotional distress, health care utilization and  decreased medication consumption. Will continue to monitor progress towards achieving/maintaining therapeutic goals with special emphasis on  1. Improvement in perceived interfernce  of pain with ADL's. Ability to do home exercises independently. Ability to do household chores indoor and/or outdoor work and social and leisure activities. Improve psychosocial and physical functioning. - she is showing progression towards this treatment goal with the current regimen. She was advised against drinking alcohol with the narcotic pain medicines, advised against driving or handling machinery while adjusting the dose of medicines or if having cognitive  issues related to the current medications. Risk of overdose and death, if medicines not taken as prescribed, were also discussed.  If the patient develops new symptoms or if the symptoms worsen, the patient should call the office. While transcribing every attempt was made to maintain the accuracy of the note in terms of it's contents,there may have been some errors made inadvertently. Thank you for allowing me to participate in the care of this patient. Nitza Dunlap CNP.     Cc: Amrita Reddy MD

## 2023-01-05 ENCOUNTER — OFFICE VISIT (OUTPATIENT)
Dept: PAIN MANAGEMENT | Age: 53
End: 2023-01-05
Payer: COMMERCIAL

## 2023-01-05 VITALS
TEMPERATURE: 97.3 F | OXYGEN SATURATION: 98 % | HEIGHT: 64 IN | WEIGHT: 137 LBS | SYSTOLIC BLOOD PRESSURE: 114 MMHG | HEART RATE: 74 BPM | BODY MASS INDEX: 23.39 KG/M2 | DIASTOLIC BLOOD PRESSURE: 70 MMHG

## 2023-01-05 DIAGNOSIS — G89.4 CHRONIC PAIN SYNDROME: ICD-10-CM

## 2023-01-05 DIAGNOSIS — M25.521 BILATERAL ELBOW JOINT PAIN: ICD-10-CM

## 2023-01-05 DIAGNOSIS — M54.16 LUMBAR NERVE ROOT IMPINGEMENT: ICD-10-CM

## 2023-01-05 DIAGNOSIS — M47.817 LUMBOSACRAL SPONDYLOSIS WITHOUT MYELOPATHY: ICD-10-CM

## 2023-01-05 DIAGNOSIS — M25.522 BILATERAL ELBOW JOINT PAIN: ICD-10-CM

## 2023-01-05 DIAGNOSIS — M48.061 SPINAL STENOSIS, LUMBAR REGION, WITHOUT NEUROGENIC CLAUDICATION: ICD-10-CM

## 2023-01-05 DIAGNOSIS — M48.02 CERVICAL STENOSIS OF SPINE: ICD-10-CM

## 2023-01-05 DIAGNOSIS — K59.03 DRUG INDUCED CONSTIPATION: ICD-10-CM

## 2023-01-05 DIAGNOSIS — G56.03 CARPAL TUNNEL SYNDROME, BILATERAL: ICD-10-CM

## 2023-01-05 DIAGNOSIS — M50.30 DDD (DEGENERATIVE DISC DISEASE), CERVICAL: ICD-10-CM

## 2023-01-05 DIAGNOSIS — M51.37 DEGENERATION OF LUMBAR OR LUMBOSACRAL INTERVERTEBRAL DISC: ICD-10-CM

## 2023-01-05 PROCEDURE — 99213 OFFICE O/P EST LOW 20 MIN: CPT | Performed by: NURSE PRACTITIONER

## 2023-01-05 RX ORDER — LIDOCAINE 36 MG/1
1 PATCH TOPICAL DAILY
Qty: 90 PATCH | Refills: 0 | Status: SHIPPED | OUTPATIENT
Start: 2023-01-05 | End: 2023-04-05

## 2023-01-05 RX ORDER — OXYCODONE HYDROCHLORIDE AND ACETAMINOPHEN 5; 325 MG/1; MG/1
1 TABLET ORAL EVERY 8 HOURS PRN
Qty: 90 TABLET | Refills: 0 | Status: SHIPPED | OUTPATIENT
Start: 2023-01-05 | End: 2023-02-04

## 2023-01-05 NOTE — PROGRESS NOTES
[facility]  1970  [id number]      HISTORY OF PRESENT ILLNESS: Ms. Min Guillen is a 47 y.o. female returns for a follow up visit for pain management  She has a diagnosis of   1. Chronic pain syndrome    2. Degeneration of lumbar or lumbosacral intervertebral disc    3. Lumbosacral spondylosis without myelopathy    4. Spinal stenosis, lumbar region, without neurogenic claudication    5. Lumbar nerve root impingement, L2    6. DDD (degenerative disc disease), cervical    7. Cervical stenosis of spine, mild    8. Carpal tunnel syndrome, bilateral    9. Bilateral elbow joint pain    10. Drug induced constipation      As per Information Obtained from the PADT (Patient Assessment and Documentation Tool)    She complains of pain in the both arm(s): upper, both leg(s): upper, and left lower back She rates the pain 7/10 and describes it as aching. Current treatment regimen has helped relieve about 60% of the pain. She denies any side effects from the current pain regimen. Patient reports that since the last follow up visit the physical functioning is unchanged, family/social relationships are unchanged, mood is unchanged sleep patterns are unchanged, and that the overall functioning is unchanged. Patient denies misusing/abusing her narcotic pain medications or using any illegal drugs. Upon obtaining medical history from Ms. Lara states that pain is manageable on current pain therapy. Takes the pain medications as prescribed. Mood/anxiety is stable. Sleep is fair with an average of 5-6 hours. Denies to having issues of constipation. Tolerating activities/house chores with moderate tenderness to the lower back. ALLERGIES: Patients list of allergies were reviewed     MEDICATIONS: Ms. Min Guillen list of medications were reviewed. Her current medications are   Outpatient Medications Prior to Visit   Medication Sig Dispense Refill    tiZANidine (ZANAFLEX) 4 MG tablet TAKE 1 TABLET BY MOUTH AT BEDTIME AS NEEDED hydroxychloroquine (PLAQUENIL) 200 MG tablet Take 1.5 tablets by mouth daily 135 tablet 1    spironolactone (ALDACTONE) 50 MG tablet TAKE 1 TABLET BY MOUTH ONE TIME A DAY, INCREASE TO 1 TABLET TWO TIMES A DAY IF TOLERATING MEDICATION      traZODone (DESYREL) 50 MG tablet TAKE 1 TABLET NIGHTLY AS NEEDED FOR SLEEP 90 tablet 1    buPROPion (WELLBUTRIN XL) 300 MG extended release tablet       vitamin B-12 (CYANOCOBALAMIN) 500 MCG tablet Take 500 mcg by mouth daily      Resveratrol 250 MG CAPS Take by mouth daily      ELIQUIS 5 MG TABS tablet TAKE 1 TABLET BY MOUTH TWO TIMES A DAY  60 tablet 0    Lidocaine (ZTLIDO) 1.8 % PTCH Apply 1 patch topically daily 90 patch 0    diclofenac sodium (VOLTAREN) 1 % GEL Apply 4 g topically 4 times daily 150 g 2     No facility-administered medications prior to visit. SOCIAL/FAMILY/PAST MEDICAL HISTORY: Ms. Staci Upton social, family and past medical history was reviewed. REVIEW OF SYSTEMS:    Respiratory: Negative for apnea, chest tightness and shortness of breath or change in baseline breathing. Gastrointestinal: Negative for nausea, vomiting, abdominal pain, diarrhea, constipation, blood in stool and abdominal distention. PHYSICAL EXAM:   Nursing note and vitals reviewed. /70   Pulse 74   Temp 97.3 °F (36.3 °C)   Ht 5' 4\" (1.626 m)   Wt 137 lb (62.1 kg)   LMP  (LMP Unknown)   SpO2 98%   BMI 23.52 kg/m²   Constitutional: She appears well-developed and well-nourished. No acute distress. Skin: Skin is warm and dry, good turgor. No rash noted. She is not diaphoretic. Cardiovascular: Normal rate, regular rhythm, normal heart sounds, and does not have murmur. Pulmonary/Chest: Effort normal. No respiratory distress. She does not have wheezes in the lung fields. She has no rales. Neurological/Psychiatric:She is alert and oriented to person, place, and time. Coordination is  normal.  Her mood isAppropriate and affect is Neutral/Euthymic(normal) . Prescription pain medication monitoring:                  MEDD current = 22.50              ORT Score = 0 low risk              Other Risk factors - (mood) Stable              Date of Last Medication Agreement: 3/18/22              Date Naloxone prescribed: 4/21/22              UDT:                          Date of last UDT: 8/8/22                          Adverse report: No              OARRS:                          Checked today: Yes                          Adverse report: No    IMPRESSION:   1. Chronic pain syndrome    2. Degeneration of lumbar or lumbosacral intervertebral disc    3. Lumbosacral spondylosis without myelopathy    4. Spinal stenosis, lumbar region, without neurogenic claudication    5. Lumbar nerve root impingement, L2    6. DDD (degenerative disc disease), cervical    7. Cervical stenosis of spine, mild    8. Carpal tunnel syndrome, bilateral    9. Bilateral elbow joint pain    10. Drug induced constipation      PLAN:  Informed verbal consent was obtained:  -Patient's opioid therapy will be maintained at current dose  -Home exercises/Marley exercises recommended  -CBT techniques- relaxation therapies such as biofeedback, mindfulness based stress reduction, imagery, cognitive restructuring, problem solving discussed with patient   -Last UDS 8/8/22 consistent  -Return in about 4 weeks (around 2/2/2023). -[place]AR[place] record was obtained and reviewed  for the last one year and no indicators of drug misuse  were found. Any other controlled substance prescriptions  seen on the record have been accounted for, I am aware of the patient receiving these medications. Jesús Malone OAR[place] record will be rechecked as part of office protocol.        Analgesic Plan:              Continue present regimen: Percocet 5-325 mg tabs q8h prn              Adjust dose of present analgesic: No              Switch analgesics: No              Add/Adjust concomitant therapy: ZTlido, Narcan    I will continue her current medication regimen  which is part of the above treatment schedule. It has been helping with Ms. Lara's chronic  medical problems which for this visit include:   Diagnoses of Chronic pain syndrome, Degeneration of lumbar or lumbosacral intervertebral disc, Lumbosacral spondylosis without myelopathy, Spinal stenosis, lumbar region, without neurogenic claudication, Lumbar nerve root impingement, L2, DDD (degenerative disc disease), cervical, Cervical stenosis of spine, mild, Carpal tunnel syndrome, bilateral, Bilateral elbow joint pain, and Drug induced constipation were pertinent to this visit. Risks and benefits of the medications and other alternative treatments  including no treatment were discussed with the patient. The common side effects of these medications were also explained to the patient. Informed verbal consent was obtained. Goals of current treatment regimen include improvement in pain, restoration of functioning- with focus on improvement in physical performance, general activity, work or disability,emotional distress, health care utilization and  decreased medication consumption. Will continue to monitor progress towards achieving/maintaining therapeutic goals with special emphasis on  1. Improvement in perceived interfernce  of pain with ADL's. Ability to do home exercises independently. Ability to do household chores indoor and/or outdoor work and social and leisure activities. Improve psychosocial and physical functioning. - she is showing progression towards this treatment goal with the current regimen. She was advised against drinking alcohol with the narcotic pain medicines, advised against driving or handling machinery while adjusting the dose of medicines or if having cognitive  issues related to the current medications. Risk of overdose and death, if medicines not taken as prescribed, were also discussed.  If the patient develops new symptoms or if the symptoms worsen, the patient should call the office. While transcribing every attempt was made to maintain the accuracy of the note in terms of it's contents,there may have been some errors made inadvertently. Thank you for allowing me to participate in the care of this patient. Eduarda Likes CNP.     Cc: Lilia Jimenez MD

## 2023-01-18 ENCOUNTER — OFFICE VISIT (OUTPATIENT)
Dept: RHEUMATOLOGY | Age: 53
End: 2023-01-18
Payer: COMMERCIAL

## 2023-01-18 VITALS
DIASTOLIC BLOOD PRESSURE: 80 MMHG | SYSTOLIC BLOOD PRESSURE: 122 MMHG | HEART RATE: 74 BPM | WEIGHT: 138 LBS | BODY MASS INDEX: 23.69 KG/M2 | OXYGEN SATURATION: 98 %

## 2023-01-18 DIAGNOSIS — Z79.899 LONG-TERM USE OF HYDROXYCHLOROQUINE: ICD-10-CM

## 2023-01-18 DIAGNOSIS — M13.80 SERONEGATIVE INFLAMMATORY ARTHRITIS: Primary | ICD-10-CM

## 2023-01-18 DIAGNOSIS — S39.012A STRAIN OF LUMBAR REGION, INITIAL ENCOUNTER: ICD-10-CM

## 2023-01-18 PROCEDURE — 99214 OFFICE O/P EST MOD 30 MIN: CPT | Performed by: INTERNAL MEDICINE

## 2023-01-18 RX ORDER — HYDROXYCHLOROQUINE SULFATE 200 MG/1
300 TABLET, FILM COATED ORAL DAILY
Qty: 135 TABLET | Refills: 1 | Status: SHIPPED | OUTPATIENT
Start: 2023-01-18 | End: 2023-04-18

## 2023-01-18 RX ORDER — TIZANIDINE 4 MG/1
4 TABLET ORAL NIGHTLY PRN
Qty: 30 TABLET | Refills: 2 | Status: SHIPPED | OUTPATIENT
Start: 2023-01-18 | End: 2023-02-17

## 2023-01-18 RX ORDER — PREDNISONE 1 MG/1
TABLET ORAL
Qty: 67 TABLET | Refills: 2 | Status: SHIPPED | OUTPATIENT
Start: 2023-01-18 | End: 2023-02-12

## 2023-01-18 NOTE — PROGRESS NOTES
Leigh Ann Ferrer MD  [facility]) Physicians - Rheumatology    [x] [facility]:  [facility] [address]. Gibson [address] [facility] [] [facility] 94:  [address], [address]   Office: [phone]  Fax: [phone]     RHEUMATOLOGY PROGRESS NOTE    ASSESSMENT/PLAN:  Kita Mireles is a 47 y.o. female w/ seronegative inflammatory polyarthritis (PsA vs seronegative RA) and degenerative arthritis of the L-spine. Pt is s/p b/l 3rd trigger finger surgery on 9/24/2021 and b/l carpal tunnel surgery by by Dr. Collin Bryant in 10/2020. PMHx pertinent for Hx of popliteal thrombosis in 3/2021 (follows w/ Hematology Dr. Vanita Herbert, per pt on lifelong anticoagulation, found to have Factor V Leiden mutation and positive B2GP IgG) and Hx of diverticulitis w/ sigmoid abscess (hospitalized in 7/2020). Current rheum meds:   mg/400 mg QOD: started in 12/2021, held on 2/18/22  OTC APAP  Lidocaine patches, Diclofenac gel PRN  Percocet PRN     Prior rheum meds:  Pred taper starting w/ 20 mg daily: took in 12/2021, provided significant pain relief  NSAIDs: told to avoid d/t chronic anti-coagulation and Hx of diverticulitis    Reviewed recent hospitalization records. 1. Seronegative inflammatory arthritis  Assessment & Plan:  - seronegative inflammatory polyarthritis involving the b/l wrists, MCP, PIP and DIP joints w/ significant response to low dose Pred taper. Hx of b/l 2-3rd dactylitis. MRI R hand from 12/2021 showed flexor tenosynovitis of the 3rd digit. Nuclear bone scan ordered by PCP on 12/6/21 showed uptake pattern suggestive of inflammatory arthropathy w/ superimposed degenerative arthropathy. Suspect she likely has early PsA vs seronegative RA, favor PsA given prior findings of dactylitis. - she cont to have mild bogginess in her DIP joints on exam today. Pt declined MTX.  We discussed repeating MRI of her hand if she cont to have persistent arthralgias after she finishes remodeling her house next month. - prescribed low dose Prednisone taper for arthritis flares. - pt following w/ Pain Management. Orders:  -     hydroxychloroquine (PLAQUENIL) 200 MG tablet; Take 1.5 tablets by mouth daily, Disp-135 tablet, R-1Normal  -     predniSONE (DELTASONE) 5 MG tablet; Take 4 tablets by mouth daily for 7 days, THEN 3 tablets daily for 7 days, THEN 2 tablets daily for 7 days, THEN 1 tablet daily for 4 days. , Disp-67 tablet, R-2Normal  2. Strain of lumbar region, initial encounter  Assessment & Plan:  - following w/ Pain Management. - prescribed low dose Tizanidine.  - she will complete her current Prednisone taper. Orders:  -     predniSONE (DELTASONE) 5 MG tablet; Take 4 tablets by mouth daily for 7 days, THEN 3 tablets daily for 7 days, THEN 2 tablets daily for 7 days, THEN 1 tablet daily for 4 days. , Disp-67 tablet, R-2Normal  3. Long-term use of hydroxychloroquine  Assessment & Plan:  - she had eye exam for HCQ toxicity monitoring in 2/2022. Return in about 4 months for lab result discussion and treatment plan, medication monitoring. The risks and benefits of my recommendations, as well as other treatment options, benefits and side effects were discussed with the patient today. Questions were answered. NOTE: This report is transcribed by using voice recognition software dragon. Every effort is made to ensure accuracy; however, inadvertent computerized  transcription errors may be present. SUBJECTIVE:  Past medical/surgical history, medications and allergies are reviewed and updated as appropriate. Interval Hx:   Pt reports stable arthralgias on HCQ. Hands feel stiff for 90 min in the morning. She reports pain in the flexor tendons of her hands which she attributes to remodeling her house. She also recently threw out her low back. She started a Prednisone taper recently for this which she feels has alleviated some of the pain in her hands. No PsO.     Rheumatologic ROS:  Constitutional: denies chronic fatigue, fever/chills, night sweats, unintentional weight loss  Integumentary: denies photosensitivity, rash, patchy alopecia, or Sx of Raynaud's phenomenon  Eyes: denies dry eyes, redness or pain, visual disturbance, or floaters  Nose: denies nasal ulcers or recurrent sinusitis  Oral cavity: denies dry mouth or oral ulcers  Cardiovascular: +pt reported remote Hx of cardiac arrhthymias and intermittent dizziness, denies CP, palpitations, Hx of pericardial effusion or pericarditis  Respiratory: denies SOB, cough, hemoptysis, or pleurisy  Gastrointestinal: denies heart burn, dysphagia or esophageal dysmotility, denies change in bowel habits or Sx of IBD  Hematologic/Lymphatic: +Hx of popliteal vein thrombosis in 3/2021 found to have Factor V Leiden mutation on life long anticoagulation, denies recurrent miscarriages, denies swollen LNs  Musculoskeletal:  refer to above HPI   Neurological: denies focal weakness, paresthesias/hyperesthesias or change in sensation, denies Hx of seizure, denies change in gait, balance, or memory    No Known Allergies    Past Medical History:        Diagnosis Date    Diverticulitis 2020    Factor 5 Leiden mutation, heterozygous ([address]     Freiberg's disease, right     foot    Hx of blood clots     PONV (postoperative nausea and vomiting)     Psoriatic arthritis ([address]        Past Surgical History:        Procedure Laterality Date    CARPAL TUNNEL RELEASE Bilateral      SECTION  2001     x1    FINGER TRIGGER RELEASE Bilateral     FOOT SURGERY Right     HEMICOLECTOMY N/A 3/25/2022    ROBOTIC SIGMOID COLECTOMY, SPLENIC FLEXURE, OMENTAL FLAP performed by Yury Ching MD at [address] [facility] Left 10/26/2020    LEFT L4 AND L5 TRANSFORAMINAL EPDIURAL STEROID INJECTION WITH FLUOROSCOPY ([id number]) performed by Maryann Carnes MD at [address] [facility] Left 2020    LEFT LUMBAR FOUR LUMBAR FIVE TRANSFORAMINAL  EPIDURAL STEROID INJECTION SITE CONFIRMED BY FLUOROSCOPY performed by Maryann Carnes MD at [address] [place] [address] OR       Medications:    Current Outpatient Medications   Medication Sig Dispense Refill    hydroxychloroquine (PLAQUENIL) 200 MG tablet Take 1.5 tablets by mouth daily 135 tablet 1    tiZANidine (ZANAFLEX) 4 MG tablet Take 1 tablet by mouth nightly as needed (low back pain, muscle spasms) 30 tablet 2    predniSONE (DELTASONE) 5 MG tablet Take 4 tablets by mouth daily for 7 days, THEN 3 tablets daily for 7 days, THEN 2 tablets daily for 7 days, THEN 1 tablet daily for 4 days. 67 tablet 2    Lidocaine (ZTLIDO) 1.8 % PTCH Apply 1 patch topically daily 90 patch 0    oxyCODONE-acetaminophen (PERCOCET) 5-325 MG per tablet Take 1 tablet by mouth every 8 hours as needed for Pain for up to 30 days. Take no more than 2-3 tabs orally prn 90 tablet 0    diclofenac sodium (VOLTAREN) 1 % GEL Apply 4 g topically 4 times daily 150 g 2    spironolactone (ALDACTONE) 50 MG tablet TAKE 1 TABLET BY MOUTH ONE TIME A DAY, INCREASE TO 1 TABLET TWO TIMES A DAY IF TOLERATING MEDICATION      traZODone (DESYREL) 50 MG tablet TAKE 1 TABLET NIGHTLY AS NEEDED FOR SLEEP 90 tablet 1    buPROPion (WELLBUTRIN XL) 300 MG extended release tablet Take 300 mg by mouth every morning      vitamin B-12 (CYANOCOBALAMIN) 500 MCG tablet Take 500 mcg by mouth daily      Resveratrol 250 MG CAPS Take by mouth daily      ELIQUIS 5 MG TABS tablet TAKE 1 TABLET BY MOUTH TWO TIMES A DAY  60 tablet 0     No current facility-administered medications for this visit.         OBJECTIVE:  Physical Exam:  /80 (Site: Left Upper Arm, Position: Sitting, Cuff Size: Medium Adult)   Pulse 74   Wt 138 lb (62.6 kg)   LMP  (LMP Unknown)   SpO2 98%   BMI 23.69 kg/m²     GEN: AAOx3, in NAD, well-appearing  HEAD: normocephalic, atraumatic  EYES: no injection or icterus  CVS: RRR  LUNGS: in no acute respiratory distress  MSK:  Upper extremities:              Hands: no joint deformities, no tenosynovitis or dactylitis, b/l MCP joints w/o synovitis NTTP, b/l PIP joints w/ mild bony enlargement no synovitis NTTP, b/l DIP joints slightly boggy and TTP, full fist formation w/ good  strength              Wrist: no synovitis in the wrist joints b/l TTP, FROM              Elbow: no synovitis or bursitis, FROM  Lower extremities:              Knees: no warmth or effusion present, FROM              Ankles: no synovitis, FROM, Achilles tendons w/o swelling or warmth NTTP              Feet: no toe swelling or pain or warmth on palpation w/ FROM, negative MTP squeeze test  INTEGUMENT: no rash or psoriatic lesions, no petechiae, bruises, or palpable purpura, no patchy alopecia, no nail or periungual changes, no clubbing or digital ulcers    DATA:  Labs:   I personally reviewed interval labs and discussed w/ the pt in detail which showed:    Lab Results   Component Value Date    WBC 4.6 11/23/2022    HGB 13.4 11/23/2022    HCT 40.2 11/23/2022    MCV 96.2 11/23/2022     11/23/2022    GRAN 54 04/08/2020    LYMPHOPCT 28.1 11/23/2022    RBC 4.18 11/23/2022    MCH 32.1 11/23/2022    MCHC 33.3 11/23/2022    RDW 12.8 11/23/2022     Lab Results   Component Value Date     03/27/2022    K 3.8 03/27/2022     03/27/2022    CO2 25 03/27/2022    BUN 10 03/27/2022    CREATININE 0.7 03/27/2022    GLUCOSE 96 03/27/2022    CALCIUM 8.6 03/27/2022    PROT 6.9 02/19/2022    LABALBU 3.8 03/27/2022    BILITOT 0.5 02/19/2022    ALKPHOS 113 02/19/2022    AST 14 (L) 02/19/2022    ALT <5 (L) 02/19/2022    LABGLOM >60 03/27/2022    GFRAA >60 03/27/2022    AGRATIO 2.1 02/19/2022    GLOB 2.6 11/16/2020     Lab Results   Component Value Date    COLORU yellow 11/28/2016    CLARITYU clear 11/28/2016    GLUCOSEU neg 11/28/2016    BILIRUBINUR neg 11/28/2016    KETUA neg 11/28/2016    SPECGRAV 1.005 11/28/2016    BLOODU trace 11/28/2016    PHUR 5.5 11/28/2016    PROTEINU neg 11/28/2016    LEUKOCYTESUR neg 11/28/2016    LABMICR <1.20 11/17/2021     Lab Results   Component Value Date    VITD25 33.1 11/16/2020     Lab Results   Component Value Date    C3 133.4 10/20/2021     Lab Results   Component Value Date    C4 19.8 10/20/2021     No results found for: ANTIDSDNAIGG     Lab Results   Component Value Date    LABURIC 3.2 10/20/2021     Lab Results   Component Value Date    CRP <3.0 11/23/2022    CRP <3.0 02/01/2022    CRP <3.0 10/20/2021     Lab Results   Component Value Date    SEDRATE 9 10/20/2021     No results found for: CKTOTAL     Negative SHILOH (10/20/21)  Negative RF, CCP (10/20/21)  Negative HLA B27 (10/20/21)  Negative hepatitis B surface Ag and core total Ab, hepatitis C Ab (10/20/21)    Imaging:  I personally reviewed interval imaging and discussed w/ the pt in detail which included:    MRI C-spine (7/1/20): FINDINGS:   BONES/ALIGNMENT: There is normal alignment of the spine. The vertebral body heights are maintained. The bone marrow signal appears unremarkable. SPINAL CORD: The visualized spinal cord has normal signal and morphology. No evidence of mass or abnormal fluid collection within the spinal canal.     SOFT TISSUES: Paraspinal soft tissues are unremarkable. C2-C3: Disc height and signal maintained. No neural foraminal narrowing or spinal canal stenosis. C3-C4: Disc height and signal maintained. No neural foraminal narrowing or spinal canal stenosis. C4-C5: Disc height and signal maintained. No neural foraminal narrowing or spinal canal stenosis. C5-C6: Mild disc height loss. No disc signal abnormality. No left neural foraminal narrowing. Mild right neural foraminal narrowing secondary to uncovertebral hypertrophy. Mild spinal canal stenosis secondary to disc bulge. C6-C7: Disc height and signal maintained. No neural foraminal narrowing or spinal canal stenosis. C7-T1: Disc height and signal maintained. No neural foraminal narrowing or spinal canal stenosis. IMPRESSION:  1.  Mild C5-6 degenerative disc height loss. 2. Mild C5-6 spinal canal stenosis secondary to disc bulge. 3. Mild narrowing of the right C6 neural foramen secondary to C5-6 uncovertebral hypertrophy. MRI L-spine (10/8/20): FINDINGS:    Assume 5 lumbar vertebrae. Lordosis is moderate. Conus medullaris termination is normal.  Slight anterior disc displacements multilevel. T11-12: Disc is dehydrated, slightly height reduced; shallow left lateral disc protrusion gently deforms left ventral dural sac. T12-L1: Disc shows no posterior displacement. L1-2: Disc shows no posterior displacement. L2-3: Disc is dehydrated; trace disc bulge; right extraforaminal annular fissure; disc effaces ventral dural sac. Mild facet hypertrophy, scant facet fluid. L3-4: Disc is dehydrated; shallow disc bulge with annular fissure straightens ventral dural sac. Scant facet fluid. L4-5: Disc is dehydrated, slightly height reduced; 2mm retrolisthesis; disc bulge with annular fissure gently encroaches upon ventral dural sac, minimally greater on left; disc contacts foraminal L4 nerve roots. Mild spinal stenosis. Mild bilateral lateral recess stenosis. Mild  biforaminal stenosis. L5-S1: Disc is dehydrated, slightly height reduced; shallow central disc protrusion effaces ventral dural sac. Disc contacts foraminal L5 nerve roots. Mild biforaminal narrowing. No focal bone lesion to suggest acute osseous stress injury or acute stress fracture. CONCLUSION:   1. L4-5 mild spinal stenosis. Trace retrolisthesis; disc bulge with annular fissure; disc contacting foraminal L4 nerve roots. Mild biforaminal narrowing. 2. L5-S1 shallow central disc protrusion effacing ventral dural sac; disc contacts foraminal L5  nerve roots. Mild biforaminal narrowing. 3. T11-12 shallow left lateral disc protrusion gently encroaching upon left ventral dural sac. X-rays (10/20/21):  Left hand findings:     3 projections. Normal bone mineralization. No fracture or subluxation. No evidence of bony erosion or ankylosis. Joint spaces are maintained. 1. No abnormality of left hand identified. Right hand findings:     3 projections. Normal mineralization. No fracture or dislocation. No soft tissue abnormality. No evidence of erosion or ankylosis. Joint spaces are maintained. IMPRESSION:   1. No abnormality of right hand identified. Lumbar spine findings:     3 projections. There are 5 nonrib-bearing lumbar vertebral bodies. Normal vertebral body height and alignment. There is mild disc space narrowing at L4-L5. Mild L4-L5 facet arthrosis also demonstrated. Impression:   1. Mild degenerative disc and facet disease in the lumbar spine and L4-L5. SI joints findings:     3 projections of the sacroiliac joints. No evidence of SI joint fusion. No evidence of bony erosion or significant sclerosis. Evaluation of pelvic soft tissues demonstrates the presence of an intrauterine device. IMPRESSION:   1. No abnormalities of the sacroiliac joints are detected. MRI pelvis (2/11/21): Findings:  No acute fracture or contusion. No AVN of the femoral heads. Mild spurring of left acetabulum. Subtle spurring of right acetabulum. No hip joint effusion. No acute labral tear. No acute muscle or tendon strain. No trochanteric bursitis. Moderate tendinopathy of right hamstring tendon with meniscal tears. Mild tendinopathy of left hamstring tendon with no tear. Sciatic nerve normal in appearance and symmetrical.  Visualized SI joints and pubic symphysis are unremarkable. No lymphadenopathy in the visualized pelvis. An IUD. Conclusion:  1. Moderate tendinopathy of right hamstring tendon with meniscal tears at the origin. 2. Mild tendinopathy of the left hamstring tendon, no tear. Nuclear bone scan (12/6/21): Findings:      There is relatively diffuse and fairly symmetric increased polyarticular uptake identified in the distal appendicular skeleton particularly elbows and wrists/hands, and ankles/feet. This is nonspecific but raises the possibility of inflammatory arthropathy. There is slightly greater asymmetrical uptake within the right knee, and the right greater than left forefeet, and these findings can also be seen with degenerative arthropathy. No other suspicious focal nonarticular osseous activity. IMPRESSION:  Increased bilateral periarticular activity identified in the distal appendicular skeleton, nonspecific but raises the possibility of inflammatory arthropathy, with possible superimposed degenerative arthropathy, as described. MRI R hand (12/9/21): FINDINGS:   There is abnormal edema and enhancement identified within the flexor digitorum tendon sheath of the third digit acid courses volar to the third metacarpal head, proximal third phalanx and middle third phalanx. The findings are compatible with tenosynovitis. No discrete tendon tear is identified. No tendinopathy is identified. No joint effusions are identified. No abnormal synovial thickening or enhancement is identified. No osseous erosion is identified. Marrow signal is normal.   IMPRESSION:  1. Findings consistent with tenosynovitis of the flexor digitorum tendon of the third digit. 2. No evidence of any synovitis or osseous erosion. Above results were discussed w/ the pt in detail during today's visit.

## 2023-01-18 NOTE — ASSESSMENT & PLAN NOTE
- following w/ Pain Management. - prescribed low dose Tizanidine.  - she will complete her current Prednisone taper.

## 2023-01-18 NOTE — ASSESSMENT & PLAN NOTE
- seronegative inflammatory polyarthritis involving the b/l wrists, MCP, PIP and DIP joints w/ significant response to low dose Pred taper. Hx of b/l 2-3rd dactylitis. MRI R hand from 12/2021 showed flexor tenosynovitis of the 3rd digit. Nuclear bone scan ordered by PCP on 12/6/21 showed uptake pattern suggestive of inflammatory arthropathy w/ superimposed degenerative arthropathy. Suspect she likely has early PsA vs seronegative RA, favor PsA given prior findings of dactylitis. - she cont to have mild bogginess in her DIP joints on exam today. Pt declined MTX. We discussed repeating MRI of her hand if she cont to have persistent arthralgias after she finishes remodeling her house next month. - prescribed low dose Prednisone taper for arthritis flares. - pt following w/ Pain Management.

## 2023-01-23 DIAGNOSIS — G47.9 SLEEP DISORDER: ICD-10-CM

## 2023-01-23 RX ORDER — TRAZODONE HYDROCHLORIDE 50 MG/1
TABLET ORAL
Qty: 90 TABLET | Refills: 0 | Status: SHIPPED | OUTPATIENT
Start: 2023-01-23

## 2023-01-23 NOTE — TELEPHONE ENCOUNTER
Medication:   Requested Prescriptions     Pending Prescriptions Disp Refills    traZODone (DESYREL) 50 MG tablet [Pharmacy Med Name: TRAZODONE HCL TABS 50MG] 90 tablet 3     Sig: TAKE 1 TABLET NIGHTLY AS NEEDED FOR SLEEP     Last Filled:  07/27/2022    Last appt: 3/21/2022   Next appt: Visit date not found    Last OARRS:   RX Monitoring 1/5/2023   Attestation -   Periodic Controlled Substance Monitoring No signs of potential drug abuse or diversion identified.

## 2023-02-02 ENCOUNTER — OFFICE VISIT (OUTPATIENT)
Dept: PAIN MANAGEMENT | Age: 53
End: 2023-02-02
Payer: COMMERCIAL

## 2023-02-02 VITALS
BODY MASS INDEX: 23.9 KG/M2 | OXYGEN SATURATION: 97 % | WEIGHT: 140 LBS | DIASTOLIC BLOOD PRESSURE: 72 MMHG | HEART RATE: 71 BPM | SYSTOLIC BLOOD PRESSURE: 111 MMHG | HEIGHT: 64 IN | TEMPERATURE: 97.3 F

## 2023-02-02 DIAGNOSIS — G89.4 CHRONIC PAIN SYNDROME: ICD-10-CM

## 2023-02-02 DIAGNOSIS — M54.16 LUMBAR NERVE ROOT IMPINGEMENT: ICD-10-CM

## 2023-02-02 DIAGNOSIS — M25.521 BILATERAL ELBOW JOINT PAIN: ICD-10-CM

## 2023-02-02 DIAGNOSIS — M48.02 CERVICAL STENOSIS OF SPINE: ICD-10-CM

## 2023-02-02 DIAGNOSIS — M47.817 LUMBOSACRAL SPONDYLOSIS WITHOUT MYELOPATHY: ICD-10-CM

## 2023-02-02 DIAGNOSIS — M51.37 DEGENERATION OF LUMBAR OR LUMBOSACRAL INTERVERTEBRAL DISC: ICD-10-CM

## 2023-02-02 DIAGNOSIS — M48.061 SPINAL STENOSIS, LUMBAR REGION, WITHOUT NEUROGENIC CLAUDICATION: ICD-10-CM

## 2023-02-02 DIAGNOSIS — M50.30 DDD (DEGENERATIVE DISC DISEASE), CERVICAL: ICD-10-CM

## 2023-02-02 DIAGNOSIS — M25.522 BILATERAL ELBOW JOINT PAIN: ICD-10-CM

## 2023-02-02 DIAGNOSIS — G56.03 CARPAL TUNNEL SYNDROME, BILATERAL: ICD-10-CM

## 2023-02-02 PROCEDURE — 99213 OFFICE O/P EST LOW 20 MIN: CPT | Performed by: NURSE PRACTITIONER

## 2023-02-02 RX ORDER — OXYCODONE HYDROCHLORIDE AND ACETAMINOPHEN 5; 325 MG/1; MG/1
1 TABLET ORAL EVERY 8 HOURS PRN
Qty: 90 TABLET | Refills: 0 | Status: SHIPPED | OUTPATIENT
Start: 2023-02-02 | End: 2023-03-04

## 2023-02-02 RX ORDER — LIDOCAINE 36 MG/1
1 PATCH TOPICAL DAILY
Qty: 90 PATCH | Refills: 0 | Status: SHIPPED | OUTPATIENT
Start: 2023-02-02 | End: 2023-05-03

## 2023-03-02 ENCOUNTER — OFFICE VISIT (OUTPATIENT)
Dept: PAIN MANAGEMENT | Age: 53
End: 2023-03-02

## 2023-03-02 VITALS
TEMPERATURE: 97.5 F | OXYGEN SATURATION: 98 % | SYSTOLIC BLOOD PRESSURE: 121 MMHG | WEIGHT: 139 LBS | HEIGHT: 64 IN | HEART RATE: 76 BPM | BODY MASS INDEX: 23.73 KG/M2 | DIASTOLIC BLOOD PRESSURE: 71 MMHG

## 2023-03-02 DIAGNOSIS — M25.521 BILATERAL ELBOW JOINT PAIN: ICD-10-CM

## 2023-03-02 DIAGNOSIS — M50.30 DDD (DEGENERATIVE DISC DISEASE), CERVICAL: ICD-10-CM

## 2023-03-02 DIAGNOSIS — M54.16 LUMBAR NERVE ROOT IMPINGEMENT: ICD-10-CM

## 2023-03-02 DIAGNOSIS — M48.061 SPINAL STENOSIS, LUMBAR REGION, WITHOUT NEUROGENIC CLAUDICATION: ICD-10-CM

## 2023-03-02 DIAGNOSIS — M51.37 DEGENERATION OF LUMBAR OR LUMBOSACRAL INTERVERTEBRAL DISC: ICD-10-CM

## 2023-03-02 DIAGNOSIS — M25.522 BILATERAL ELBOW JOINT PAIN: ICD-10-CM

## 2023-03-02 DIAGNOSIS — G89.4 CHRONIC PAIN SYNDROME: ICD-10-CM

## 2023-03-02 DIAGNOSIS — M48.02 CERVICAL STENOSIS OF SPINE: ICD-10-CM

## 2023-03-02 DIAGNOSIS — G56.03 CARPAL TUNNEL SYNDROME, BILATERAL: ICD-10-CM

## 2023-03-02 DIAGNOSIS — M47.817 LUMBOSACRAL SPONDYLOSIS WITHOUT MYELOPATHY: ICD-10-CM

## 2023-03-02 RX ORDER — LIDOCAINE 36 MG/1
1 PATCH TOPICAL DAILY
Qty: 90 PATCH | Refills: 0 | Status: SHIPPED | OUTPATIENT
Start: 2023-03-02 | End: 2023-05-31

## 2023-03-02 RX ORDER — OXYCODONE HYDROCHLORIDE AND ACETAMINOPHEN 5; 325 MG/1; MG/1
1 TABLET ORAL EVERY 8 HOURS PRN
Qty: 90 TABLET | Refills: 0 | Status: SHIPPED | OUTPATIENT
Start: 2023-03-02 | End: 2023-04-01

## 2023-03-02 NOTE — PROGRESS NOTES
Slade Lara  1970  [id number]      HISTORY OF PRESENT ILLNESS: Ms. Saida Contreras is a 47 y.o. female returns for a follow up visit for pain management  She has a diagnosis of   1. Chronic pain syndrome    2. Degeneration of lumbar or lumbosacral intervertebral disc    3. Lumbosacral spondylosis without myelopathy    4. Lumbar nerve root impingement, L2    5. Spinal stenosis, lumbar region, without neurogenic claudication    6. DDD (degenerative disc disease), cervical    7. Cervical stenosis of spine, mild    8. Carpal tunnel syndrome, bilateral    9. Bilateral elbow joint pain      As per Information Obtained from the PADT (Patient Assessment and Documentation Tool)    She complains of pain in the  lower back radiating down the left leg  She rates the pain 6/10 and describes it as aching. Current treatment regimen has helped relieve about 60% of the pain. She denies any side effects from the current pain regimen. Patient reports that since the last follow up visit the physical functioning is unchanged, family/social relationships are unchanged, mood is unchanged sleep patterns are unchanged, and that the overall functioning is unchanged. Patient denies misusing/abusing her narcotic pain medications or using any illegal drugs. Upon obtaining medical history from Ms. Lara states that pain is manageable on current pain therapy. Takes the pain medications as prescribed. Had 4 teeth extracted wisdom teeth. Mood/anxiety is stable. Sleep is fair with an average of 5-6 hours. Denies to having issues of constipation. Tolerating activities/house chores with moderate tenderness to the lower back. ALLERGIES: Patients list of allergies were reviewed     MEDICATIONS: Ms. Saida Contreras list of medications were reviewed. Her current medications are   Outpatient Medications Prior to Visit   Medication Sig Dispense Refill    traZODone (DESYREL) 50 MG tablet TAKE 1 TABLET NIGHTLY AS NEEDED FOR SLEEP 90 tablet 0 hydroxychloroquine (PLAQUENIL) 200 MG tablet Take 1.5 tablets by mouth daily 135 tablet 1    spironolactone (ALDACTONE) 50 MG tablet TAKE 1 TABLET BY MOUTH ONE TIME A DAY, INCREASE TO 1 TABLET TWO TIMES A DAY IF TOLERATING MEDICATION      buPROPion (WELLBUTRIN XL) 300 MG extended release tablet Take 300 mg by mouth every morning      vitamin B-12 (CYANOCOBALAMIN) 500 MCG tablet Take 500 mcg by mouth daily      Resveratrol 250 MG CAPS Take by mouth daily      ELIQUIS 5 MG TABS tablet TAKE 1 TABLET BY MOUTH TWO TIMES A DAY  60 tablet 0    Lidocaine (ZTLIDO) 1.8 % PTCH Apply 1 patch topically daily 90 patch 0    oxyCODONE-acetaminophen (PERCOCET) 5-325 MG per tablet Take 1 tablet by mouth every 8 hours as needed for Pain for up to 30 days. Take no more than 2-3 tabs orally prn 90 tablet 0    diclofenac sodium (VOLTAREN) 1 % GEL Apply 4 g topically 4 times daily 150 g 2     No facility-administered medications prior to visit. SOCIAL/FAMILY/PAST MEDICAL HISTORY: Ms. Susan Byrd social, family and past medical history was reviewed. REVIEW OF SYSTEMS:    Respiratory: Negative for apnea, chest tightness and shortness of breath or change in baseline breathing. Gastrointestinal: Negative for nausea, vomiting, abdominal pain, diarrhea, constipation, blood in stool and abdominal distention. PHYSICAL EXAM:   Nursing note and vitals reviewed. /71   Pulse 76   Temp 97.5 °F (36.4 °C)   Ht 5' 4\" (1.626 m)   Wt 139 lb (63 kg)   LMP  (LMP Unknown)   SpO2 98%   BMI 23.86 kg/m²   Constitutional: She appears well-developed and well-nourished. No acute distress. Skin: Skin is warm and dry, good turgor. No rash noted. She is not diaphoretic. Cardiovascular: Normal rate, regular rhythm, normal heart sounds, and does not have murmur. Pulmonary/Chest: Effort normal. No respiratory distress. She does not have wheezes in the lung fields. She has no rales.      Neurological/Psychiatric:She is alert and oriented to person, place, and time. Coordination is  normal.  Her mood isAppropriate and affect is Neutral/Euthymic(normal) . Prescription pain medication monitoring:                  MEDD current = 22.50              ORT Score = 0 low risk              Other Risk factors - (mood) Stable              Date of Last Medication Agreement: 1/5/23              Date Naloxone prescribed: 4/21/22              UDT:                          Date of last UDT: 8/8/22                          Adverse report: No              OARRS:                          Checked today: Yes                          Adverse report: No    IMPRESSION:   1. Chronic pain syndrome    2. Degeneration of lumbar or lumbosacral intervertebral disc    3. Lumbosacral spondylosis without myelopathy    4. Lumbar nerve root impingement, L2    5. Spinal stenosis, lumbar region, without neurogenic claudication    6. DDD (degenerative disc disease), cervical    7. Cervical stenosis of spine, mild    8. Carpal tunnel syndrome, bilateral    9. Bilateral elbow joint pain        PLAN:  Informed verbal consent was obtained:  -Patient's opioid therapy will be maintained at current dose  -Home exercises/Marley exercises recommended  -CBT techniques- relaxation therapies such as biofeedback, mindfulness based stress reduction, imagery, cognitive restructuring, problem solving discussed with patient   -Last UDS 8/8/22 consistent  -Return in about 4 weeks (around 3/30/2023). Analgesic Plan:              Continue present regimen: Percocet 5-325 mg tabs q8h prn              Adjust dose of present analgesic: No              Switch analgesics: No              Add/Adjust concomitant therapy: ZTlido, Narcan    I will continue her current medication regimen  which is part of the above treatment schedule. It has been helping with Ms. Lara's chronic  medical problems which for this visit include:   Diagnoses of Chronic pain syndrome, Degeneration of lumbar or lumbosacral intervertebral disc, Lumbosacral spondylosis without myelopathy, Lumbar nerve root impingement, L2, Spinal stenosis, lumbar region, without neurogenic claudication, DDD (degenerative disc disease), cervical, Cervical stenosis of spine, mild, Carpal tunnel syndrome, bilateral, and Bilateral elbow joint pain were pertinent to this visit.   Risks and benefits of the medications and other alternative treatments  including no treatment were discussed with the patient.The common side effects of these medications were also explained to the patient.  Informed verbal consent was obtained.   Goals of current treatment regimen include improvement in pain, restoration of functioning- with focus on improvement in physical performance, general activity, work or disability,emotional distress, health care utilization and  decreased medication consumption. Will continue to monitor progress towards achieving/maintaining therapeutic goals with special emphasis on  1. Improvement in perceived interfernce  of pain with ADL's. Ability to do home exercises independently. Ability to do household chores indoor and/or outdoor work and social and leisure activities.Improve psychosocial and physical functioning. - she is showing progression towards this treatment goal with the current regimen.     She was advised against drinking alcohol with the narcotic pain medicines, advised against driving or handling machinery while adjusting the dose of medicines or if having cognitive  issues related to the current medications.Risk of overdose and death, if medicines not taken as prescribed, were also discussed. If the patient develops new symptoms or if the symptoms worsen, the patient should call the office.    While transcribing every attempt was made to maintain the accuracy of the note in terms of it's contents,there may have been some errors made inadvertently.  Thank you for allowing me to participate in the care of this patient.    Beth  Faisal RENDON.     Cc: Raul Aguilar MD

## 2023-03-20 NOTE — PROGRESS NOTES
Saleem Lara  1970  [id number]      HISTORY OF PRESENT ILLNESS: Ms. Deng Murguia is a 47 y.o. female returns for a follow up visit for pain management  She has a diagnosis of   1. Chronic pain syndrome    2. Degeneration of lumbar or lumbosacral intervertebral disc    3. Lumbosacral spondylosis without myelopathy    4. Lumbar nerve root impingement, L2    5. Spinal stenosis, lumbar region, without neurogenic claudication    6. DDD (degenerative disc disease), cervical    7. Cervical stenosis of spine, mild    8. Carpal tunnel syndrome, bilateral    9. Bilateral elbow joint pain      As per Information Obtained from the PADT (Patient Assessment and Documentation Tool)    She complains of pain in the  left upper back, left upper arm, lower back, left leg  She rates the pain 7/10 and describes it as aching. Current treatment regimen has helped relieve about 70% of the pain. She denies any side effects from the current pain regimen. Patient reports that since the last follow up visit the physical functioning is unchanged, family/social relationships are unchanged, mood is unchanged sleep patterns are unchanged, and that the overall functioning is unchanged. Patient denies misusing/abusing her narcotic pain medications or using any illegal drugs. Upon obtaining medical history from Ms. Lara states that pain is manageable on current pain therapy. Takes the pain medications as prescribed. Mood/anxiety is stable. Sleep is fair with an average of 5-6 hours. Denies to having issues of constipation. Tolerating activities/house chores with moderate tenderness to the lower back. ALLERGIES: Patients list of allergies were reviewed     MEDICATIONS: Ms. Deng Murguia list of medications were reviewed. Her current medications are   Outpatient Medications Prior to Visit   Medication Sig Dispense Refill    traZODone (DESYREL) 50 MG tablet TAKE 1 TABLET NIGHTLY AS NEEDED FOR SLEEP 90 tablet 0    hydroxychloroquine (PLAQUENIL) 200 MG tablet Take 1.5 tablets by mouth daily 135 tablet 1    tiZANidine (ZANAFLEX) 4 MG tablet Take 1 tablet by mouth nightly as needed (low back pain, muscle spasms) 30 tablet 2    predniSONE (DELTASONE) 5 MG tablet Take 4 tablets by mouth daily for 7 days, THEN 3 tablets daily for 7 days, THEN 2 tablets daily for 7 days, THEN 1 tablet daily for 4 days. 67 tablet 2    spironolactone (ALDACTONE) 50 MG tablet TAKE 1 TABLET BY MOUTH ONE TIME A DAY, INCREASE TO 1 TABLET TWO TIMES A DAY IF TOLERATING MEDICATION      buPROPion (WELLBUTRIN XL) 300 MG extended release tablet Take 300 mg by mouth every morning      vitamin B-12 (CYANOCOBALAMIN) 500 MCG tablet Take 500 mcg by mouth daily      Resveratrol 250 MG CAPS Take by mouth daily      ELIQUIS 5 MG TABS tablet TAKE 1 TABLET BY MOUTH TWO TIMES A DAY  60 tablet 0    Lidocaine (ZTLIDO) 1.8 % PTCH Apply 1 patch topically daily 90 patch 0    oxyCODONE-acetaminophen (PERCOCET) 5-325 MG per tablet Take 1 tablet by mouth every 8 hours as needed for Pain for up to 30 days. Take no more than 2-3 tabs orally prn 90 tablet 0    diclofenac sodium (VOLTAREN) 1 % GEL Apply 4 g topically 4 times daily 150 g 2     No facility-administered medications prior to visit. SOCIAL/FAMILY/PAST MEDICAL HISTORY: Ms. sAhley Fuentes social, family and past medical history was reviewed. REVIEW OF SYSTEMS:    Respiratory: Negative for apnea, chest tightness and shortness of breath or change in baseline breathing. Gastrointestinal: Negative for nausea, vomiting, abdominal pain, diarrhea, constipation, blood in stool and abdominal distention. PHYSICAL EXAM:   Nursing note and vitals reviewed. /72   Pulse 71   Temp 97.3 °F (36.3 °C)   Ht 5' 4\" (1.626 m)   Wt 140 lb (63.5 kg)   LMP  (LMP Unknown)   SpO2 97%   BMI 24.03 kg/m²   Constitutional: She appears well-developed and well-nourished. No acute distress. Skin: Skin is warm and dry, good turgor. No rash noted.  She is not diaphoretic. Cardiovascular: Normal rate, regular rhythm, normal heart sounds, and does not have murmur. Pulmonary/Chest: Effort normal. No respiratory distress. She does not have wheezes in the lung fields. She has no rales. Neurological/Psychiatric:She is alert and oriented to person, place, and time. Coordination is  normal.  Her mood isAppropriate and affect is Neutral/Euthymic(normal) . Prescription pain medication monitoring:                  MEDD current = 22.50              ORT Score = 0 low risk              Other Risk factors - (mood) Stable              Date of Last Medication Agreement: 3/18/22              Date Naloxone prescribed: 4/21/22              UDT:                          Date of last UDT: 8/8/22                          Adverse report: No              OARRS:                          Checked today: Yes                          Adverse report: No    IMPRESSION:   1. Chronic pain syndrome    2. Degeneration of lumbar or lumbosacral intervertebral disc    3. Lumbosacral spondylosis without myelopathy    4. Lumbar nerve root impingement, L2    5. Spinal stenosis, lumbar region, without neurogenic claudication    6. DDD (degenerative disc disease), cervical    7. Cervical stenosis of spine, mild    8. Carpal tunnel syndrome, bilateral    9. Bilateral elbow joint pain      PLAN:  Informed verbal consent was obtained:  -Patient's opioid therapy will be maintained at current dose  -Home exercises/Marley exercises recommended  -CBT techniques- relaxation therapies such as biofeedback, mindfulness based stress reduction, imagery, cognitive restructuring, problem solving discussed with patient   -Last UDS 8/8/22 consistent  -Return in about 4 weeks (around 3/2/2023).      Analgesic Plan:              Continue present regimen: Percocet 5-325 mg tabs q8h prn              Adjust dose of present analgesic: No              Switch analgesics: No              Add/Adjust concomitant therapy: Mahendra Villar will continue her current medication regimen  which is part of the above treatment schedule. It has been helping with Ms. Lara's chronic  medical problems which for this visit include:   Diagnoses of Chronic pain syndrome, Degeneration of lumbar or lumbosacral intervertebral disc, Lumbosacral spondylosis without myelopathy, Lumbar nerve root impingement, L2, Spinal stenosis, lumbar region, without neurogenic claudication, DDD (degenerative disc disease), cervical, Cervical stenosis of spine, mild, Carpal tunnel syndrome, bilateral, and Bilateral elbow joint pain were pertinent to this visit. Risks and benefits of the medications and other alternative treatments  including no treatment were discussed with the patient. The common side effects of these medications were also explained to the patient. Informed verbal consent was obtained. Goals of current treatment regimen include improvement in pain, restoration of functioning- with focus on improvement in physical performance, general activity, work or disability,emotional distress, health care utilization and  decreased medication consumption. Will continue to monitor progress towards achieving/maintaining therapeutic goals with special emphasis on  1. Improvement in perceived interfernce  of pain with ADL's. Ability to do home exercises independently. Ability to do household chores indoor and/or outdoor work and social and leisure activities. Improve psychosocial and physical functioning. - she is showing progression towards this treatment goal with the current regimen. She was advised against drinking alcohol with the narcotic pain medicines, advised against driving or handling machinery while adjusting the dose of medicines or if having cognitive  issues related to the current medications. Risk of overdose and death, if medicines not taken as prescribed, were also discussed.  If the patient develops new symptoms or if the symptoms worsen, the patient should call the office. While transcribing every attempt was made to maintain the accuracy of the note in terms of it's contents,there may have been some errors made inadvertently. Thank you for allowing me to participate in the care of this patient. Nessa Garcia CNP.     Cc: Gerald Robbins MD Secondary Intention Text (Leave Blank If You Do Not Want): The defect will heal with secondary intention.

## 2023-03-30 ENCOUNTER — OFFICE VISIT (OUTPATIENT)
Dept: PAIN MANAGEMENT | Age: 53
End: 2023-03-30

## 2023-03-30 VITALS
SYSTOLIC BLOOD PRESSURE: 108 MMHG | DIASTOLIC BLOOD PRESSURE: 65 MMHG | TEMPERATURE: 97.5 F | WEIGHT: 140 LBS | OXYGEN SATURATION: 98 % | BODY MASS INDEX: 23.9 KG/M2 | HEART RATE: 72 BPM | HEIGHT: 64 IN

## 2023-03-30 DIAGNOSIS — M54.16 LUMBAR NERVE ROOT IMPINGEMENT: ICD-10-CM

## 2023-03-30 DIAGNOSIS — M25.522 BILATERAL ELBOW JOINT PAIN: ICD-10-CM

## 2023-03-30 DIAGNOSIS — M25.521 BILATERAL ELBOW JOINT PAIN: ICD-10-CM

## 2023-03-30 DIAGNOSIS — M48.02 CERVICAL STENOSIS OF SPINE: ICD-10-CM

## 2023-03-30 DIAGNOSIS — G56.03 CARPAL TUNNEL SYNDROME, BILATERAL: ICD-10-CM

## 2023-03-30 DIAGNOSIS — G89.4 CHRONIC PAIN SYNDROME: ICD-10-CM

## 2023-03-30 DIAGNOSIS — M48.061 SPINAL STENOSIS, LUMBAR REGION, WITHOUT NEUROGENIC CLAUDICATION: ICD-10-CM

## 2023-03-30 DIAGNOSIS — F51.01 PRIMARY INSOMNIA: ICD-10-CM

## 2023-03-30 DIAGNOSIS — M50.30 DDD (DEGENERATIVE DISC DISEASE), CERVICAL: ICD-10-CM

## 2023-03-30 DIAGNOSIS — M47.817 LUMBOSACRAL SPONDYLOSIS WITHOUT MYELOPATHY: ICD-10-CM

## 2023-03-30 DIAGNOSIS — M51.37 DEGENERATION OF LUMBAR OR LUMBOSACRAL INTERVERTEBRAL DISC: ICD-10-CM

## 2023-03-30 RX ORDER — OXYCODONE HYDROCHLORIDE AND ACETAMINOPHEN 5; 325 MG/1; MG/1
1 TABLET ORAL EVERY 8 HOURS PRN
Qty: 90 TABLET | Refills: 0 | Status: SHIPPED | OUTPATIENT
Start: 2023-03-30 | End: 2023-04-29

## 2023-03-30 RX ORDER — LIDOCAINE 36 MG/1
1 PATCH TOPICAL DAILY
Qty: 90 PATCH | Refills: 0 | Status: SHIPPED | OUTPATIENT
Start: 2023-03-30 | End: 2023-06-28

## 2023-03-30 NOTE — PROGRESS NOTES
were also discussed. If the patient develops new symptoms or if the symptoms worsen, the patient should call the office. While transcribing every attempt was made to maintain the accuracy of the note in terms of it's contents,there may have been some errors made inadvertently. Thank you for allowing me to participate in the care of this patient. Kristine Carter CNP.     Cc: Pallavi Adam MD

## 2023-04-12 ENCOUNTER — APPOINTMENT (OUTPATIENT)
Dept: GENERAL RADIOLOGY | Age: 53
End: 2023-04-12
Payer: COMMERCIAL

## 2023-04-12 ENCOUNTER — HOSPITAL ENCOUNTER (EMERGENCY)
Age: 53
Discharge: HOME | End: 2023-04-12
Attending: EMERGENCY MEDICINE
Payer: COMMERCIAL

## 2023-04-12 VITALS
OXYGEN SATURATION: 99 % | BODY MASS INDEX: 24.04 KG/M2 | WEIGHT: 140.8 LBS | RESPIRATION RATE: 23 BRPM | DIASTOLIC BLOOD PRESSURE: 86 MMHG | HEIGHT: 64 IN | TEMPERATURE: 98.2 F | HEART RATE: 68 BPM | SYSTOLIC BLOOD PRESSURE: 126 MMHG

## 2023-04-12 DIAGNOSIS — R07.9 CHEST PAIN, UNSPECIFIED TYPE: Primary | ICD-10-CM

## 2023-04-12 LAB
ANION GAP SERPL CALCULATED.3IONS-SCNC: 11 MMOL/L (ref 3–16)
BASOPHILS # BLD: 0 K/UL (ref 0–0.2)
BASOPHILS NFR BLD: 0.2 %
BUN SERPL-MCNC: 18 MG/DL (ref 7–20)
CALCIUM SERPL-MCNC: 10.1 MG/DL (ref 8.3–10.6)
CHLORIDE SERPL-SCNC: 103 MMOL/L (ref 99–110)
CO2 SERPL-SCNC: 28 MMOL/L (ref 21–32)
CREAT SERPL-MCNC: 0.9 MG/DL (ref 0.6–1.1)
DEPRECATED RDW RBC AUTO: 12.5 % (ref 12.4–15.4)
EKG ATRIAL RATE: 79 BPM
EKG DIAGNOSIS: NORMAL
EKG P AXIS: 83 DEGREES
EKG P-R INTERVAL: 140 MS
EKG Q-T INTERVAL: 438 MS
EKG QRS DURATION: 74 MS
EKG QTC CALCULATION (BAZETT): 502 MS
EKG R AXIS: 56 DEGREES
EKG T AXIS: 62 DEGREES
EKG VENTRICULAR RATE: 79 BPM
EOSINOPHIL # BLD: 0 K/UL (ref 0–0.6)
EOSINOPHIL NFR BLD: 0.1 %
GFR SERPLBLD CREATININE-BSD FMLA CKD-EPI: >60 ML/MIN/{1.73_M2}
GLUCOSE SERPL-MCNC: 107 MG/DL (ref 70–99)
HCT VFR BLD AUTO: 39.1 % (ref 36–48)
HGB BLD-MCNC: 13.2 G/DL (ref 12–16)
LYMPHOCYTES # BLD: 1.6 K/UL (ref 1–5.1)
LYMPHOCYTES NFR BLD: 24.6 %
MCH RBC QN AUTO: 32.4 PG (ref 26–34)
MCHC RBC AUTO-ENTMCNC: 33.7 G/DL (ref 31–36)
MCV RBC AUTO: 96.2 FL (ref 80–100)
MONOCYTES # BLD: 0.4 K/UL (ref 0–1.3)
MONOCYTES NFR BLD: 6.4 %
NEUTROPHILS # BLD: 4.4 K/UL (ref 1.7–7.7)
NEUTROPHILS NFR BLD: 68.7 %
NT-PROBNP SERPL-MCNC: 151 PG/ML (ref 0–124)
PLATELET # BLD AUTO: 280 K/UL (ref 135–450)
PMV BLD AUTO: 8.1 FL (ref 5–10.5)
POTASSIUM SERPL-SCNC: 4.5 MMOL/L (ref 3.5–5.1)
RBC # BLD AUTO: 4.06 M/UL (ref 4–5.2)
SODIUM SERPL-SCNC: 142 MMOL/L (ref 136–145)
TROPONIN T SERPL-MCNC: <0.01 NG/ML
TROPONIN T SERPL-MCNC: <0.01 NG/ML
WBC # BLD AUTO: 6.4 K/UL (ref 4–11)

## 2023-04-12 PROCEDURE — 85025 COMPLETE CBC W/AUTO DIFF WBC: CPT

## 2023-04-12 PROCEDURE — 93005 ELECTROCARDIOGRAM TRACING: CPT | Performed by: EMERGENCY MEDICINE

## 2023-04-12 PROCEDURE — 6370000000 HC RX 637 (ALT 250 FOR IP): Performed by: PHYSICIAN ASSISTANT

## 2023-04-12 PROCEDURE — 36415 COLL VENOUS BLD VENIPUNCTURE: CPT

## 2023-04-12 PROCEDURE — 84484 ASSAY OF TROPONIN QUANT: CPT

## 2023-04-12 PROCEDURE — 71046 X-RAY EXAM CHEST 2 VIEWS: CPT

## 2023-04-12 PROCEDURE — 99285 EMERGENCY DEPT VISIT HI MDM: CPT

## 2023-04-12 PROCEDURE — 83880 ASSAY OF NATRIURETIC PEPTIDE: CPT

## 2023-04-12 PROCEDURE — 80048 BASIC METABOLIC PNL TOTAL CA: CPT

## 2023-04-12 RX ORDER — ASPIRIN 81 MG/1
324 TABLET, CHEWABLE ORAL ONCE
Status: COMPLETED | OUTPATIENT
Start: 2023-04-12 | End: 2023-04-12

## 2023-04-12 RX ADMIN — ASPIRIN 81 MG 324 MG: 81 TABLET ORAL at 15:51

## 2023-04-12 ASSESSMENT — ENCOUNTER SYMPTOMS
NAUSEA: 0
VOMITING: 0
ABDOMINAL PAIN: 0
SHORTNESS OF BREATH: 0
EYE REDNESS: 0

## 2023-04-12 ASSESSMENT — LIFESTYLE VARIABLES
HOW OFTEN DO YOU HAVE A DRINK CONTAINING ALCOHOL: 2-4 TIMES A MONTH
HOW MANY STANDARD DRINKS CONTAINING ALCOHOL DO YOU HAVE ON A TYPICAL DAY: 1 OR 2

## 2023-04-12 ASSESSMENT — PAIN DESCRIPTION - LOCATION: LOCATION: CHEST

## 2023-04-12 ASSESSMENT — PAIN DESCRIPTION - DESCRIPTORS: DESCRIPTORS: SHARP;HEAVINESS

## 2023-04-12 ASSESSMENT — PAIN - FUNCTIONAL ASSESSMENT
PAIN_FUNCTIONAL_ASSESSMENT: 0-10
PAIN_FUNCTIONAL_ASSESSMENT: NONE - DENIES PAIN

## 2023-04-12 ASSESSMENT — PAIN SCALES - GENERAL: PAINLEVEL_OUTOF10: 8

## 2023-04-12 ASSESSMENT — PAIN DESCRIPTION - ORIENTATION: ORIENTATION: LEFT

## 2023-04-12 NOTE — DISCHARGE INSTRUCTIONS
Follow up with primary care. Return to the ED with new or worsening symptoms, including exertional chest pain, shortness of breath, cough, fever.

## 2023-04-12 NOTE — ED NOTES
Discharge instructions reviewed without questions. No further needs voiced at this time. Ambulatory from department in stable condition.        Lakesha Watkins RN  04/12/23 [id number]

## 2023-04-12 NOTE — ED PROVIDER NOTES
ED Attending Attestation Note     Date of evaluation: 4/12/2023    This patient was seen by the advance practice provider. I have seen and examined the patient, agree with the workup, evaluation, management and diagnosis. The care plan has been discussed. I have reviewed the ECG and concur with the LIZZ's interpretation. My assessment reveals who presents with pain in the left side of the chest, which started at rest, alleviated while on her way here, is having no pain now. No dyspnea associated with it today, no exertional component. Lungs are clear and chest is nontender. She is pain-free at the moment. History of DVT but is appropriately anticoagulated and clinically have very low suspicion for PE. Heart score low to moderate risk at 3-4.        Jamin Patel MD  04/12/23 1748
Segments: no acute change  T Waves: no acute change  Q Waves:none  Clinical Impression: no acute changes  Comparison:  3/07/2022    ED BEDSIDE ULTRASOUND:  No results found. RECENT VITALS:  BP: 126/86, Temp: 98.2 °F (36.8 °C), Heart Rate: 68, Resp: 23, SpO2: 99 %     Procedures         ED Course     Nursing Notes, Past Medical Hx,Past Surgical Hx, Social Hx, Allergies, and Family Hx were reviewed. The patient was given the following medications:  Orders Placed This Encounter   Medications    aspirin chewable tablet 324 mg       CONSULTS:  None    Review of Systems     Review of Systems   Constitutional:  Negative for chills and fever. HENT:  Negative for congestion. Eyes:  Negative for redness. Respiratory:  Negative for shortness of breath. Cardiovascular:  Positive for chest pain. Negative for leg swelling. Gastrointestinal:  Negative for abdominal pain, nausea and vomiting. Genitourinary:  Negative for difficulty urinating. Musculoskeletal:  Negative for gait problem. Skin:  Negative for wound. Neurological:  Negative for dizziness. Psychiatric/Behavioral:  The patient is not nervous/anxious. Past Medical, Surgical, Family, and Social History     She has a past medical history of Diverticulitis, Factor 5 Leiden mutation, heterozygous ([address] Freiberg's disease, right, Hx of blood clots, PONV (postoperative nausea and vomiting), and Psoriatic arthritis ([address] She has a past surgical history that includes  section (); Carpal tunnel release (Bilateral); Pain management procedure (Left, 10/26/2020); Pain management procedure (Left, 2020); Finger trigger release (Bilateral); Foot surgery (Right); and hemicolectomy (N/A, 3/25/2022). Her family history includes Heart Disease (age of onset: [de-identified]) in her father; High Cholesterol in her father; No Known Problems in her mother; Other in her brother. She reports that she has never smoked.  She has never used smokeless

## 2023-04-19 ENCOUNTER — OFFICE VISIT (OUTPATIENT)
Dept: PRIMARY CARE CLINIC | Age: 53
End: 2023-04-19
Payer: COMMERCIAL

## 2023-04-19 VITALS
HEIGHT: 64 IN | WEIGHT: 138.8 LBS | OXYGEN SATURATION: 99 % | SYSTOLIC BLOOD PRESSURE: 128 MMHG | TEMPERATURE: 97.6 F | HEART RATE: 38 BPM | DIASTOLIC BLOOD PRESSURE: 70 MMHG | BODY MASS INDEX: 23.7 KG/M2

## 2023-04-19 DIAGNOSIS — R94.31 ABNORMAL EKG: ICD-10-CM

## 2023-04-19 DIAGNOSIS — Z09 HOSPITAL DISCHARGE FOLLOW-UP: Primary | ICD-10-CM

## 2023-04-19 DIAGNOSIS — G47.9 SLEEP DISORDER: ICD-10-CM

## 2023-04-19 DIAGNOSIS — Z79.01 ANTICOAGULATED: ICD-10-CM

## 2023-04-19 DIAGNOSIS — D68.51 FACTOR 5 LEIDEN MUTATION, HETEROZYGOUS (HCC): ICD-10-CM

## 2023-04-19 DIAGNOSIS — R07.9 RECURRENT CHEST PAIN: ICD-10-CM

## 2023-04-19 DIAGNOSIS — Z86.718 HISTORY OF DVT OF LOWER EXTREMITY: ICD-10-CM

## 2023-04-19 PROBLEM — S39.012A STRAIN OF LUMBAR REGION: Status: RESOLVED | Noted: 2023-01-18 | Resolved: 2023-04-19

## 2023-04-19 PROBLEM — S16.1XXA: Status: RESOLVED | Noted: 2018-09-24 | Resolved: 2023-04-19

## 2023-04-19 PROBLEM — I82.4Z9 ACUTE DEEP VEIN THROMBOSIS (DVT) OF DISTAL VEIN OF LOWER EXTREMITY (HCC): Status: RESOLVED | Noted: 2020-04-17 | Resolved: 2023-04-19

## 2023-04-19 PROBLEM — M51.26 DISC DISPLACEMENT, LUMBAR: Status: RESOLVED | Noted: 2017-08-14 | Resolved: 2023-04-19

## 2023-04-19 PROBLEM — K59.03 DRUG INDUCED CONSTIPATION: Status: RESOLVED | Noted: 2018-07-16 | Resolved: 2023-04-19

## 2023-04-19 PROBLEM — K57.92 DIVERTICULITIS: Status: RESOLVED | Noted: 2022-02-19 | Resolved: 2023-04-19

## 2023-04-19 PROBLEM — G56.03 CARPAL TUNNEL SYNDROME, BILATERAL: Status: RESOLVED | Noted: 2020-07-15 | Resolved: 2023-04-19

## 2023-04-19 PROBLEM — M48.061 SPINAL STENOSIS, LUMBAR REGION, WITHOUT NEUROGENIC CLAUDICATION: Chronic | Status: RESOLVED | Noted: 2017-01-19 | Resolved: 2023-04-19

## 2023-04-19 PROBLEM — R42 DIZZINESS: Status: RESOLVED | Noted: 2022-02-01 | Resolved: 2023-04-19

## 2023-04-19 PROBLEM — K57.32 SIGMOID DIVERTICULITIS: Status: RESOLVED | Noted: 2022-03-25 | Resolved: 2023-04-19

## 2023-04-19 PROCEDURE — 99214 OFFICE O/P EST MOD 30 MIN: CPT | Performed by: FAMILY MEDICINE

## 2023-04-19 RX ORDER — PREDNISONE 10 MG/1
TABLET ORAL
COMMUNITY
Start: 2023-04-18

## 2023-04-19 RX ORDER — TRAZODONE HYDROCHLORIDE 50 MG/1
TABLET ORAL
Qty: 90 TABLET | Refills: 1 | Status: SHIPPED | OUTPATIENT
Start: 2023-04-19

## 2023-04-19 SDOH — ECONOMIC STABILITY: INCOME INSECURITY: HOW HARD IS IT FOR YOU TO PAY FOR THE VERY BASICS LIKE FOOD, HOUSING, MEDICAL CARE, AND HEATING?: NOT HARD AT ALL

## 2023-04-19 SDOH — ECONOMIC STABILITY: FOOD INSECURITY: WITHIN THE PAST 12 MONTHS, THE FOOD YOU BOUGHT JUST DIDN'T LAST AND YOU DIDN'T HAVE MONEY TO GET MORE.: NEVER TRUE

## 2023-04-19 SDOH — ECONOMIC STABILITY: FOOD INSECURITY: WITHIN THE PAST 12 MONTHS, YOU WORRIED THAT YOUR FOOD WOULD RUN OUT BEFORE YOU GOT MONEY TO BUY MORE.: NEVER TRUE

## 2023-04-19 ASSESSMENT — ENCOUNTER SYMPTOMS
BACK PAIN: 1
SHORTNESS OF BREATH: 0
CHOKING: 0
RESPIRATORY NEGATIVE: 1
WHEEZING: 0

## 2023-04-19 ASSESSMENT — PATIENT HEALTH QUESTIONNAIRE - PHQ9
SUM OF ALL RESPONSES TO PHQ QUESTIONS 1-9: 0
SUM OF ALL RESPONSES TO PHQ QUESTIONS 1-9: 0
SUM OF ALL RESPONSES TO PHQ9 QUESTIONS 1 & 2: 0
SUM OF ALL RESPONSES TO PHQ QUESTIONS 1-9: 0
1. LITTLE INTEREST OR PLEASURE IN DOING THINGS: 0
2. FEELING DOWN, DEPRESSED OR HOPELESS: 0
SUM OF ALL RESPONSES TO PHQ QUESTIONS 1-9: 0

## 2023-04-19 NOTE — PROGRESS NOTES
heterozygous ([facility]        5. Sleep disorder  traZODone (DESYREL) 50 MG tablet      6. History of DVT of lower extremity        7.  Anticoagulated          Cardiology consult /referral given

## 2023-04-20 ENCOUNTER — OFFICE VISIT (OUTPATIENT)
Dept: CARDIOLOGY CLINIC | Age: 53
End: 2023-04-20
Payer: COMMERCIAL

## 2023-04-20 VITALS
DIASTOLIC BLOOD PRESSURE: 52 MMHG | WEIGHT: 137.6 LBS | TEMPERATURE: 74 F | HEART RATE: 74 BPM | BODY MASS INDEX: 23.62 KG/M2 | SYSTOLIC BLOOD PRESSURE: 100 MMHG

## 2023-04-20 DIAGNOSIS — R00.2 PALPITATION: ICD-10-CM

## 2023-04-20 DIAGNOSIS — R07.9 CHEST PAIN, UNSPECIFIED TYPE: Primary | ICD-10-CM

## 2023-04-20 PROCEDURE — 93242 EXT ECG>48HR<7D RECORDING: CPT | Performed by: INTERNAL MEDICINE

## 2023-04-20 PROCEDURE — 99214 OFFICE O/P EST MOD 30 MIN: CPT | Performed by: INTERNAL MEDICINE

## 2023-04-20 ASSESSMENT — ENCOUNTER SYMPTOMS
CHOKING: 0
CHEST TIGHTNESS: 0
SHORTNESS OF BREATH: 0
COUGH: 0

## 2023-04-20 NOTE — PROGRESS NOTES
Subjective:      Patient ID: Julian Houser is a 47 y.o. female. HPI Follow up for chest pain. Seen in ER  several episodes of chest pain past 6 months. Irregular heart beat for past few months. No excessive caffeine/power drinks. .  Elevated BNP. Chest pain was sharp and pressure. Lasted hr.  Started at work. No exertional sx. No pnd or orthopnea. No syncope. Feels fine otherwise. No sob. Past Medical History:   Diagnosis Date    Diverticulitis 2020    Factor 5 Leiden mutation, heterozygous ([address]     Freiberg's disease, right     foot    Hx of blood clots     PONV (postoperative nausea and vomiting)     Psoriatic arthritis ([address]      Past Surgical History:   Procedure Laterality Date    CARPAL TUNNEL RELEASE Bilateral      SECTION  2001     x1    FINGER TRIGGER RELEASE Bilateral     FOOT SURGERY Right     HEMICOLECTOMY N/A 3/25/2022    ROBOTIC SIGMOID COLECTOMY, SPLENIC FLEXURE, OMENTAL FLAP performed by Heath Quinonez MD at [facility] [address] Left 10/26/2020    LEFT L4 AND L5 TRANSFORAMINAL EPDIURAL STEROID INJECTION WITH FLUOROSCOPY (41908, [id number]) performed by Lara Zimmerman MD at [address] [facility] Left 2020    LEFT LUMBAR FOUR LUMBAR FIVE TRANSFORAMINAL  EPIDURAL STEROID INJECTION SITE CONFIRMED BY FLUOROSCOPY performed by Lara Zimmerman MD at [address] [address] [address] History     Socioeconomic History    Marital status:      Spouse name: Cora Whitmore    Number of children: 1    Years of education: college    Highest education level: Not on file   Occupational History     Comment: [employer]   Tobacco Use    Smoking status: Never    Smokeless tobacco: Never   Vaping Use    Vaping Use: Never used   Substance and Sexual Activity    Alcohol use:  Yes     Alcohol/week: 1.0 - 2.0 standard drink     Types: 1 - 2 Glasses of wine per week    Drug use: No    Sexual activity: Yes     Partners: Male     Birth control/protection: Pill

## 2023-04-21 DIAGNOSIS — G47.9 SLEEP DISORDER: ICD-10-CM

## 2023-04-21 RX ORDER — TRAZODONE HYDROCHLORIDE 50 MG/1
TABLET ORAL
Qty: 90 TABLET | Refills: 1 | OUTPATIENT
Start: 2023-04-21

## 2023-04-24 ENCOUNTER — PROCEDURE VISIT (OUTPATIENT)
Dept: CARDIOLOGY CLINIC | Age: 53
End: 2023-04-24
Payer: COMMERCIAL

## 2023-04-24 DIAGNOSIS — R00.2 PALPITATION: ICD-10-CM

## 2023-04-24 DIAGNOSIS — R07.9 CHEST PAIN, UNSPECIFIED TYPE: Primary | ICD-10-CM

## 2023-04-24 LAB
LV EF: 58 %
LVEF MODALITY: NORMAL

## 2023-04-24 PROCEDURE — 93306 TTE W/DOPPLER COMPLETE: CPT | Performed by: INTERNAL MEDICINE

## 2023-04-30 DIAGNOSIS — G47.9 SLEEP DISORDER: ICD-10-CM

## 2023-05-01 RX ORDER — TRAZODONE HYDROCHLORIDE 50 MG/1
TABLET ORAL
Qty: 90 TABLET | Refills: 1 | Status: SHIPPED | OUTPATIENT
Start: 2023-05-01

## 2023-05-01 NOTE — TELEPHONE ENCOUNTER
Medication:   Requested Prescriptions     Pending Prescriptions Disp Refills    traZODone (DESYREL) 50 MG tablet [Pharmacy Med Name: TRAZODONE HCL TABS 50MG] 90 tablet 3     Sig: TAKE 1 TABLET NIGHTLY AS NEEDED FOR SLEEP     Last Filled:  4.19.23 needs to go to mailorder    Last appt: 4/19/2023   Next appt: Visit date not found    Last OARRS:   RX Monitoring 1/5/2023   Attestation -   Periodic Controlled Substance Monitoring No signs of potential drug abuse or diversion identified.

## 2023-05-08 ENCOUNTER — TELEPHONE (OUTPATIENT)
Dept: CARDIOLOGY CLINIC | Age: 53
End: 2023-05-08

## 2023-05-09 PROCEDURE — 93244 EXT ECG>48HR<7D REV&INTERPJ: CPT | Performed by: INTERNAL MEDICINE

## 2023-05-09 NOTE — TELEPHONE ENCOUNTER
Dr Paloma Ramirez reviewed monitor results, due to PVC volume, refer to EP; left message for pt to call back to review results.

## 2023-05-11 ENCOUNTER — OFFICE VISIT (OUTPATIENT)
Dept: PAIN MANAGEMENT | Age: 53
End: 2023-05-11
Payer: COMMERCIAL

## 2023-05-11 VITALS
TEMPERATURE: 97.5 F | OXYGEN SATURATION: 98 % | SYSTOLIC BLOOD PRESSURE: 106 MMHG | HEART RATE: 65 BPM | DIASTOLIC BLOOD PRESSURE: 64 MMHG

## 2023-05-11 DIAGNOSIS — M50.30 DDD (DEGENERATIVE DISC DISEASE), CERVICAL: ICD-10-CM

## 2023-05-11 DIAGNOSIS — R00.2 PALPITATION: Primary | ICD-10-CM

## 2023-05-11 DIAGNOSIS — M47.817 LUMBOSACRAL SPONDYLOSIS WITHOUT MYELOPATHY: ICD-10-CM

## 2023-05-11 DIAGNOSIS — M51.37 DEGENERATION OF LUMBAR OR LUMBOSACRAL INTERVERTEBRAL DISC: ICD-10-CM

## 2023-05-11 DIAGNOSIS — F51.01 PRIMARY INSOMNIA: ICD-10-CM

## 2023-05-11 DIAGNOSIS — M25.521 BILATERAL ELBOW JOINT PAIN: ICD-10-CM

## 2023-05-11 DIAGNOSIS — G89.4 CHRONIC PAIN SYNDROME: ICD-10-CM

## 2023-05-11 DIAGNOSIS — M25.522 BILATERAL ELBOW JOINT PAIN: ICD-10-CM

## 2023-05-11 DIAGNOSIS — M48.02 CERVICAL STENOSIS OF SPINE: ICD-10-CM

## 2023-05-11 DIAGNOSIS — M48.061 SPINAL STENOSIS, LUMBAR REGION, WITHOUT NEUROGENIC CLAUDICATION: ICD-10-CM

## 2023-05-11 DIAGNOSIS — M54.16 LUMBAR NERVE ROOT IMPINGEMENT: ICD-10-CM

## 2023-05-11 DIAGNOSIS — G56.03 CARPAL TUNNEL SYNDROME, BILATERAL: ICD-10-CM

## 2023-05-11 PROCEDURE — 99213 OFFICE O/P EST LOW 20 MIN: CPT | Performed by: NURSE PRACTITIONER

## 2023-05-11 RX ORDER — OXYCODONE HYDROCHLORIDE AND ACETAMINOPHEN 5; 325 MG/1; MG/1
1 TABLET ORAL EVERY 8 HOURS PRN
Qty: 90 TABLET | Refills: 0 | Status: SHIPPED | OUTPATIENT
Start: 2023-05-11 | End: 2023-06-10

## 2023-05-11 RX ORDER — LIDOCAINE 36 MG/1
1 PATCH TOPICAL DAILY
Qty: 90 PATCH | Refills: 0 | Status: SHIPPED | OUTPATIENT
Start: 2023-05-11 | End: 2023-08-09

## 2023-05-11 NOTE — PROGRESS NOTES
Meet Lara  1970  [id number]      HISTORY OF PRESENT ILLNESS: Ms. Chong Diaz is a 47 y.o. female returns for a follow up visit for pain management  She has a diagnosis of   1. Chronic pain syndrome    2. DDD (degenerative disc disease), cervical    3. Cervical stenosis of spine, mild    4. Degeneration of lumbar or lumbosacral intervertebral disc    5. Lumbosacral spondylosis without myelopathy    6. Spinal stenosis, lumbar region, without neurogenic claudication    7. Lumbar nerve root impingement, L2    8. Carpal tunnel syndrome, bilateral    9. Bilateral elbow joint pain    10. Primary insomnia      As per Information Obtained from the PADT (Patient Assessment and Documentation Tool)    She complains of pain in the  left buttock, lower back  She rates the pain 5/10 and describes it as aching. Current treatment regimen has helped relieve about 60% of the pain. She denies any side effects from the current pain regimen. Patient reports that since the last follow up visit the physical functioning is unchanged, family/social relationships are unchanged, mood is unchanged sleep patterns are unchanged, and that the overall functioning is unchanged. Patient denies misusing/abusing her narcotic pain medications or using any illegal drugs. Upon obtaining medical history from Ms. Lara states that pain is manageable on current pain therapy. Takes the pain medications as prescribed. She is scheduled to f/u with electrophysiology for CP after being treated in the ER for CP. Mood/anxiety is stable. Sleep is fair with an average of 5-6 hours. Denies to having issues of constipation. Tolerating activities/house chores with moderate tenderness to the lower back, neck. ALLERGIES: Patients list of allergies were reviewed     MEDICATIONS: Ms. Chong Diaz list of medications were reviewed. Her current medications are   Outpatient Medications Prior to Visit   Medication Sig Dispense Refill    traZODone (DESYREL) 50 MG tablet

## 2023-05-25 ENCOUNTER — TELEPHONE (OUTPATIENT)
Dept: CARDIOLOGY CLINIC | Age: 53
End: 2023-05-25

## 2023-05-25 NOTE — TELEPHONE ENCOUNTER
Spoke to [facility] scheduling  error corrected. Appointment has been changed to  06/12/2023 for a Lexiscan at 1:00 p.m.  Left message for pt to call back to inform her of changes and to give prep for stress test.

## 2023-05-25 NOTE — TELEPHONE ENCOUNTER
Spoke to [facility] scheduling  error corrected. Appointment has been changed to  06/12/2023 for a Lexiscan at 1:00 p.m. Left message for pt to call back to inform her of changes and to give prep for stress test.     Arrival time 12:30 at [address] [facility] [address] entrance.   Nothing to eat or drink 4 hrs prior to procedure/ if test is in the afternoon ok to eat a light bowl of cereal  or toast.  No caffeine of any sort Chocolate, tea soda etc.

## 2023-05-25 NOTE — TELEPHONE ENCOUNTER
Emmett Marks with central scheduling called to advise she had to call the patient to cancel her echo that was scheduled on 5/29/23 at [facility], [facility]. but then the patient was asking why she couldn't go to [place] to get it done as that's where she had her previous echo. The patient was told she couldn't get it done at [place]. There is currently not an order for an echo. Emmett Marks is calling to find out if there was a reason why she can't get it at [place] and if she has to have it done at [facility] they need an order. Please call Emmett Marks back at [phone].

## 2023-05-25 NOTE — TELEPHONE ENCOUNTER
Pt scheduled for a stress  echo in error pt had a recent echo and needs a Lexiscan per  last office note, will call [facility] to correct the error and inform pt after changes have been made.

## 2023-06-07 ENCOUNTER — OFFICE VISIT (OUTPATIENT)
Dept: PAIN MANAGEMENT | Age: 53
End: 2023-06-07
Payer: COMMERCIAL

## 2023-06-07 VITALS
TEMPERATURE: 96.8 F | HEART RATE: 59 BPM | BODY MASS INDEX: 24.41 KG/M2 | OXYGEN SATURATION: 99 % | WEIGHT: 143 LBS | HEIGHT: 64 IN | DIASTOLIC BLOOD PRESSURE: 67 MMHG | SYSTOLIC BLOOD PRESSURE: 114 MMHG

## 2023-06-07 DIAGNOSIS — M54.16 LUMBAR NERVE ROOT IMPINGEMENT: ICD-10-CM

## 2023-06-07 DIAGNOSIS — M25.522 BILATERAL ELBOW JOINT PAIN: ICD-10-CM

## 2023-06-07 DIAGNOSIS — G56.03 CARPAL TUNNEL SYNDROME, BILATERAL: ICD-10-CM

## 2023-06-07 DIAGNOSIS — F51.01 PRIMARY INSOMNIA: ICD-10-CM

## 2023-06-07 DIAGNOSIS — M47.817 LUMBOSACRAL SPONDYLOSIS WITHOUT MYELOPATHY: ICD-10-CM

## 2023-06-07 DIAGNOSIS — M51.37 DEGENERATION OF LUMBAR OR LUMBOSACRAL INTERVERTEBRAL DISC: ICD-10-CM

## 2023-06-07 DIAGNOSIS — M50.30 DDD (DEGENERATIVE DISC DISEASE), CERVICAL: ICD-10-CM

## 2023-06-07 DIAGNOSIS — M48.061 SPINAL STENOSIS, LUMBAR REGION, WITHOUT NEUROGENIC CLAUDICATION: ICD-10-CM

## 2023-06-07 DIAGNOSIS — M25.521 BILATERAL ELBOW JOINT PAIN: ICD-10-CM

## 2023-06-07 DIAGNOSIS — G89.4 CHRONIC PAIN SYNDROME: ICD-10-CM

## 2023-06-07 DIAGNOSIS — M48.02 CERVICAL STENOSIS OF SPINE: ICD-10-CM

## 2023-06-07 PROCEDURE — 99213 OFFICE O/P EST LOW 20 MIN: CPT | Performed by: NURSE PRACTITIONER

## 2023-06-07 RX ORDER — LIDOCAINE 36 MG/1
1 PATCH TOPICAL DAILY
Qty: 90 PATCH | Refills: 0 | Status: SHIPPED | OUTPATIENT
Start: 2023-06-07 | End: 2023-09-05

## 2023-06-07 RX ORDER — OXYCODONE HYDROCHLORIDE AND ACETAMINOPHEN 5; 325 MG/1; MG/1
1 TABLET ORAL EVERY 8 HOURS PRN
Qty: 90 TABLET | Refills: 0 | Status: SHIPPED | OUTPATIENT
Start: 2023-06-07 | End: 2023-07-07

## 2023-06-07 NOTE — PROGRESS NOTES
Masha Lara  1970  [id number]      HISTORY OF PRESENT ILLNESS: Ms. Donna Gan is a 47 y.o. female returns for a follow up visit for pain management  She has a diagnosis of   1. Chronic pain syndrome    2. DDD (degenerative disc disease), cervical    3. Cervical stenosis of spine, mild    4. Degeneration of lumbar or lumbosacral intervertebral disc    5. Lumbar nerve root impingement, L2    6. Spinal stenosis, lumbar region, without neurogenic claudication    7. Lumbosacral spondylosis without myelopathy    8. Carpal tunnel syndrome, bilateral    9. Bilateral elbow joint pain    10. Primary insomnia      As per Information Obtained from the PADT (Patient Assessment and Documentation Tool)    She complains of pain in the  neck, left shoulder, left arm, upper and lower back, left leg  She rates the pain 9/10 and describes it as aching. Current treatment regimen has helped relieve about 50% of the pain. She denies any side effects from the current pain regimen. Patient reports that since the last follow up visit the physical functioning is unchanged, family/social relationships are unchanged, mood is unchanged sleep patterns are unchanged, and that the overall functioning is unchanged. Patient denies misusing/abusing her narcotic pain medications or using any illegal drugs. Upon obtaining medical history from Ms. Lara states that pain is manageable on current pain therapy. Takes the pain medications as prescribed. She saw Dr. Delores Guevara today for cervical spine degeneration, MRI of the cervical spine next week. Mood/anxiety is stable. Sleep is fair with an average of 5-6 hours. Denies to having issues of constipation. Tolerating activities/house chores with moderate tenderness to the lower back, neck. ALLERGIES: Patients list of allergies were reviewed     MEDICATIONS: Ms. Donna Gan list of medications were reviewed. Her current medications are   Outpatient Medications Prior to Visit   Medication Sig Dispense

## 2023-06-20 ENCOUNTER — OFFICE VISIT (OUTPATIENT)
Dept: CARDIOLOGY CLINIC | Age: 53
End: 2023-06-20
Payer: COMMERCIAL

## 2023-06-20 VITALS
SYSTOLIC BLOOD PRESSURE: 118 MMHG | BODY MASS INDEX: 25.58 KG/M2 | DIASTOLIC BLOOD PRESSURE: 72 MMHG | WEIGHT: 149 LBS | HEART RATE: 72 BPM

## 2023-06-20 DIAGNOSIS — R00.2 PALPITATION: ICD-10-CM

## 2023-06-20 DIAGNOSIS — I49.3 PVC (PREMATURE VENTRICULAR CONTRACTION): Primary | ICD-10-CM

## 2023-06-20 PROCEDURE — 99204 OFFICE O/P NEW MOD 45 MIN: CPT | Performed by: INTERNAL MEDICINE

## 2023-06-20 PROCEDURE — 93000 ELECTROCARDIOGRAM COMPLETE: CPT | Performed by: INTERNAL MEDICINE

## 2023-07-06 ENCOUNTER — OFFICE VISIT (OUTPATIENT)
Dept: PAIN MANAGEMENT | Age: 53
End: 2023-07-06
Payer: COMMERCIAL

## 2023-07-06 VITALS
BODY MASS INDEX: 25.27 KG/M2 | HEART RATE: 68 BPM | SYSTOLIC BLOOD PRESSURE: 115 MMHG | WEIGHT: 148 LBS | DIASTOLIC BLOOD PRESSURE: 76 MMHG | HEIGHT: 64 IN | OXYGEN SATURATION: 99 % | TEMPERATURE: 97.2 F

## 2023-07-06 DIAGNOSIS — M48.02 CERVICAL STENOSIS OF SPINE: ICD-10-CM

## 2023-07-06 DIAGNOSIS — G56.03 CARPAL TUNNEL SYNDROME, BILATERAL: ICD-10-CM

## 2023-07-06 DIAGNOSIS — M47.817 LUMBOSACRAL SPONDYLOSIS WITHOUT MYELOPATHY: ICD-10-CM

## 2023-07-06 DIAGNOSIS — M50.30 DDD (DEGENERATIVE DISC DISEASE), CERVICAL: ICD-10-CM

## 2023-07-06 DIAGNOSIS — M48.061 SPINAL STENOSIS, LUMBAR REGION, WITHOUT NEUROGENIC CLAUDICATION: ICD-10-CM

## 2023-07-06 DIAGNOSIS — M25.522 BILATERAL ELBOW JOINT PAIN: ICD-10-CM

## 2023-07-06 DIAGNOSIS — G89.4 CHRONIC PAIN SYNDROME: ICD-10-CM

## 2023-07-06 DIAGNOSIS — M25.521 BILATERAL ELBOW JOINT PAIN: ICD-10-CM

## 2023-07-06 DIAGNOSIS — M54.16 LUMBAR NERVE ROOT IMPINGEMENT: ICD-10-CM

## 2023-07-06 DIAGNOSIS — M51.37 DEGENERATION OF LUMBAR OR LUMBOSACRAL INTERVERTEBRAL DISC: ICD-10-CM

## 2023-07-06 PROCEDURE — 99213 OFFICE O/P EST LOW 20 MIN: CPT | Performed by: NURSE PRACTITIONER

## 2023-07-06 RX ORDER — OXYCODONE HYDROCHLORIDE AND ACETAMINOPHEN 5; 325 MG/1; MG/1
1 TABLET ORAL EVERY 8 HOURS PRN
Qty: 90 TABLET | Refills: 0 | Status: SHIPPED | OUTPATIENT
Start: 2023-07-06 | End: 2023-08-05

## 2023-07-06 RX ORDER — LIDOCAINE 36 MG/1
1 PATCH TOPICAL DAILY
Qty: 90 PATCH | Refills: 0 | Status: SHIPPED | OUTPATIENT
Start: 2023-07-06 | End: 2023-10-04

## 2023-07-06 RX ORDER — FOLIC ACID 1 MG/1
1 TABLET ORAL DAILY
COMMUNITY

## 2023-07-06 NOTE — PROGRESS NOTES
Sandra Lara  1970  [id number]    HISTORY OF PRESENT ILLNESS: Ms. Mal Coronado is a 47 y.o. female returns for a follow up visit for pain management  She has a diagnosis of   1. Chronic pain syndrome    2. DDD (degenerative disc disease), cervical    3. Cervical stenosis of spine, mild    4. Degeneration of lumbar or lumbosacral intervertebral disc    5. Lumbar nerve root impingement, L2    6. Spinal stenosis, lumbar region, without neurogenic claudication    7. Lumbosacral spondylosis without myelopathy    8. Carpal tunnel syndrome, bilateral    9. Bilateral elbow joint pain      As per Information Obtained from the PADT (Patient Assessment and Documentation Tool)    She complains of pain in the  neck, left arm, lower back, left leg  She rates the pain 7/10 and describes it as aching. Current treatment regimen has helped relieve about 50% of the pain. She denies any side effects from the current pain regimen. Patient reports that since the last follow up visit the physical functioning is unchanged, family/social relationships are unchanged, mood is unchanged sleep patterns are unchanged, and that the overall functioning is unchanged. Patient denies misusing/abusing her narcotic pain medications or using any illegal drugs. Upon obtaining medical history from Ms. Lara states that pain is manageable on current pain therapy. Takes the pain medications as prescribed. Mood/anxiety is stable. Sleep is fair with an average of 5-6 hours. Denies to having issues of constipation. Tolerating activities/house chores with moderate tenderness to the lower back. ALLERGIES: Patients list of allergies were reviewed     MEDICATIONS: Ms. Mal Coronado list of medications were reviewed. Her current medications are   Outpatient Medications Prior to Visit   Medication Sig Dispense Refill    folic acid (FOLVITE) 1 MG tablet Take 1 tablet by mouth daily      traZODone (DESYREL) 50 MG tablet TAKE 1 TABLET NIGHTLY AS NEEDED FOR SLEEP 90

## 2023-07-19 ENCOUNTER — HOSPITAL ENCOUNTER (OUTPATIENT)
Dept: MRI IMAGING | Age: 53
Discharge: HOME | End: 2023-07-19
Attending: INTERNAL MEDICINE
Payer: COMMERCIAL

## 2023-07-19 DIAGNOSIS — I49.3 PVC (PREMATURE VENTRICULAR CONTRACTION): ICD-10-CM

## 2023-07-19 PROCEDURE — 75565 CARD MRI VELOC FLOW MAPPING: CPT | Performed by: INTERNAL MEDICINE

## 2023-07-19 PROCEDURE — 75565 CARD MRI VELOC FLOW MAPPING: CPT

## 2023-07-19 PROCEDURE — A9585 GADOBUTROL INJECTION: HCPCS | Performed by: INTERNAL MEDICINE

## 2023-07-19 PROCEDURE — 75561 CARDIAC MRI FOR MORPH W/DYE: CPT | Performed by: INTERNAL MEDICINE

## 2023-07-19 PROCEDURE — 6360000004 HC RX CONTRAST MEDICATION: Performed by: INTERNAL MEDICINE

## 2023-07-19 RX ADMIN — GADOBUTROL 12 ML: 604.72 INJECTION INTRAVENOUS at 10:38

## 2023-07-20 PROBLEM — I49.3 PVC (PREMATURE VENTRICULAR CONTRACTION): Status: ACTIVE | Noted: 2023-07-20

## 2023-07-20 LAB
LV EF: 65 %
LVEF MODALITY: NORMAL

## 2023-08-15 ENCOUNTER — OFFICE VISIT (OUTPATIENT)
Dept: URGENT CARE | Age: 53
End: 2023-08-15

## 2023-08-15 VITALS
DIASTOLIC BLOOD PRESSURE: 70 MMHG | RESPIRATION RATE: 17 BRPM | OXYGEN SATURATION: 96 % | TEMPERATURE: 98.7 F | HEART RATE: 76 BPM | BODY MASS INDEX: 24.75 KG/M2 | SYSTOLIC BLOOD PRESSURE: 104 MMHG | HEIGHT: 64 IN | WEIGHT: 145 LBS

## 2023-08-15 DIAGNOSIS — J01.90 SUBACUTE SINUSITIS, UNSPECIFIED LOCATION: Primary | ICD-10-CM

## 2023-08-15 RX ORDER — METHYLPREDNISOLONE 4 MG/1
TABLET ORAL
Qty: 21 TABLET | Refills: 0 | Status: SHIPPED | OUTPATIENT
Start: 2023-08-15 | End: 2023-08-21

## 2023-08-15 ASSESSMENT — ENCOUNTER SYMPTOMS
SORE THROAT: 0
COUGH: 0
SINUS PRESSURE: 1
SHORTNESS OF BREATH: 0

## 2023-08-15 NOTE — PATIENT INSTRUCTIONS
Medrol dose pack as directed  Continue OTC medications  Follow up if symptoms worsen, you develop fever, shortness of breath or new or concerning symptoms.

## 2023-08-17 ENCOUNTER — OFFICE VISIT (OUTPATIENT)
Dept: PAIN MANAGEMENT | Age: 53
End: 2023-08-17
Payer: COMMERCIAL

## 2023-08-17 VITALS
SYSTOLIC BLOOD PRESSURE: 110 MMHG | WEIGHT: 148 LBS | TEMPERATURE: 96.8 F | HEART RATE: 70 BPM | HEIGHT: 64 IN | OXYGEN SATURATION: 99 % | BODY MASS INDEX: 25.27 KG/M2 | DIASTOLIC BLOOD PRESSURE: 71 MMHG

## 2023-08-17 DIAGNOSIS — G89.4 CHRONIC PAIN SYNDROME: ICD-10-CM

## 2023-08-17 DIAGNOSIS — M50.30 DDD (DEGENERATIVE DISC DISEASE), CERVICAL: ICD-10-CM

## 2023-08-17 DIAGNOSIS — M48.061 SPINAL STENOSIS, LUMBAR REGION, WITHOUT NEUROGENIC CLAUDICATION: ICD-10-CM

## 2023-08-17 DIAGNOSIS — M54.16 LUMBAR NERVE ROOT IMPINGEMENT: ICD-10-CM

## 2023-08-17 DIAGNOSIS — M48.02 CERVICAL STENOSIS OF SPINE: ICD-10-CM

## 2023-08-17 DIAGNOSIS — M47.817 LUMBOSACRAL SPONDYLOSIS WITHOUT MYELOPATHY: ICD-10-CM

## 2023-08-17 DIAGNOSIS — Z51.81 ENCOUNTER FOR THERAPEUTIC DRUG MONITORING: ICD-10-CM

## 2023-08-17 DIAGNOSIS — M51.37 DEGENERATION OF LUMBAR OR LUMBOSACRAL INTERVERTEBRAL DISC: ICD-10-CM

## 2023-08-17 DIAGNOSIS — G56.03 CARPAL TUNNEL SYNDROME, BILATERAL: ICD-10-CM

## 2023-08-17 PROCEDURE — 99213 OFFICE O/P EST LOW 20 MIN: CPT | Performed by: NURSE PRACTITIONER

## 2023-08-17 RX ORDER — LIDOCAINE 36 MG/1
1 PATCH TOPICAL DAILY
Qty: 90 PATCH | Refills: 0 | Status: SHIPPED | OUTPATIENT
Start: 2023-08-17 | End: 2023-11-15

## 2023-08-17 RX ORDER — OXYCODONE HYDROCHLORIDE AND ACETAMINOPHEN 5; 325 MG/1; MG/1
1 TABLET ORAL EVERY 8 HOURS PRN
Qty: 90 TABLET | Refills: 0 | Status: SHIPPED | OUTPATIENT
Start: 2023-08-17 | End: 2023-09-16

## 2023-08-18 ENCOUNTER — HOSPITAL ENCOUNTER (OUTPATIENT)
Dept: WOMENS IMAGING | Age: 53
Discharge: HOME | End: 2023-08-18
Payer: COMMERCIAL

## 2023-08-18 VITALS — WEIGHT: 148 LBS | BODY MASS INDEX: 25.27 KG/M2 | HEIGHT: 64 IN

## 2023-08-18 DIAGNOSIS — Z12.31 VISIT FOR SCREENING MAMMOGRAM: ICD-10-CM

## 2023-08-18 PROCEDURE — 77063 BREAST TOMOSYNTHESIS BI: CPT

## 2023-09-14 ENCOUNTER — OFFICE VISIT (OUTPATIENT)
Dept: PAIN MANAGEMENT | Age: 53
End: 2023-09-14
Payer: COMMERCIAL

## 2023-09-14 VITALS
SYSTOLIC BLOOD PRESSURE: 102 MMHG | WEIGHT: 149 LBS | HEIGHT: 64 IN | BODY MASS INDEX: 25.44 KG/M2 | HEART RATE: 71 BPM | OXYGEN SATURATION: 98 % | TEMPERATURE: 97.3 F | DIASTOLIC BLOOD PRESSURE: 66 MMHG

## 2023-09-14 DIAGNOSIS — G89.4 CHRONIC PAIN SYNDROME: ICD-10-CM

## 2023-09-14 DIAGNOSIS — M51.37 DEGENERATION OF LUMBAR OR LUMBOSACRAL INTERVERTEBRAL DISC: ICD-10-CM

## 2023-09-14 DIAGNOSIS — F51.01 PRIMARY INSOMNIA: ICD-10-CM

## 2023-09-14 DIAGNOSIS — M47.817 LUMBOSACRAL SPONDYLOSIS WITHOUT MYELOPATHY: ICD-10-CM

## 2023-09-14 DIAGNOSIS — M48.061 SPINAL STENOSIS, LUMBAR REGION, WITHOUT NEUROGENIC CLAUDICATION: ICD-10-CM

## 2023-09-14 DIAGNOSIS — M50.30 DDD (DEGENERATIVE DISC DISEASE), CERVICAL: ICD-10-CM

## 2023-09-14 DIAGNOSIS — M54.16 LUMBAR NERVE ROOT IMPINGEMENT: ICD-10-CM

## 2023-09-14 DIAGNOSIS — M48.02 CERVICAL STENOSIS OF SPINE: ICD-10-CM

## 2023-09-14 DIAGNOSIS — G56.03 CARPAL TUNNEL SYNDROME, BILATERAL: ICD-10-CM

## 2023-09-14 PROCEDURE — 99213 OFFICE O/P EST LOW 20 MIN: CPT | Performed by: NURSE PRACTITIONER

## 2023-09-14 RX ORDER — OXYCODONE HYDROCHLORIDE AND ACETAMINOPHEN 5; 325 MG/1; MG/1
1 TABLET ORAL EVERY 8 HOURS PRN
Qty: 90 TABLET | Refills: 0 | Status: SHIPPED | OUTPATIENT
Start: 2023-09-14 | End: 2023-10-14

## 2023-09-14 RX ORDER — LIDOCAINE 36 MG/1
1 PATCH TOPICAL DAILY
Qty: 90 PATCH | Refills: 0 | Status: SHIPPED | OUTPATIENT
Start: 2023-09-14 | End: 2023-12-13

## 2023-09-14 NOTE — PROGRESS NOTES
Lady Rodri Lara  1970  [id number]    HISTORY OF PRESENT ILLNESS: Ms. Tuna Mathur is a 47 y.o. female returns for a follow up visit for pain management  She has a diagnosis of   1. Chronic pain syndrome    2. DDD (degenerative disc disease), cervical    3. Cervical stenosis of spine, mild    4. Degeneration of lumbar or lumbosacral intervertebral disc    5. Lumbosacral spondylosis without myelopathy    6. Lumbar nerve root impingement, L2    7. Spinal stenosis, lumbar region, without neurogenic claudication    8. Carpal tunnel syndrome, bilateral    9. Primary insomnia      As per Information Obtained from the PADT (Patient Assessment and Documentation Tool)    She complains of pain in the  left shoulder radiating down the left arm, lower back, left leg  She rates the pain 7/10 and describes it as aching. Current treatment regimen has helped relieve about 50% of the pain. She denies any side effects from the current pain regimen. Patient reports that since the last follow up visit the physical functioning is unchanged, family/social relationships are unchanged, mood is unchanged sleep patterns are unchanged, and that the overall functioning is unchanged. Patient denies misusing/abusing her narcotic pain medications or using any illegal drugs. Upon obtaining medical history from Ms. Lara states that pain is manageable on current pain therapy. Takes the pain medications as prescribed. Pain in the left arm pit, left arm. Was informed of herniated disc in the cervical spine, currently scheduled for PRASHANTH of the cervical spine with Dr. Charissa Goldman. Has an appointment with gastroenterology for abdominal discomfort. Mood/anxiety is stable. Sleep is fair with an average of 5-6 hours. Denies to having issues of constipation. Tolerating activities/house chores with moderate tenderness to the lower back, neck.      ALLERGIES: Patients list of allergies were reviewed     MEDICATIONS: Ms. Tuan Mathur list of medications were

## 2023-10-19 ENCOUNTER — OFFICE VISIT (OUTPATIENT)
Dept: PAIN MANAGEMENT | Age: 53
End: 2023-10-19
Payer: COMMERCIAL

## 2023-10-19 VITALS
SYSTOLIC BLOOD PRESSURE: 115 MMHG | OXYGEN SATURATION: 99 % | HEART RATE: 62 BPM | BODY MASS INDEX: 25.06 KG/M2 | DIASTOLIC BLOOD PRESSURE: 72 MMHG | WEIGHT: 146 LBS | TEMPERATURE: 97 F

## 2023-10-19 DIAGNOSIS — M50.30 DDD (DEGENERATIVE DISC DISEASE), CERVICAL: ICD-10-CM

## 2023-10-19 DIAGNOSIS — M51.37 DEGENERATION OF LUMBAR OR LUMBOSACRAL INTERVERTEBRAL DISC: ICD-10-CM

## 2023-10-19 DIAGNOSIS — M48.02 CERVICAL STENOSIS OF SPINE: ICD-10-CM

## 2023-10-19 DIAGNOSIS — G56.03 CARPAL TUNNEL SYNDROME, BILATERAL: ICD-10-CM

## 2023-10-19 DIAGNOSIS — M48.061 SPINAL STENOSIS, LUMBAR REGION, WITHOUT NEUROGENIC CLAUDICATION: ICD-10-CM

## 2023-10-19 DIAGNOSIS — G89.4 CHRONIC PAIN SYNDROME: ICD-10-CM

## 2023-10-19 DIAGNOSIS — M47.817 LUMBOSACRAL SPONDYLOSIS WITHOUT MYELOPATHY: ICD-10-CM

## 2023-10-19 DIAGNOSIS — M54.16 LUMBAR NERVE ROOT IMPINGEMENT: ICD-10-CM

## 2023-10-19 PROCEDURE — G8484 FLU IMMUNIZE NO ADMIN: HCPCS | Performed by: NURSE PRACTITIONER

## 2023-10-19 PROCEDURE — G8427 DOCREV CUR MEDS BY ELIG CLIN: HCPCS | Performed by: NURSE PRACTITIONER

## 2023-10-19 PROCEDURE — G8419 CALC BMI OUT NRM PARAM NOF/U: HCPCS | Performed by: NURSE PRACTITIONER

## 2023-10-19 PROCEDURE — 99213 OFFICE O/P EST LOW 20 MIN: CPT | Performed by: NURSE PRACTITIONER

## 2023-10-19 PROCEDURE — 3017F COLORECTAL CA SCREEN DOC REV: CPT | Performed by: NURSE PRACTITIONER

## 2023-10-19 PROCEDURE — 1036F TOBACCO NON-USER: CPT | Performed by: NURSE PRACTITIONER

## 2023-10-19 RX ORDER — PANTOPRAZOLE SODIUM 20 MG/1
20 TABLET, DELAYED RELEASE ORAL DAILY
COMMUNITY

## 2023-10-19 RX ORDER — LIDOCAINE 36 MG/1
1 PATCH TOPICAL DAILY
Qty: 90 PATCH | Refills: 0 | Status: SHIPPED | OUTPATIENT
Start: 2023-10-19 | End: 2024-01-17

## 2023-10-19 RX ORDER — OXYCODONE HYDROCHLORIDE AND ACETAMINOPHEN 5; 325 MG/1; MG/1
1 TABLET ORAL EVERY 8 HOURS PRN
Qty: 90 TABLET | Refills: 0 | Status: SHIPPED | OUTPATIENT
Start: 2023-10-19 | End: 2023-11-18

## 2023-10-19 NOTE — PROGRESS NOTES
stenosis, lumbar region, without neurogenic claudication, Lumbosacral spondylosis without myelopathy, and Carpal tunnel syndrome, bilateral were pertinent to this visit. Risks and benefits of the medications and other alternative treatments  including no treatment were discussed with the patient. The common side effects of these medications were also explained to the patient. Informed verbal consent was obtained. Goals of current treatment regimen include improvement in pain, restoration of functioning- with focus on improvement in physical performance, general activity, work or disability,emotional distress, health care utilization and  decreased medication consumption. Will continue to monitor progress towards achieving/maintaining therapeutic goals with special emphasis on  1. Improvement in perceived interfernce  of pain with ADL's. Ability to do home exercises independently. Ability to do household chores indoor and/or outdoor work and social and leisure activities. Improve psychosocial and physical functioning. - she is showing progression towards this treatment goal with the current regimen. She was advised against drinking alcohol with the narcotic pain medicines, advised against driving or handling machinery while adjusting the dose of medicines or if having cognitive  issues related to the current medications. Risk of overdose and death, if medicines not taken as prescribed, were also discussed. If the patient develops new symptoms or if the symptoms worsen, the patient should call the office. While transcribing every attempt was made to maintain the accuracy of the note in terms of it's contents,there may have been some errors made inadvertently. Thank you for allowing me to participate in the care of this patient. rEi Gray CNP.     Cc: Ramiro Ragland MD

## 2023-10-27 DIAGNOSIS — G47.9 SLEEP DISORDER: ICD-10-CM

## 2023-10-30 RX ORDER — TRAZODONE HYDROCHLORIDE 50 MG/1
TABLET ORAL
Qty: 90 TABLET | Refills: 1 | Status: SHIPPED | OUTPATIENT
Start: 2023-10-30

## 2023-10-30 NOTE — TELEPHONE ENCOUNTER
Medication:   Requested Prescriptions     Pending Prescriptions Disp Refills    traZODone (DESYREL) 50 MG tablet 90 tablet 1     Last Filled:  5.1.23    Last appt: 4/19/2023   Next appt: Visit date not found    Last OARRS:       9/14/2023     3:21 PM   RX Monitoring   Periodic Controlled Substance Monitoring No signs of potential drug abuse or diversion identified.

## 2023-11-16 ENCOUNTER — OFFICE VISIT (OUTPATIENT)
Dept: PAIN MANAGEMENT | Age: 53
End: 2023-11-16
Payer: COMMERCIAL

## 2023-11-16 VITALS
TEMPERATURE: 97.3 F | HEART RATE: 68 BPM | DIASTOLIC BLOOD PRESSURE: 78 MMHG | OXYGEN SATURATION: 100 % | SYSTOLIC BLOOD PRESSURE: 115 MMHG

## 2023-11-16 DIAGNOSIS — M47.817 LUMBOSACRAL SPONDYLOSIS WITHOUT MYELOPATHY: ICD-10-CM

## 2023-11-16 DIAGNOSIS — G89.4 CHRONIC PAIN SYNDROME: ICD-10-CM

## 2023-11-16 DIAGNOSIS — K59.03 DRUG INDUCED CONSTIPATION: ICD-10-CM

## 2023-11-16 DIAGNOSIS — M51.37 DEGENERATION OF LUMBAR OR LUMBOSACRAL INTERVERTEBRAL DISC: ICD-10-CM

## 2023-11-16 DIAGNOSIS — M54.16 LUMBAR NERVE ROOT IMPINGEMENT: ICD-10-CM

## 2023-11-16 DIAGNOSIS — G56.03 CARPAL TUNNEL SYNDROME, BILATERAL: ICD-10-CM

## 2023-11-16 DIAGNOSIS — M50.30 DDD (DEGENERATIVE DISC DISEASE), CERVICAL: ICD-10-CM

## 2023-11-16 DIAGNOSIS — M48.02 CERVICAL STENOSIS OF SPINE: ICD-10-CM

## 2023-11-16 DIAGNOSIS — M48.061 SPINAL STENOSIS, LUMBAR REGION, WITHOUT NEUROGENIC CLAUDICATION: ICD-10-CM

## 2023-11-16 DIAGNOSIS — F51.01 PRIMARY INSOMNIA: ICD-10-CM

## 2023-11-16 PROCEDURE — 3017F COLORECTAL CA SCREEN DOC REV: CPT | Performed by: NURSE PRACTITIONER

## 2023-11-16 PROCEDURE — 1036F TOBACCO NON-USER: CPT | Performed by: NURSE PRACTITIONER

## 2023-11-16 PROCEDURE — G8427 DOCREV CUR MEDS BY ELIG CLIN: HCPCS | Performed by: NURSE PRACTITIONER

## 2023-11-16 PROCEDURE — G8419 CALC BMI OUT NRM PARAM NOF/U: HCPCS | Performed by: NURSE PRACTITIONER

## 2023-11-16 PROCEDURE — 99214 OFFICE O/P EST MOD 30 MIN: CPT | Performed by: NURSE PRACTITIONER

## 2023-11-16 PROCEDURE — G8484 FLU IMMUNIZE NO ADMIN: HCPCS | Performed by: NURSE PRACTITIONER

## 2023-11-16 RX ORDER — OXYCODONE HYDROCHLORIDE AND ACETAMINOPHEN 5; 325 MG/1; MG/1
1 TABLET ORAL EVERY 8 HOURS PRN
Qty: 90 TABLET | Refills: 0 | Status: SHIPPED | OUTPATIENT
Start: 2023-11-16 | End: 2023-12-16

## 2023-11-16 RX ORDER — LIDOCAINE 36 MG/1
1 PATCH TOPICAL DAILY
Qty: 90 PATCH | Refills: 0 | Status: SHIPPED | OUTPATIENT
Start: 2023-11-16 | End: 2024-02-14

## 2023-11-16 NOTE — PROGRESS NOTES
Gato Lara  1970  [id number]    HISTORY OF PRESENT ILLNESS: Ms. Gerson Allen is a 47 y.o. female returns for a follow up visit for pain management  She has a diagnosis of   1. Chronic pain syndrome    2. DDD (degenerative disc disease), cervical    3. Degeneration of lumbar or lumbosacral intervertebral disc    4. Cervical stenosis of spine, mild    5. Spinal stenosis, lumbar region, without neurogenic claudication    6. Lumbar nerve root impingement, L2    7. Lumbosacral spondylosis without myelopathy    8. Carpal tunnel syndrome, bilateral    9. Primary insomnia    10. Drug induced constipation      As per Information Obtained from the PADT (Patient Assessment and Documentation Tool)    She complains of pain in the  neck, left shoulder, left arm, lower back  She rates the pain 8/10 and describes it as aching. Current treatment regimen has helped relieve about 50% of the pain. She has constipation any side effects from the current pain regimen. Patient reports that since the last follow up visit the physical functioning is unchanged, family/social relationships are unchanged, mood is unchanged sleep patterns are unchanged, and that the overall functioning is unchanged. Patient denies misusing/abusing her narcotic pain medications or using any illegal drugs. Upon obtaining medical history from Ms. Lara states that pain is manageable on current pain therapy. Takes the pain medications as prescribed. Mood/anxiety is stable. Sleep is fair with an average of 5-6 hours. Denies to having issues of constipation. Tolerating activities/house chores with moderate tenderness to the lower back. ALLERGIES: Patients list of allergies were reviewed     MEDICATIONS: Ms. Gerson Allen list of medications were reviewed. Her current medications are   Outpatient Medications Prior to Visit   Medication Sig Dispense Refill    traZODone (DESYREL) 50 MG tablet One po @HS 90 tablet 1    pantoprazole (PROTONIX) 20 MG tablet Take 1

## 2023-12-14 ENCOUNTER — OFFICE VISIT (OUTPATIENT)
Dept: PAIN MANAGEMENT | Age: 53
End: 2023-12-14
Payer: COMMERCIAL

## 2023-12-14 VITALS
DIASTOLIC BLOOD PRESSURE: 71 MMHG | SYSTOLIC BLOOD PRESSURE: 112 MMHG | HEART RATE: 82 BPM | TEMPERATURE: 97.2 F | OXYGEN SATURATION: 99 %

## 2023-12-14 DIAGNOSIS — M51.37 DEGENERATION OF LUMBAR OR LUMBOSACRAL INTERVERTEBRAL DISC: ICD-10-CM

## 2023-12-14 DIAGNOSIS — K59.03 DRUG INDUCED CONSTIPATION: ICD-10-CM

## 2023-12-14 DIAGNOSIS — M48.061 SPINAL STENOSIS, LUMBAR REGION, WITHOUT NEUROGENIC CLAUDICATION: ICD-10-CM

## 2023-12-14 DIAGNOSIS — M48.02 CERVICAL STENOSIS OF SPINE: ICD-10-CM

## 2023-12-14 DIAGNOSIS — M54.16 LUMBAR NERVE ROOT IMPINGEMENT: ICD-10-CM

## 2023-12-14 DIAGNOSIS — M47.817 LUMBOSACRAL SPONDYLOSIS WITHOUT MYELOPATHY: ICD-10-CM

## 2023-12-14 DIAGNOSIS — G56.03 CARPAL TUNNEL SYNDROME, BILATERAL: ICD-10-CM

## 2023-12-14 DIAGNOSIS — M50.30 DDD (DEGENERATIVE DISC DISEASE), CERVICAL: ICD-10-CM

## 2023-12-14 DIAGNOSIS — G89.4 CHRONIC PAIN SYNDROME: ICD-10-CM

## 2023-12-14 PROCEDURE — G8427 DOCREV CUR MEDS BY ELIG CLIN: HCPCS | Performed by: NURSE PRACTITIONER

## 2023-12-14 PROCEDURE — 1036F TOBACCO NON-USER: CPT | Performed by: NURSE PRACTITIONER

## 2023-12-14 PROCEDURE — G8484 FLU IMMUNIZE NO ADMIN: HCPCS | Performed by: NURSE PRACTITIONER

## 2023-12-14 PROCEDURE — G8419 CALC BMI OUT NRM PARAM NOF/U: HCPCS | Performed by: NURSE PRACTITIONER

## 2023-12-14 PROCEDURE — 3017F COLORECTAL CA SCREEN DOC REV: CPT | Performed by: NURSE PRACTITIONER

## 2023-12-14 PROCEDURE — 99214 OFFICE O/P EST MOD 30 MIN: CPT | Performed by: NURSE PRACTITIONER

## 2023-12-14 RX ORDER — OXYCODONE HYDROCHLORIDE AND ACETAMINOPHEN 5; 325 MG/1; MG/1
1 TABLET ORAL EVERY 8 HOURS PRN
Qty: 90 TABLET | Refills: 0 | Status: SHIPPED | OUTPATIENT
Start: 2023-12-14 | End: 2024-01-13

## 2023-12-14 RX ORDER — IVERMECTIN 3 MG/1
TABLET ORAL ONCE
COMMUNITY

## 2023-12-14 RX ORDER — LIDOCAINE 36 MG/1
1 PATCH TOPICAL DAILY
Qty: 90 PATCH | Refills: 0 | Status: SHIPPED | OUTPATIENT
Start: 2023-12-14 | End: 2024-03-13

## 2023-12-14 NOTE — PROGRESS NOTES
Cherelle Lara  1970  [id number]    HISTORY OF PRESENT ILLNESS: Ms. Manish Blake is a 47 y.o. female returns for a follow up visit for pain management  She has a diagnosis of   1. Chronic pain syndrome    2. Degeneration of lumbar or lumbosacral intervertebral disc    3. Lumbosacral spondylosis without myelopathy    4. Lumbar nerve root impingement, L2    5. Spinal stenosis, lumbar region, without neurogenic claudication    6. DDD (degenerative disc disease), cervical    7. Cervical stenosis of spine, mild    8. Carpal tunnel syndrome, bilateral    9. Drug induced constipation      As per Information Obtained from the PADT (Patient Assessment and Documentation Tool)    She complains of pain in the  left arm, lower back  She rates the pain 7/10 and describes it as aching. Current treatment regimen has helped relieve about 50% of the pain. She denies any side effects from the current pain regimen. Patient reports that since the last follow up visit the physical functioning is unchanged, family/social relationships are unchanged, mood is unchanged sleep patterns are worse, and that the overall functioning is unchanged. Patient denies misusing/abusing her narcotic pain medications or using any illegal drugs. Upon obtaining medical history from Ms. Lara states that pain is manageable on current pain therapy. Takes the pain medications as prescribed. Mood/anxiety is stable. Sleep is fair with an average of 5-6 hours. Denies to having issues of constipation. Tolerating activities/house chores with moderate tenderness to the lower back. ALLERGIES: Patients list of allergies were reviewed     MEDICATIONS: Ms. Manish Blake list of medications were reviewed. Her current medications are   Outpatient Medications Prior to Visit   Medication Sig Dispense Refill    ivermectin 3 MG tablet Take by mouth once      traZODone (DESYREL) 50 MG tablet One po @HS 90 tablet 1    pantoprazole (PROTONIX) 20 MG tablet Take 1 tablet by mouth

## 2024-01-11 ENCOUNTER — OFFICE VISIT (OUTPATIENT)
Dept: PAIN MANAGEMENT | Age: 54
End: 2024-01-11

## 2024-01-11 VITALS
SYSTOLIC BLOOD PRESSURE: 117 MMHG | TEMPERATURE: 96.8 F | OXYGEN SATURATION: 99 % | DIASTOLIC BLOOD PRESSURE: 78 MMHG | HEART RATE: 76 BPM

## 2024-01-11 DIAGNOSIS — M48.02 CERVICAL STENOSIS OF SPINE: ICD-10-CM

## 2024-01-11 DIAGNOSIS — M47.817 LUMBOSACRAL SPONDYLOSIS WITHOUT MYELOPATHY: ICD-10-CM

## 2024-01-11 DIAGNOSIS — G89.4 CHRONIC PAIN SYNDROME: ICD-10-CM

## 2024-01-11 DIAGNOSIS — M48.061 SPINAL STENOSIS, LUMBAR REGION, WITHOUT NEUROGENIC CLAUDICATION: ICD-10-CM

## 2024-01-11 DIAGNOSIS — G56.03 CARPAL TUNNEL SYNDROME, BILATERAL: ICD-10-CM

## 2024-01-11 DIAGNOSIS — M50.30 DDD (DEGENERATIVE DISC DISEASE), CERVICAL: ICD-10-CM

## 2024-01-11 DIAGNOSIS — M51.37 DEGENERATION OF LUMBAR OR LUMBOSACRAL INTERVERTEBRAL DISC: ICD-10-CM

## 2024-01-11 DIAGNOSIS — M54.16 LUMBAR NERVE ROOT IMPINGEMENT: ICD-10-CM

## 2024-01-11 RX ORDER — LIDOCAINE 36 MG/1
1 PATCH TOPICAL DAILY
Qty: 90 PATCH | Refills: 0 | Status: SHIPPED | OUTPATIENT
Start: 2024-01-11 | End: 2024-04-10

## 2024-01-11 RX ORDER — OXYCODONE HYDROCHLORIDE AND ACETAMINOPHEN 5; 325 MG/1; MG/1
1 TABLET ORAL EVERY 8 HOURS PRN
Qty: 90 TABLET | Refills: 0 | Status: SHIPPED | OUTPATIENT
Start: 2024-01-11 | End: 2024-02-10

## 2024-01-11 RX ORDER — DOXYCYCLINE HYCLATE 100 MG
100 TABLET ORAL 2 TIMES DAILY
COMMUNITY

## 2024-01-11 NOTE — PROGRESS NOTES
mg by mouth as needed      ibuprofen (ADVIL;MOTRIN) 600 MG tablet Take 1 tablet by mouth every 6 hours as needed      ivermectin 3 MG tablet Take by mouth once      traZODone (DESYREL) 50 MG tablet One po @HS 90 tablet 1    pantoprazole (PROTONIX) 20 MG tablet Take 1 tablet by mouth daily      methotrexate (RHEUMATREX) 2.5 MG chemo tablet Take by mouth once a week      folic acid (FOLVITE) 1 MG tablet Take 1 tablet by mouth daily      buPROPion (WELLBUTRIN XL) 300 MG extended release tablet Take 1 tablet by mouth every morning      vitamin B-12 (CYANOCOBALAMIN) 500 MCG tablet Take 1 tablet by mouth daily      ELIQUIS 5 MG TABS tablet TAKE 1 TABLET BY MOUTH TWO TIMES A DAY  60 tablet 0    Lidocaine (ZTLIDO) 1.8 % PTCH Apply 1 patch topically daily 90 patch 0    oxyCODONE-acetaminophen (PERCOCET) 5-325 MG per tablet Take 1 tablet by mouth every 8 hours as needed for Pain for up to 30 days. Take no more than 2-3 tabs orally prn 90 tablet 0    spironolactone (ALDACTONE) 50 MG tablet TAKE 1 TABLET BY MOUTH ONE TIME A DAY, INCREASE TO 1 TABLET TWO TIMES A DAY IF TOLERATING MEDICATION       No facility-administered medications prior to visit.     SOCIAL/FAMILY/PAST MEDICAL HISTORY: Ms. Lara social, family and past medical history was reviewed.     REVIEW OF SYSTEMS:    Respiratory: Negative for apnea, chest tightness and shortness of breath or change in baseline breathing.    Gastrointestinal: Negative for nausea, vomiting, abdominal pain, diarrhea, constipation, blood in stool and abdominal distention.     PHYSICAL EXAM:   Nursing note and vitals reviewed. /78   Pulse 76   Temp 96.8 °F (36 °C)   LMP  (LMP Unknown)   SpO2 99%   Constitutional: She appears well-developed and well-nourished. No acute distress.   Skin: Skin is warm and dry, good turgor. No rash noted. She is not diaphoretic.  Cardiovascular: Normal rate, regular rhythm, normal heart sounds, and does not have murmur.     Pulmonary/Chest: Effort

## 2024-01-15 DIAGNOSIS — I49.3 PVC (PREMATURE VENTRICULAR CONTRACTION): ICD-10-CM

## 2024-01-15 PROCEDURE — 93242 EXT ECG>48HR<7D RECORDING: CPT | Performed by: INTERNAL MEDICINE

## 2024-01-17 LAB
CMV DNA SERPL NAA+PROBE-ACNC: NOT DETECTED IU/ML
CMV DNA SERPL NAA+PROBE-LOG IU: NOT DETECTED LOG IU/ML
CMV DNA SERPL QL NAA+PROBE: NOT DETECTED
EBV EA-D IGG SER-ACNC: 50.8 U/ML (ref 0–10.9)
EBV NA IGG SER IA-ACNC: 337 U/ML (ref 0–21.9)
EBV VCA IGG SER IA-ACNC: 306 U/ML (ref 0–21.9)
EBV VCA IGM SER IA-ACNC: <10 U/ML (ref 0–43.9)

## 2024-01-18 ENCOUNTER — NURSE ONLY (OUTPATIENT)
Dept: CARDIOLOGY CLINIC | Age: 54
End: 2024-01-18

## 2024-01-24 PROCEDURE — 93244 EXT ECG>48HR<7D REV&INTERPJ: CPT | Performed by: INTERNAL MEDICINE

## 2024-01-31 DIAGNOSIS — R00.2 PALPITATION: Primary | ICD-10-CM

## 2024-02-08 ENCOUNTER — OFFICE VISIT (OUTPATIENT)
Dept: PAIN MANAGEMENT | Age: 54
End: 2024-02-08

## 2024-02-08 VITALS
DIASTOLIC BLOOD PRESSURE: 76 MMHG | OXYGEN SATURATION: 98 % | HEART RATE: 73 BPM | TEMPERATURE: 97.2 F | SYSTOLIC BLOOD PRESSURE: 114 MMHG

## 2024-02-08 DIAGNOSIS — M54.16 LUMBAR NERVE ROOT IMPINGEMENT: ICD-10-CM

## 2024-02-08 DIAGNOSIS — M48.02 CERVICAL STENOSIS OF SPINE: ICD-10-CM

## 2024-02-08 DIAGNOSIS — M47.817 LUMBOSACRAL SPONDYLOSIS WITHOUT MYELOPATHY: ICD-10-CM

## 2024-02-08 DIAGNOSIS — M51.37 DEGENERATION OF LUMBAR OR LUMBOSACRAL INTERVERTEBRAL DISC: ICD-10-CM

## 2024-02-08 DIAGNOSIS — M50.30 DDD (DEGENERATIVE DISC DISEASE), CERVICAL: ICD-10-CM

## 2024-02-08 DIAGNOSIS — G56.03 CARPAL TUNNEL SYNDROME, BILATERAL: ICD-10-CM

## 2024-02-08 DIAGNOSIS — G89.4 CHRONIC PAIN SYNDROME: ICD-10-CM

## 2024-02-08 DIAGNOSIS — M48.061 SPINAL STENOSIS, LUMBAR REGION, WITHOUT NEUROGENIC CLAUDICATION: ICD-10-CM

## 2024-02-08 RX ORDER — LIDOCAINE 36 MG/1
1 PATCH TOPICAL DAILY
Qty: 90 PATCH | Refills: 0 | Status: SHIPPED | OUTPATIENT
Start: 2024-02-08 | End: 2024-05-08

## 2024-02-08 RX ORDER — OXYCODONE HYDROCHLORIDE AND ACETAMINOPHEN 5; 325 MG/1; MG/1
1 TABLET ORAL EVERY 8 HOURS PRN
Qty: 90 TABLET | Refills: 0 | Status: SHIPPED | OUTPATIENT
Start: 2024-02-08 | End: 2024-03-09

## 2024-02-08 NOTE — PROGRESS NOTES
and does not have murmur.     Pulmonary/Chest: Effort normal. No respiratory distress. She does not have wheezes in the lung fields. She has no rales.     Neurological/Psychiatric:She is alert and oriented to person, place, and time. Coordination is  normal.  Her mood isAppropriate and affect is Neutral/Euthymic(normal) .     Prescription pain medication monitoring:                  MEDD current = 22.50              ORT Score = 0 low risk              Other Risk factors - (mood) Stable              Date of Last Medication Agreement: 1/11/24              Date Naloxone prescribed: 0              UDT:                          Date of last UDT: 8/17/23                          Adverse report: No              OARRS:                          Checked today: Yes                          Adverse report: No    IMPRESSION:   1. Chronic pain syndrome    2. DDD (degenerative disc disease), cervical    3. Cervical stenosis of spine, mild    4. Degeneration of lumbar or lumbosacral intervertebral disc    5. Lumbar nerve root impingement, L2    6. Lumbosacral spondylosis without myelopathy    7. Spinal stenosis, lumbar region, without neurogenic claudication    8. Carpal tunnel syndrome, bilateral      PLAN:  Informed verbal consent was obtained:  -Patient's opioid therapy will be maintained at current dose  -Home exercises/Marley exercises recommended  -CBT techniques- relaxation therapies such as biofeedback, mindfulness based stress reduction, imagery, cognitive restructuring, problem solving discussed with patient   -Last UDS 8/17/23 consistent  -Return in about 4 weeks (around 3/7/2024).     Analgesic Plan:              Continue present regimen: Yes: Percocet 5-325 mg tabs orally q8h prn              Adjust dose of present analgesic: No              Switch analgesics: No              Add/Adjust concomitant therapy: No: continue with Jo Ann Joyner    I will continue her current medication regimen  which is part of the above

## 2024-03-03 DIAGNOSIS — G47.9 SLEEP DISORDER: ICD-10-CM

## 2024-03-04 NOTE — TELEPHONE ENCOUNTER
Medication:   Requested Prescriptions     Pending Prescriptions Disp Refills    traZODone (DESYREL) 50 MG tablet [Pharmacy Med Name: traZODone HCl 50 MG Oral Tablet] 90 tablet 3     Sig: TAKE 1 TABLET BY MOUTH AT  BEDTIME     Last Filled:  10.30.23    Last appt: 4/19/2023   Next appt: Visit date not found  Sent mcm  Needs ov    Last OARRS:       12/14/2023     2:50 PM   RX Monitoring   Periodic Controlled Substance Monitoring No signs of potential drug abuse or diversion identified.

## 2024-03-05 RX ORDER — TRAZODONE HYDROCHLORIDE 50 MG/1
TABLET ORAL
Qty: 90 TABLET | Refills: 3 | Status: SHIPPED | OUTPATIENT
Start: 2024-03-05

## 2024-03-07 ENCOUNTER — OFFICE VISIT (OUTPATIENT)
Dept: PAIN MANAGEMENT | Age: 54
End: 2024-03-07

## 2024-03-07 VITALS
SYSTOLIC BLOOD PRESSURE: 113 MMHG | OXYGEN SATURATION: 98 % | DIASTOLIC BLOOD PRESSURE: 74 MMHG | HEART RATE: 73 BPM | TEMPERATURE: 96.8 F

## 2024-03-07 DIAGNOSIS — M54.16 LUMBAR NERVE ROOT IMPINGEMENT: ICD-10-CM

## 2024-03-07 DIAGNOSIS — M51.37 DEGENERATION OF LUMBAR OR LUMBOSACRAL INTERVERTEBRAL DISC: ICD-10-CM

## 2024-03-07 DIAGNOSIS — M48.02 CERVICAL STENOSIS OF SPINE: ICD-10-CM

## 2024-03-07 DIAGNOSIS — F51.01 PRIMARY INSOMNIA: ICD-10-CM

## 2024-03-07 DIAGNOSIS — G89.4 CHRONIC PAIN SYNDROME: ICD-10-CM

## 2024-03-07 DIAGNOSIS — G56.03 CARPAL TUNNEL SYNDROME, BILATERAL: ICD-10-CM

## 2024-03-07 DIAGNOSIS — M50.30 DDD (DEGENERATIVE DISC DISEASE), CERVICAL: ICD-10-CM

## 2024-03-07 DIAGNOSIS — M48.061 SPINAL STENOSIS, LUMBAR REGION, WITHOUT NEUROGENIC CLAUDICATION: ICD-10-CM

## 2024-03-07 DIAGNOSIS — M47.817 LUMBOSACRAL SPONDYLOSIS WITHOUT MYELOPATHY: ICD-10-CM

## 2024-03-07 RX ORDER — OXYCODONE HYDROCHLORIDE AND ACETAMINOPHEN 5; 325 MG/1; MG/1
1 TABLET ORAL EVERY 8 HOURS PRN
Qty: 90 TABLET | Refills: 0 | Status: SHIPPED | OUTPATIENT
Start: 2024-03-07 | End: 2024-04-06

## 2024-03-07 RX ORDER — LIDOCAINE 36 MG/1
1 PATCH TOPICAL DAILY
Qty: 90 PATCH | Refills: 0 | Status: SHIPPED | OUTPATIENT
Start: 2024-03-07 | End: 2024-06-05

## 2024-03-07 NOTE — PROGRESS NOTES
Arely Lara  1970  [id number]    HISTORY OF PRESENT ILLNESS: Ms. Lara is a 53 y.o. female returns for a follow up visit for pain management  She has a diagnosis of   1. Chronic pain syndrome    2. DDD (degenerative disc disease), cervical    3. Cervical stenosis of spine, mild    4. Degeneration of lumbar or lumbosacral intervertebral disc    5. Spinal stenosis, lumbar region, without neurogenic claudication    6. Lumbosacral spondylosis without myelopathy    7. Lumbar nerve root impingement, L2    8. Carpal tunnel syndrome, bilateral    9. Primary insomnia      As per Information Obtained from the PADT (Patient Assessment and Documentation Tool)    She complains of pain in the  left shoulder blade, left upper arm, lower back radiating down the left leg  She rates the pain 9/10 and describes it as aching. Current treatment regimen has helped relieve about 40% of the pain.  She denies any side effects from the current pain regimen. Patient reports that since the last follow up visit the physical functioning is unchanged, family/social relationships are unchanged, mood is unchanged sleep patterns are unchanged, and that the overall functioning is unchanged.  Patient denies misusing/abusing her narcotic pain medications or using any illegal drugs.      Upon obtaining medical history from Ms. Lara states that pain is manageable on current pain therapy. Takes the pain medications as prescribed. Mood/anxiety is stable. Sleep is fair with an average of 5-6 hours. Denies to having issues of constipation. Tolerating activities/house chores with moderate tenderness to the lower back.     ALLERGIES: Patients list of allergies were reviewed     MEDICATIONS: Ms. Lara list of medications were reviewed.Her current medications are   Outpatient Medications Prior to Visit   Medication Sig Dispense Refill    traZODone (DESYREL) 50 MG tablet TAKE 1 TABLET BY MOUTH AT  BEDTIME 90 tablet 3    docusate (COLACE, DULCOLAX) 100

## 2024-03-19 ASSESSMENT — PATIENT HEALTH QUESTIONNAIRE - PHQ9
1. LITTLE INTEREST OR PLEASURE IN DOING THINGS: NOT AT ALL
SUM OF ALL RESPONSES TO PHQ QUESTIONS 1-9: 0
1. LITTLE INTEREST OR PLEASURE IN DOING THINGS: NOT AT ALL
SUM OF ALL RESPONSES TO PHQ QUESTIONS 1-9: 0
SUM OF ALL RESPONSES TO PHQ9 QUESTIONS 1 & 2: 0
SUM OF ALL RESPONSES TO PHQ9 QUESTIONS 1 & 2: 0
SUM OF ALL RESPONSES TO PHQ QUESTIONS 1-9: 0
2. FEELING DOWN, DEPRESSED OR HOPELESS: NOT AT ALL
SUM OF ALL RESPONSES TO PHQ QUESTIONS 1-9: 0
2. FEELING DOWN, DEPRESSED OR HOPELESS: NOT AT ALL

## 2024-03-22 ENCOUNTER — OFFICE VISIT (OUTPATIENT)
Dept: PRIMARY CARE CLINIC | Age: 54
End: 2024-03-22
Payer: COMMERCIAL

## 2024-03-22 VITALS
TEMPERATURE: 97.8 F | HEIGHT: 65 IN | WEIGHT: 153.2 LBS | SYSTOLIC BLOOD PRESSURE: 124 MMHG | HEART RATE: 86 BPM | BODY MASS INDEX: 25.52 KG/M2 | DIASTOLIC BLOOD PRESSURE: 78 MMHG | OXYGEN SATURATION: 97 %

## 2024-03-22 DIAGNOSIS — Z13.1 DIABETES MELLITUS SCREENING: ICD-10-CM

## 2024-03-22 DIAGNOSIS — Z00.00 ROUTINE PHYSICAL EXAMINATION: Primary | ICD-10-CM

## 2024-03-22 DIAGNOSIS — Z13.220 LIPID SCREENING: ICD-10-CM

## 2024-03-22 DIAGNOSIS — Z79.01 CHRONIC ANTICOAGULATION: ICD-10-CM

## 2024-03-22 DIAGNOSIS — E55.9 VITAMIN D DEFICIENCY: ICD-10-CM

## 2024-03-22 DIAGNOSIS — F51.01 PRIMARY INSOMNIA: ICD-10-CM

## 2024-03-22 DIAGNOSIS — G89.4 CHRONIC PAIN SYNDROME: ICD-10-CM

## 2024-03-22 DIAGNOSIS — G47.9 SLEEP DISORDER: ICD-10-CM

## 2024-03-22 DIAGNOSIS — R76.0 ANTIPHOSPHOLIPID ANTIBODY POSITIVE: ICD-10-CM

## 2024-03-22 PROCEDURE — G8484 FLU IMMUNIZE NO ADMIN: HCPCS | Performed by: FAMILY MEDICINE

## 2024-03-22 PROCEDURE — 99396 PREV VISIT EST AGE 40-64: CPT | Performed by: FAMILY MEDICINE

## 2024-03-22 ASSESSMENT — ENCOUNTER SYMPTOMS
BACK PAIN: 1
ABDOMINAL PAIN: 0
EYES NEGATIVE: 1
WHEEZING: 0
SHORTNESS OF BREATH: 0
APNEA: 0

## 2024-03-22 NOTE — PROGRESS NOTES
SUBJECTIVE:  Patient ID: Arely Lara is a 53 y.o. female.  Chief Complaint:  Chief Complaint   Patient presents with    Annual Exam       HPI  53 year old Female  Annual   Menopause  Weight gain    Past Medical History:   Diagnosis Date    Diverticulitis 2020    Factor 5 Leiden mutation, heterozygous (HCC)     Freiberg's disease, right     foot    Hx of blood clots     PONV (postoperative nausea and vomiting)     Psoriatic arthritis (HCC)      Past Surgical History:   Procedure Laterality Date    CARPAL TUNNEL RELEASE Bilateral      SECTION  2001     x1    FINGER TRIGGER RELEASE Bilateral     FOOT SURGERY Right     HEMICOLECTOMY N/A 3/25/2022    ROBOTIC SIGMOID COLECTOMY, SPLENIC FLEXURE, OMENTAL FLAP performed by Rodney Ruffin MD at [facility] OR    PAIN MANAGEMENT PROCEDURE Left 10/26/2020    LEFT L4 AND L5 TRANSFORAMINAL EPDIURAL STEROID INJECTION WITH FLUOROSCOPY (86577, 14727) performed by Margaret Moody MD at [facility]    PAIN MANAGEMENT PROCEDURE Left 2020    LEFT LUMBAR FOUR LUMBAR FIVE TRANSFORAMINAL  EPIDURAL STEROID INJECTION SITE CONFIRMED BY FLUOROSCOPY performed by Margaret Moody MD at [facility]     No Known Allergies    Family History   Problem Relation Age of Onset    No Known Problems Mother         Healthy 81 year old    Heart Disease Father 83        +Stent    High Cholesterol Father     Other Brother         do not talk +drug      Social History     Social History Narrative    Divorce     remote    BF x 7 year    Son 23 year old  @ [employer] graduate + Ft job    & 2 step son 22 & 20    FT job    Moved to smaller home with BF    All children @[employer]    No tobacco       Patient Active Problem List   Diagnosis    Degeneration of lumbar or lumbosacral intervertebral disc    Lumbosacral spondylosis without myelopathy    Lumbar stenosis    Chronic pain syndrome    Lumbar nerve root impingement, L2    Primary insomnia    Coagulation disorder (HCC)    Chronic left sacroiliac

## 2024-04-04 ENCOUNTER — OFFICE VISIT (OUTPATIENT)
Dept: PAIN MANAGEMENT | Age: 54
End: 2024-04-04

## 2024-04-04 VITALS
DIASTOLIC BLOOD PRESSURE: 76 MMHG | OXYGEN SATURATION: 97 % | HEART RATE: 77 BPM | TEMPERATURE: 97 F | SYSTOLIC BLOOD PRESSURE: 108 MMHG

## 2024-04-04 DIAGNOSIS — K59.03 DRUG INDUCED CONSTIPATION: ICD-10-CM

## 2024-04-04 DIAGNOSIS — M25.50 POLYARTHRALGIA: ICD-10-CM

## 2024-04-04 DIAGNOSIS — F51.01 PRIMARY INSOMNIA: ICD-10-CM

## 2024-04-04 DIAGNOSIS — G89.4 CHRONIC PAIN SYNDROME: ICD-10-CM

## 2024-04-04 DIAGNOSIS — M50.30 DDD (DEGENERATIVE DISC DISEASE), CERVICAL: ICD-10-CM

## 2024-04-04 DIAGNOSIS — M48.02 CERVICAL STENOSIS OF SPINE: ICD-10-CM

## 2024-04-04 DIAGNOSIS — M48.061 SPINAL STENOSIS, LUMBAR REGION, WITHOUT NEUROGENIC CLAUDICATION: ICD-10-CM

## 2024-04-04 DIAGNOSIS — G56.03 CARPAL TUNNEL SYNDROME, BILATERAL: ICD-10-CM

## 2024-04-04 DIAGNOSIS — M47.817 LUMBOSACRAL SPONDYLOSIS WITHOUT MYELOPATHY: ICD-10-CM

## 2024-04-04 DIAGNOSIS — M54.16 LUMBAR NERVE ROOT IMPINGEMENT: ICD-10-CM

## 2024-04-04 DIAGNOSIS — M51.37 DEGENERATION OF LUMBAR OR LUMBOSACRAL INTERVERTEBRAL DISC: ICD-10-CM

## 2024-04-04 RX ORDER — OXYCODONE HYDROCHLORIDE AND ACETAMINOPHEN 5; 325 MG/1; MG/1
1 TABLET ORAL EVERY 8 HOURS PRN
Qty: 90 TABLET | Refills: 0 | Status: SHIPPED | OUTPATIENT
Start: 2024-04-04 | End: 2024-05-04

## 2024-04-04 RX ORDER — LIDOCAINE 36 MG/1
1 PATCH TOPICAL DAILY
Qty: 90 PATCH | Refills: 0 | Status: SHIPPED | OUTPATIENT
Start: 2024-04-04 | End: 2024-07-03

## 2024-04-04 NOTE — PROGRESS NOTES
hand  Cardiovascular: Normal rate, regular rhythm, normal heart sounds, and does not have murmur.     Pulmonary/Chest: Effort normal. No respiratory distress. She does not have wheezes in the lung fields. She has no rales.     Neurological/Psychiatric:She is alert and oriented to person, place, and time. Coordination is  normal.  Her mood isAppropriate and affect is Neutral/Euthymic(normal) .     Prescription pain medication monitoring:                  MEDD current = 22.50              ORT Score = 0 low risk              Other Risk factors - (mood) Stable              Date of Last Medication Agreement: 1/11/24              Date Naloxone prescribed: 0              UDT:                          Date of last UDT: 8/17/23                          Adverse report: No              OARRS:                          Checked today: Yes                          Adverse report: No    IMPRESSION:   1. Chronic pain syndrome    2. DDD (degenerative disc disease), cervical    3. Cervical stenosis of spine, mild    4. Degeneration of lumbar or lumbosacral intervertebral disc    5. Lumbosacral spondylosis without myelopathy    6. Spinal stenosis, lumbar region, without neurogenic claudication    7. Lumbar nerve root impingement, L2    8. Polyarthralgia    9. Carpal tunnel syndrome, bilateral    10. Primary insomnia    11. Drug induced constipation      PLAN:  Informed verbal consent was obtained:  -Patient's opioid therapy will be maintained at current dose  -Home exercises/Marley exercises recommended  -CBT techniques- relaxation therapies such as biofeedback, mindfulness based stress reduction, imagery, cognitive restructuring, problem solving discussed with patient   -She was advised weight reduction, diet changes- 800-1200 negar diet, diet diary, exercising, nutritional  consult increased physical activity as tolerated   -Last UDS 8/17/23 consistent  -No follow-ups on file.     Analgesic Plan:              Continue present

## 2024-04-05 ENCOUNTER — TELEPHONE (OUTPATIENT)
Dept: PAIN MANAGEMENT | Age: 54
End: 2024-04-05

## 2024-04-05 NOTE — TELEPHONE ENCOUNTER
Submitted PA for Clari [facility] Key: [id number] STATUS: PENDING.    Follow up done daily; if no decision with in three days we will refax.  If another three days goes by with no decision will call the insurance for status.

## 2024-04-05 NOTE — TELEPHONE ENCOUNTER
This medication is APPROVED through 10/5/2024.    [facility] has been notified.   Thank you!

## 2024-05-02 ENCOUNTER — OFFICE VISIT (OUTPATIENT)
Dept: PAIN MANAGEMENT | Age: 54
End: 2024-05-02

## 2024-05-02 VITALS
OXYGEN SATURATION: 98 % | SYSTOLIC BLOOD PRESSURE: 134 MMHG | TEMPERATURE: 97.2 F | HEART RATE: 76 BPM | DIASTOLIC BLOOD PRESSURE: 82 MMHG

## 2024-05-02 DIAGNOSIS — Z13.1 DIABETES MELLITUS SCREENING: ICD-10-CM

## 2024-05-02 DIAGNOSIS — M48.061 SPINAL STENOSIS, LUMBAR REGION, WITHOUT NEUROGENIC CLAUDICATION: ICD-10-CM

## 2024-05-02 DIAGNOSIS — M50.30 DDD (DEGENERATIVE DISC DISEASE), CERVICAL: ICD-10-CM

## 2024-05-02 DIAGNOSIS — M25.50 POLYARTHRALGIA: ICD-10-CM

## 2024-05-02 DIAGNOSIS — M48.02 CERVICAL STENOSIS OF SPINE: ICD-10-CM

## 2024-05-02 DIAGNOSIS — M47.817 LUMBOSACRAL SPONDYLOSIS WITHOUT MYELOPATHY: ICD-10-CM

## 2024-05-02 DIAGNOSIS — Z00.00 ROUTINE PHYSICAL EXAMINATION: ICD-10-CM

## 2024-05-02 DIAGNOSIS — G56.03 CARPAL TUNNEL SYNDROME, BILATERAL: ICD-10-CM

## 2024-05-02 DIAGNOSIS — F51.01 PRIMARY INSOMNIA: ICD-10-CM

## 2024-05-02 DIAGNOSIS — E55.9 VITAMIN D DEFICIENCY: ICD-10-CM

## 2024-05-02 DIAGNOSIS — Z13.220 LIPID SCREENING: ICD-10-CM

## 2024-05-02 DIAGNOSIS — M54.16 LUMBAR NERVE ROOT IMPINGEMENT: ICD-10-CM

## 2024-05-02 DIAGNOSIS — M51.37 DEGENERATION OF LUMBAR OR LUMBOSACRAL INTERVERTEBRAL DISC: ICD-10-CM

## 2024-05-02 DIAGNOSIS — S39.012A LUMBOSACRAL STRAIN, INITIAL ENCOUNTER: ICD-10-CM

## 2024-05-02 DIAGNOSIS — K59.03 DRUG INDUCED CONSTIPATION: ICD-10-CM

## 2024-05-02 DIAGNOSIS — G89.4 CHRONIC PAIN SYNDROME: ICD-10-CM

## 2024-05-02 LAB
25(OH)D3 SERPL-MCNC: 36.2 NG/ML
ALBUMIN SERPL-MCNC: 4.8 G/DL (ref 3.4–5)
ALBUMIN/GLOB SERPL: 2.1 {RATIO} (ref 1.1–2.2)
ALP SERPL-CCNC: 124 U/L (ref 40–129)
ALT SERPL-CCNC: 23 U/L (ref 10–40)
ANION GAP SERPL CALCULATED.3IONS-SCNC: 12 MMOL/L (ref 3–16)
AST SERPL-CCNC: 20 U/L (ref 15–37)
BILIRUB SERPL-MCNC: <0.2 MG/DL (ref 0–1)
BUN SERPL-MCNC: 21 MG/DL (ref 7–20)
CALCIUM SERPL-MCNC: 9.9 MG/DL (ref 8.3–10.6)
CHLORIDE SERPL-SCNC: 99 MMOL/L (ref 99–110)
CHOLEST SERPL-MCNC: 261 MG/DL (ref 0–199)
CO2 SERPL-SCNC: 28 MMOL/L (ref 21–32)
CREAT SERPL-MCNC: 0.8 MG/DL (ref 0.6–1.1)
DEPRECATED RDW RBC AUTO: 12.5 % (ref 12.4–15.4)
GFR SERPLBLD CREATININE-BSD FMLA CKD-EPI: 88 ML/MIN/{1.73_M2}
GLUCOSE SERPL-MCNC: 101 MG/DL (ref 70–99)
HCT VFR BLD AUTO: 37.4 % (ref 36–48)
HDLC SERPL-MCNC: 73 MG/DL (ref 40–60)
HGB BLD-MCNC: 12.9 G/DL (ref 12–16)
LDLC SERPL CALC-MCNC: 171 MG/DL
MCH RBC QN AUTO: 33.1 PG (ref 26–34)
MCHC RBC AUTO-ENTMCNC: 34.5 G/DL (ref 31–36)
MCV RBC AUTO: 95.9 FL (ref 80–100)
PLATELET # BLD AUTO: 284 K/UL (ref 135–450)
PMV BLD AUTO: 7.9 FL (ref 5–10.5)
POTASSIUM SERPL-SCNC: 4.1 MMOL/L (ref 3.5–5.1)
PROT SERPL-MCNC: 7.1 G/DL (ref 6.4–8.2)
RBC # BLD AUTO: 3.9 M/UL (ref 4–5.2)
SODIUM SERPL-SCNC: 139 MMOL/L (ref 136–145)
TRIGL SERPL-MCNC: 85 MG/DL (ref 0–150)
TSH SERPL DL<=0.005 MIU/L-ACNC: 2.38 UIU/ML (ref 0.27–4.2)
VLDLC SERPL CALC-MCNC: 17 MG/DL
WBC # BLD AUTO: 7.1 K/UL (ref 4–11)

## 2024-05-02 RX ORDER — LIDOCAINE 36 MG/1
1 PATCH TOPICAL DAILY
Qty: 90 PATCH | Refills: 0 | Status: SHIPPED | OUTPATIENT
Start: 2024-05-02 | End: 2024-07-31

## 2024-05-02 RX ORDER — OXYCODONE HYDROCHLORIDE AND ACETAMINOPHEN 5; 325 MG/1; MG/1
1 TABLET ORAL EVERY 6 HOURS PRN
Qty: 112 TABLET | Refills: 0 | Status: SHIPPED | OUTPATIENT
Start: 2024-05-02 | End: 2024-05-30

## 2024-05-02 RX ORDER — TIZANIDINE 4 MG/1
2 TABLET ORAL NIGHTLY
Qty: 10 TABLET | Refills: 0 | Status: SHIPPED | OUTPATIENT
Start: 2024-05-02 | End: 2024-05-12

## 2024-05-02 NOTE — PROGRESS NOTES
achieving/maintaining therapeutic goals with special emphasis on  1. Improvement in perceived interfernce  of pain with ADL's. Ability to do home exercises independently. Ability to do household chores indoor and/or outdoor work and social and leisure activities.Improve psychosocial and physical functioning. - she is not showing any significant progress/or showing regression  towards this goal and reassessment and adjustment of goals/treatment have been made.     She was advised against drinking alcohol with the narcotic pain medicines, advised against driving or handling machinery while adjusting the dose of medicines or if having cognitive  issues related to the current medications.Risk of overdose and death, if medicines not taken as prescribed, were also discussed. If the patient develops new symptoms or if the symptoms worsen, the patient should call the office.    While transcribing every attempt was made to maintain the accuracy of the note in terms of it's contents,there may have been some errors made inadvertently.  Thank you for allowing me to participate in the care of this patient.    Beth Malone CNP.    Cc: Theresa Manning MD

## 2024-05-03 DIAGNOSIS — E78.9 LIPID DISORDER: Primary | ICD-10-CM

## 2024-05-03 LAB
EST. AVERAGE GLUCOSE BLD GHB EST-MCNC: 96.8 MG/DL
HBA1C MFR BLD: 5 %

## 2024-05-03 RX ORDER — ROSUVASTATIN CALCIUM 10 MG/1
10 TABLET, COATED ORAL NIGHTLY
Qty: 90 TABLET | Refills: 1 | Status: SHIPPED | OUTPATIENT
Start: 2024-05-03

## 2024-05-30 ENCOUNTER — OFFICE VISIT (OUTPATIENT)
Dept: PAIN MANAGEMENT | Age: 54
End: 2024-05-30

## 2024-05-30 VITALS
HEART RATE: 70 BPM | SYSTOLIC BLOOD PRESSURE: 116 MMHG | DIASTOLIC BLOOD PRESSURE: 76 MMHG | TEMPERATURE: 97.2 F | OXYGEN SATURATION: 97 %

## 2024-05-30 DIAGNOSIS — K59.03 DRUG INDUCED CONSTIPATION: ICD-10-CM

## 2024-05-30 DIAGNOSIS — F51.01 PRIMARY INSOMNIA: ICD-10-CM

## 2024-05-30 DIAGNOSIS — M47.817 LUMBOSACRAL SPONDYLOSIS WITHOUT MYELOPATHY: ICD-10-CM

## 2024-05-30 DIAGNOSIS — M51.37 DEGENERATION OF LUMBAR OR LUMBOSACRAL INTERVERTEBRAL DISC: ICD-10-CM

## 2024-05-30 DIAGNOSIS — M48.061 SPINAL STENOSIS, LUMBAR REGION, WITHOUT NEUROGENIC CLAUDICATION: ICD-10-CM

## 2024-05-30 DIAGNOSIS — M54.16 LUMBAR NERVE ROOT IMPINGEMENT: ICD-10-CM

## 2024-05-30 DIAGNOSIS — M25.50 POLYARTHRALGIA: ICD-10-CM

## 2024-05-30 DIAGNOSIS — G56.03 CARPAL TUNNEL SYNDROME, BILATERAL: ICD-10-CM

## 2024-05-30 DIAGNOSIS — M48.02 CERVICAL STENOSIS OF SPINE: ICD-10-CM

## 2024-05-30 DIAGNOSIS — M50.30 DDD (DEGENERATIVE DISC DISEASE), CERVICAL: ICD-10-CM

## 2024-05-30 DIAGNOSIS — G89.4 CHRONIC PAIN SYNDROME: ICD-10-CM

## 2024-05-30 RX ORDER — OXYCODONE HYDROCHLORIDE AND ACETAMINOPHEN 5; 325 MG/1; MG/1
1 TABLET ORAL EVERY 6 HOURS PRN
Qty: 112 TABLET | Refills: 0 | Status: SHIPPED | OUTPATIENT
Start: 2024-05-30 | End: 2024-06-27

## 2024-05-30 RX ORDER — LIDOCAINE 36 MG/1
1 PATCH TOPICAL DAILY
Qty: 90 PATCH | Refills: 0 | Status: SHIPPED | OUTPATIENT
Start: 2024-05-30 | End: 2024-08-28

## 2024-05-30 NOTE — PROGRESS NOTES
Arely Lara  1970  [id number]    HISTORY OF PRESENT ILLNESS: Ms. Lara is a 53 y.o. female returns for a follow up visit for pain management  She has a diagnosis of   1. Chronic pain syndrome    2. Degeneration of lumbar or lumbosacral intervertebral disc    3. Spinal stenosis, lumbar region, without neurogenic claudication    4. Lumbosacral spondylosis without myelopathy    5. Lumbar nerve root impingement, L2    6. DDD (degenerative disc disease), cervical    7. Cervical stenosis of spine, mild    8. Carpal tunnel syndrome, bilateral    9. Polyarthralgia    10. Primary insomnia    11. Drug induced constipation      As per Information Obtained from the PADT (Patient Assessment and Documentation Tool)    She complains of pain in the  lower back radiating down both legs  She rates the pain 8/10 and describes it as aching. Current treatment regimen has helped relieve about 0% of the pain.  She denies any side effects from the current pain regimen. Patient reports that since the last follow up visit the physical functioning is worse, family/social relationships are unchanged, mood is worse sleep patterns are worse, and that the overall functioning is worse.  Patient denies misusing/abusing her narcotic pain medications or using any illegal drugs.      Upon obtaining medical history from Ms. Lara states that pain is manageable on current pain therapy. Takes the pain medications as prescribed. Patient currently pending approval for LESI through Dr. Moody, she also had surgical evaluation to the lumbar spine, currently waiting to see another surgeon. Reports having peripheral swelling to the legs/hands. Mood/anxiety is stable. Sleep is fair with an average of 5-6 hours. Denies to having issues of constipation. Tolerating activities/house chores with moderate tenderness to the lower back.     MRI of the Lumbar spine will be scanned into the media portion of the chart    ALLERGIES: Patients list of allergies

## 2024-05-31 ENCOUNTER — OFFICE VISIT (OUTPATIENT)
Dept: PRIMARY CARE CLINIC | Age: 54
End: 2024-05-31
Payer: COMMERCIAL

## 2024-05-31 VITALS
WEIGHT: 156.8 LBS | DIASTOLIC BLOOD PRESSURE: 82 MMHG | HEART RATE: 70 BPM | SYSTOLIC BLOOD PRESSURE: 124 MMHG | OXYGEN SATURATION: 97 % | TEMPERATURE: 97.3 F | BODY MASS INDEX: 26.09 KG/M2

## 2024-05-31 DIAGNOSIS — R60.0 PERIPHERAL EDEMA: Primary | ICD-10-CM

## 2024-05-31 DIAGNOSIS — G89.4 CHRONIC PAIN SYNDROME: ICD-10-CM

## 2024-05-31 DIAGNOSIS — Z79.01 CHRONIC ANTICOAGULATION: ICD-10-CM

## 2024-05-31 PROCEDURE — G8419 CALC BMI OUT NRM PARAM NOF/U: HCPCS | Performed by: FAMILY MEDICINE

## 2024-05-31 PROCEDURE — 99213 OFFICE O/P EST LOW 20 MIN: CPT | Performed by: FAMILY MEDICINE

## 2024-05-31 PROCEDURE — 3017F COLORECTAL CA SCREEN DOC REV: CPT | Performed by: FAMILY MEDICINE

## 2024-05-31 PROCEDURE — G8427 DOCREV CUR MEDS BY ELIG CLIN: HCPCS | Performed by: FAMILY MEDICINE

## 2024-05-31 PROCEDURE — 1036F TOBACCO NON-USER: CPT | Performed by: FAMILY MEDICINE

## 2024-05-31 RX ORDER — FUROSEMIDE 20 MG/1
20 TABLET ORAL DAILY PRN
Qty: 30 TABLET | Refills: 1 | Status: SHIPPED | OUTPATIENT
Start: 2024-05-31

## 2024-05-31 SDOH — ECONOMIC STABILITY: INCOME INSECURITY: HOW HARD IS IT FOR YOU TO PAY FOR THE VERY BASICS LIKE FOOD, HOUSING, MEDICAL CARE, AND HEATING?: NOT VERY HARD

## 2024-05-31 SDOH — ECONOMIC STABILITY: FOOD INSECURITY: WITHIN THE PAST 12 MONTHS, YOU WORRIED THAT YOUR FOOD WOULD RUN OUT BEFORE YOU GOT MONEY TO BUY MORE.: NEVER TRUE

## 2024-05-31 SDOH — ECONOMIC STABILITY: FOOD INSECURITY: WITHIN THE PAST 12 MONTHS, THE FOOD YOU BOUGHT JUST DIDN'T LAST AND YOU DIDN'T HAVE MONEY TO GET MORE.: NEVER TRUE

## 2024-05-31 ASSESSMENT — ENCOUNTER SYMPTOMS
EYES NEGATIVE: 1
SHORTNESS OF BREATH: 0
APNEA: 0
WHEEZING: 0
BACK PAIN: 1

## 2024-05-31 ASSESSMENT — PATIENT HEALTH QUESTIONNAIRE - PHQ9
SUM OF ALL RESPONSES TO PHQ QUESTIONS 1-9: 0
2. FEELING DOWN, DEPRESSED OR HOPELESS: NOT AT ALL
SUM OF ALL RESPONSES TO PHQ QUESTIONS 1-9: 0
1. LITTLE INTEREST OR PLEASURE IN DOING THINGS: NOT AT ALL
SUM OF ALL RESPONSES TO PHQ QUESTIONS 1-9: 0
SUM OF ALL RESPONSES TO PHQ QUESTIONS 1-9: 0
SUM OF ALL RESPONSES TO PHQ9 QUESTIONS 1 & 2: 0

## 2024-05-31 NOTE — PROGRESS NOTES
SUBJECTIVE:  Patient ID: Arely Lara is a 53 y.o. female.  Chief Complaint:  Chief Complaint   Patient presents with    Edema     Hands and legs started this week       HPI  53 year old Female   Hands & lower leg swell off/on  No shortness of breath  Cardiac evaluation stress test /Echo/Monitor  next visit 2024  Rheumatology Dr Espnio may change to biologic agent   Pt isn't happy Rx methotrexate  Cardiology electophysiology  specialist Dr Ortiz   Chronic Pain specialist  Hematology Dr Darryl Joya    Chronic anticoagulation Rx Eliquis    Past Medical History:   Diagnosis Date    Anticoagulant long-term use 2020    Chronic back pain     Diverticulitis 2020    Factor 5 Leiden mutation, heterozygous (HCC)     Freiberg's disease, right     foot    GERD (gastroesophageal reflux disease)     Hx of blood clots     Osteoarthritis     Sometime in  I think    PONV (postoperative nausea and vomiting)     Psoriatic arthritis (HCC)      Past Surgical History:   Procedure Laterality Date    CARPAL TUNNEL RELEASE Bilateral      SECTION  2001     x1    FINGER TRIGGER RELEASE Bilateral     FOOT SURGERY Right     HEMICOLECTOMY N/A 2022    ROBOTIC SIGMOID COLECTOMY, SPLENIC FLEXURE, OMENTAL FLAP performed by Rodney Ruffin MD at [facility] OR    PAIN MANAGEMENT PROCEDURE Left 10/26/2020    LEFT L4 AND L5 TRANSFORAMINAL EPDIURAL STEROID INJECTION WITH FLUOROSCOPY (28081, [id number]) performed by Margaret Moody MD at [facility]    PAIN MANAGEMENT PROCEDURE Left 2020    LEFT LUMBAR FOUR LUMBAR FIVE TRANSFORAMINAL  EPIDURAL STEROID INJECTION SITE CONFIRMED BY FLUOROSCOPY performed by Margaret Moody MD at [facility] OR    SUBTOTAL COLECTOMY  2022     No Known Allergies      Patient Active Problem List   Diagnosis    Degeneration of lumbar or lumbosacral intervertebral disc    Lumbosacral spondylosis without myelopathy    Lumbar stenosis    Chronic pain syndrome    Lumbar nerve root impingement, L2

## 2024-06-24 ENCOUNTER — OFFICE VISIT (OUTPATIENT)
Dept: PAIN MANAGEMENT | Age: 54
End: 2024-06-24
Payer: COMMERCIAL

## 2024-06-24 VITALS
DIASTOLIC BLOOD PRESSURE: 77 MMHG | TEMPERATURE: 97.2 F | OXYGEN SATURATION: 98 % | SYSTOLIC BLOOD PRESSURE: 121 MMHG | HEART RATE: 66 BPM

## 2024-06-24 DIAGNOSIS — M25.50 POLYARTHRALGIA: ICD-10-CM

## 2024-06-24 DIAGNOSIS — G56.03 CARPAL TUNNEL SYNDROME, BILATERAL: ICD-10-CM

## 2024-06-24 DIAGNOSIS — K59.03 DRUG INDUCED CONSTIPATION: ICD-10-CM

## 2024-06-24 DIAGNOSIS — M47.817 LUMBOSACRAL SPONDYLOSIS WITHOUT MYELOPATHY: ICD-10-CM

## 2024-06-24 DIAGNOSIS — M48.061 SPINAL STENOSIS, LUMBAR REGION, WITHOUT NEUROGENIC CLAUDICATION: ICD-10-CM

## 2024-06-24 DIAGNOSIS — G89.4 CHRONIC PAIN SYNDROME: ICD-10-CM

## 2024-06-24 DIAGNOSIS — F51.01 PRIMARY INSOMNIA: ICD-10-CM

## 2024-06-24 DIAGNOSIS — M51.37 DEGENERATION OF LUMBAR OR LUMBOSACRAL INTERVERTEBRAL DISC: ICD-10-CM

## 2024-06-24 DIAGNOSIS — M48.02 CERVICAL STENOSIS OF SPINE: ICD-10-CM

## 2024-06-24 DIAGNOSIS — M50.30 DDD (DEGENERATIVE DISC DISEASE), CERVICAL: ICD-10-CM

## 2024-06-24 DIAGNOSIS — M54.16 LUMBAR NERVE ROOT IMPINGEMENT: ICD-10-CM

## 2024-06-24 PROCEDURE — 99214 OFFICE O/P EST MOD 30 MIN: CPT | Performed by: NURSE PRACTITIONER

## 2024-06-24 PROCEDURE — G8419 CALC BMI OUT NRM PARAM NOF/U: HCPCS | Performed by: NURSE PRACTITIONER

## 2024-06-24 PROCEDURE — 1036F TOBACCO NON-USER: CPT | Performed by: NURSE PRACTITIONER

## 2024-06-24 PROCEDURE — G8427 DOCREV CUR MEDS BY ELIG CLIN: HCPCS | Performed by: NURSE PRACTITIONER

## 2024-06-24 PROCEDURE — 3017F COLORECTAL CA SCREEN DOC REV: CPT | Performed by: NURSE PRACTITIONER

## 2024-06-24 RX ORDER — LIDOCAINE 36 MG/1
1 PATCH TOPICAL DAILY
Qty: 90 PATCH | Refills: 0 | Status: SHIPPED | OUTPATIENT
Start: 2024-06-24 | End: 2024-09-22

## 2024-06-24 RX ORDER — OXYCODONE HYDROCHLORIDE AND ACETAMINOPHEN 5; 325 MG/1; MG/1
1 TABLET ORAL EVERY 6 HOURS PRN
Qty: 112 TABLET | Refills: 0 | Status: SHIPPED | OUTPATIENT
Start: 2024-06-24 | End: 2024-07-22

## 2024-06-24 NOTE — PROGRESS NOTES
Arely Lara  1970  [id number]    HISTORY OF PRESENT ILLNESS: Ms. Lara is a 53 y.o. female returns for a follow up visit for pain management  She has a diagnosis of   1. Chronic pain syndrome    2. DDD (degenerative disc disease), cervical    3. Cervical stenosis of spine, mild    4. Degeneration of lumbar or lumbosacral intervertebral disc    5. Spinal stenosis, lumbar region, without neurogenic claudication    6. Lumbosacral spondylosis without myelopathy    7. Lumbar nerve root impingement, L2    8. Polyarthralgia    9. Carpal tunnel syndrome, bilateral    10. Primary insomnia    11. Drug induced constipation      As per Information Obtained from the PADT (Patient Assessment and Documentation Tool)    She complains of pain in the  both elbows, lower back, both legs  She rates the pain 8/10 and describes it as aching. Current treatment regimen has helped relieve about 40% of the pain.  She denies any side effects from the current pain regimen. Patient reports that since the last follow up visit the physical functioning is unchanged, family/social relationships are unchanged, mood is unchanged sleep patterns are unchanged, and that the overall functioning is unchanged.  Patient denies misusing/abusing her narcotic pain medications or using any illegal drugs.      Upon obtaining medical history from Ms. Lara states that pain is manageable on current pain therapy. Takes the pain medications as prescribed. Maintains f/u with rheumatology for inflammation of the joints. Mood/anxiety is stable. Sleep is fair with an average of 5-6 hours. Denies to having issues of constipation. Tolerating activities/house chores with moderate tenderness to the lower back.     ALLERGIES: Patients list of allergies were reviewed     MEDICATIONS: Ms. Lara list of medications were reviewed.Her current medications are   Outpatient Medications Prior to Visit   Medication Sig Dispense Refill    furosemide (LASIX) 20 MG tablet Take

## 2024-07-12 ENCOUNTER — HOSPITAL ENCOUNTER (OUTPATIENT)
Dept: MRI IMAGING | Age: 54
Discharge: HOME | End: 2024-07-12
Attending: INTERNAL MEDICINE
Payer: COMMERCIAL

## 2024-07-12 DIAGNOSIS — M13.80 SERONEGATIVE INFLAMMATORY ARTHRITIS: ICD-10-CM

## 2024-07-12 PROCEDURE — 6360000004 HC RX CONTRAST MEDICATION: Performed by: INTERNAL MEDICINE

## 2024-07-12 PROCEDURE — A9579 GAD-BASE MR CONTRAST NOS,1ML: HCPCS | Performed by: INTERNAL MEDICINE

## 2024-07-12 PROCEDURE — 73220 MRI UPPR EXTREMITY W/O&W/DYE: CPT

## 2024-07-12 PROCEDURE — 2580000003 HC RX 258: Performed by: INTERNAL MEDICINE

## 2024-07-12 RX ORDER — SODIUM CHLORIDE 0.9 % (FLUSH) 0.9 %
5-40 SYRINGE (ML) INJECTION 2 TIMES DAILY
Status: DISCONTINUED | OUTPATIENT
Start: 2024-07-12 | End: 2024-07-13 | Stop reason: HOSPADM

## 2024-07-12 RX ADMIN — SODIUM CHLORIDE, PRESERVATIVE FREE 10 ML: 5 INJECTION INTRAVENOUS at 13:55

## 2024-07-12 RX ADMIN — GADOTERIDOL 14 ML: 279.3 INJECTION, SOLUTION INTRAVENOUS at 13:56

## 2024-07-22 ENCOUNTER — OFFICE VISIT (OUTPATIENT)
Dept: PAIN MANAGEMENT | Age: 54
End: 2024-07-22
Payer: COMMERCIAL

## 2024-07-22 VITALS — SYSTOLIC BLOOD PRESSURE: 107 MMHG | HEART RATE: 64 BPM | OXYGEN SATURATION: 98 % | DIASTOLIC BLOOD PRESSURE: 72 MMHG

## 2024-07-22 DIAGNOSIS — M47.817 LUMBOSACRAL SPONDYLOSIS WITHOUT MYELOPATHY: ICD-10-CM

## 2024-07-22 DIAGNOSIS — M50.30 DDD (DEGENERATIVE DISC DISEASE), CERVICAL: ICD-10-CM

## 2024-07-22 DIAGNOSIS — M51.37 DEGENERATION OF LUMBAR OR LUMBOSACRAL INTERVERTEBRAL DISC: ICD-10-CM

## 2024-07-22 DIAGNOSIS — M48.02 CERVICAL STENOSIS OF SPINE: ICD-10-CM

## 2024-07-22 DIAGNOSIS — M25.50 POLYARTHRALGIA: ICD-10-CM

## 2024-07-22 DIAGNOSIS — G56.03 CARPAL TUNNEL SYNDROME, BILATERAL: ICD-10-CM

## 2024-07-22 DIAGNOSIS — M54.16 LUMBAR NERVE ROOT IMPINGEMENT: ICD-10-CM

## 2024-07-22 DIAGNOSIS — M48.061 SPINAL STENOSIS, LUMBAR REGION, WITHOUT NEUROGENIC CLAUDICATION: ICD-10-CM

## 2024-07-22 DIAGNOSIS — K59.03 DRUG INDUCED CONSTIPATION: ICD-10-CM

## 2024-07-22 DIAGNOSIS — F51.01 PRIMARY INSOMNIA: ICD-10-CM

## 2024-07-22 DIAGNOSIS — G89.4 CHRONIC PAIN SYNDROME: ICD-10-CM

## 2024-07-22 PROCEDURE — 1036F TOBACCO NON-USER: CPT | Performed by: NURSE PRACTITIONER

## 2024-07-22 PROCEDURE — G8427 DOCREV CUR MEDS BY ELIG CLIN: HCPCS | Performed by: NURSE PRACTITIONER

## 2024-07-22 PROCEDURE — G8419 CALC BMI OUT NRM PARAM NOF/U: HCPCS | Performed by: NURSE PRACTITIONER

## 2024-07-22 PROCEDURE — 99214 OFFICE O/P EST MOD 30 MIN: CPT | Performed by: NURSE PRACTITIONER

## 2024-07-22 PROCEDURE — 3017F COLORECTAL CA SCREEN DOC REV: CPT | Performed by: NURSE PRACTITIONER

## 2024-07-22 NOTE — PROGRESS NOTES
were reviewed.Her current medications are   Outpatient Medications Prior to Visit   Medication Sig Dispense Refill    Lidocaine (ZTLIDO) 1.8 % PTCH Apply 1 patch topically daily 90 patch 0    oxyCODONE-acetaminophen (PERCOCET) 5-325 MG per tablet Take 1 tablet by mouth every 6 hours as needed for Pain for up to 28 days. Take no more than 2-3 tabs orally prn Max Daily Amount: 4 tablets 112 tablet 0    furosemide (LASIX) 20 MG tablet Take 1 tablet by mouth daily as needed (swelling) 30 tablet 1    rosuvastatin (CRESTOR) 10 MG tablet Take 1 tablet by mouth nightly 90 tablet 1    traZODone (DESYREL) 50 MG tablet TAKE 1 TABLET BY MOUTH AT  BEDTIME 90 tablet 3    docusate (COLACE, DULCOLAX) 100 MG CAPS Take 100 mg by mouth as needed      ibuprofen (ADVIL;MOTRIN) 600 MG tablet Take 1 tablet by mouth every 6 hours as needed      methotrexate (RHEUMATREX) 2.5 MG chemo tablet Take by mouth once a week      folic acid (FOLVITE) 1 MG tablet Take 1 tablet by mouth daily      buPROPion (WELLBUTRIN XL) 300 MG extended release tablet Take 1 tablet by mouth every morning      vitamin B-12 (CYANOCOBALAMIN) 500 MCG tablet Take 1 tablet by mouth daily      ELIQUIS 5 MG TABS tablet TAKE 1 TABLET BY MOUTH TWO TIMES A DAY  60 tablet 0     No facility-administered medications prior to visit.     SOCIAL/FAMILY/PAST MEDICAL HISTORY: Ms. Lara social, family and past medical history was reviewed.     REVIEW OF SYSTEMS:    Respiratory: Negative for apnea, chest tightness and shortness of breath or change in baseline breathing.    Gastrointestinal: Negative for nausea, vomiting, abdominal pain, diarrhea, constipation, blood in stool and abdominal distention.     PHYSICAL EXAM:   Nursing note and vitals reviewed. /72   Pulse 64   LMP  (LMP Unknown)   SpO2 98%   Constitutional: She appears well-developed and well-nourished. No acute distress.   Skin: Skin is warm and dry, good turgor. No rash noted. She is not

## 2024-08-05 ENCOUNTER — TELEPHONE (OUTPATIENT)
Dept: PAIN MANAGEMENT | Age: 54
End: 2024-08-05

## 2024-08-05 DIAGNOSIS — M54.16 LUMBAR NERVE ROOT IMPINGEMENT: ICD-10-CM

## 2024-08-05 DIAGNOSIS — G89.4 CHRONIC PAIN SYNDROME: ICD-10-CM

## 2024-08-05 DIAGNOSIS — M51.37 DEGENERATION OF LUMBAR OR LUMBOSACRAL INTERVERTEBRAL DISC: ICD-10-CM

## 2024-08-05 DIAGNOSIS — M48.02 CERVICAL STENOSIS OF SPINE: ICD-10-CM

## 2024-08-05 DIAGNOSIS — M48.061 SPINAL STENOSIS, LUMBAR REGION, WITHOUT NEUROGENIC CLAUDICATION: ICD-10-CM

## 2024-08-05 DIAGNOSIS — G56.03 CARPAL TUNNEL SYNDROME, BILATERAL: ICD-10-CM

## 2024-08-05 DIAGNOSIS — M50.30 DDD (DEGENERATIVE DISC DISEASE), CERVICAL: ICD-10-CM

## 2024-08-05 DIAGNOSIS — M47.817 LUMBOSACRAL SPONDYLOSIS WITHOUT MYELOPATHY: ICD-10-CM

## 2024-08-05 RX ORDER — OXYCODONE HYDROCHLORIDE AND ACETAMINOPHEN 5; 325 MG/1; MG/1
1 TABLET ORAL EVERY 6 HOURS PRN
Qty: 112 TABLET | Refills: 0 | Status: SHIPPED | OUTPATIENT
Start: 2024-08-05 | End: 2024-09-02

## 2024-08-05 NOTE — TELEPHONE ENCOUNTER
Pt said that percocet was not sent in at last office visit.  Request meds sent in.     [phone]

## 2024-08-06 RX ORDER — FUROSEMIDE 20 MG/1
20 TABLET ORAL DAILY PRN
Qty: 30 TABLET | Refills: 0 | Status: SHIPPED | OUTPATIENT
Start: 2024-08-06

## 2024-08-06 RX ORDER — FUROSEMIDE 20 MG/1
TABLET ORAL
Qty: 30 TABLET | Refills: 0 | OUTPATIENT
Start: 2024-08-06

## 2024-08-06 NOTE — TELEPHONE ENCOUNTER
Called patient to inform her that Per [facility] has sent her percocet to her preferred pharmacy requested.

## 2024-08-06 NOTE — TELEPHONE ENCOUNTER
Medication:   Requested Prescriptions     Pending Prescriptions Disp Refills    furosemide (LASIX) 20 MG tablet 30 tablet 1     Sig: Take 1 tablet by mouth daily as needed (swelling)     Last Filled:  5.31.24    Last appt: 5/31/2024   Next appt: Visit date not found    Last OARRS:       7/22/2024    12:06 PM   RX Monitoring   Periodic Controlled Substance Monitoring No signs of potential drug abuse or diversion identified.

## 2024-08-09 LAB
ALBUMIN: 4.7 G/DL (ref 3.5–5.7)
ALP BLD-CCNC: 102 IU/L (ref 35–135)
ALT SERPL-CCNC: 16 IU/L (ref 10–60)
AST SERPL-CCNC: 21 IU/L (ref 10–40)
BILIRUB SERPL-MCNC: 0.5 MG/DL (ref 0–1.2)
BILIRUBIN DIRECT: 0.1 MG/DL (ref 0–0.2)
CHOLESTEROL, TOTAL: 142 MG/DL
HDLC SERPL-MCNC: 53 MG/DL
LDL CHOLESTEROL: 58 MG/DL
NONHDLC SERPL-MCNC: 89 MG/DL
TOTAL PROTEIN: 6.8 G/DL (ref 6–8)
TRIGL SERPL-MCNC: 155 MG/DL

## 2024-08-26 ENCOUNTER — OFFICE VISIT (OUTPATIENT)
Dept: PAIN MANAGEMENT | Age: 54
End: 2024-08-26
Payer: COMMERCIAL

## 2024-08-26 VITALS
WEIGHT: 156 LBS | HEART RATE: 74 BPM | DIASTOLIC BLOOD PRESSURE: 77 MMHG | TEMPERATURE: 98.5 F | BODY MASS INDEX: 25.96 KG/M2 | OXYGEN SATURATION: 99 % | SYSTOLIC BLOOD PRESSURE: 119 MMHG

## 2024-08-26 DIAGNOSIS — S39.012A LUMBOSACRAL STRAIN, INITIAL ENCOUNTER: ICD-10-CM

## 2024-08-26 DIAGNOSIS — M48.061 SPINAL STENOSIS, LUMBAR REGION, WITHOUT NEUROGENIC CLAUDICATION: ICD-10-CM

## 2024-08-26 DIAGNOSIS — K59.03 DRUG INDUCED CONSTIPATION: ICD-10-CM

## 2024-08-26 DIAGNOSIS — M48.02 CERVICAL STENOSIS OF SPINE: ICD-10-CM

## 2024-08-26 DIAGNOSIS — M47.817 LUMBOSACRAL SPONDYLOSIS WITHOUT MYELOPATHY: ICD-10-CM

## 2024-08-26 DIAGNOSIS — Z51.81 ENCOUNTER FOR THERAPEUTIC DRUG MONITORING: ICD-10-CM

## 2024-08-26 DIAGNOSIS — G89.4 CHRONIC PAIN SYNDROME: ICD-10-CM

## 2024-08-26 DIAGNOSIS — M54.16 LUMBAR NERVE ROOT IMPINGEMENT: ICD-10-CM

## 2024-08-26 DIAGNOSIS — F51.01 PRIMARY INSOMNIA: ICD-10-CM

## 2024-08-26 DIAGNOSIS — M50.30 DDD (DEGENERATIVE DISC DISEASE), CERVICAL: ICD-10-CM

## 2024-08-26 DIAGNOSIS — M25.50 POLYARTHRALGIA: ICD-10-CM

## 2024-08-26 DIAGNOSIS — M51.37 DEGENERATION OF LUMBAR OR LUMBOSACRAL INTERVERTEBRAL DISC: ICD-10-CM

## 2024-08-26 DIAGNOSIS — G56.03 CARPAL TUNNEL SYNDROME, BILATERAL: ICD-10-CM

## 2024-08-26 PROCEDURE — G8419 CALC BMI OUT NRM PARAM NOF/U: HCPCS | Performed by: NURSE PRACTITIONER

## 2024-08-26 PROCEDURE — G8427 DOCREV CUR MEDS BY ELIG CLIN: HCPCS | Performed by: NURSE PRACTITIONER

## 2024-08-26 PROCEDURE — 1036F TOBACCO NON-USER: CPT | Performed by: NURSE PRACTITIONER

## 2024-08-26 PROCEDURE — 99214 OFFICE O/P EST MOD 30 MIN: CPT | Performed by: NURSE PRACTITIONER

## 2024-08-26 PROCEDURE — 3017F COLORECTAL CA SCREEN DOC REV: CPT | Performed by: NURSE PRACTITIONER

## 2024-08-26 RX ORDER — OXYCODONE AND ACETAMINOPHEN 5; 325 MG/1; MG/1
1 TABLET ORAL EVERY 6 HOURS PRN
Qty: 112 TABLET | Refills: 0 | Status: SHIPPED | OUTPATIENT
Start: 2024-08-26 | End: 2024-09-23

## 2024-08-26 RX ORDER — LIDOCAINE 36 MG/1
1 PATCH TOPICAL DAILY
Qty: 90 PATCH | Refills: 0 | Status: SHIPPED | OUTPATIENT
Start: 2024-08-26 | End: 2024-11-24

## 2024-08-26 NOTE — PROGRESS NOTES
Arely Lara  1970  [id number]    HISTORY OF PRESENT ILLNESS: Ms. Lara is a 53 y.o. female returns for a follow up visit for pain management  She has a diagnosis of   1. Chronic pain syndrome    2. DDD (degenerative disc disease), cervical    3. Cervical stenosis of spine, mild    4. Degeneration of lumbar or lumbosacral intervertebral disc    5. Lumbosacral spondylosis without myelopathy    6. Lumbar nerve root impingement, L2    7. Spinal stenosis, lumbar region, without neurogenic claudication    8. Carpal tunnel syndrome, bilateral    9. Encounter for therapeutic drug monitoring    10. Polyarthralgia    11. Primary insomnia    12. Drug induced constipation    13. Lumbosacral strain, initial encounter      As per Information Obtained from the PADT (Patient Assessment and Documentation Tool)    She complains of pain in the entire left side of the body. She rates the pain 8/10 and describes it as aching. Current treatment regimen has helped relieve about 40% of the pain.  She denies any side effects from the current pain regimen. Patient reports that since the last follow up visit the physical functioning is unchanged, family/social relationships are unchanged, mood is unchanged sleep patterns are unchanged, and that the overall functioning is unchanged.  Patient denies misusing/abusing her narcotic pain medications or using any illegal drugs.      Upon obtaining medical history from Ms. Lara states that pain is manageable on current pain therapy. Takes the pain medications as prescribed. Mood/anxiety is stable. Sleep is fair with an average of 5-6 hours. Denies to having issues of constipation. Tolerating activities/house chores with moderate tenderness to the lower back.     ALLERGIES: Patients list of allergies were reviewed     MEDICATIONS: Ms. Lara list of medications were reviewed.Her current medications are   Outpatient Medications Prior to Visit   Medication Sig Dispense Refill    furosemide  against drinking alcohol with the narcotic pain medicines, advised against driving or handling machinery while adjusting the dose of medicines or if having cognitive  issues related to the current medications.Risk of overdose and death, if medicines not taken as prescribed, were also discussed. If the patient develops new symptoms or if the symptoms worsen, the patient should call the office.    While transcribing every attempt was made to maintain the accuracy of the note in terms of it's contents,there may have been some errors made inadvertently.  Thank you for allowing me to participate in the care of this patient.    Beth Malone CNP.    Cc: Theresa Manning MD

## 2024-09-03 ENCOUNTER — HOSPITAL ENCOUNTER (OUTPATIENT)
Dept: WOMENS IMAGING | Age: 54
Discharge: HOME | End: 2024-09-03
Payer: COMMERCIAL

## 2024-09-03 VITALS — HEIGHT: 65 IN | BODY MASS INDEX: 25.99 KG/M2 | WEIGHT: 156 LBS

## 2024-09-03 DIAGNOSIS — Z12.31 VISIT FOR SCREENING MAMMOGRAM: ICD-10-CM

## 2024-09-03 PROCEDURE — 77063 BREAST TOMOSYNTHESIS BI: CPT

## 2024-09-03 RX ORDER — FUROSEMIDE 20 MG
20 TABLET ORAL DAILY PRN
Qty: 30 TABLET | Refills: 0 | Status: SHIPPED | OUTPATIENT
Start: 2024-09-03

## 2024-09-03 NOTE — TELEPHONE ENCOUNTER
Medication:   Requested Prescriptions     Pending Prescriptions Disp Refills    furosemide (LASIX) 20 MG tablet [Pharmacy Med Name: Furosemide Oral Tablet 20 MG] 30 tablet 0     Sig: Take 1 tablet by mouth daily as needed (swelling)     Last Filled:  8.6.24    Last appt: 5/31/2024   Next appt: Visit date not found    Last OARRS:       8/26/2024    10:26 AM   RX Monitoring   Periodic Controlled Substance Monitoring No signs of potential drug abuse or diversion identified.

## 2024-09-07 SDOH — HEALTH STABILITY: PHYSICAL HEALTH: ON AVERAGE, HOW MANY DAYS PER WEEK DO YOU ENGAGE IN MODERATE TO STRENUOUS EXERCISE (LIKE A BRISK WALK)?: 1 DAY

## 2024-09-07 SDOH — HEALTH STABILITY: PHYSICAL HEALTH: ON AVERAGE, HOW MANY MINUTES DO YOU ENGAGE IN EXERCISE AT THIS LEVEL?: 20 MIN

## 2024-09-10 ENCOUNTER — HOSPITAL ENCOUNTER (OUTPATIENT)
Dept: ULTRASOUND IMAGING | Age: 54
Discharge: HOME | End: 2024-09-10
Payer: COMMERCIAL

## 2024-09-10 ENCOUNTER — OFFICE VISIT (OUTPATIENT)
Dept: PRIMARY CARE CLINIC | Age: 54
End: 2024-09-10
Payer: COMMERCIAL

## 2024-09-10 VITALS
HEIGHT: 65 IN | DIASTOLIC BLOOD PRESSURE: 70 MMHG | BODY MASS INDEX: 25.06 KG/M2 | WEIGHT: 150.4 LBS | HEART RATE: 79 BPM | SYSTOLIC BLOOD PRESSURE: 130 MMHG | OXYGEN SATURATION: 96 %

## 2024-09-10 DIAGNOSIS — N63.32 MASS OF AXILLARY TAIL OF LEFT BREAST: Primary | ICD-10-CM

## 2024-09-10 DIAGNOSIS — R22.9 GENERALIZED SKIN LUMPS: ICD-10-CM

## 2024-09-10 DIAGNOSIS — N63.32 MASS OF AXILLARY TAIL OF LEFT BREAST: ICD-10-CM

## 2024-09-10 PROCEDURE — G8419 CALC BMI OUT NRM PARAM NOF/U: HCPCS | Performed by: FAMILY MEDICINE

## 2024-09-10 PROCEDURE — 3017F COLORECTAL CA SCREEN DOC REV: CPT | Performed by: FAMILY MEDICINE

## 2024-09-10 PROCEDURE — 1036F TOBACCO NON-USER: CPT | Performed by: FAMILY MEDICINE

## 2024-09-10 PROCEDURE — 99213 OFFICE O/P EST LOW 20 MIN: CPT | Performed by: FAMILY MEDICINE

## 2024-09-10 PROCEDURE — G8427 DOCREV CUR MEDS BY ELIG CLIN: HCPCS | Performed by: FAMILY MEDICINE

## 2024-09-10 PROCEDURE — 76999 ECHO EXAMINATION PROCEDURE: CPT

## 2024-09-10 ASSESSMENT — ENCOUNTER SYMPTOMS
RESPIRATORY NEGATIVE: 1
GASTROINTESTINAL NEGATIVE: 1
EYES NEGATIVE: 1

## 2024-09-18 NOTE — PROGRESS NOTES
[facility]   Cardiac Electrophysiology Consultation   Date: 2024  Reason for Consultation: PVC  Consult Requesting Physician: No att. providers found     Chief Complaint:   Chief Complaint   Patient presents with    6 Month Follow-Up      HPI: Arely Lara is a 53 y.o. female with PMH significant for Factor V Leiden, diverticulitis with abscess and sigmoid colectomy, DVT on Eliquis,  c/o palpitations, 3 day CAM (2023) showed 18% multifocal PVCs, no reported symptoms. Normal echo (2023), negative MPI (3/2022). Normal cardiac MRI (2023), placed on methotrexate per rheumatology, 2 day CAM (2024) showed <1% PVCs.    Interval History:   Today, she is here for 6 mo f/u, presenting in SR with no PVCs. She denies symptomatic recurrence of PVCs. Denies complaints of palpitations, dizziness, CP, SOB, orthopnea, BLE swelling, or syncope. She has noticed an increase in PVCs when she is under stress. She is planning to have back surgery by the end of the year.    She is  for an insurance company.     Assessment:  PVCs, 18% >> less than 1%, predominantly from LV base  Palpitations  Factor V Leiden, on Eliquis  Seronegative inflammatory arthritis, on methotrexate, follows rheumatology, Dr. Espino at [facility]    Plan:  No changes today  Will plan for yearly 3 day CAM and echo  Follow up in 1 year with EP NP      Past Medical History:   Diagnosis Date    Anticoagulant long-term use 2020    Chronic back pain     Diverticulitis 2020    Factor 5 Leiden mutation, heterozygous (HCC)     Freiberg's disease, right     foot    GERD (gastroesophageal reflux disease)     Hx of blood clots     Osteoarthritis     Sometime in  I think    PONV (postoperative nausea and vomiting)     Psoriatic arthritis (HCC)         Past Surgical History:   Procedure Laterality Date    CARPAL TUNNEL RELEASE Bilateral      SECTION  2001     x1    FINGER TRIGGER RELEASE Bilateral     FOOT SURGERY Right

## 2024-09-23 ENCOUNTER — OFFICE VISIT (OUTPATIENT)
Dept: PAIN MANAGEMENT | Age: 54
End: 2024-09-23

## 2024-09-23 VITALS
BODY MASS INDEX: 24.96 KG/M2 | SYSTOLIC BLOOD PRESSURE: 108 MMHG | TEMPERATURE: 98.6 F | OXYGEN SATURATION: 98 % | DIASTOLIC BLOOD PRESSURE: 69 MMHG | HEART RATE: 72 BPM | WEIGHT: 150 LBS

## 2024-09-23 DIAGNOSIS — G56.03 CARPAL TUNNEL SYNDROME, BILATERAL: ICD-10-CM

## 2024-09-23 DIAGNOSIS — M51.37 DEGENERATION OF LUMBAR OR LUMBOSACRAL INTERVERTEBRAL DISC: ICD-10-CM

## 2024-09-23 DIAGNOSIS — M25.50 POLYARTHRALGIA: ICD-10-CM

## 2024-09-23 DIAGNOSIS — K59.03 DRUG INDUCED CONSTIPATION: ICD-10-CM

## 2024-09-23 DIAGNOSIS — M48.061 SPINAL STENOSIS, LUMBAR REGION, WITHOUT NEUROGENIC CLAUDICATION: ICD-10-CM

## 2024-09-23 DIAGNOSIS — M50.30 DDD (DEGENERATIVE DISC DISEASE), CERVICAL: ICD-10-CM

## 2024-09-23 DIAGNOSIS — M54.16 LUMBAR NERVE ROOT IMPINGEMENT: ICD-10-CM

## 2024-09-23 DIAGNOSIS — F51.01 PRIMARY INSOMNIA: ICD-10-CM

## 2024-09-23 DIAGNOSIS — M48.02 CERVICAL STENOSIS OF SPINE: ICD-10-CM

## 2024-09-23 DIAGNOSIS — Z51.81 ENCOUNTER FOR THERAPEUTIC DRUG MONITORING: ICD-10-CM

## 2024-09-23 DIAGNOSIS — G89.4 CHRONIC PAIN SYNDROME: ICD-10-CM

## 2024-09-23 DIAGNOSIS — M47.817 LUMBOSACRAL SPONDYLOSIS WITHOUT MYELOPATHY: ICD-10-CM

## 2024-09-23 RX ORDER — LIDOCAINE 36 MG/1
1 PATCH TOPICAL DAILY
Qty: 90 PATCH | Refills: 0 | Status: SHIPPED | OUTPATIENT
Start: 2024-09-23 | End: 2024-12-22

## 2024-09-23 RX ORDER — OXYCODONE AND ACETAMINOPHEN 5; 325 MG/1; MG/1
1 TABLET ORAL EVERY 6 HOURS PRN
Qty: 40 TABLET | Refills: 0 | Status: SHIPPED | OUTPATIENT
Start: 2024-09-23 | End: 2024-10-03

## 2024-09-25 DIAGNOSIS — E78.9 LIPID DISORDER: ICD-10-CM

## 2024-09-25 RX ORDER — ROSUVASTATIN CALCIUM 10 MG/1
10 TABLET, COATED ORAL NIGHTLY
Qty: 90 TABLET | Refills: 0 | Status: SHIPPED | OUTPATIENT
Start: 2024-09-25

## 2024-09-26 ENCOUNTER — TELEPHONE (OUTPATIENT)
Dept: PAIN MANAGEMENT | Age: 54
End: 2024-09-26

## 2024-09-26 NOTE — TELEPHONE ENCOUNTER
Pt calling because [employer] told her to call to see the results of her UDS so that she can get her medication sent.

## 2024-09-30 ENCOUNTER — OFFICE VISIT (OUTPATIENT)
Dept: CARDIOLOGY CLINIC | Age: 54
End: 2024-09-30
Payer: COMMERCIAL

## 2024-09-30 VITALS
HEART RATE: 65 BPM | DIASTOLIC BLOOD PRESSURE: 64 MMHG | WEIGHT: 152.8 LBS | SYSTOLIC BLOOD PRESSURE: 112 MMHG | BODY MASS INDEX: 25.43 KG/M2

## 2024-09-30 DIAGNOSIS — R00.2 PALPITATIONS: ICD-10-CM

## 2024-09-30 DIAGNOSIS — D68.2 FACTOR V DEFICIENCY (HCC): ICD-10-CM

## 2024-09-30 DIAGNOSIS — I49.3 PVC (PREMATURE VENTRICULAR CONTRACTION): Primary | ICD-10-CM

## 2024-09-30 PROCEDURE — 99214 OFFICE O/P EST MOD 30 MIN: CPT | Performed by: INTERNAL MEDICINE

## 2024-09-30 PROCEDURE — 3017F COLORECTAL CA SCREEN DOC REV: CPT | Performed by: INTERNAL MEDICINE

## 2024-09-30 PROCEDURE — 1036F TOBACCO NON-USER: CPT | Performed by: INTERNAL MEDICINE

## 2024-09-30 PROCEDURE — 93000 ELECTROCARDIOGRAM COMPLETE: CPT | Performed by: INTERNAL MEDICINE

## 2024-09-30 PROCEDURE — G8419 CALC BMI OUT NRM PARAM NOF/U: HCPCS | Performed by: INTERNAL MEDICINE

## 2024-09-30 PROCEDURE — G8427 DOCREV CUR MEDS BY ELIG CLIN: HCPCS | Performed by: INTERNAL MEDICINE

## 2024-09-30 RX ORDER — FUROSEMIDE 20 MG
20 TABLET ORAL DAILY PRN
Qty: 30 TABLET | Refills: 0 | Status: SHIPPED | OUTPATIENT
Start: 2024-09-30

## 2024-09-30 NOTE — TELEPHONE ENCOUNTER
Medication:   Requested Prescriptions     Pending Prescriptions Disp Refills    furosemide (LASIX) 20 MG tablet [Pharmacy Med Name: Furosemide Oral Tablet 20 MG] 30 tablet 0     Sig: TAKE 1 TABLET BY MOUTH DAILY AS NEEDED (SWELLING).     Last Filled:  9.3.24    Last appt: 9/10/2024   Next appt: Visit date not found    Last OARRS:       8/26/2024    10:26 AM   RX Monitoring   Periodic Controlled Substance Monitoring No signs of potential drug abuse or diversion identified.

## 2024-10-15 ENCOUNTER — TELEPHONE (OUTPATIENT)
Dept: CARDIOLOGY CLINIC | Age: 54
End: 2024-10-15

## 2024-10-15 NOTE — TELEPHONE ENCOUNTER
Patient Name: Arely Lara     : 1970      Procedure: Left LS-S1 Hemilaminectomy and Microdiscectomy ( Spelling unclear) for Herniated Lumbar Disc     Date of Service: 2024    Place of service:[facility]     Anesthesia: general      Surgeon : Dr. Bajwa       Requesting to hold OAC:     Last OV: 2024    [facility] Fax [phone]

## 2024-10-18 NOTE — TELEPHONE ENCOUNTER
Low cardiac risk    Maria E Ortiz MD   Cardiac Electrophysiology  [facility]  Office [phone]

## 2024-10-21 ENCOUNTER — OFFICE VISIT (OUTPATIENT)
Dept: PAIN MANAGEMENT | Age: 54
End: 2024-10-21

## 2024-10-21 ENCOUNTER — PATIENT MESSAGE (OUTPATIENT)
Dept: PRIMARY CARE CLINIC | Age: 54
End: 2024-10-21

## 2024-10-21 VITALS
DIASTOLIC BLOOD PRESSURE: 69 MMHG | HEART RATE: 78 BPM | OXYGEN SATURATION: 96 % | TEMPERATURE: 97.2 F | SYSTOLIC BLOOD PRESSURE: 120 MMHG

## 2024-10-21 DIAGNOSIS — M54.16 LUMBAR NERVE ROOT IMPINGEMENT: ICD-10-CM

## 2024-10-21 DIAGNOSIS — M48.02 CERVICAL STENOSIS OF SPINE: ICD-10-CM

## 2024-10-21 DIAGNOSIS — G56.03 CARPAL TUNNEL SYNDROME, BILATERAL: ICD-10-CM

## 2024-10-21 DIAGNOSIS — M48.061 SPINAL STENOSIS, LUMBAR REGION, WITHOUT NEUROGENIC CLAUDICATION: ICD-10-CM

## 2024-10-21 DIAGNOSIS — M50.30 DDD (DEGENERATIVE DISC DISEASE), CERVICAL: ICD-10-CM

## 2024-10-21 DIAGNOSIS — M25.50 POLYARTHRALGIA: ICD-10-CM

## 2024-10-21 DIAGNOSIS — M51.371 DEGENERATION OF INTERVERTEBRAL DISC OF LUMBOSACRAL REGION WITH LOWER EXTREMITY PAIN: ICD-10-CM

## 2024-10-21 DIAGNOSIS — G89.4 CHRONIC PAIN SYNDROME: ICD-10-CM

## 2024-10-21 DIAGNOSIS — K59.03 DRUG INDUCED CONSTIPATION: ICD-10-CM

## 2024-10-21 DIAGNOSIS — M47.817 LUMBOSACRAL SPONDYLOSIS WITHOUT MYELOPATHY: ICD-10-CM

## 2024-10-21 DIAGNOSIS — F51.01 PRIMARY INSOMNIA: ICD-10-CM

## 2024-10-21 RX ORDER — OXYCODONE AND ACETAMINOPHEN 5; 325 MG/1; MG/1
1 TABLET ORAL EVERY 6 HOURS PRN
Qty: 40 TABLET | Refills: 0 | Status: SHIPPED | OUTPATIENT
Start: 2024-10-21 | End: 2024-10-31

## 2024-10-21 NOTE — PROGRESS NOTES
wheezes in the lung fields. She has no rales.     Neurological/Psychiatric:She is alert and oriented to person, place, and time. Coordination is  normal.  Her mood isAppropriate and affect is Neutral/Euthymic(normal) .     Prescription pain medication monitoring:                  MEDD current = 22.50              ORT Score = 0 low risk              Other Risk factors - (mood) Stable              Date of Last Medication Agreement: 1/11/24              Date Naloxone prescribed: 0              UDT:                          Date of last UDT: 8/26/24                          Adverse report: No              OARRS:                          Checked today: Yes                          Adverse report: No    IMPRESSION:   1. Chronic pain syndrome    2. DDD (degenerative disc disease), cervical    3. Cervical stenosis of spine, mild    4. Degeneration of intervertebral disc of lumbosacral region with lower extremity pain    5. Lumbosacral spondylosis without myelopathy    6. Lumbar nerve root impingement, L2    7. Spinal stenosis, lumbar region, without neurogenic claudication    8. Carpal tunnel syndrome, bilateral    9. Polyarthralgia    10. Primary insomnia    11. Drug induced constipation      PLAN:  Informed verbal consent was obtained:  -Patient's opioid therapy will be maintained at current dose  -Home exercises/Marley exercises recommended  -CBT techniques- relaxation therapies such as biofeedback, mindfulness based stress reduction, imagery, cognitive restructuring, problem solving discussed with patient   -Last UDS 8/26/24 consistent  -Return in about 4 weeks (around 11/18/2024).   -OARRS record was obtained and reviewed  for the last one year and no indicators of drug misuse  were found. Any other controlled substance prescriptions  seen on the record have been accounted for, I am aware of the patient receiving these medications. . OARRS record will be rechecked as part of office protocol.       Analgesic Plan:

## 2024-10-22 ENCOUNTER — TELEPHONE (OUTPATIENT)
Dept: PAIN MANAGEMENT | Age: 54
End: 2024-10-22

## 2024-10-22 NOTE — TELEPHONE ENCOUNTER
Submitted PA for [facility] Key: [id number] STATUS: PENDING.    Follow up done daily; if no decision with in three days we will refax.  If another three days goes by with no decision will call the insurance for status.

## 2024-10-22 NOTE — TELEPHONE ENCOUNTER
This medication is APPROVED.  Auth Expiration Date: 10/22/2025.    [facility] has been notified. Thank you!

## 2024-11-16 DIAGNOSIS — E78.9 LIPID DISORDER: ICD-10-CM

## 2024-11-18 ENCOUNTER — OFFICE VISIT (OUTPATIENT)
Dept: PAIN MANAGEMENT | Age: 54
End: 2024-11-18

## 2024-11-18 VITALS
HEART RATE: 79 BPM | SYSTOLIC BLOOD PRESSURE: 107 MMHG | TEMPERATURE: 97 F | OXYGEN SATURATION: 98 % | DIASTOLIC BLOOD PRESSURE: 69 MMHG

## 2024-11-18 DIAGNOSIS — G89.4 CHRONIC PAIN SYNDROME: ICD-10-CM

## 2024-11-18 DIAGNOSIS — M25.511 ACUTE PAIN OF RIGHT SHOULDER: ICD-10-CM

## 2024-11-18 DIAGNOSIS — M50.30 DDD (DEGENERATIVE DISC DISEASE), CERVICAL: ICD-10-CM

## 2024-11-18 DIAGNOSIS — M48.061 SPINAL STENOSIS, LUMBAR REGION, WITHOUT NEUROGENIC CLAUDICATION: ICD-10-CM

## 2024-11-18 DIAGNOSIS — M79.18 MYOFASCIAL PAIN: ICD-10-CM

## 2024-11-18 DIAGNOSIS — G56.03 CARPAL TUNNEL SYNDROME, BILATERAL: ICD-10-CM

## 2024-11-18 DIAGNOSIS — M54.16 LUMBAR NERVE ROOT IMPINGEMENT: ICD-10-CM

## 2024-11-18 DIAGNOSIS — M25.50 POLYARTHRALGIA: ICD-10-CM

## 2024-11-18 DIAGNOSIS — M79.18 MYOFASCIAL PAIN SYNDROME: ICD-10-CM

## 2024-11-18 DIAGNOSIS — M48.02 CERVICAL STENOSIS OF SPINE: ICD-10-CM

## 2024-11-18 DIAGNOSIS — M51.371 DEGENERATION OF INTERVERTEBRAL DISC OF LUMBOSACRAL REGION WITH LOWER EXTREMITY PAIN: ICD-10-CM

## 2024-11-18 DIAGNOSIS — K59.03 DRUG INDUCED CONSTIPATION: ICD-10-CM

## 2024-11-18 DIAGNOSIS — M47.817 LUMBOSACRAL SPONDYLOSIS WITHOUT MYELOPATHY: ICD-10-CM

## 2024-11-18 DIAGNOSIS — F51.01 PRIMARY INSOMNIA: ICD-10-CM

## 2024-11-18 RX ORDER — LIDOCAINE 36 MG/1
1 PATCH TOPICAL DAILY
Qty: 90 PATCH | Refills: 0 | Status: SHIPPED | OUTPATIENT
Start: 2024-11-18 | End: 2024-11-18 | Stop reason: SDUPTHER

## 2024-11-18 RX ORDER — BUPIVACAINE HYDROCHLORIDE 2.5 MG/ML
9 INJECTION, SOLUTION INFILTRATION; PERINEURAL ONCE
Status: COMPLETED | OUTPATIENT
Start: 2024-11-18 | End: 2024-11-18

## 2024-11-18 RX ORDER — OXYCODONE AND ACETAMINOPHEN 5; 325 MG/1; MG/1
1 TABLET ORAL EVERY 6 HOURS PRN
Qty: 120 TABLET | Refills: 0 | Status: SHIPPED | OUTPATIENT
Start: 2024-11-18 | End: 2024-12-18

## 2024-11-18 RX ORDER — ROSUVASTATIN CALCIUM 10 MG/1
10 TABLET, COATED ORAL NIGHTLY
Qty: 90 TABLET | Refills: 1 | Status: SHIPPED | OUTPATIENT
Start: 2024-11-18

## 2024-11-18 RX ORDER — LIDOCAINE 36 MG/1
1 PATCH TOPICAL DAILY
Qty: 4 PATCH | Refills: 0 | Status: SHIPPED | COMMUNITY
Start: 2024-11-18 | End: 2024-12-18

## 2024-11-18 RX ORDER — TRIAMCINOLONE ACETONIDE 40 MG/ML
40 INJECTION, SUSPENSION INTRA-ARTICULAR; INTRAMUSCULAR ONCE
Status: COMPLETED | OUTPATIENT
Start: 2024-11-18 | End: 2024-11-18

## 2024-11-18 RX ORDER — BUPIVACAINE HYDROCHLORIDE 5 MG/ML
30 INJECTION, SOLUTION EPIDURAL; INTRACAUDAL ONCE
Status: SHIPPED | OUTPATIENT
Start: 2024-11-18

## 2024-11-18 RX ADMIN — BUPIVACAINE HYDROCHLORIDE 22.5 MG: 2.5 INJECTION, SOLUTION INFILTRATION; PERINEURAL at 12:56

## 2024-11-18 RX ADMIN — TRIAMCINOLONE ACETONIDE 40 MG: 40 INJECTION, SUSPENSION INTRA-ARTICULAR; INTRAMUSCULAR at 12:57

## 2024-11-18 NOTE — TELEPHONE ENCOUNTER
Medication:   Requested Prescriptions     Pending Prescriptions Disp Refills    rosuvastatin (CRESTOR) 10 MG tablet [Pharmacy Med Name: Rosuvastatin Calcium 10 MG Oral Tablet] 90 tablet 3     Sig: TAKE 1 TABLET BY MOUTH EVERY  NIGHT     Last Filled:  9.25.24    Last appt: 9/10/2024   Next appt: 12/2/2024    Last Lipid:   Lab Results   Component Value Date/Time    CHOL 142 08/09/2024 02:30 PM    TRIG 155 08/09/2024 02:30 PM    HDL 53 08/09/2024 02:30 PM

## 2024-11-18 NOTE — PROGRESS NOTES
Arely Lara  1970  [id number]    HISTORY OF PRESENT ILLNESS: Ms. Lara is a 53 y.o. female returns for a follow up visit for pain management  She has a diagnosis of   1. Chronic pain syndrome    2. Acute pain of right shoulder    3. DDD (degenerative disc disease), cervical    4. Cervical stenosis of spine, mild    5. Degeneration of intervertebral disc of lumbosacral region with lower extremity pain    6. Lumbosacral spondylosis without myelopathy    7. Lumbar nerve root impingement, L2    8. Spinal stenosis, lumbar region, without neurogenic claudication    9. Carpal tunnel syndrome, bilateral    10. Polyarthralgia    11. Primary insomnia    12. Drug induced constipation    13. Myofascial pain      As per Information Obtained from the PADT (Patient Assessment and Documentation Tool)    She complains of pain in the  right shoulder, radiating down the right arm, lower back radiating down both legs, left foot  She rates the pain 9/10 and describes it as aching. Current treatment regimen has helped relieve about 20% of the pain.  She denies any side effects from the current pain regimen. Patient reports that since the last follow up visit the physical functioning is worse, family/social relationships are unchanged, mood is unchanged sleep patterns are worse, and that the overall functioning is unchanged.  Patient denies misusing/abusing her narcotic pain medications or using any illegal drugs.      Upon obtaining medical history from Ms. Lara states that pain is not manageable on current pain therapy. Takes the pain medications as prescribed. Has had pain to the right shoulder for a 2 days with pain to the let side neck. Currently scheduled for lumbar surgery next month end. Mood/anxiety is stable. Sleep is fair with an average of 5-6 hours. Denies to having issues of constipation. Tolerating activities/house chores with moderate tenderness to the lower back.     ALLERGIES: Patients list of allergies were

## 2024-12-02 ENCOUNTER — OFFICE VISIT (OUTPATIENT)
Dept: PRIMARY CARE CLINIC | Age: 54
End: 2024-12-02

## 2024-12-02 VITALS
BODY MASS INDEX: 25.69 KG/M2 | OXYGEN SATURATION: 98 % | DIASTOLIC BLOOD PRESSURE: 78 MMHG | HEIGHT: 65 IN | WEIGHT: 154.2 LBS | HEART RATE: 77 BPM | SYSTOLIC BLOOD PRESSURE: 122 MMHG

## 2024-12-02 DIAGNOSIS — D68.51 FACTOR 5 LEIDEN MUTATION, HETEROZYGOUS (HCC): ICD-10-CM

## 2024-12-02 DIAGNOSIS — R76.0 ANTIPHOSPHOLIPID ANTIBODY POSITIVE: ICD-10-CM

## 2024-12-02 DIAGNOSIS — Z79.01 ANTICOAGULATED: ICD-10-CM

## 2024-12-02 DIAGNOSIS — M48.061 SPINAL STENOSIS OF LUMBAR REGION, UNSPECIFIED WHETHER NEUROGENIC CLAUDICATION PRESENT: ICD-10-CM

## 2024-12-02 DIAGNOSIS — Z01.818 PRE-OP EXAM: Primary | ICD-10-CM

## 2024-12-02 DIAGNOSIS — Z01.818 PRE-OP EXAM: ICD-10-CM

## 2024-12-02 ASSESSMENT — ENCOUNTER SYMPTOMS
GASTROINTESTINAL NEGATIVE: 1
RESPIRATORY NEGATIVE: 1
EYES NEGATIVE: 1
BACK PAIN: 1
ALLERGIC/IMMUNOLOGIC NEGATIVE: 1

## 2024-12-02 NOTE — PROGRESS NOTES
SUBJECTIVE:  Arely Lara is a 54 y.o. female who presents for evaluation for pre op for laminectomy on L4-5. The pain is described as constant and is 6/10 in intensity. Onset was several years ago. Symptoms have been gradually worsening since. Aggravating factors:certain movements .  Alleviating factors: rest. Associated symptoms: pain. The patient denies chest pain or SOB, no fever or chills, no ST or cough, no N/V/D.    Review of Systems   Constitutional: Negative.    HENT: Negative.     Eyes: Negative.    Respiratory: Negative.     Cardiovascular: Negative.    Gastrointestinal: Negative.    Endocrine: Negative.    Genitourinary: Negative.    Musculoskeletal:  Positive for arthralgias and back pain.   Allergic/Immunologic: Negative.    Neurological: Negative.    Hematological: Negative.    Psychiatric/Behavioral: Negative.     All other systems reviewed and are negative.     Past Medical History:   Diagnosis Date    Anticoagulant long-term use 03/2020    Chronic back pain     Diverticulitis 05/2020    Factor 5 Leiden mutation, heterozygous ([facility])     Freiberg's disease, right     foot    GERD (gastroesophageal reflux disease)     Hx of blood clots     Osteoarthritis     Sometime in 2021 I think    PONV (postoperative nausea and vomiting)     Psoriatic arthritis ([facility])       Patient Active Problem List    Diagnosis Date Noted    Seronegative inflammatory arthritis 11/23/2022    Long-term use of hydroxychloroquine 11/23/2022    Neck pain on right side 05/31/2022    PVC (premature ventricular contraction) 07/20/2023    History of DVT of lower extremity 04/19/2023    Anticoagulated     Factor 5 Leiden mutation, heterozygous ([facility])     Elevated alkaline phosphatase level 02/01/2022    Chronic left sacroiliac pain 10/20/2021    Antiphospholipid antibody positive 10/20/2021    Coagulation disorder ([facility]) 11/06/2020    Primary insomnia 07/16/2018    Chronic pain syndrome 12/28/2017    Lumbar nerve root impingement, L2

## 2024-12-16 ENCOUNTER — OFFICE VISIT (OUTPATIENT)
Dept: PAIN MANAGEMENT | Age: 54
End: 2024-12-16

## 2024-12-16 VITALS
TEMPERATURE: 96.8 F | SYSTOLIC BLOOD PRESSURE: 107 MMHG | HEART RATE: 82 BPM | DIASTOLIC BLOOD PRESSURE: 72 MMHG | OXYGEN SATURATION: 98 %

## 2024-12-16 DIAGNOSIS — G89.4 CHRONIC PAIN SYNDROME: ICD-10-CM

## 2024-12-16 DIAGNOSIS — M50.30 DDD (DEGENERATIVE DISC DISEASE), CERVICAL: ICD-10-CM

## 2024-12-16 DIAGNOSIS — M25.511 ACUTE PAIN OF RIGHT SHOULDER: ICD-10-CM

## 2024-12-16 DIAGNOSIS — M48.02 CERVICAL STENOSIS OF SPINE: ICD-10-CM

## 2024-12-16 DIAGNOSIS — K59.03 DRUG INDUCED CONSTIPATION: ICD-10-CM

## 2024-12-16 DIAGNOSIS — G56.03 CARPAL TUNNEL SYNDROME, BILATERAL: ICD-10-CM

## 2024-12-16 DIAGNOSIS — M25.50 POLYARTHRALGIA: ICD-10-CM

## 2024-12-16 DIAGNOSIS — M54.16 LUMBAR NERVE ROOT IMPINGEMENT: ICD-10-CM

## 2024-12-16 DIAGNOSIS — M47.817 LUMBOSACRAL SPONDYLOSIS WITHOUT MYELOPATHY: ICD-10-CM

## 2024-12-16 DIAGNOSIS — F51.01 PRIMARY INSOMNIA: ICD-10-CM

## 2024-12-16 DIAGNOSIS — M51.371 DEGENERATION OF INTERVERTEBRAL DISC OF LUMBOSACRAL REGION WITH LOWER EXTREMITY PAIN: ICD-10-CM

## 2024-12-16 DIAGNOSIS — M48.061 SPINAL STENOSIS, LUMBAR REGION, WITHOUT NEUROGENIC CLAUDICATION: ICD-10-CM

## 2024-12-16 RX ORDER — OXYCODONE AND ACETAMINOPHEN 5; 325 MG/1; MG/1
1 TABLET ORAL EVERY 6 HOURS PRN
Qty: 40 TABLET | Refills: 0 | Status: SHIPPED | OUTPATIENT
Start: 2024-12-16 | End: 2024-12-26

## 2024-12-16 RX ORDER — LIDOCAINE 36 MG/1
1 PATCH TOPICAL DAILY
Qty: 4 PATCH | Refills: 0 | Status: SHIPPED | OUTPATIENT
Start: 2024-12-16 | End: 2025-01-15

## 2024-12-16 NOTE — PROGRESS NOTES
Analgesic Plan:              Continue present regimen: Yes: Percocet 5-325 mg tabs orally q6h prn              Adjust dose of present analgesic: No              Switch analgesics: No              Add/Adjust concomitant therapy: No: continue with Zanaflex, ZTlido, Narcan    I will continue her current medication regimen  which is part of the above treatment schedule. It has been helping with Ms. Lara's chronic  medical problems which for this visit include:   Diagnoses of Chronic pain syndrome, DDD (degenerative disc disease), cervical, Cervical stenosis of spine, Degeneration of intervertebral disc of lumbosacral region with lower extremity pain, Lumbosacral spondylosis without myelopathy, Spinal stenosis, lumbar region, without neurogenic claudication, Lumbar nerve root impingement, Acute pain of right shoulder, Carpal tunnel syndrome, bilateral, Polyarthralgia, Primary insomnia, and Drug induced constipation were pertinent to this visit.   Risks and benefits of the medications and other alternative treatments  including no treatment were discussed with the patient.The common side effects of these medications were also explained to the patient.  Informed verbal consent was obtained.   Goals of current treatment regimen include improvement in pain, restoration of functioning- with focus on improvement in physical performance, general activity, work or disability,emotional distress, health care utilization and  decreased medication consumption. Will continue to monitor progress towards achieving/maintaining therapeutic goals with special emphasis on  1. Improvement in perceived interfernce  of pain with ADL's. Ability to do home exercises independently. Ability to do household chores indoor and/or outdoor work and social and leisure activities.Improve psychosocial and physical functioning. - she is showing progression towards this treatment goal with the current regimen.     She was advised against drinking

## 2025-01-13 ENCOUNTER — OFFICE VISIT (OUTPATIENT)
Dept: PAIN MANAGEMENT | Age: 55
End: 2025-01-13

## 2025-01-13 ENCOUNTER — HOSPITAL ENCOUNTER (OUTPATIENT)
Dept: GENERAL RADIOLOGY | Age: 55
Discharge: HOME | End: 2025-01-13
Payer: COMMERCIAL

## 2025-01-13 VITALS
HEART RATE: 76 BPM | TEMPERATURE: 97 F | OXYGEN SATURATION: 97 % | DIASTOLIC BLOOD PRESSURE: 72 MMHG | SYSTOLIC BLOOD PRESSURE: 122 MMHG

## 2025-01-13 DIAGNOSIS — G89.4 CHRONIC PAIN SYNDROME: ICD-10-CM

## 2025-01-13 DIAGNOSIS — M51.371 DEGENERATION OF INTERVERTEBRAL DISC OF LUMBOSACRAL REGION WITH LOWER EXTREMITY PAIN: ICD-10-CM

## 2025-01-13 DIAGNOSIS — M48.02 CERVICAL STENOSIS OF SPINE: ICD-10-CM

## 2025-01-13 DIAGNOSIS — M54.16 LUMBAR NERVE ROOT IMPINGEMENT: ICD-10-CM

## 2025-01-13 DIAGNOSIS — M25.511 ACUTE PAIN OF RIGHT SHOULDER: ICD-10-CM

## 2025-01-13 DIAGNOSIS — G56.03 CARPAL TUNNEL SYNDROME, BILATERAL: ICD-10-CM

## 2025-01-13 DIAGNOSIS — M47.817 LUMBOSACRAL SPONDYLOSIS WITHOUT MYELOPATHY: ICD-10-CM

## 2025-01-13 DIAGNOSIS — Z98.890 S/P LAMINECTOMY: ICD-10-CM

## 2025-01-13 DIAGNOSIS — M48.061 SPINAL STENOSIS, LUMBAR REGION, WITHOUT NEUROGENIC CLAUDICATION: ICD-10-CM

## 2025-01-13 DIAGNOSIS — M50.30 DDD (DEGENERATIVE DISC DISEASE), CERVICAL: ICD-10-CM

## 2025-01-13 PROCEDURE — 73030 X-RAY EXAM OF SHOULDER: CPT

## 2025-01-13 RX ORDER — OXYCODONE AND ACETAMINOPHEN 5; 325 MG/1; MG/1
1 TABLET ORAL EVERY 6 HOURS PRN
Qty: 112 TABLET | Refills: 0 | Status: SHIPPED | OUTPATIENT
Start: 2025-01-13 | End: 2025-02-10

## 2025-01-13 RX ORDER — LIDOCAINE 36 MG/1
1 PATCH TOPICAL DAILY
Qty: 4 PATCH | Refills: 0 | Status: SHIPPED | OUTPATIENT
Start: 2025-01-13 | End: 2025-02-12

## 2025-01-13 NOTE — PROGRESS NOTES
activities.Improve psychosocial and physical functioning. - she is showing progression towards this treatment goal with the current regimen.     She was advised against drinking alcohol with the narcotic pain medicines, advised against driving or handling machinery while adjusting the dose of medicines or if having cognitive  issues related to the current medications.Risk of overdose and death, if medicines not taken as prescribed, were also discussed. If the patient develops new symptoms or if the symptoms worsen, the patient should call the office.    While transcribing every attempt was made to maintain the accuracy of the note in terms of it's contents,there may have been some errors made inadvertently.  Thank you for allowing me to participate in the care of this patient.    Beth Malone CNP.    Cc: Suzan Arellano MD

## 2025-02-11 DIAGNOSIS — G47.9 SLEEP DISORDER: ICD-10-CM

## 2025-02-12 NOTE — TELEPHONE ENCOUNTER
Medication:   Requested Prescriptions     Pending Prescriptions Disp Refills    traZODone (DESYREL) 50 MG tablet [Pharmacy Med Name: traZODone HCl 50 MG Oral Tablet] 90 tablet 3     Sig: TAKE 1 TABLET BY MOUTH AT  BEDTIME     Last Filled:  03/05/2024    Last appt: 12/2/2024   Next appt: Visit date not found    Last OARRS:       1/13/2025    10:00 AM   RX Monitoring   Periodic Controlled Substance Monitoring No signs of potential drug abuse or diversion identified.

## 2025-02-13 ENCOUNTER — OFFICE VISIT (OUTPATIENT)
Dept: PAIN MANAGEMENT | Age: 55
End: 2025-02-13

## 2025-02-13 VITALS
DIASTOLIC BLOOD PRESSURE: 63 MMHG | HEART RATE: 75 BPM | OXYGEN SATURATION: 98 % | TEMPERATURE: 96.8 F | SYSTOLIC BLOOD PRESSURE: 114 MMHG

## 2025-02-13 DIAGNOSIS — F51.01 PRIMARY INSOMNIA: ICD-10-CM

## 2025-02-13 DIAGNOSIS — M47.817 LUMBOSACRAL SPONDYLOSIS WITHOUT MYELOPATHY: ICD-10-CM

## 2025-02-13 DIAGNOSIS — M51.371 DEGENERATION OF INTERVERTEBRAL DISC OF LUMBOSACRAL REGION WITH LOWER EXTREMITY PAIN: ICD-10-CM

## 2025-02-13 DIAGNOSIS — M25.511 ACUTE PAIN OF RIGHT SHOULDER: ICD-10-CM

## 2025-02-13 DIAGNOSIS — M48.061 SPINAL STENOSIS, LUMBAR REGION, WITHOUT NEUROGENIC CLAUDICATION: ICD-10-CM

## 2025-02-13 DIAGNOSIS — K59.03 DRUG INDUCED CONSTIPATION: ICD-10-CM

## 2025-02-13 DIAGNOSIS — G89.4 CHRONIC PAIN SYNDROME: ICD-10-CM

## 2025-02-13 DIAGNOSIS — M25.50 POLYARTHRALGIA: ICD-10-CM

## 2025-02-13 DIAGNOSIS — M50.30 DDD (DEGENERATIVE DISC DISEASE), CERVICAL: ICD-10-CM

## 2025-02-13 DIAGNOSIS — G56.03 CARPAL TUNNEL SYNDROME, BILATERAL: ICD-10-CM

## 2025-02-13 DIAGNOSIS — M54.16 LUMBAR NERVE ROOT IMPINGEMENT: ICD-10-CM

## 2025-02-13 DIAGNOSIS — M48.02 CERVICAL STENOSIS OF SPINE: ICD-10-CM

## 2025-02-13 RX ORDER — OXYCODONE AND ACETAMINOPHEN 5; 325 MG/1; MG/1
1 TABLET ORAL EVERY 6 HOURS PRN
Qty: 112 TABLET | Refills: 0 | Status: SHIPPED | OUTPATIENT
Start: 2025-02-13 | End: 2025-03-13

## 2025-02-13 RX ORDER — TRAZODONE HYDROCHLORIDE 50 MG/1
TABLET, FILM COATED ORAL
Qty: 90 TABLET | Refills: 1 | Status: SHIPPED | OUTPATIENT
Start: 2025-02-13

## 2025-02-13 RX ORDER — LIDOCAINE 36 MG/1
1 PATCH TOPICAL DAILY
Qty: 4 PATCH | Refills: 0 | Status: SHIPPED | OUTPATIENT
Start: 2025-02-13 | End: 2025-03-15

## 2025-02-13 NOTE — PROGRESS NOTES
Arely Lara  1970  [id number]    HISTORY OF PRESENT ILLNESS: Ms. Lara is a 54 y.o. female returns for a follow up visit for pain management  She has a diagnosis of   1. Chronic pain syndrome    2. Lumbar nerve root impingement    3. Degeneration of intervertebral disc of lumbosacral region with lower extremity pain    4. Lumbosacral spondylosis without myelopathy    5. Spinal stenosis, lumbar region, without neurogenic claudication    6. Cervical stenosis of spine    7. DDD (degenerative disc disease), cervical    8. Acute pain of right shoulder    9. Carpal tunnel syndrome, bilateral    10. Polyarthralgia    11. Primary insomnia    12. Drug induced constipation      As per Information Obtained from the PADT (Patient Assessment and Documentation Tool)    She complains of pain in the  upper and lower back, both shoulder blades, left hip  She rates the pain 5/10 and describes it as aching. Current treatment regimen has helped relieve about 60% of the pain.  She denies any side effects from the current pain regimen. Patient reports that since the last follow up visit the physical functioning is unchanged, family/social relationships are unchanged, mood is unchanged sleep patterns are unchanged, and that the overall functioning is unchanged.  Patient denies misusing/abusing her narcotic pain medications or using any illegal drugs.      Upon obtaining medical history from Ms. Lara states that pain is manageable on current pain therapy. Takes the pain medications as prescribed. Mood/anxiety is stable. Sleep is fair with an average of 5-6 hours. Denies to having issues of constipation. Tolerating activities/house chores with moderate tenderness to the lower back.     ALLERGIES: Patients list of allergies were reviewed     MEDICATIONS: Ms. Lara list of medications were reviewed.Her current medications are   Outpatient Medications Prior to Visit   Medication Sig Dispense Refill    traZODone (DESYREL) 50 MG

## 2025-03-17 ENCOUNTER — OFFICE VISIT (OUTPATIENT)
Dept: PRIMARY CARE CLINIC | Age: 55
End: 2025-03-17
Payer: COMMERCIAL

## 2025-03-17 VITALS
OXYGEN SATURATION: 97 % | HEART RATE: 71 BPM | BODY MASS INDEX: 26.49 KG/M2 | HEIGHT: 65 IN | SYSTOLIC BLOOD PRESSURE: 122 MMHG | WEIGHT: 159 LBS | DIASTOLIC BLOOD PRESSURE: 80 MMHG

## 2025-03-17 DIAGNOSIS — Z86.718 HISTORY OF DVT OF LOWER EXTREMITY: Primary | ICD-10-CM

## 2025-03-17 DIAGNOSIS — M13.80 SERONEGATIVE INFLAMMATORY ARTHRITIS: ICD-10-CM

## 2025-03-17 DIAGNOSIS — R60.0 PERIPHERAL EDEMA: ICD-10-CM

## 2025-03-17 DIAGNOSIS — M48.061 SPINAL STENOSIS OF LUMBAR REGION, UNSPECIFIED WHETHER NEUROGENIC CLAUDICATION PRESENT: ICD-10-CM

## 2025-03-17 LAB
ANION GAP SERPL CALCULATED.3IONS-SCNC: 10 MMOL/L (ref 3–16)
BUN SERPL-MCNC: 19 MG/DL (ref 7–20)
CALCIUM SERPL-MCNC: 9.9 MG/DL (ref 8.3–10.6)
CHLORIDE SERPL-SCNC: 104 MMOL/L (ref 99–110)
CO2 SERPL-SCNC: 26 MMOL/L (ref 21–32)
CREAT SERPL-MCNC: 0.8 MG/DL (ref 0.6–1.1)
DEPRECATED RDW RBC AUTO: 12.3 % (ref 12.4–15.4)
GFR SERPLBLD CREATININE-BSD FMLA CKD-EPI: 88 ML/MIN/{1.73_M2}
GLUCOSE SERPL-MCNC: 102 MG/DL (ref 70–99)
HCT VFR BLD AUTO: 36.5 % (ref 36–48)
HGB BLD-MCNC: 12.4 G/DL (ref 12–16)
MCH RBC QN AUTO: 32.4 PG (ref 26–34)
MCHC RBC AUTO-ENTMCNC: 33.9 G/DL (ref 31–36)
MCV RBC AUTO: 95.6 FL (ref 80–100)
PLATELET # BLD AUTO: 239 K/UL (ref 135–450)
PMV BLD AUTO: 8.3 FL (ref 5–10.5)
POTASSIUM SERPL-SCNC: 4.3 MMOL/L (ref 3.5–5.1)
RBC # BLD AUTO: 3.82 M/UL (ref 4–5.2)
SODIUM SERPL-SCNC: 140 MMOL/L (ref 136–145)
WBC # BLD AUTO: 5.1 K/UL (ref 4–11)

## 2025-03-17 PROCEDURE — G8419 CALC BMI OUT NRM PARAM NOF/U: HCPCS | Performed by: FAMILY MEDICINE

## 2025-03-17 PROCEDURE — G8427 DOCREV CUR MEDS BY ELIG CLIN: HCPCS | Performed by: FAMILY MEDICINE

## 2025-03-17 PROCEDURE — 99214 OFFICE O/P EST MOD 30 MIN: CPT | Performed by: FAMILY MEDICINE

## 2025-03-17 PROCEDURE — 3017F COLORECTAL CA SCREEN DOC REV: CPT | Performed by: FAMILY MEDICINE

## 2025-03-17 PROCEDURE — 1036F TOBACCO NON-USER: CPT | Performed by: FAMILY MEDICINE

## 2025-03-17 SDOH — ECONOMIC STABILITY: FOOD INSECURITY: WITHIN THE PAST 12 MONTHS, THE FOOD YOU BOUGHT JUST DIDN'T LAST AND YOU DIDN'T HAVE MONEY TO GET MORE.: NEVER TRUE

## 2025-03-17 SDOH — ECONOMIC STABILITY: FOOD INSECURITY: WITHIN THE PAST 12 MONTHS, YOU WORRIED THAT YOUR FOOD WOULD RUN OUT BEFORE YOU GOT MONEY TO BUY MORE.: NEVER TRUE

## 2025-03-17 ASSESSMENT — PATIENT HEALTH QUESTIONNAIRE - PHQ9
SUM OF ALL RESPONSES TO PHQ QUESTIONS 1-9: 0
1. LITTLE INTEREST OR PLEASURE IN DOING THINGS: NOT AT ALL
2. FEELING DOWN, DEPRESSED OR HOPELESS: NOT AT ALL
SUM OF ALL RESPONSES TO PHQ QUESTIONS 1-9: 0

## 2025-03-17 NOTE — PROGRESS NOTES
SUBJECTIVE:  Patient ID: Arely Lara is a 54 y.o. y.o. female     HPI   Patient presented today with a concern about leg pain she feels swelling and cramps  Patient with history of blood clot on Eliquis last few months he was cut down to 2.5 mg at a 5 mg by her hematologist  She did stop all her arthritis medication  Recently had surgery in her lower back recovering okay she still have some pain no left leg when she feels it swollen more she thinks she is holding water  She has been experiencing pain also in the left hip area  She sees [facility] which I do not see the record on my end    Past Medical History:   Diagnosis Date    Anticoagulant long-term use 2020    Chronic back pain     Diverticulitis 2020    Factor 5 Leiden mutation, heterozygous     Freiberg's disease, right     foot    GERD (gastroesophageal reflux disease)     Hx of blood clots     Osteoarthritis     Sometime in  I think    PONV (postoperative nausea and vomiting)     Psoriatic arthritis ([facility])       Past Surgical History:   Procedure Laterality Date    CARPAL TUNNEL RELEASE Bilateral      SECTION  2001     x1    FINGER TRIGGER RELEASE Bilateral     FOOT SURGERY Right     HEMICOLECTOMY N/A 2022    ROBOTIC SIGMOID COLECTOMY, SPLENIC FLEXURE, OMENTAL FLAP performed by Rodney Ruffin MD at [facility] OR    PAIN MANAGEMENT PROCEDURE Left 10/26/2020    LEFT L4 AND L5 TRANSFORAMINAL EPDIURAL STEROID INJECTION WITH FLUOROSCOPY (99017, 25858) performed by Margaret Moody MD at [facility]    PAIN MANAGEMENT PROCEDURE Left 2020    LEFT LUMBAR FOUR LUMBAR FIVE TRANSFORAMINAL  EPIDURAL STEROID INJECTION SITE CONFIRMED BY FLUOROSCOPY performed by Margaret Moody MD at [facility] OR    SUBTOTAL COLECTOMY  2022     Family History   Problem Relation Age of Onset    No Known Problems Mother         Healthy 81 year old    Heart Disease Father 83        +Stent    High Cholesterol Father     Heart Attack Father     Other Brother         do

## 2025-03-18 ENCOUNTER — RESULTS FOLLOW-UP (OUTPATIENT)
Age: 55
End: 2025-03-18

## 2025-03-18 ENCOUNTER — RESULTS FOLLOW-UP (OUTPATIENT)
Dept: PRIMARY CARE CLINIC | Age: 55
End: 2025-03-18

## 2025-03-18 ENCOUNTER — HOSPITAL ENCOUNTER (OUTPATIENT)
Age: 55
Discharge: HOME | End: 2025-03-20
Payer: COMMERCIAL

## 2025-03-18 DIAGNOSIS — R60.0 PERIPHERAL EDEMA: ICD-10-CM

## 2025-03-18 DIAGNOSIS — Z86.718 HISTORY OF DVT OF LOWER EXTREMITY: ICD-10-CM

## 2025-03-18 PROCEDURE — 93970 EXTREMITY STUDY: CPT | Performed by: INTERNAL MEDICINE

## 2025-03-18 PROCEDURE — 93970 EXTREMITY STUDY: CPT

## 2025-03-20 ENCOUNTER — OFFICE VISIT (OUTPATIENT)
Dept: PAIN MANAGEMENT | Age: 55
End: 2025-03-20

## 2025-03-20 VITALS
OXYGEN SATURATION: 97 % | DIASTOLIC BLOOD PRESSURE: 63 MMHG | SYSTOLIC BLOOD PRESSURE: 107 MMHG | HEART RATE: 76 BPM | TEMPERATURE: 76.8 F

## 2025-03-20 DIAGNOSIS — M48.061 SPINAL STENOSIS, LUMBAR REGION, WITHOUT NEUROGENIC CLAUDICATION: ICD-10-CM

## 2025-03-20 DIAGNOSIS — M25.511 CHRONIC RIGHT SHOULDER PAIN: ICD-10-CM

## 2025-03-20 DIAGNOSIS — G89.4 CHRONIC PAIN SYNDROME: ICD-10-CM

## 2025-03-20 DIAGNOSIS — M79.18 MYOFASCIAL PAIN: ICD-10-CM

## 2025-03-20 DIAGNOSIS — G56.03 CARPAL TUNNEL SYNDROME, BILATERAL: ICD-10-CM

## 2025-03-20 DIAGNOSIS — M47.817 LUMBOSACRAL SPONDYLOSIS WITHOUT MYELOPATHY: ICD-10-CM

## 2025-03-20 DIAGNOSIS — G89.29 CHRONIC RIGHT SHOULDER PAIN: ICD-10-CM

## 2025-03-20 DIAGNOSIS — M51.371 DEGENERATION OF INTERVERTEBRAL DISC OF LUMBOSACRAL REGION WITH LOWER EXTREMITY PAIN: ICD-10-CM

## 2025-03-20 DIAGNOSIS — M48.02 CERVICAL STENOSIS OF SPINE: ICD-10-CM

## 2025-03-20 DIAGNOSIS — F51.01 PRIMARY INSOMNIA: ICD-10-CM

## 2025-03-20 DIAGNOSIS — M50.30 DDD (DEGENERATIVE DISC DISEASE), CERVICAL: ICD-10-CM

## 2025-03-20 RX ORDER — BUPIVACAINE HYDROCHLORIDE 2.5 MG/ML
9 INJECTION, SOLUTION INFILTRATION; PERINEURAL ONCE
Status: COMPLETED | OUTPATIENT
Start: 2025-03-20 | End: 2025-03-20

## 2025-03-20 RX ORDER — LIDOCAINE 36 MG/1
1 PATCH TOPICAL DAILY
Qty: 4 PATCH | Refills: 0 | Status: SHIPPED | OUTPATIENT
Start: 2025-03-20 | End: 2025-04-19

## 2025-03-20 RX ORDER — OXYCODONE AND ACETAMINOPHEN 5; 325 MG/1; MG/1
1 TABLET ORAL EVERY 6 HOURS PRN
Qty: 112 TABLET | Refills: 0 | Status: SHIPPED | OUTPATIENT
Start: 2025-03-20 | End: 2025-04-17

## 2025-03-20 RX ORDER — TRIAMCINOLONE ACETONIDE 40 MG/ML
40 INJECTION, SUSPENSION INTRA-ARTICULAR; INTRAMUSCULAR ONCE
Status: COMPLETED | OUTPATIENT
Start: 2025-03-20 | End: 2025-03-20

## 2025-03-20 RX ADMIN — TRIAMCINOLONE ACETONIDE 40 MG: 40 INJECTION, SUSPENSION INTRA-ARTICULAR; INTRAMUSCULAR at 16:08

## 2025-03-20 RX ADMIN — BUPIVACAINE HYDROCHLORIDE 22.5 MG: 2.5 INJECTION, SOLUTION INFILTRATION; PERINEURAL at 16:06

## 2025-03-20 NOTE — PROGRESS NOTES
physical performance, general activity, work or disability,emotional distress, health care utilization and  decreased medication consumption. Will continue to monitor progress towards achieving/maintaining therapeutic goals with special emphasis on  1. Improvement in perceived interfernce  of pain with ADL's. Ability to do home exercises independently. Ability to do household chores indoor and/or outdoor work and social and leisure activities.Improve psychosocial and physical functioning. - she is showing progression towards this treatment goal with the current regimen.     She was advised against drinking alcohol with the narcotic pain medicines, advised against driving or handling machinery while adjusting the dose of medicines or if having cognitive  issues related to the current medications.Risk of overdose and death, if medicines not taken as prescribed, were also discussed. If the patient develops new symptoms or if the symptoms worsen, the patient should call the office.    While transcribing every attempt was made to maintain the accuracy of the note in terms of it's contents,there may have been some errors made inadvertently.  Thank you for allowing me to participate in the care of this patient.    Beth Malone CNP.    Cc: Suzan Arellano MD

## 2025-03-29 ENCOUNTER — HOSPITAL ENCOUNTER (OUTPATIENT)
Age: 55
Discharge: HOME | End: 2025-03-29
Payer: COMMERCIAL

## 2025-03-29 DIAGNOSIS — G89.29 CHRONIC RIGHT SHOULDER PAIN: ICD-10-CM

## 2025-03-29 DIAGNOSIS — G89.4 CHRONIC PAIN SYNDROME: ICD-10-CM

## 2025-03-29 DIAGNOSIS — M25.511 CHRONIC RIGHT SHOULDER PAIN: ICD-10-CM

## 2025-03-29 PROCEDURE — 73200 CT UPPER EXTREMITY W/O DYE: CPT

## 2025-04-21 ENCOUNTER — TRANSCRIBE ORDERS (OUTPATIENT)
Dept: ADMINISTRATIVE | Age: 55
End: 2025-04-21

## 2025-04-21 DIAGNOSIS — M54.16 LUMBAR RADICULOPATHY: Primary | ICD-10-CM

## 2025-04-21 DIAGNOSIS — M54.50 LOW BACK PAIN, UNSPECIFIED BACK PAIN LATERALITY, UNSPECIFIED CHRONICITY, UNSPECIFIED WHETHER SCIATICA PRESENT: ICD-10-CM

## 2025-04-21 DIAGNOSIS — M51.27 HERNIATED NUCLEUS PULPOSUS, L5-S1: ICD-10-CM

## 2025-04-24 ENCOUNTER — OFFICE VISIT (OUTPATIENT)
Dept: PAIN MANAGEMENT | Age: 55
End: 2025-04-24
Payer: COMMERCIAL

## 2025-04-24 VITALS
DIASTOLIC BLOOD PRESSURE: 74 MMHG | SYSTOLIC BLOOD PRESSURE: 117 MMHG | OXYGEN SATURATION: 100 % | HEART RATE: 73 BPM | TEMPERATURE: 97 F

## 2025-04-24 DIAGNOSIS — G89.4 CHRONIC PAIN SYNDROME: ICD-10-CM

## 2025-04-24 DIAGNOSIS — G56.03 CARPAL TUNNEL SYNDROME, BILATERAL: ICD-10-CM

## 2025-04-24 DIAGNOSIS — G89.29 CHRONIC RIGHT SHOULDER PAIN: ICD-10-CM

## 2025-04-24 DIAGNOSIS — M48.061 SPINAL STENOSIS, LUMBAR REGION, WITHOUT NEUROGENIC CLAUDICATION: ICD-10-CM

## 2025-04-24 DIAGNOSIS — M47.817 LUMBOSACRAL SPONDYLOSIS WITHOUT MYELOPATHY: ICD-10-CM

## 2025-04-24 DIAGNOSIS — F51.01 PRIMARY INSOMNIA: ICD-10-CM

## 2025-04-24 DIAGNOSIS — M50.30 DDD (DEGENERATIVE DISC DISEASE), CERVICAL: ICD-10-CM

## 2025-04-24 DIAGNOSIS — M51.371 DEGENERATION OF INTERVERTEBRAL DISC OF LUMBOSACRAL REGION WITH LOWER EXTREMITY PAIN: ICD-10-CM

## 2025-04-24 DIAGNOSIS — M48.02 CERVICAL STENOSIS OF SPINE: ICD-10-CM

## 2025-04-24 DIAGNOSIS — Z98.890 S/P LAMINECTOMY: ICD-10-CM

## 2025-04-24 DIAGNOSIS — M54.16 LUMBAR NERVE ROOT IMPINGEMENT: ICD-10-CM

## 2025-04-24 DIAGNOSIS — M25.511 CHRONIC RIGHT SHOULDER PAIN: ICD-10-CM

## 2025-04-24 PROCEDURE — 99214 OFFICE O/P EST MOD 30 MIN: CPT | Performed by: NURSE PRACTITIONER

## 2025-04-24 RX ORDER — LIDOCAINE 36 MG/1
1 PATCH TOPICAL DAILY
Qty: 4 PATCH | Refills: 0 | Status: SHIPPED | OUTPATIENT
Start: 2025-04-24 | End: 2025-05-24

## 2025-04-24 RX ORDER — OXYCODONE AND ACETAMINOPHEN 7.5; 325 MG/1; MG/1
1 TABLET ORAL EVERY 6 HOURS PRN
Qty: 112 TABLET | Refills: 0 | Status: SHIPPED | OUTPATIENT
Start: 2025-04-24 | End: 2025-05-22

## 2025-04-24 NOTE — PROGRESS NOTES
current medications are   Outpatient Medications Prior to Visit   Medication Sig Dispense Refill    traZODone (DESYREL) 50 MG tablet TAKE 1 TABLET BY MOUTH AT  BEDTIME 90 tablet 1    rosuvastatin (CRESTOR) 10 MG tablet TAKE 1 TABLET BY MOUTH EVERY  NIGHT 90 tablet 1    furosemide (LASIX) 20 MG tablet TAKE 1 TABLET BY MOUTH DAILY AS NEEDED (SWELLING). 30 tablet 0    ibuprofen (ADVIL;MOTRIN) 600 MG tablet Take 1 tablet by mouth every 6 hours as needed      methotrexate (RHEUMATREX) 2.5 MG chemo tablet Take by mouth once a week      folic acid (FOLVITE) 1 MG tablet Take 1 tablet by mouth daily      buPROPion (WELLBUTRIN XL) 300 MG extended release tablet Take 1 tablet by mouth every morning      vitamin B-12 (CYANOCOBALAMIN) 500 MCG tablet Take 1 tablet by mouth daily      ELIQUIS 5 MG TABS tablet TAKE 1 TABLET BY MOUTH TWO TIMES A DAY  60 tablet 0    amitriptyline (ELAVIL) 25 MG tablet Take 1 tablet by mouth nightly 30 tablet 1     Facility-Administered Medications Prior to Visit   Medication Dose Route Frequency Provider Last Rate Last Admin    BUPivacaine (PF) (MARCAINE) 0.5 % injection 150 mg  30 mL Injection Once          SOCIAL/FAMILY/PAST MEDICAL HISTORY: Ms. Lara social, family and past medical history was reviewed.     REVIEW OF SYSTEMS:    Respiratory: Negative for apnea, chest tightness and shortness of breath or change in baseline breathing.    Gastrointestinal: Negative for nausea, vomiting, abdominal pain, diarrhea, constipation, blood in stool and abdominal distention.     PHYSICAL EXAM:   Nursing note and vitals reviewed. /74   Pulse 73   Temp 97 °F (36.1 °C)   SpO2 100%   Constitutional: She appears well-developed and well-nourished. No acute distress.   Skin: Skin is warm and dry, good turgor. No rash noted. She is not diaphoretic.  Cardiovascular: Normal rate, regular rhythm, normal heart sounds, and does not have murmur.     Pulmonary/Chest: Effort normal. No respiratory distress. She does

## 2025-04-25 DIAGNOSIS — M48.02 CERVICAL STENOSIS OF SPINE: ICD-10-CM

## 2025-04-25 DIAGNOSIS — G89.4 CHRONIC PAIN SYNDROME: ICD-10-CM

## 2025-04-25 DIAGNOSIS — M48.061 SPINAL STENOSIS, LUMBAR REGION, WITHOUT NEUROGENIC CLAUDICATION: ICD-10-CM

## 2025-04-25 DIAGNOSIS — M50.30 DDD (DEGENERATIVE DISC DISEASE), CERVICAL: ICD-10-CM

## 2025-04-25 DIAGNOSIS — M51.371 DEGENERATION OF INTERVERTEBRAL DISC OF LUMBOSACRAL REGION WITH LOWER EXTREMITY PAIN: ICD-10-CM

## 2025-04-25 DIAGNOSIS — M47.817 LUMBOSACRAL SPONDYLOSIS WITHOUT MYELOPATHY: ICD-10-CM

## 2025-04-26 ENCOUNTER — HOSPITAL ENCOUNTER (OUTPATIENT)
Age: 55
Discharge: HOME | End: 2025-04-26
Payer: COMMERCIAL

## 2025-04-26 DIAGNOSIS — M54.50 LOW BACK PAIN, UNSPECIFIED BACK PAIN LATERALITY, UNSPECIFIED CHRONICITY, UNSPECIFIED WHETHER SCIATICA PRESENT: ICD-10-CM

## 2025-04-26 DIAGNOSIS — M54.16 LUMBAR RADICULOPATHY: ICD-10-CM

## 2025-04-26 DIAGNOSIS — M51.27 HERNIATED NUCLEUS PULPOSUS, L5-S1: ICD-10-CM

## 2025-04-26 PROCEDURE — A9579 GAD-BASE MR CONTRAST NOS,1ML: HCPCS | Performed by: FAMILY MEDICINE

## 2025-04-26 PROCEDURE — 72158 MRI LUMBAR SPINE W/O & W/DYE: CPT

## 2025-04-26 PROCEDURE — 6360000004 HC RX CONTRAST MEDICATION: Performed by: FAMILY MEDICINE

## 2025-04-26 RX ADMIN — GADOTERIDOL 15 ML: 279.3 INJECTION, SOLUTION INTRAVENOUS at 14:31

## 2025-04-29 ENCOUNTER — PATIENT MESSAGE (OUTPATIENT)
Dept: PRIMARY CARE CLINIC | Age: 55
End: 2025-04-29

## 2025-04-29 DIAGNOSIS — E78.9 LIPID DISORDER: ICD-10-CM

## 2025-04-30 RX ORDER — LIDOCAINE 36 MG/1
1 PATCH TOPICAL DAILY
Qty: 4 PATCH | Refills: 0 | OUTPATIENT
Start: 2025-04-30 | End: 2025-05-30

## 2025-04-30 RX ORDER — ROSUVASTATIN CALCIUM 10 MG/1
10 TABLET, COATED ORAL NIGHTLY
Qty: 90 TABLET | Refills: 0 | Status: SHIPPED | OUTPATIENT
Start: 2025-04-30

## 2025-04-30 NOTE — TELEPHONE ENCOUNTER
Medication:   Requested Prescriptions     Pending Prescriptions Disp Refills    rosuvastatin (CRESTOR) 10 MG tablet 90 tablet 1     Sig: Take 1 tablet by mouth nightly       Pt has new insurance       Last Filled:  11/18/24    Last appt: 3/17/2025   Next appt: Visit date not found    Last Lipid:   Lab Results   Component Value Date/Time    CHOL 142 08/09/2024 02:30 PM    TRIG 155 08/09/2024 02:30 PM    HDL 53 08/09/2024 02:30 PM

## 2025-05-22 ENCOUNTER — OFFICE VISIT (OUTPATIENT)
Dept: PAIN MANAGEMENT | Age: 55
End: 2025-05-22

## 2025-05-22 VITALS
HEART RATE: 71 BPM | DIASTOLIC BLOOD PRESSURE: 73 MMHG | OXYGEN SATURATION: 97 % | SYSTOLIC BLOOD PRESSURE: 133 MMHG | TEMPERATURE: 96.8 F

## 2025-05-22 DIAGNOSIS — M47.817 LUMBOSACRAL SPONDYLOSIS WITHOUT MYELOPATHY: ICD-10-CM

## 2025-05-22 DIAGNOSIS — M48.02 CERVICAL STENOSIS OF SPINE: ICD-10-CM

## 2025-05-22 DIAGNOSIS — M25.511 CHRONIC RIGHT SHOULDER PAIN: ICD-10-CM

## 2025-05-22 DIAGNOSIS — M51.371 DEGENERATION OF INTERVERTEBRAL DISC OF LUMBOSACRAL REGION WITH LOWER EXTREMITY PAIN: ICD-10-CM

## 2025-05-22 DIAGNOSIS — Z98.890 S/P LAMINECTOMY: ICD-10-CM

## 2025-05-22 DIAGNOSIS — F51.01 PRIMARY INSOMNIA: ICD-10-CM

## 2025-05-22 DIAGNOSIS — M48.061 SPINAL STENOSIS, LUMBAR REGION, WITHOUT NEUROGENIC CLAUDICATION: ICD-10-CM

## 2025-05-22 DIAGNOSIS — M54.16 LUMBAR NERVE ROOT IMPINGEMENT: ICD-10-CM

## 2025-05-22 DIAGNOSIS — G89.4 CHRONIC PAIN SYNDROME: ICD-10-CM

## 2025-05-22 DIAGNOSIS — G56.03 CARPAL TUNNEL SYNDROME, BILATERAL: ICD-10-CM

## 2025-05-22 DIAGNOSIS — G89.29 CHRONIC RIGHT SHOULDER PAIN: ICD-10-CM

## 2025-05-22 DIAGNOSIS — M50.30 DDD (DEGENERATIVE DISC DISEASE), CERVICAL: ICD-10-CM

## 2025-05-22 RX ORDER — PREGABALIN 50 MG/1
50 CAPSULE ORAL NIGHTLY
Qty: 30 CAPSULE | Refills: 0 | Status: SHIPPED | OUTPATIENT
Start: 2025-05-22 | End: 2025-06-21

## 2025-05-22 RX ORDER — OXYCODONE AND ACETAMINOPHEN 7.5; 325 MG/1; MG/1
1 TABLET ORAL EVERY 6 HOURS PRN
Qty: 112 TABLET | Refills: 0 | Status: SHIPPED | OUTPATIENT
Start: 2025-05-22 | End: 2025-06-19

## 2025-05-22 RX ORDER — LIDOCAINE 36 MG/1
1 PATCH TOPICAL DAILY
Qty: 4 PATCH | Refills: 0 | Status: SHIPPED | OUTPATIENT
Start: 2025-05-22 | End: 2025-06-21

## 2025-05-22 NOTE — PROGRESS NOTES
Arely Lara  1970  [id number]    HISTORY OF PRESENT ILLNESS: Ms. Lara is a 54 y.o. female returns for a follow up visit for pain management  She has a diagnosis of   1. Chronic pain syndrome    2. Degeneration of intervertebral disc of lumbosacral region with lower extremity pain    3. Lumbosacral spondylosis without myelopathy    4. Lumbar nerve root impingement    5. Spinal stenosis, lumbar region, without neurogenic claudication    6. S/P laminectomy with spinal cord decompression, 12/26/14 with Dr. Bajwa    7. DDD (degenerative disc disease), cervical    8. Cervical stenosis of spine    9. Chronic right shoulder pain    10. Carpal tunnel syndrome, bilateral    11. Primary insomnia      As per Information Obtained from the PADT (Patient Assessment and Documentation Tool)    She complains of pain in the  right lower back  She rates the pain 8/10 and describes it as stabbing. Current treatment regimen has helped relieve about 20% of the pain.  She denies any side effects from the current pain regimen. Patient reports that since the last follow up visit the physical functioning is unchanged, family/social relationships are unchanged, mood is unchanged sleep patterns are worse, and that the overall functioning is unchanged.  Patient denies misusing/abusing her narcotic pain medications or using any illegal drugs.      Upon obtaining medical history from Ms. Lara states that pain is manageable on current pain therapy. Takes the pain medications as prescribed. Mood/anxiety is stable. Sleep is fair with an average of 5-6 hours. Denies to having issues of constipation. Tolerating activities/house chores with moderate tenderness to the lower back.     ALLERGIES: Patients list of allergies were reviewed     MEDICATIONS: Ms. Lara list of medications were reviewed.Her current medications are   Outpatient Medications Prior to Visit   Medication Sig Dispense Refill    rosuvastatin (CRESTOR) 10 MG tablet Take 1

## 2025-06-19 ENCOUNTER — OFFICE VISIT (OUTPATIENT)
Dept: PAIN MANAGEMENT | Age: 55
End: 2025-06-19

## 2025-06-19 VITALS
OXYGEN SATURATION: 99 % | DIASTOLIC BLOOD PRESSURE: 74 MMHG | HEART RATE: 72 BPM | TEMPERATURE: 96.8 F | SYSTOLIC BLOOD PRESSURE: 110 MMHG

## 2025-06-19 DIAGNOSIS — Z98.890 S/P LAMINECTOMY: ICD-10-CM

## 2025-06-19 DIAGNOSIS — M50.30 DDD (DEGENERATIVE DISC DISEASE), CERVICAL: ICD-10-CM

## 2025-06-19 DIAGNOSIS — M48.061 SPINAL STENOSIS, LUMBAR REGION, WITHOUT NEUROGENIC CLAUDICATION: ICD-10-CM

## 2025-06-19 DIAGNOSIS — G89.4 CHRONIC PAIN SYNDROME: ICD-10-CM

## 2025-06-19 DIAGNOSIS — M25.511 CHRONIC RIGHT SHOULDER PAIN: ICD-10-CM

## 2025-06-19 DIAGNOSIS — M51.371 DEGENERATION OF INTERVERTEBRAL DISC OF LUMBOSACRAL REGION WITH LOWER EXTREMITY PAIN: ICD-10-CM

## 2025-06-19 DIAGNOSIS — G89.29 CHRONIC RIGHT SHOULDER PAIN: ICD-10-CM

## 2025-06-19 DIAGNOSIS — G56.03 CARPAL TUNNEL SYNDROME, BILATERAL: ICD-10-CM

## 2025-06-19 DIAGNOSIS — M48.02 CERVICAL STENOSIS OF SPINE: ICD-10-CM

## 2025-06-19 DIAGNOSIS — M54.16 LUMBAR NERVE ROOT IMPINGEMENT: ICD-10-CM

## 2025-06-19 DIAGNOSIS — F51.01 PRIMARY INSOMNIA: ICD-10-CM

## 2025-06-19 DIAGNOSIS — M47.817 LUMBOSACRAL SPONDYLOSIS WITHOUT MYELOPATHY: ICD-10-CM

## 2025-06-19 RX ORDER — PREGABALIN 50 MG/1
50 CAPSULE ORAL NIGHTLY
Qty: 30 CAPSULE | Refills: 0 | Status: SHIPPED | OUTPATIENT
Start: 2025-06-19 | End: 2025-07-19

## 2025-06-19 RX ORDER — LIDOCAINE 36 MG/1
1 PATCH TOPICAL DAILY
Qty: 4 PATCH | Refills: 0 | Status: SHIPPED | OUTPATIENT
Start: 2025-06-19 | End: 2025-07-19

## 2025-06-19 RX ORDER — OXYCODONE AND ACETAMINOPHEN 7.5; 325 MG/1; MG/1
1 TABLET ORAL EVERY 6 HOURS PRN
Qty: 112 TABLET | Refills: 0 | Status: SHIPPED | OUTPATIENT
Start: 2025-06-19 | End: 2025-07-17

## 2025-06-19 NOTE — PROGRESS NOTES
patient that opioid pain medications may not be phoned into the pharmacy or refilled without being seen.  -Patient's opioid therapy will be maintained at current dose  -Home exercises/Marley exercises recommended  -CBT techniques- relaxation therapies such as biofeedback, mindfulness based stress reduction, imagery, cognitive restructuring, problem solving discussed with patient   -Last UDS 8/26/24 consistent  -Return in about 4 weeks (around 7/17/2025).     Analgesic Plan:              Continue present regimen: No: Percocet 7.5-325 mg tabs orally q6h prn              Adjust dose of present analgesic: Yes              Switch analgesics: No              Add/Adjust concomitant therapy: No: continue with Zanaflex, ZTlido, Narcan    I will continue her current medication regimen  which is part of the above treatment schedule. It has been helping with Ms. Lara's chronic  medical problems which for this visit include:   Diagnoses of Chronic pain syndrome, DDD (degenerative disc disease), cervical, Cervical stenosis of spine, Degeneration of intervertebral disc of lumbosacral region with lower extremity pain, Lumbosacral spondylosis without myelopathy, Spinal stenosis, lumbar region, without neurogenic claudication, Lumbar nerve root impingement, S/P laminectomy with spinal cord decompression, 12/26/14 with Dr. Bajwa, Chronic right shoulder pain, Carpal tunnel syndrome, bilateral, and Primary insomnia were pertinent to this visit.   Risks and benefits of the medications and other alternative treatments  including no treatment were discussed with the patient.The common side effects of these medications were also explained to the patient.  Informed verbal consent was obtained.   Goals of current treatment regimen include improvement in pain, restoration of functioning- with focus on improvement in physical performance, general activity, work or disability,emotional distress, health care utilization and  decreased

## 2025-06-29 DIAGNOSIS — G47.9 SLEEP DISORDER: ICD-10-CM

## 2025-06-30 ENCOUNTER — PATIENT MESSAGE (OUTPATIENT)
Dept: PRIMARY CARE CLINIC | Age: 55
End: 2025-06-30

## 2025-06-30 DIAGNOSIS — G47.9 SLEEP DISORDER: ICD-10-CM

## 2025-06-30 RX ORDER — TRAZODONE HYDROCHLORIDE 50 MG/1
TABLET ORAL
Qty: 90 TABLET | Refills: 1 | Status: SHIPPED | OUTPATIENT
Start: 2025-06-30 | End: 2025-07-01 | Stop reason: SDUPTHER

## 2025-06-30 NOTE — TELEPHONE ENCOUNTER
Medication:   Requested Prescriptions     Pending Prescriptions Disp Refills    traZODone (DESYREL) 50 MG tablet 90 tablet 1     Sig: TAKE 1 TABLET BY MOUTH AT  BEDTIME     Last Filled:  02/13/2025    Last appt: 3/17/2025   Next appt: Visit date not found    Last OARRS:       4/24/2025    10:38 AM   RX Monitoring   Periodic Controlled Substance Monitoring No signs of potential drug abuse or diversion identified.

## 2025-07-01 RX ORDER — TRAZODONE HYDROCHLORIDE 50 MG/1
TABLET ORAL
Qty: 90 TABLET | Refills: 1 | Status: SHIPPED | OUTPATIENT
Start: 2025-07-01

## 2025-07-23 ENCOUNTER — OFFICE VISIT (OUTPATIENT)
Dept: PAIN MANAGEMENT | Age: 55
End: 2025-07-23
Payer: COMMERCIAL

## 2025-07-23 VITALS
SYSTOLIC BLOOD PRESSURE: 106 MMHG | BODY MASS INDEX: 25.56 KG/M2 | HEART RATE: 79 BPM | OXYGEN SATURATION: 95 % | WEIGHT: 153.6 LBS | DIASTOLIC BLOOD PRESSURE: 69 MMHG

## 2025-07-23 DIAGNOSIS — F51.01 PRIMARY INSOMNIA: ICD-10-CM

## 2025-07-23 DIAGNOSIS — M50.30 DDD (DEGENERATIVE DISC DISEASE), CERVICAL: ICD-10-CM

## 2025-07-23 DIAGNOSIS — M47.817 LUMBOSACRAL SPONDYLOSIS WITHOUT MYELOPATHY: ICD-10-CM

## 2025-07-23 DIAGNOSIS — K59.03 DRUG INDUCED CONSTIPATION: ICD-10-CM

## 2025-07-23 DIAGNOSIS — G56.03 CARPAL TUNNEL SYNDROME, BILATERAL: ICD-10-CM

## 2025-07-23 DIAGNOSIS — G89.4 CHRONIC PAIN SYNDROME: ICD-10-CM

## 2025-07-23 DIAGNOSIS — M48.02 CERVICAL STENOSIS OF SPINE: ICD-10-CM

## 2025-07-23 DIAGNOSIS — M48.061 SPINAL STENOSIS, LUMBAR REGION, WITHOUT NEUROGENIC CLAUDICATION: ICD-10-CM

## 2025-07-23 DIAGNOSIS — M51.371 DEGENERATION OF INTERVERTEBRAL DISC OF LUMBOSACRAL REGION WITH LOWER EXTREMITY PAIN: ICD-10-CM

## 2025-07-23 PROCEDURE — 99214 OFFICE O/P EST MOD 30 MIN: CPT | Performed by: NURSE PRACTITIONER

## 2025-07-23 RX ORDER — OXYCODONE AND ACETAMINOPHEN 7.5; 325 MG/1; MG/1
1 TABLET ORAL EVERY 6 HOURS PRN
Qty: 112 TABLET | Refills: 0 | Status: SHIPPED | OUTPATIENT
Start: 2025-07-23 | End: 2025-08-20

## 2025-07-23 RX ORDER — PREGABALIN 50 MG/1
50 CAPSULE ORAL NIGHTLY
Qty: 30 CAPSULE | Refills: 0 | Status: SHIPPED | OUTPATIENT
Start: 2025-07-23 | End: 2025-08-22

## 2025-07-23 RX ORDER — LIDOCAINE 36 MG/1
1 PATCH TOPICAL DAILY
Qty: 4 PATCH | Refills: 0 | Status: SHIPPED | OUTPATIENT
Start: 2025-07-23 | End: 2025-08-22

## 2025-07-23 NOTE — PROGRESS NOTES
Arely Lara  1970  [id number]    HISTORY OF PRESENT ILLNESS: Ms. Lara is a 54 y.o. female returns for a follow up visit for pain management  She has a diagnosis of   1. Chronic pain syndrome    2. DDD (degenerative disc disease), cervical    3. Lumbosacral spondylosis without myelopathy    4. Spinal stenosis, lumbar region, without neurogenic claudication    5. Degeneration of intervertebral disc of lumbosacral region with lower extremity pain    6. Cervical stenosis of spine    7. Carpal tunnel syndrome, bilateral    8. Primary insomnia    9. Drug induced constipation      As per Information Obtained from the PADT (Patient Assessment and Documentation Tool)    She complains of pain in the both buttocks, both knee(s), left leg(s): entire area, bilateral lower back, both lower leg(s): entire area, bilateral mid-back, and both shoulder(s): entire area She rates the pain 6/10 and describes it as aching. Current treatment regimen has helped relieve about 50% of the pain.  She denies any side effects from the current pain regimen. Patient reports that since the last follow up visit the physical functioning is unchanged, family/social relationships are unchanged, mood is unchanged sleep patterns are unchanged, and that the overall functioning is unchanged.  Patient denies misusing/abusing her narcotic pain medications or using any illegal drugs.      Upon obtaining medical history from Ms. Lara states that pain is manageable on current pain therapy. Takes the pain medications as prescribed. Mood/anxiety is stable. Sleep is fair with an average of 5-6 hours. Denies to having issues of constipation. Tolerating activities/house chores with moderate tenderness to the lower back.     ALLERGIES: Patients list of allergies were reviewed     MEDICATIONS: Ms. Lara list of medications were reviewed.Her current medications are   Outpatient Medications Prior to Visit   Medication Sig Dispense Refill    traZODone

## 2025-08-02 ENCOUNTER — PATIENT MESSAGE (OUTPATIENT)
Dept: PAIN MANAGEMENT | Age: 55
End: 2025-08-02

## 2025-08-02 DIAGNOSIS — G56.03 CARPAL TUNNEL SYNDROME, BILATERAL: ICD-10-CM

## 2025-08-02 DIAGNOSIS — M51.371 DEGENERATION OF INTERVERTEBRAL DISC OF LUMBOSACRAL REGION WITH LOWER EXTREMITY PAIN: ICD-10-CM

## 2025-08-02 DIAGNOSIS — M47.817 LUMBOSACRAL SPONDYLOSIS WITHOUT MYELOPATHY: ICD-10-CM

## 2025-08-02 DIAGNOSIS — M50.30 DDD (DEGENERATIVE DISC DISEASE), CERVICAL: ICD-10-CM

## 2025-08-02 DIAGNOSIS — M48.061 SPINAL STENOSIS, LUMBAR REGION, WITHOUT NEUROGENIC CLAUDICATION: ICD-10-CM

## 2025-08-02 DIAGNOSIS — G89.4 CHRONIC PAIN SYNDROME: ICD-10-CM

## 2025-08-02 DIAGNOSIS — M48.02 CERVICAL STENOSIS OF SPINE: ICD-10-CM

## 2025-08-04 RX ORDER — OXYCODONE AND ACETAMINOPHEN 7.5; 325 MG/1; MG/1
1 TABLET ORAL EVERY 6 HOURS PRN
Qty: 64 TABLET | Refills: 0 | Status: SHIPPED | OUTPATIENT
Start: 2025-08-04 | End: 2025-08-20

## 2025-08-21 DIAGNOSIS — E78.9 LIPID DISORDER: ICD-10-CM

## 2025-08-21 RX ORDER — ROSUVASTATIN CALCIUM 10 MG/1
10 TABLET, COATED ORAL NIGHTLY
Qty: 90 TABLET | Refills: 0 | Status: SHIPPED | OUTPATIENT
Start: 2025-08-21

## 2025-08-25 ENCOUNTER — OFFICE VISIT (OUTPATIENT)
Dept: PAIN MANAGEMENT | Age: 55
End: 2025-08-25
Payer: COMMERCIAL

## 2025-08-25 VITALS
BODY MASS INDEX: 25.46 KG/M2 | HEART RATE: 70 BPM | SYSTOLIC BLOOD PRESSURE: 112 MMHG | OXYGEN SATURATION: 96 % | DIASTOLIC BLOOD PRESSURE: 62 MMHG | WEIGHT: 153 LBS

## 2025-08-25 DIAGNOSIS — M51.371 DEGENERATION OF INTERVERTEBRAL DISC OF LUMBOSACRAL REGION WITH LOWER EXTREMITY PAIN: ICD-10-CM

## 2025-08-25 DIAGNOSIS — M48.061 SPINAL STENOSIS, LUMBAR REGION, WITHOUT NEUROGENIC CLAUDICATION: ICD-10-CM

## 2025-08-25 DIAGNOSIS — F51.01 PRIMARY INSOMNIA: ICD-10-CM

## 2025-08-25 DIAGNOSIS — M50.30 DDD (DEGENERATIVE DISC DISEASE), CERVICAL: ICD-10-CM

## 2025-08-25 DIAGNOSIS — Z51.81 ENCOUNTER FOR THERAPEUTIC DRUG MONITORING: ICD-10-CM

## 2025-08-25 DIAGNOSIS — M54.16 LUMBAR NERVE ROOT IMPINGEMENT: ICD-10-CM

## 2025-08-25 DIAGNOSIS — M48.02 CERVICAL STENOSIS OF SPINE: ICD-10-CM

## 2025-08-25 DIAGNOSIS — G89.4 CHRONIC PAIN SYNDROME: ICD-10-CM

## 2025-08-25 DIAGNOSIS — Z98.890 S/P LAMINECTOMY: ICD-10-CM

## 2025-08-25 DIAGNOSIS — M47.817 LUMBOSACRAL SPONDYLOSIS WITHOUT MYELOPATHY: ICD-10-CM

## 2025-08-25 DIAGNOSIS — G56.03 CARPAL TUNNEL SYNDROME, BILATERAL: ICD-10-CM

## 2025-08-25 PROCEDURE — 99214 OFFICE O/P EST MOD 30 MIN: CPT | Performed by: NURSE PRACTITIONER

## 2025-08-25 RX ORDER — ADALIMUMAB-ADAZ 40 MG/.4ML
40 INJECTION, SOLUTION SUBCUTANEOUS
COMMUNITY
Start: 2025-07-24

## 2025-08-25 RX ORDER — OXYCODONE AND ACETAMINOPHEN 7.5; 325 MG/1; MG/1
1 TABLET ORAL EVERY 6 HOURS PRN
Qty: 112 TABLET | Refills: 0 | Status: SHIPPED | OUTPATIENT
Start: 2025-08-25 | End: 2025-09-22

## 2025-08-25 RX ORDER — PREGABALIN 50 MG/1
50 CAPSULE ORAL NIGHTLY
Qty: 30 CAPSULE | Refills: 0 | Status: SHIPPED | OUTPATIENT
Start: 2025-08-25 | End: 2025-09-24

## (undated) DEVICE — SYRINGE CATH TIP 50ML

## (undated) DEVICE — 40583 XL ADVANCED TRENDELENBURG POSITIONING KIT: Brand: 40583 XL ADVANCED TRENDELENBURG POSITIONING KIT

## (undated) DEVICE — PUMP SUC IRR TBNG L10FT W/ HNDPC ASSEMB STRYKEFLOW 2

## (undated) DEVICE — SUTURE VCRL SZ 3-0 L18IN ABSRB UD L26MM SH 1/2 CIR J864D

## (undated) DEVICE — SOLUTION BOWL: Brand: KENDALL

## (undated) DEVICE — GAUZE,SPONGE,4"X4",16PLY,STRL,LF,10/TRAY: Brand: MEDLINE

## (undated) DEVICE — SUTURE MCRYL SZ 4-0 L27IN ABSRB UD L19MM PS-2 1/2 CIR PRIM Y426H

## (undated) DEVICE — TOWEL,STOP FLAG GOLD N-W: Brand: MEDLINE

## (undated) DEVICE — LAPAROSCOPIC ACCESS SYSTEM: Brand: ALEXIS LAPAROSCOPIC SYSTEM WITH KII FIOS FIRST ENTRY

## (undated) DEVICE — TROCAR: Brand: KII FIOS FIRST ENTRY

## (undated) DEVICE — BLADELESS OBTURATOR: Brand: WECK VISTA

## (undated) DEVICE — PEN: MARKING STD 100/CS: Brand: MEDICAL ACTION INDUSTRIES

## (undated) DEVICE — CHLORAPREP 26ML ORANGE

## (undated) DEVICE — CANNULA SEAL

## (undated) DEVICE — SYSTEM SMK EVAC LAP TBNG FILTER HSNG BENT STYL PNK SEE CLR

## (undated) DEVICE — SUTURE VCRL SZ 0 L27IN ABSRB UD L36MM CT-1 1/2 CIR J260H

## (undated) DEVICE — LEGGINGS, PAIR, 33X51 XL, STERILE: Brand: MEDLINE

## (undated) DEVICE — GLOVE SURG SZ 7 CRM LTX FREE POLYISOPRENE POLYMER BEAD ANTI

## (undated) DEVICE — ROBOTIC: Brand: MEDLINE INDUSTRIES, INC.

## (undated) DEVICE — VESSEL SEALER EXTEND: Brand: ENDOWRIST

## (undated) DEVICE — STERILE POLYISOPRENE POWDER-FREE SURGICAL GLOVES: Brand: PROTEXIS

## (undated) DEVICE — CATHETER DRNGE 34FR 2 EYE PROPORTIONATE HD DISP FOR

## (undated) DEVICE — TOWEL,OR,DSP,ST,BLUE,STD,4/PK,20PK/CS: Brand: MEDLINE

## (undated) DEVICE — NEEDLE HYPO 22GA L1.5IN BLK POLYPR HUB S STL REG BVL STR

## (undated) DEVICE — 3M™ IOBAN™ 2 ANTIMICROBIAL INCISE DRAPE 6648EZ: Brand: IOBAN™ 2

## (undated) DEVICE — Device: Brand: JELCO

## (undated) DEVICE — SOLUTION ANTIFOG VIS SYS CLEARIFY LAPSCP

## (undated) DEVICE — INTENDED FOR TISSUE SEPARATION, AND OTHER PROCEDURES THAT REQUIRE A SHARP SURGICAL BLADE TO PUNCTURE OR CUT.: Brand: BARD-PARKER ® CARBON RIB-BACK BLADES

## (undated) DEVICE — STAPLER 60: Brand: SUREFORM

## (undated) DEVICE — UNIVERSAL BLOCK TRAY: Brand: AVANOS*

## (undated) DEVICE — SEALER/DIVIDER LAP SHFT L44CM JAW APER 11.4MM 315DEG ROT

## (undated) DEVICE — STAPLER EXT 65MM S STL AUTO DISP PURSTRING

## (undated) DEVICE — SOLUTION IV 1000ML 0.9% SOD CHL PH 5 INJ USP VIAFLX PLAS

## (undated) DEVICE — MEDIA CONTRAST RX ISOVUE-300 61% 30ML VIALS

## (undated) DEVICE — Device

## (undated) DEVICE — DRAPE,UNDERBUTTOCKS,PCH,STERILE: Brand: MEDLINE

## (undated) DEVICE — ALCOHOL RUBBING 16OZ 70% ISO

## (undated) DEVICE — UNIVERSAL BLOCK TRAY: Brand: MEDLINE INDUSTRIES, INC.

## (undated) DEVICE — NEEDLE SPNL 22GA L3.5IN BLK HUB S STL REG WALL FIT STYL W/

## (undated) DEVICE — SPONGE,LAP,18"X18",DLX,XR,ST,5/PK,40/PK: Brand: MEDLINE

## (undated) DEVICE — SYRINGE MED 10ML SLIP TIP BLNT FILL AND LUERLOCK DISP

## (undated) DEVICE — ARM DRAPE

## (undated) DEVICE — STAPLER 60 RELOAD BLUE: Brand: SUREFORM

## (undated) DEVICE — SEAL

## (undated) DEVICE — STAPLER INT DIA29MM CLS STPL H1.5-2.2MM OPN LEG L5.2MM 26

## (undated) DEVICE — REDUCER: Brand: ENDOWRIST

## (undated) DEVICE — STERILE PVP: Brand: MEDLINE INDUSTRIES, INC.

## (undated) DEVICE — SYRINGE MED 3ML CLR PLAS STD N CTRL LUERLOCK TIP DISP

## (undated) DEVICE — ADHESIVE SKIN CLSR 0.7ML TOP DERMBND ADV

## (undated) DEVICE — SOLUTION INJ LR VISIV 1000ML BG

## (undated) DEVICE — TOTAL TRAY, 16FR 10ML SIL FOLEY, URN: Brand: MEDLINE

## (undated) DEVICE — TIP-UP FENESTRATED GRASPER: Brand: ENDOWRIST

## (undated) DEVICE — TRAY PREP DRY W/ PREM GLV 2 APPL 6 SPNG 2 UNDPD 1 OVERWRAP

## (undated) DEVICE — APPLICATOR MEDICATED 26 CC SOLUTION HI LT ORNG CHLORAPREP

## (undated) DEVICE — STANDARD HYPODERMIC NEEDLE,POLYPROPYLENE HUB: Brand: MONOJECT

## (undated) DEVICE — DISPOSABLE OR TOWEL: Brand: CARDINAL HEALTH